# Patient Record
Sex: FEMALE | Race: WHITE | NOT HISPANIC OR LATINO | Employment: UNEMPLOYED | URBAN - NONMETROPOLITAN AREA
[De-identification: names, ages, dates, MRNs, and addresses within clinical notes are randomized per-mention and may not be internally consistent; named-entity substitution may affect disease eponyms.]

---

## 2018-01-21 ENCOUNTER — HOSPITAL ENCOUNTER (EMERGENCY)
Facility: HOSPITAL | Age: 73
Discharge: HOME | End: 2018-01-21
Attending: EMERGENCY MEDICINE | Admitting: EMERGENCY MEDICINE
Payer: MEDICARE

## 2018-01-21 VITALS
OXYGEN SATURATION: 99 % | BODY MASS INDEX: 28.25 KG/M2 | TEMPERATURE: 100.3 F | HEART RATE: 98 BPM | DIASTOLIC BLOOD PRESSURE: 69 MMHG | WEIGHT: 180 LBS | HEIGHT: 67 IN | RESPIRATION RATE: 18 BRPM | SYSTOLIC BLOOD PRESSURE: 160 MMHG
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
## 2018-01-21 DIAGNOSIS — S61.452A CAT BITE OF LEFT HAND, INITIAL ENCOUNTER: ICD-10-CM

## 2018-01-21 DIAGNOSIS — L03.114 CELLULITIS OF HAND, LEFT: Primary | ICD-10-CM

## 2018-01-21 PROCEDURE — 99283 EMERGENCY DEPT VISIT LOW MDM: CPT

## 2018-01-21 PROCEDURE — 90471 IMMUNIZATION ADMIN: CPT

## 2018-01-21 PROCEDURE — 90715 TDAP VACCINE 7 YRS/> IM: CPT | Performed by: EMERGENCY MEDICINE

## 2018-01-21 RX ORDER — AMOXICILLIN AND CLAVULANATE POTASSIUM 875; 125 MG/1; MG/1
1 TABLET, FILM COATED ORAL EVERY 12 HOURS
Qty: 14 TABLET | Refills: 0 | Status: SHIPPED | OUTPATIENT
Start: 2018-01-21 | End: 2018-02-26 | Stop reason: HOSPADM

## 2018-01-21 RX ORDER — LEVOTHYROXINE SODIUM 0.2 MG/1
200 TABLET ORAL DAILY
COMMUNITY

## 2018-01-21 RX ORDER — AZATHIOPRINE 50 MG/1
50 TABLET ORAL 3 TIMES DAILY
COMMUNITY

## 2018-01-21 RX ORDER — ERGOCALCIFEROL 1.25 MG/1
50000 CAPSULE ORAL WEEKLY
COMMUNITY
End: 2021-11-26

## 2018-01-21 RX ORDER — ALENDRONATE SODIUM 70 MG/1
70 TABLET ORAL
COMMUNITY
End: 2021-11-27

## 2018-01-21 RX ORDER — OXYCODONE HYDROCHLORIDE AND ACETAMINOPHEN 5; 325 MG/1; MG/1
1 TABLET ORAL ONCE
Status: COMPLETED | OUTPATIENT
Start: 2018-01-21 | End: 2018-01-21

## 2018-01-21 RX ORDER — AMOXICILLIN AND CLAVULANATE POTASSIUM 875; 125 MG/1; MG/1
1 TABLET, FILM COATED ORAL ONCE
Status: COMPLETED | OUTPATIENT
Start: 2018-01-21 | End: 2018-01-21

## 2018-01-21 RX ORDER — IBUPROFEN 800 MG/1
TABLET ORAL EVERY 8 HOURS PRN
COMMUNITY
End: 2018-02-26 | Stop reason: HOSPADM

## 2018-01-21 RX ORDER — ATORVASTATIN CALCIUM 40 MG/1
40 TABLET, FILM COATED ORAL DAILY
COMMUNITY
End: 2018-03-25 | Stop reason: HOSPADM

## 2018-01-21 RX ORDER — GLIPIZIDE 10 MG/1
5 TABLET ORAL
COMMUNITY
End: 2021-11-26

## 2018-01-21 RX ORDER — NAPROXEN 500 MG/1
500 TABLET ORAL 2 TIMES DAILY WITH MEALS
Qty: 14 TABLET | Refills: 0 | Status: SHIPPED | OUTPATIENT
Start: 2018-01-21 | End: 2018-02-26 | Stop reason: HOSPADM

## 2018-01-21 RX ORDER — MELATONIN
1000 DAILY
COMMUNITY

## 2018-01-21 RX ADMIN — AMOXICILLIN AND CLAVULANATE POTASSIUM 1 TABLET: 875; 125 TABLET, FILM COATED ORAL at 15:34

## 2018-01-21 RX ADMIN — TETANUS TOXOID, REDUCED DIPHTHERIA TOXOID AND ACELLULAR PERTUSSIS VACCINE, ADSORBED 0.5 ML: 5; 2.5; 8; 8; 2.5 SUSPENSION INTRAMUSCULAR at 15:53

## 2018-01-21 RX ADMIN — OXYCODONE HYDROCHLORIDE AND ACETAMINOPHEN 1 TABLET: 5; 325 TABLET ORAL at 15:34

## 2018-01-21 NOTE — DISCHARGE INSTRUCTIONS
Animal Bite   WHAT YOU NEED TO KNOW:   Animal bite injuries range from shallow cuts to deep, life-threatening wounds  An animal can cut or puncture the skin when it bites  Your skin may be torn from your body  Your skin may swell or bruise even if the bite does not break the skin  Animal bites occur more often on the hands, arms, legs, and face  Bites from dogs and cats are the most common injuries  DISCHARGE INSTRUCTIONS:   Return to the emergency department if:   · You have a fever  · Your wound is red, swollen, and draining pus  · You see red streaks on the skin around the wound  · You can no longer move the bitten area  · Your heartbeat and breathing are much faster than usual     · You feel dizzy and confused  Contact your healthcare provider if:   · Your pain does not get better, even after you take pain medicine  · You have nightmares or flashbacks about the animal bite  · You have questions or concerns about your condition or care  Medicines: You may need any of the following:  · Antibiotics  prevent or treat a bacterial infection  · Prescription pain medicine  may be given  Ask how to take this medicine safely  · A tetanus vaccine  may be needed to prevent tetanus  Tetanus is a life-threatening bacterial infection that affects the nerves and muscles  The bacteria can be spread through animal bites  · A rabies vaccine  may be needed to prevent rabies  Rabies is a life-threatening viral infection  The virus can be spread through animal bites  · Take your medicine as directed  Contact your healthcare provider if you think your medicine is not helping or if you have side effects  Tell him of her if you are allergic to any medicine  Keep a list of the medicines, vitamins, and herbs you take  Include the amounts, and when and why you take them  Bring the list or the pill bottles to follow-up visits  Carry your medicine list with you in case of an emergency    Follow up with your healthcare provider in 1 to 2 days: You may need to return to have your stitches removed  Write down your questions so you remember to ask them during your visits  Self-care:   · Apply antibiotic ointment as directed  This helps prevent infection in minor skin wounds  It is available without a doctor's order  · Keep the wound clean and covered  Wash the wound every day with soap and water or germ-killing cleanser  Ask your healthcare provider about the kinds of bandages to use  · Apply ice on your wound  Ice helps decrease swelling and pain  Ice may also help prevent tissue damage  Use an ice pack, or put crushed ice in a plastic bag  Cover it with a towel and place it on your wound for 15 to 20 minutes every hour or as directed  · Elevate the wound area  Raise your wound above the level of your heart as often as you can  This will help decrease swelling and pain  Prop your wound on pillows or blankets to keep it elevated comfortably  Prevent another animal bite:   · Learn to recognize the signs of a scared or angry pet  Avoid quick, sudden movements  · Do not step between animals that are fighting  · Do not leave a pet alone with a young child  · Do not disturb an animal while it eats, sleeps, or cares for its young  · Do not approach an animal you do not know, especially one that is tied up or caged  · Stay away from animals that seem sick or act strangely  · Do not feed or capture wild animals  © 2017 [facility] Information is for End User's use only and may not be sold, redistributed or otherwise used for commercial purposes  All illustrations and images included in CareNotes® are the copyrighted property of [employer]  or Cameron Hurtado  The above information is an  only  It is not intended as medical advice for individual conditions or treatments   Talk to your doctor, nurse or pharmacist before following any medical regimen to see if it is safe and effective for you  Cellulitis, Ambulatory Care   GENERAL INFORMATION:   Cellulitis  is a skin infection caused by bacteria  Common symptoms include the following:   · Fever    · A red, warm, swollen area on your skin    · Pain when the area is touched    · Bumps or blisters (abscess) that may drain pus    · Bumpy, raised skin that feels like an orange peel  Seek immediate care for the following symptoms:   · An increase in pain, redness, warmth, and size    · Red streaks coming from the infected area    · A thin, gray-brown discharge coming from your infected skin area    · A crackling under your skin when you touch it    · Purple dots or bumps on your skin, or bleeding under your skin    · New swelling and pain in your legs    · Sudden trouble breathing or chest pain  Treatment for cellulitis  may include medicines to treat the bacterial infection or decrease pain  The infection may need to be cleaned out  Damaged, dead, or infected tissue may need to be cut away to help your wound heal   Manage your symptoms:   · Elevate your wound above the level of your heart  as often as you can  This will help decrease swelling and pain  Prop your wound on pillows or blankets to keep it elevated comfortably  · Clean your wound as directed  You may need to wash the wound with soap and water  Look for signs of infection  · Wear pressure stockings as directed  The stockings are tight and put pressure on your legs  This improves blood flow and decreases swelling  Prevent cellulitis:   · Wash your hands often  Use soap and water  Wash your hands after you use the bathroom, change a child's diapers, or sneeze  Wash your hands before you prepare or eat food  Use lotion to prevent dry, cracked skin  · Do not share personal items, such as towels, clothing, and razors  · Clean exercise equipment  with germ-killing  before and after you use it    Follow up with your healthcare provider as directed:  Write down your questions so you remember to ask them during your visits  CARE AGREEMENT:   You have the right to help plan your care  Learn about your health condition and how it may be treated  Discuss treatment options with your caregivers to decide what care you want to receive  You always have the right to refuse treatment  The above information is an  only  It is not intended as medical advice for individual conditions or treatments  Talk to your doctor, nurse or pharmacist before following any medical regimen to see if it is safe and effective for you  © 2014 [address] is for End User's use only and may not be sold, redistributed or otherwise used for commercial purposes  All illustrations and images included in CareNotes® are the copyrighted property of [employer]  or [facility]

## 2018-01-21 NOTE — ED PROVIDER NOTES
History  Chief Complaint   Patient presents with    Animal Bite     bit on left hand approx  1 week ago  the past few days hand has become sore and red     HPI     69-year-old female was bit on her left hand by her cat who does not have rabies about 1 week ago  It started getting red and painful a couple days ago  No fevers chills or sweats  No pain in the wrist   No numbness or tingling  Is right-handed  Takes azathioprine for multiple sclerosis  No chest pain shortness of breath  No forearm pain  No recent antibiotics  Medical decision making:    Impression:  Cat bite cellulitis to left hand without crepitus or signs of abscess  No lymphangitis at this time  Warrants Augmentin and pain control  Prior to Admission Medications   Prescriptions Last Dose Informant Patient Reported? Taking? alendronate (FOSAMAX) 70 mg tablet   Yes Yes   Sig: Take 70 mg by mouth every 7 days   atorvastatin (LIPITOR) 40 mg tablet   Yes Yes   Sig: Take 40 mg by mouth daily   azaTHIOprine (IMURAN) 50 mg tablet   Yes Yes   Sig: Take 50 mg by mouth 3 (three) times a day   cholecalciferol (VITAMIN D3) 1,000 units tablet   Yes Yes   Sig: Take 1,000 Units by mouth daily   ergocalciferol (VITAMIN D2) 50,000 units   Yes Yes   Sig: Take 50,000 Units by mouth once a week   glipiZIDE (GLUCOTROL) 10 mg tablet   Yes Yes   Sig: Take 10 mg by mouth 2 (two) times a day before meals   ibuprofen (MOTRIN) 800 mg tablet   Yes Yes   Sig: Take by mouth every 8 (eight) hours as needed for mild pain   levothyroxine 200 mcg tablet   Yes Yes   Sig: Take 200 mcg by mouth daily   metFORMIN (GLUCOPHAGE) 1000 MG tablet   Yes Yes   Sig: Take 1,000 mg by mouth 2 (two) times a day with meals      Facility-Administered Medications: None       Past Medical History:   Diagnosis Date    Diabetes mellitus ([facility] [address] )     Disease of thyroid gland     Hyperlipidemia     Multiple sclerosis ([facility] [address] )        History reviewed  No pertinent surgical history      History reviewed  No pertinent family history  I have reviewed and agree with the history as documented  Social History   Substance Use Topics    Smoking status: Former Smoker     Types: Cigarettes     Quit date: 2007    Smokeless tobacco: Never Used    Alcohol use Yes      Comment: rarely        Review of Systems   Constitutional: Negative for chills, fatigue and fever  HENT: Negative for congestion, ear pain, rhinorrhea, sinus pressure, sore throat, trouble swallowing and voice change  Eyes: Negative for pain and visual disturbance  Respiratory: Negative for cough, choking, shortness of breath and stridor  Cardiovascular: Negative for chest pain, palpitations and leg swelling  Gastrointestinal: Negative for abdominal pain, blood in stool, constipation, diarrhea, nausea and vomiting  Endocrine: Negative  Genitourinary: Negative for dysuria, flank pain, hematuria, pelvic pain and urgency  Musculoskeletal: Negative  Skin: Positive for wound  Allergic/Immunologic: Negative  Neurological: Negative for dizziness, speech difficulty, weakness, light-headedness and numbness  Hematological: Negative  Psychiatric/Behavioral: Negative  Physical Exam  ED Triage Vitals [01/21/18 1505]   Temperature Pulse Respirations Blood Pressure SpO2   100 3 °F (37 9 °C) 98 18 160/69 99 %      Temp Source Heart Rate Source Patient Position - Orthostatic VS BP Location FiO2 (%)   Tympanic Monitor Sitting Right arm --      Pain Score       9           Orthostatic Vital Signs  Vitals:    01/21/18 1505   BP: 160/69   Pulse: 98   Patient Position - Orthostatic VS: Sitting       Physical Exam   Constitutional: She is oriented to person, place, and time  She appears well-developed and well-nourished  No distress  HENT:   Head: Normocephalic and atraumatic     Nose: Nose normal    Mouth/Throat: Oropharynx is clear and moist    Eyes: Conjunctivae and EOM are normal  Pupils are equal, round, and reactive to light    Neck: Normal range of motion  Neck supple  Cardiovascular: Normal rate, regular rhythm, normal heart sounds and intact distal pulses  Exam reveals no gallop and no friction rub  No murmur heard  Pulmonary/Chest: Effort normal and breath sounds normal  No stridor  No respiratory distress  She has no wheezes  She has no rales  She exhibits no tenderness  Abdominal: Soft  Bowel sounds are normal  She exhibits no distension  There is no tenderness  There is no rebound and no guarding  Musculoskeletal: Normal range of motion  She exhibits no deformity  Neurological: She is alert and oriented to person, place, and time  She exhibits normal muscle tone  Skin: Skin is warm and dry  No rash noted  No erythema  Left dorsal hand cellulitis with punctate puncture site without discharge or open wound  No lymphangitis  No fluctuance  Psychiatric: She has a normal mood and affect  Vitals reviewed        ED Medications  Medications   amoxicillin-clavulanate (AUGMENTIN) 875-125 mg per tablet 1 tablet (1 tablet Oral Given 1/21/18 1534)   oxyCODONE-acetaminophen (PERCOCET) 5-325 mg per tablet 1 tablet (1 tablet Oral Given 1/21/18 1534)   tetanus-diphtheria-acellular pertussis (BOOSTRIX) IM injection 0 5 mL (0 5 mL Intramuscular Given 1/21/18 1583)       Diagnostic Studies  Results Reviewed     None                 No orders to display              Procedures  Procedures       Phone Contacts  ED Phone Contact    ED Course  ED Course                                MDM  CritCare Time    Disposition  Final diagnoses:   Cellulitis of hand, left   Cat bite of left hand, initial encounter     Time reflects when diagnosis was documented in both MDM as applicable and the Disposition within this note     Time User Action Codes Description Comment    1/21/2018  4:31 PM Teja, Case Add [[id number]] Cellulitis of hand, left     1/21/2018  4:32 PM Teja Case Add Gabriel Wren Cat bite of left hand, initial encounter       ED Disposition     ED Disposition Condition Comment    Discharge  Gloria Felix discharge to home/self care  Condition at discharge: Good        Follow-up Information     Follow up With Specialties Details Why Contact Info    Rachel Martin DO Family Medicine Schedule an appointment as soon as possible for a visit in 3 days for re-evaluation [address] [place] [address] [address]  [phone]          Discharge Medication List as of 1/21/2018  4:34 PM      START taking these medications    Details   amoxicillin-clavulanate (AUGMENTIN) 875-125 mg per tablet Take 1 tablet by mouth every 12 (twelve) hours for 7 days, Starting Sun 1/21/2018, Until Sun 1/28/2018, Print      naproxen (NAPROSYN) 500 mg tablet Take 1 tablet by mouth 2 (two) times a day with meals for 7 days, Starting Sun 1/21/2018, Until Sun 1/28/2018, Print         CONTINUE these medications which have NOT CHANGED    Details   alendronate (FOSAMAX) 70 mg tablet Take 70 mg by mouth every 7 days, Historical Med      atorvastatin (LIPITOR) 40 mg tablet Take 40 mg by mouth daily, Historical Med      azaTHIOprine (IMURAN) 50 mg tablet Take 50 mg by mouth 3 (three) times a day, Historical Med      cholecalciferol (VITAMIN D3) 1,000 units tablet Take 1,000 Units by mouth daily, Historical Med      ergocalciferol (VITAMIN D2) 50,000 units Take 50,000 Units by mouth once a week, Historical Med      glipiZIDE (GLUCOTROL) 10 mg tablet Take 10 mg by mouth 2 (two) times a day before meals, Historical Med      ibuprofen (MOTRIN) 800 mg tablet Take by mouth every 8 (eight) hours as needed for mild pain, Historical Med      levothyroxine 200 mcg tablet Take 200 mcg by mouth daily, Historical Med      metFORMIN (GLUCOPHAGE) 1000 MG tablet Take 1,000 mg by mouth 2 (two) times a day with meals, Historical Med           No discharge procedures on file      ED Provider  Electronically Signed by           Hi Meléndez DO  01/21/18 6832

## 2018-01-28 ENCOUNTER — HOSPITAL ENCOUNTER (INPATIENT)
Facility: HOSPITAL | Age: 73
LOS: 1 days | DRG: 812 | End: 2018-01-28
Attending: EMERGENCY MEDICINE | Admitting: HOSPITALIST
Payer: MEDICARE

## 2018-01-28 ENCOUNTER — APPOINTMENT (EMERGENCY)
Dept: RADIOLOGY | Facility: HOSPITAL | Age: 73
DRG: 812 | End: 2018-01-28
Payer: MEDICARE

## 2018-01-28 ENCOUNTER — HOSPITAL ENCOUNTER (INPATIENT)
Facility: HOSPITAL | Age: 73
LOS: 29 days | Discharge: NURSING, REHAB OR LONG-TERM CARE | DRG: 853 | End: 2018-02-26
Attending: INTERNAL MEDICINE | Admitting: INTERNAL MEDICINE
Payer: MEDICARE

## 2018-01-28 VITALS
SYSTOLIC BLOOD PRESSURE: 148 MMHG | HEART RATE: 92 BPM | TEMPERATURE: 98.8 F | RESPIRATION RATE: 18 BRPM | WEIGHT: 174.2 LBS | OXYGEN SATURATION: 97 % | DIASTOLIC BLOOD PRESSURE: 65 MMHG | BODY MASS INDEX: 27.28 KG/M2

## 2018-01-28 DIAGNOSIS — D62 ACUTE BLOOD LOSS ANEMIA: ICD-10-CM

## 2018-01-28 DIAGNOSIS — R77.8 ELEVATED TROPONIN: ICD-10-CM

## 2018-01-28 DIAGNOSIS — L98.429 SACRAL ULCER (HCC): ICD-10-CM

## 2018-01-28 DIAGNOSIS — K25.9 GASTRIC ULCER: ICD-10-CM

## 2018-01-28 DIAGNOSIS — D61.818 PANCYTOPENIA (HCC): ICD-10-CM

## 2018-01-28 DIAGNOSIS — D62 ACUTE POSTHEMORRHAGIC ANEMIA: ICD-10-CM

## 2018-01-28 DIAGNOSIS — G93.41 METABOLIC ENCEPHALOPATHY: ICD-10-CM

## 2018-01-28 DIAGNOSIS — N17.9 AKI (ACUTE KIDNEY INJURY) (HCC): ICD-10-CM

## 2018-01-28 DIAGNOSIS — R53.1 WEAKNESS: ICD-10-CM

## 2018-01-28 DIAGNOSIS — K25.2 ACUTE GASTRIC ULCER WITH HEMORRHAGE AND PERFORATION (HCC): ICD-10-CM

## 2018-01-28 DIAGNOSIS — G35 MULTIPLE SCLEROSIS (HCC): ICD-10-CM

## 2018-01-28 DIAGNOSIS — K65.9 PERITONITIS (HCC): ICD-10-CM

## 2018-01-28 DIAGNOSIS — D64.9 ANEMIA: Primary | ICD-10-CM

## 2018-01-28 DIAGNOSIS — E11.65 TYPE 2 DIABETES MELLITUS WITH HYPERGLYCEMIA, UNSPECIFIED LONG TERM INSULIN USE STATUS: ICD-10-CM

## 2018-01-28 DIAGNOSIS — R19.5 HEME POSITIVE STOOL: ICD-10-CM

## 2018-01-28 DIAGNOSIS — I63.531 ACUTE RIGHT PCA STROKE (HCC): ICD-10-CM

## 2018-01-28 DIAGNOSIS — K66.8 PNEUMOPERITONEUM: ICD-10-CM

## 2018-01-28 DIAGNOSIS — R73.9 HYPERGLYCEMIA: ICD-10-CM

## 2018-01-28 DIAGNOSIS — I63.531 ACUTE ISCHEMIC RIGHT POSTERIOR CEREBRAL ARTERY (PCA) STROKE (HCC): ICD-10-CM

## 2018-01-28 DIAGNOSIS — G35 HISTORY OF MULTIPLE SCLEROSIS (HCC): ICD-10-CM

## 2018-01-28 PROBLEM — E11.9 TYPE 2 DIABETES MELLITUS (HCC): Status: ACTIVE | Noted: 2018-01-28

## 2018-01-28 LAB
ABO GROUP BLD: NORMAL
ACETONE SERPL-MCNC: NEGATIVE MG/DL
ALBUMIN SERPL BCP-MCNC: 2.3 G/DL (ref 3.5–5)
ALP SERPL-CCNC: 76 U/L (ref 46–116)
ALT SERPL W P-5'-P-CCNC: 23 U/L (ref 12–78)
ANION GAP SERPL CALCULATED.3IONS-SCNC: 16 MMOL/L (ref 4–13)
AST SERPL W P-5'-P-CCNC: 30 U/L (ref 5–45)
ATRIAL RATE: 82 BPM
BACTERIA UR QL AUTO: ABNORMAL /HPF
BASE EX.OXY STD BLDV CALC-SCNC: 81.2 % (ref 60–80)
BASE EXCESS BLDV CALC-SCNC: -6.7 MMOL/L
BASOPHILS # BLD MANUAL: 0 THOUSAND/UL (ref 0–0.1)
BASOPHILS NFR MAR MANUAL: 0 % (ref 0–1)
BILIRUB SERPL-MCNC: 1.3 MG/DL (ref 0.2–1)
BILIRUB UR QL STRIP: NEGATIVE
BLD GP AB SCN SERPL QL: NEGATIVE
BUN SERPL-MCNC: 43 MG/DL (ref 5–25)
CALCIUM SERPL-MCNC: 8.6 MG/DL (ref 8.3–10.1)
CHLORIDE SERPL-SCNC: 99 MMOL/L (ref 100–108)
CLARITY UR: CLEAR
CO2 SERPL-SCNC: 18 MMOL/L (ref 21–32)
COLOR UR: YELLOW
CREAT SERPL-MCNC: 1.31 MG/DL (ref 0.6–1.3)
DEPRECATED D DIMER PPP: 2742 NG/ML (FEU) (ref 0–424)
EOSINOPHIL # BLD MANUAL: 0 THOUSAND/UL (ref 0–0.4)
EOSINOPHIL NFR BLD MANUAL: 0 % (ref 0–6)
ERYTHROCYTE [DISTWIDTH] IN BLOOD BY AUTOMATED COUNT: 16.2 % (ref 11.6–15.1)
GFR SERPL CREATININE-BSD FRML MDRD: 41 ML/MIN/1.73SQ M
GLUCOSE SERPL-MCNC: 586 MG/DL (ref 65–140)
GLUCOSE UR STRIP-MCNC: ABNORMAL MG/DL
HCO3 BLDV-SCNC: 18.1 MMOL/L (ref 24–30)
HCT VFR BLD AUTO: 10 % (ref 34.8–46.1)
HGB BLD-MCNC: 3.2 G/DL (ref 11.5–15.4)
HGB UR QL STRIP.AUTO: ABNORMAL
INR PPP: 1.48 (ref 0.86–1.16)
KETONES UR STRIP-MCNC: NEGATIVE MG/DL
LEUKOCYTE ESTERASE UR QL STRIP: ABNORMAL
LYMPHOCYTES # BLD AUTO: 0.95 THOUSAND/UL (ref 0.6–4.47)
LYMPHOCYTES # BLD AUTO: 17 % (ref 14–44)
MCH RBC QN AUTO: 36.4 PG (ref 26.8–34.3)
MCHC RBC AUTO-ENTMCNC: 32 G/DL (ref 31.4–37.4)
MCV RBC AUTO: 114 FL (ref 82–98)
MONOCYTES # BLD AUTO: 0.39 THOUSAND/UL (ref 0–1.22)
MONOCYTES NFR BLD: 7 % (ref 4–12)
MYELOCYTES NFR BLD MANUAL: 1 % (ref 0–1)
NEUTROPHILS # BLD MANUAL: 4.17 THOUSAND/UL (ref 1.85–7.62)
NEUTS BAND NFR BLD MANUAL: 1 % (ref 0–8)
NEUTS SEG NFR BLD AUTO: 74 % (ref 43–75)
NITRITE UR QL STRIP: NEGATIVE
NON-SQ EPI CELLS URNS QL MICRO: ABNORMAL /HPF
NT-PROBNP SERPL-MCNC: 4178 PG/ML
O2 CT BLDV-SCNC: 4.2 ML/DL
OTHER STN SPEC: ABNORMAL
P AXIS: -6 DEGREES
PCO2 BLDV: 32.1 MM HG (ref 42–50)
PH BLDV: 7.37 [PH] (ref 7.3–7.4)
PH UR STRIP.AUTO: 5.5 [PH] (ref 4.5–8)
PLATELET # BLD AUTO: 59 THOUSANDS/UL (ref 149–390)
PMV BLD AUTO: 10.6 FL (ref 8.9–12.7)
PO2 BLDV: 54.1 MM HG (ref 35–45)
POTASSIUM SERPL-SCNC: 5.6 MMOL/L (ref 3.5–5.3)
PR INTERVAL: 144 MS
PROT SERPL-MCNC: 5.4 G/DL (ref 6.4–8.2)
PROT UR STRIP-MCNC: NEGATIVE MG/DL
PROTHROMBIN TIME: 17.9 SECONDS (ref 12.1–14.4)
QRS AXIS: -31 DEGREES
QRSD INTERVAL: 100 MS
QT INTERVAL: 364 MS
QTC INTERVAL: 425 MS
RBC # BLD AUTO: 0.88 MILLION/UL (ref 3.81–5.12)
RBC #/AREA URNS AUTO: ABNORMAL /HPF
RH BLD: POSITIVE
SODIUM SERPL-SCNC: 133 MMOL/L (ref 136–145)
SP GR UR STRIP.AUTO: 1.01 (ref 1–1.03)
SPECIMEN EXPIRATION DATE: NORMAL
T WAVE AXIS: 14 DEGREES
TOTAL CELLS COUNTED SPEC: 100
TROPONIN I SERPL-MCNC: 0.15 NG/ML
TSH SERPL DL<=0.05 MIU/L-ACNC: 0.28 UIU/ML (ref 0.36–3.74)
UROBILINOGEN UR QL STRIP.AUTO: 0.2 E.U./DL
VENTRICULAR RATE: 82 BPM
WBC # BLD AUTO: 5.56 THOUSAND/UL (ref 4.31–10.16)
WBC #/AREA URNS AUTO: ABNORMAL /HPF

## 2018-01-28 PROCEDURE — 84443 ASSAY THYROID STIM HORMONE: CPT | Performed by: EMERGENCY MEDICINE

## 2018-01-28 PROCEDURE — 85610 PROTHROMBIN TIME: CPT | Performed by: EMERGENCY MEDICINE

## 2018-01-28 PROCEDURE — 85027 COMPLETE CBC AUTOMATED: CPT | Performed by: EMERGENCY MEDICINE

## 2018-01-28 PROCEDURE — 86850 RBC ANTIBODY SCREEN: CPT | Performed by: EMERGENCY MEDICINE

## 2018-01-28 PROCEDURE — 86900 BLOOD TYPING SEROLOGIC ABO: CPT | Performed by: EMERGENCY MEDICINE

## 2018-01-28 PROCEDURE — 82805 BLOOD GASES W/O2 SATURATION: CPT | Performed by: EMERGENCY MEDICINE

## 2018-01-28 PROCEDURE — 81001 URINALYSIS AUTO W/SCOPE: CPT | Performed by: EMERGENCY MEDICINE

## 2018-01-28 PROCEDURE — 71046 X-RAY EXAM CHEST 2 VIEWS: CPT

## 2018-01-28 PROCEDURE — 85379 FIBRIN DEGRADATION QUANT: CPT | Performed by: EMERGENCY MEDICINE

## 2018-01-28 PROCEDURE — 86920 COMPATIBILITY TEST SPIN: CPT

## 2018-01-28 PROCEDURE — 36430 TRANSFUSION BLD/BLD COMPNT: CPT

## 2018-01-28 PROCEDURE — 36415 COLL VENOUS BLD VENIPUNCTURE: CPT | Performed by: EMERGENCY MEDICINE

## 2018-01-28 PROCEDURE — 93010 ELECTROCARDIOGRAM REPORT: CPT | Performed by: INTERNAL MEDICINE

## 2018-01-28 PROCEDURE — P9021 RED BLOOD CELLS UNIT: HCPCS

## 2018-01-28 PROCEDURE — 86901 BLOOD TYPING SEROLOGIC RH(D): CPT | Performed by: EMERGENCY MEDICINE

## 2018-01-28 PROCEDURE — 82009 KETONE BODYS QUAL: CPT | Performed by: EMERGENCY MEDICINE

## 2018-01-28 PROCEDURE — 84439 ASSAY OF FREE THYROXINE: CPT | Performed by: EMERGENCY MEDICINE

## 2018-01-28 PROCEDURE — 93005 ELECTROCARDIOGRAM TRACING: CPT | Performed by: EMERGENCY MEDICINE

## 2018-01-28 PROCEDURE — 82272 OCCULT BLD FECES 1-3 TESTS: CPT

## 2018-01-28 PROCEDURE — 99291 CRITICAL CARE FIRST HOUR: CPT

## 2018-01-28 PROCEDURE — 80053 COMPREHEN METABOLIC PANEL: CPT | Performed by: EMERGENCY MEDICINE

## 2018-01-28 PROCEDURE — 83880 ASSAY OF NATRIURETIC PEPTIDE: CPT | Performed by: EMERGENCY MEDICINE

## 2018-01-28 PROCEDURE — 85007 BL SMEAR W/DIFF WBC COUNT: CPT | Performed by: EMERGENCY MEDICINE

## 2018-01-28 PROCEDURE — 84484 ASSAY OF TROPONIN QUANT: CPT | Performed by: EMERGENCY MEDICINE

## 2018-01-28 RX ORDER — LEVOTHYROXINE SODIUM 0.1 MG/1
200 TABLET ORAL
Status: DISCONTINUED | OUTPATIENT
Start: 2018-01-29 | End: 2018-02-03

## 2018-01-28 RX ORDER — AZATHIOPRINE 50 MG/1
50 TABLET ORAL DAILY
Status: DISCONTINUED | OUTPATIENT
Start: 2018-01-29 | End: 2018-01-30

## 2018-01-28 RX ORDER — MELATONIN
1000 DAILY
Status: DISCONTINUED | OUTPATIENT
Start: 2018-01-29 | End: 2018-02-03

## 2018-01-28 RX ORDER — CHLORHEXIDINE GLUCONATE 0.12 MG/ML
15 RINSE ORAL EVERY 12 HOURS SCHEDULED
Status: DISCONTINUED | OUTPATIENT
Start: 2018-01-29 | End: 2018-01-29

## 2018-01-28 RX ORDER — METHYLPREDNISOLONE 4 MG/1
4 TABLET ORAL SEE ADMIN INSTRUCTIONS
COMMUNITY
End: 2018-02-26 | Stop reason: HOSPADM

## 2018-01-28 RX ORDER — GLIPIZIDE 5 MG/1
5 TABLET ORAL
Status: DISCONTINUED | OUTPATIENT
Start: 2018-01-29 | End: 2018-01-31

## 2018-01-28 RX ORDER — ERGOCALCIFEROL 1.25 MG/1
50000 CAPSULE ORAL WEEKLY
Status: DISCONTINUED | OUTPATIENT
Start: 2018-01-31 | End: 2018-02-03

## 2018-01-28 RX ORDER — ACETAMINOPHEN AND CODEINE PHOSPHATE 300; 30 MG/1; MG/1
1 TABLET ORAL EVERY 6 HOURS PRN
Status: DISCONTINUED | OUTPATIENT
Start: 2018-01-28 | End: 2018-01-29

## 2018-01-28 RX ORDER — ATORVASTATIN CALCIUM 40 MG/1
40 TABLET, FILM COATED ORAL
Status: DISCONTINUED | OUTPATIENT
Start: 2018-01-29 | End: 2018-02-01

## 2018-01-28 RX ORDER — METHYLPREDNISOLONE 4 MG/1
4 TABLET ORAL SEE ADMIN INSTRUCTIONS
Status: DISCONTINUED | OUTPATIENT
Start: 2018-01-28 | End: 2018-01-30

## 2018-01-28 RX ORDER — ACETAMINOPHEN AND CODEINE PHOSPHATE 300; 30 MG/1; MG/1
1 TABLET ORAL EVERY 6 HOURS PRN
COMMUNITY
End: 2018-02-26 | Stop reason: HOSPADM

## 2018-01-28 NOTE — ED NOTES
Pt palced on O2 via nasal cannula at 2LPM  Dr Morgan Arango made aware     Jillian Berman RN  01/28/18 5570

## 2018-01-28 NOTE — ED PROVIDER NOTES
History  Chief Complaint   Patient presents with    Weakness - Generalized     Asimn was bit by a cat last Sunday, was put on augmentin and naproxen  Since then, patient has been getting more weak  68-year-old female patient presents emergency department for evaluation fatigue  The patient states that she had been bitten by a cat last week, was treated for probable pasteurella infection and cellulitis doing better but then has become increasingly more weak  The patient does have a history of relapsing and remitting multiple sclerosis  Current, the patient is lying in bed, pale, alert and oriented but ill-appearing  The patient has no chest pains, shortness of breath, nausea, vomiting  Patient does have adventitious breath sounds bilaterally most consistent with crackles suggestive of congestive heart failure  Patient has positive hepatic jugular reflex  The patient does have bilateral 4+ pitting edema which he states is not normal for her  She has no history of renal dysfunction receive a history of congestive heart failure  The patient will be evaluated with a differential diagnosis to include but not be limited to congestive heart failure, non ST segment elevation myocardial infarction, renal failure, relapsing remitting MS  History provided by:  Patient   used: No    Fatigue   Severity:  Moderate  Onset quality:  Gradual  Timing:  Constant  Progression:  Worsening  Chronicity:  New  Context: recent infection    Context: not alcohol use, not change in medication, not decreased sleep, not drug use and not pinched nerve    Relieved by:  Nothing  Worsened by:  Nothing  Ineffective treatments:  None tried  Associated symptoms: no abdominal pain, no ataxia, no chest pain, no fever, no frequency, no seizures, no sensory-motor deficit and no stroke symptoms        Prior to Admission Medications   Prescriptions Last Dose Informant Patient Reported? Taking? acetaminophen-codeine (TYLENOL #3) 300-30 mg per tablet 1/28/2018 at 1300  Yes Yes   Sig: Take 1 tablet by mouth every 6 (six) hours as needed for moderate pain   alendronate (FOSAMAX) 70 mg tablet 1/27/2018 at 0500  Yes Yes   Sig: Take 70 mg by mouth every 7 days   amoxicillin-clavulanate (AUGMENTIN) 875-125 mg per tablet 1/28/2018 at 0500  No Yes   Sig: Take 1 tablet by mouth every 12 (twelve) hours for 7 days   atorvastatin (LIPITOR) 40 mg tablet 1/28/2018 at 0500  Yes Yes   Sig: Take 40 mg by mouth daily   azaTHIOprine (IMURAN) 50 mg tablet 1/28/2018 at 1300  Yes Yes   Sig: Take 50 mg by mouth 3 (three) times a day   cholecalciferol (VITAMIN D3) 1,000 units tablet 1/28/2018 at 0500  Yes Yes   Sig: Take 1,000 Units by mouth daily   ergocalciferol (VITAMIN D2) 50,000 units 1/24/2018 at 0500  Yes Yes   Sig: Take 50,000 Units by mouth once a week wednesday    glipiZIDE (GLUCOTROL) 10 mg tablet 1/28/2018 at 0500  Yes Yes   Sig: Take 5 mg by mouth 2 (two) times a day before meals     ibuprofen (MOTRIN) 800 mg tablet 1/28/2018 at 1300  Yes Yes   Sig: Take by mouth every 8 (eight) hours as needed for mild pain   levothyroxine 200 mcg tablet 1/27/2018 at 0500  Yes Yes   Sig: Take 200 mcg by mouth daily Skip Sundays    metFORMIN (GLUCOPHAGE) 1000 MG tablet 1/28/2018 at 0500  Yes Yes   Sig: Take 1,000 mg by mouth 2 (two) times a day with meals   methylprednisolone (MEDROL) 4 mg tablet 1/27/2018 at Unknown time  Yes Yes   Sig: Take 4 mg by mouth see administration instructions   naproxen (NAPROSYN) 500 mg tablet 1/28/2018 at 0500  No Yes   Sig: Take 1 tablet by mouth 2 (two) times a day with meals for 7 days      Facility-Administered Medications: None       Past Medical History:   Diagnosis Date    [facility] mellitus ([facility]     Disease of thyroid gland     Hyperlipidemia     Multiple sclerosis ([facility] )        Past Surgical History:   Procedure Laterality Date    MN LAP,DIAGNOSTIC ABDOMEN N/A 1/29/2018    Procedure: LAPAROSCOPY DIAGNOSTIC, with repair of perforated gastric ulcer;  Surgeon: Renee Rush MD;  Location: [facility];  Service: General       History reviewed  No pertinent family history  I have reviewed and agree with the history as documented  Social History   Substance Use Topics    Smoking status: Former Smoker     Types: Cigarettes     Quit date: 2007    Smokeless tobacco: Never Used    Alcohol use Yes      Comment: rarely        Review of Systems   Constitutional: Positive for fatigue  Negative for fever  Cardiovascular: Negative for chest pain  Gastrointestinal: Negative for abdominal pain  Genitourinary: Negative for frequency  Neurological: Negative for seizures  All other systems reviewed and are negative  Physical Exam  ED Triage Vitals   Temperature Pulse Respirations Blood Pressure SpO2   01/28/18 1655 01/28/18 1655 01/28/18 1655 01/28/18 1700 01/28/18 1655   98 9 °F (37 2 °C) 87 18 131/55 100 %      Temp Source Heart Rate Source Patient Position - Orthostatic VS BP Location FiO2 (%)   01/28/18 1655 01/28/18 1655 01/28/18 1655 01/28/18 1655 --   Temporal Monitor Sitting Left arm       Pain Score       01/28/18 1655       No Pain           Orthostatic Vital Signs  Vitals:    01/28/18 2046 01/28/18 2116 01/28/18 2130 01/28/18 2156   BP: 125/55 142/60 156/60 148/65   Pulse: 92 88 90 92   Patient Position - Orthostatic VS:    Lying       Physical Exam   Constitutional: She is oriented to person, place, and time  She appears well-developed and well-nourished  HENT:   Head: Normocephalic and atraumatic  Right Ear: External ear normal    Left Ear: External ear normal    Eyes: Conjunctivae and EOM are normal    Neck: No JVD present  No tracheal deviation present  No thyromegaly present  Cardiovascular: Normal rate  Pulmonary/Chest: Effort normal  No stridor  She has wheezes  She has rales  Abdominal: Soft  She exhibits no distension and no mass  There is no tenderness   There is no guarding  No hernia  Musculoskeletal: Normal range of motion  She exhibits edema  She exhibits no tenderness or deformity  Lymphadenopathy:     She has no cervical adenopathy  Neurological: She is alert and oriented to person, place, and time  Skin: Skin is warm  No rash noted  No erythema  No pallor  Psychiatric: She has a normal mood and affect  Her behavior is normal    Nursing note and vitals reviewed  ED Medications  Medications - No data to display    Diagnostic Studies  Results Reviewed     Procedure Component Value Units Date/Time    T4, free [[id number]]  (Normal) Collected:  01/28/18 1745    Lab Status:  Final result Specimen:  Blood from Arm, Right Updated:  01/29/18 1039     Free T4 1 24 ng/dL     Urine Microscopic [[id number]]  (Abnormal) Collected:  01/28/18 1857    Lab Status:  Final result Specimen:  Urine from Urine, Clean Catch Updated:  01/1945     RBC, UA 1-2 (A) /hpf      WBC, UA 4-10 (A) /hpf      Epithelial Cells Moderate (A) /hpf      Bacteria, UA Occasional /hpf      OTHER OBSERVATIONS Yeast Cells Present    CBC and differential [[id number]]  (Abnormal) Collected:  01/28/18 1816    Lab Status:  Final result Specimen:  Blood from Arm, Right Updated:  01/28/18 1911     WBC 5 56 Thousand/uL      RBC 0 88 (L) Million/uL      Hemoglobin 3 2 (LL) g/dL      Hematocrit 10 0 (L) %       (H) fL      MCH 36 4 (H) pg      MCHC 32 0 g/dL      RDW 16 2 (H) %      MPV 10 6 fL      Platelets 59 (L) Thousands/uL     UA w Reflex to Microscopic w Reflex to Culture [[id number]]  (Abnormal) Collected:  01/28/18 1857    Lab Status:  Final result Specimen:  Urine from Urine, Clean Catch Updated:  01/28/18 1910     Color, UA Yellow     Clarity, UA Clear     Specific Gravity, UA 1 010     pH, UA 5 5     Leukocytes, UA Elevated glucose may cause decreased leukocyte values   See urine microscopic for [facility] result/ (A)     Nitrite, UA Negative     Protein, UA Negative mg/dl      Glucose, UA >=1000 (1%) (A) mg/dl      Ketones, UA Negative mg/dl      Urobilinogen, UA 0 2 E U /dl      Bilirubin, UA Negative     Blood, UA Trace-Intact    Acetone [[id number]]  (Normal) Collected:  01/28/18 1837    Lab Status:  Final result Specimen:  Blood from Arm, Left Updated:  01/28/18 1902     Acetone, Bld Negative    Blood gas, venous [[id number]]  (Abnormal) Collected:  01/28/18 1837    Lab Status:  Final result Specimen:  Blood from Arm, Left Updated:  01/28/18 1852     pH, Gómez 7 370     pCO2, Gómez 32 1 (L) mm Hg      pO2, Gómez 54 1 (H) mm Hg      HCO3, Gómez 18 1 (L) mmol/L      Base Excess, Gómez -6 7 mmol/L      O2 Content, Gómez 4 2 ml/dL      O2 HGB, VENOUS 81 2 (H) %     Troponin I [[id number]]  (Abnormal) Collected:  01/28/18 1745    Lab Status:  Final result Specimen:  Blood from Arm, Right Updated:  01/28/18 1830     Troponin I 0 15 (HH) ng/mL     Narrative:         [employer] Chemistry analyzer 99% cutoff is > 0 04 ng/mL in network labs    o cTnI 99% cutoff is useful only when applied to patients in the clinical setting of myocardial ischemia  o cTnI 99% cutoff should be interpreted in the context of clinical history, ECG findings and possibly cardiac imaging to establish correct diagnosis  o cTnI 99% cutoff may be suggestive but clearly not indicative of a coronary event without the clinical setting of myocardial ischemia      Comprehensive metabolic panel [[id number]]  (Abnormal) Collected:  01/28/18 1745    Lab Status:  Final result Specimen:  Blood from Arm, Right Updated:  01/28/18 1825     Sodium 133 (L) mmol/L      Potassium 5 6 (H) mmol/L      Chloride 99 (L) mmol/L      CO2 18 (L) mmol/L      Anion Gap 16 (H) mmol/L      BUN 43 (H) mg/dL      Creatinine 1 31 (H) mg/dL      Glucose 586 (HH) mg/dL      Calcium 8 6 mg/dL      AST 30 U/L      ALT 23 U/L      Alkaline Phosphatase 76 U/L      Total Protein 5 4 (L) g/dL      Albumin 2 3 (L) g/dL      Total Bilirubin 1 30 (H) mg/dL      eGFR 41 ml/min/1 73sq m     Narrative: National Kidney Disease Education Program recommendations are as follows:  GFR calculation is accurate only with a steady state creatinine  Chronic Kidney disease less than 60 ml/min/1 73 sq  meters  Kidney failure less than 15 ml/min/1 73 sq  meters  TSH, 3rd generation with T4 reflex [[id number]]  (Abnormal) Collected:  01/28/18 1745    Lab Status:  Final result Specimen:  Blood from Arm, Right Updated:  01/28/18 1822     TSH 3RD GENERATON 0 283 (L) uIU/mL     Narrative:         Patients undergoing fluorescein dye angiography may retain small amounts of fluorescein in the body for 48-72 hours post procedure  Samples containing fluorescein can produce falsely depressed TSH values  If the patient had this procedure,a specimen should be resubmitted post fluorescein clearance  The recommended reference ranges for TSH during pregnancy are as follows:  First trimester 0 1 to 2 5 uIU/mL  Second trimester  0 2 to 3 0 uIU/mL  Third trimester 0 3 to 3 0 uIU/m      BNP [[id number]]  (Abnormal) Collected:  01/28/18 1745    Lab Status:  Final result Specimen:  Blood from Arm, Right Updated:  01/28/18 1822     NT-proBNP 4,178 (H) pg/mL     D-dimer, quantitative [[id number]]  (Abnormal) Collected:  01/28/18 1745    Lab Status:  Final result Specimen:  Blood from Arm, Right Updated:  01/28/18 1818     D-Dimer, Quant 2,742 (H) ng/ml (FEU)     Protime-INR [[id number]]  (Abnormal) Collected:  01/28/18 1745    Lab Status:  Final result Specimen:  Blood from Arm, Right Updated:  01/28/18 1818     Protime 17 9 (H) seconds      INR 1 48 (H)                 XR chest 2 views   Final Result by KATE King MD (01/29 2936)      No active pulmonary disease                      Workstation performed: [id number]                    Procedures  Procedures       Phone Contacts  ED Phone Contact    ED Course  ED Course as of Jan 30 1658   Lisa Estrada Jan 28, 2018   1923 Hemoglobin: (!!) 3 2                               MDM  Number of Diagnoses or Management Options  Anemia: new and requires workup  Elevated troponin: new and requires workup  Heme positive stool: new and requires workup  Hyperglycemia: new and requires workup  Weakness: new and requires workup     Amount and/or Complexity of Data Reviewed  Clinical lab tests: ordered and reviewed  Tests in the radiology section of CPT®: ordered and reviewed  Decide to obtain previous medical records or to obtain history from someone other than the patient: yes  Review and summarize past medical records: yes  Discuss the patient with other providers: yes  Independent visualization of images, tracings, or specimens: yes    Patient Progress  Patient progress: improved    The patient presented with a condition in which there was a high probability of imminent or life-threatening deterioration, and critical care services (excluding separately billable procedures) totalled 30-74 minutes  Disposition  Final diagnoses:   Anemia   Elevated troponin   Weakness   Hyperglycemia   Heme positive stool     Time reflects when diagnosis was documented in both MDM as applicable and the Disposition within this note     Time User Action Codes Description Comment    1/28/2018  7:26 PM Rozann Quiet Add [D64 9] Anemia     1/28/2018  7:27 PM Rozann Quiet Add [R74 8] Elevated troponin     1/28/2018  7:27 PM Rozann Quiet Add [R53 1] Weakness     1/28/2018  7:27 PM Rozann Quiet Add [R73 9] Hyperglycemia     1/28/2018  7:27 PM Rozann Quiet Add [R19 5] Heme positive stool       ED Disposition     ED Disposition Condition Comment    Admit  Case was discussed with Dr Merline Richters and the patient's admission status was agreed to be Admission Status: inpatient status to the service of Dr Merline Richters MD [facility]   Accepting Physician  Sue Holm MD, Dr      RN Documentation    Aline Sutherland Most Recent Value   Accepting Facility Name, [address] [place] [address]   Bed Assignment  ICU room 13   Report Given to  Franci Caraballo RN      Follow-up Information    None       Discharge Medication List as of 1/28/2018 10:33 PM      CONTINUE these medications which have NOT CHANGED    Details   acetaminophen-codeine (TYLENOL #3) 300-30 mg per tablet Take 1 tablet by mouth every 6 (six) hours as needed for moderate pain, Historical Med      alendronate (FOSAMAX) 70 mg tablet Take 70 mg by mouth every 7 days, Historical Med      amoxicillin-clavulanate (AUGMENTIN) 875-125 mg per tablet Take 1 tablet by mouth every 12 (twelve) hours for 7 days, Starting Sun 1/21/2018, Until Sun 1/28/2018, Print      atorvastatin (LIPITOR) 40 mg tablet Take 40 mg by mouth daily, Historical Med      azaTHIOprine (IMURAN) 50 mg tablet Take 50 mg by mouth 3 (three) times a day, Historical Med      cholecalciferol (VITAMIN D3) 1,000 units tablet Take 1,000 Units by mouth daily, Historical Med      ergocalciferol (VITAMIN D2) 50,000 units Take 50,000 Units by mouth once a week wednesday , Historical Med      glipiZIDE (GLUCOTROL) 10 mg tablet Take 5 mg by mouth 2 (two) times a day before meals  , Historical Med      ibuprofen (MOTRIN) 800 mg tablet Take by mouth every 8 (eight) hours as needed for mild pain, Historical Med      levothyroxine 200 mcg tablet Take 200 mcg by mouth daily Skip Sundays , Historical Med      metFORMIN (GLUCOPHAGE) 1000 MG tablet Take 1,000 mg by mouth 2 (two) times a day with meals, Historical Med      methylprednisolone (MEDROL) 4 mg tablet Take 4 mg by mouth see administration instructions, Historical Med      naproxen (NAPROSYN) 500 mg tablet Take 1 tablet by mouth 2 (two) times a day with meals for 7 days, Starting Sun 1/21/2018, Until Sun 1/28/2018, Print           No discharge procedures on file      ED Provider  Electronically Signed by           Carolina Clarke DO  01/30/18 0296

## 2018-01-29 ENCOUNTER — ANESTHESIA EVENT (INPATIENT)
Dept: PERIOP | Facility: HOSPITAL | Age: 73
DRG: 853 | End: 2018-01-29
Payer: MEDICARE

## 2018-01-29 ENCOUNTER — APPOINTMENT (INPATIENT)
Dept: CT IMAGING | Facility: HOSPITAL | Age: 73
DRG: 853 | End: 2018-01-29
Payer: MEDICARE

## 2018-01-29 ENCOUNTER — ANESTHESIA (INPATIENT)
Dept: PERIOP | Facility: HOSPITAL | Age: 73
DRG: 853 | End: 2018-01-29
Payer: MEDICARE

## 2018-01-29 PROBLEM — K25.9 GASTRIC ULCER: Status: ACTIVE | Noted: 2018-01-28

## 2018-01-29 PROBLEM — E88.09 HYPOALBUMINEMIA DUE TO PROTEIN-CALORIE MALNUTRITION (HCC): Status: ACTIVE | Noted: 2018-01-29

## 2018-01-29 PROBLEM — K66.8 PNEUMOPERITONEUM: Status: ACTIVE | Noted: 2018-01-28

## 2018-01-29 PROBLEM — E46 HYPOALBUMINEMIA DUE TO PROTEIN-CALORIE MALNUTRITION (HCC): Status: ACTIVE | Noted: 2018-01-29

## 2018-01-29 LAB
ABO GROUP BLD BPU: NORMAL
ABO GROUP BLD BPU: NORMAL
ABO GROUP BLD: NORMAL
ANION GAP SERPL CALCULATED.3IONS-SCNC: 7 MMOL/L (ref 4–13)
ANION GAP SERPL CALCULATED.3IONS-SCNC: 8 MMOL/L (ref 4–13)
ANISOCYTOSIS BLD QL SMEAR: PRESENT
BASE EXCESS BLDA CALC-SCNC: -2 MMOL/L (ref -2–3)
BASE EXCESS BLDA CALC-SCNC: -4 MMOL/L (ref -2–3)
BASOPHILS # BLD MANUAL: 0 THOUSAND/UL (ref 0–0.1)
BASOPHILS NFR MAR MANUAL: 0 % (ref 0–1)
BLD GP AB SCN SERPL QL: NEGATIVE
BPU ID: NORMAL
BPU ID: NORMAL
BUN SERPL-MCNC: 44 MG/DL (ref 5–25)
BUN SERPL-MCNC: 46 MG/DL (ref 5–25)
CA-I BLD-SCNC: 1.12 MMOL/L (ref 1.12–1.32)
CA-I BLD-SCNC: 1.15 MMOL/L (ref 1.12–1.32)
CALCIUM SERPL-MCNC: 8.2 MG/DL (ref 8.3–10.1)
CALCIUM SERPL-MCNC: 8.4 MG/DL (ref 8.3–10.1)
CHLORIDE SERPL-SCNC: 103 MMOL/L (ref 100–108)
CHLORIDE SERPL-SCNC: 106 MMOL/L (ref 100–108)
CO2 SERPL-SCNC: 23 MMOL/L (ref 21–32)
CO2 SERPL-SCNC: 25 MMOL/L (ref 21–32)
CREAT SERPL-MCNC: 1.13 MG/DL (ref 0.6–1.3)
CREAT SERPL-MCNC: 1.23 MG/DL (ref 0.6–1.3)
EOSINOPHIL # BLD MANUAL: 0 THOUSAND/UL (ref 0–0.4)
EOSINOPHIL NFR BLD MANUAL: 0 % (ref 0–6)
ERYTHROCYTE [DISTWIDTH] IN BLOOD BY AUTOMATED COUNT: 17.8 % (ref 11.6–15.1)
ERYTHROCYTE [DISTWIDTH] IN BLOOD BY AUTOMATED COUNT: 19.2 % (ref 11.6–15.1)
ERYTHROCYTE [DISTWIDTH] IN BLOOD BY AUTOMATED COUNT: 22.6 % (ref 11.6–15.1)
GFR SERPL CREATININE-BSD FRML MDRD: 44 ML/MIN/1.73SQ M
GFR SERPL CREATININE-BSD FRML MDRD: 49 ML/MIN/1.73SQ M
GLUCOSE SERPL-MCNC: 328 MG/DL (ref 65–140)
GLUCOSE SERPL-MCNC: 335 MG/DL (ref 65–140)
GLUCOSE SERPL-MCNC: 393 MG/DL (ref 65–140)
GLUCOSE SERPL-MCNC: 403 MG/DL (ref 65–140)
GLUCOSE SERPL-MCNC: 497 MG/DL (ref 65–140)
GLUCOSE SERPL-MCNC: 512 MG/DL (ref 65–140)
HCO3 BLDA-SCNC: 22.6 MMOL/L (ref 22–28)
HCO3 BLDA-SCNC: 23.5 MMOL/L (ref 22–28)
HCT VFR BLD AUTO: 14 % (ref 34.8–46.1)
HCT VFR BLD AUTO: 22.8 % (ref 34.8–46.1)
HCT VFR BLD AUTO: 24.7 % (ref 34.8–46.1)
HCT VFR BLD AUTO: 26.9 % (ref 34.8–46.1)
HCT VFR BLD CALC: 16 % (ref 34.8–46.1)
HCT VFR BLD CALC: 19 % (ref 34.8–46.1)
HGB BLD-MCNC: 12.4 G/DL (ref 11.5–15.4)
HGB BLD-MCNC: 4.8 G/DL (ref 11.5–15.4)
HGB BLD-MCNC: 7.8 G/DL (ref 11.5–15.4)
HGB BLD-MCNC: 8.4 G/DL (ref 11.5–15.4)
HGB BLD-MCNC: 9.1 G/DL (ref 11.5–15.4)
HGB BLDA-MCNC: 5.4 G/DL (ref 11.5–15.4)
HGB BLDA-MCNC: 6.5 G/DL (ref 11.5–15.4)
INR PPP: 1.61 (ref 0.86–1.16)
LYMPHOCYTES # BLD AUTO: 0.83 THOUSAND/UL (ref 0.6–4.47)
LYMPHOCYTES # BLD AUTO: 34 % (ref 14–44)
MAGNESIUM SERPL-MCNC: 1.7 MG/DL (ref 1.6–2.6)
MCH RBC QN AUTO: 30.4 PG (ref 26.8–34.3)
MCH RBC QN AUTO: 31 PG (ref 26.8–34.3)
MCH RBC QN AUTO: 33.1 PG (ref 26.8–34.3)
MCHC RBC AUTO-ENTMCNC: 33.8 G/DL (ref 31.4–37.4)
MCHC RBC AUTO-ENTMCNC: 34.2 G/DL (ref 31.4–37.4)
MCHC RBC AUTO-ENTMCNC: 34.3 G/DL (ref 31.4–37.4)
MCV RBC AUTO: 89 FL (ref 82–98)
MCV RBC AUTO: 92 FL (ref 82–98)
MCV RBC AUTO: 97 FL (ref 82–98)
MONOCYTES # BLD AUTO: 0.12 THOUSAND/UL (ref 0–1.22)
MONOCYTES NFR BLD: 5 % (ref 4–12)
NEUTROPHILS # BLD MANUAL: 1.48 THOUSAND/UL (ref 1.85–7.62)
NEUTS BAND NFR BLD MANUAL: 24 % (ref 0–8)
NEUTS SEG NFR BLD AUTO: 37 % (ref 43–75)
NRBC BLD AUTO-RTO: 1 /100 WBC (ref 0–2)
PCO2 BLD: 24 MMOL/L (ref 21–32)
PCO2 BLD: 25 MMOL/L (ref 21–32)
PCO2 BLD: 44.8 MM HG (ref 36–44)
PCO2 BLD: 50.3 MM HG (ref 36–44)
PH BLD: 7.26 [PH] (ref 7.35–7.45)
PH BLD: 7.33 [PH] (ref 7.35–7.45)
PLATELET # BLD AUTO: 102 THOUSANDS/UL (ref 149–390)
PLATELET # BLD AUTO: 37 THOUSANDS/UL (ref 149–390)
PLATELET # BLD AUTO: 45 THOUSANDS/UL (ref 149–390)
PLATELET BLD QL SMEAR: ABNORMAL
PMV BLD AUTO: 10.5 FL (ref 8.9–12.7)
PMV BLD AUTO: 10.7 FL (ref 8.9–12.7)
PMV BLD AUTO: 11.8 FL (ref 8.9–12.7)
PO2 BLD: 461 MM HG (ref 75–129)
PO2 BLD: 543 MM HG (ref 75–129)
POTASSIUM BLD-SCNC: 4.4 MMOL/L (ref 3.5–5.3)
POTASSIUM BLD-SCNC: 4.7 MMOL/L (ref 3.5–5.3)
POTASSIUM SERPL-SCNC: 4.7 MMOL/L (ref 3.5–5.3)
POTASSIUM SERPL-SCNC: 4.9 MMOL/L (ref 3.5–5.3)
PROTHROMBIN TIME: 19.3 SECONDS (ref 12.1–14.4)
RBC # BLD AUTO: 1.45 MILLION/UL (ref 3.81–5.12)
RBC # BLD AUTO: 2.57 MILLION/UL (ref 3.81–5.12)
RBC # BLD AUTO: 2.94 MILLION/UL (ref 3.81–5.12)
RH BLD: POSITIVE
SAO2 % BLD FROM PO2: 100 % (ref 95–98)
SAO2 % BLD FROM PO2: 100 % (ref 95–98)
SODIUM BLD-SCNC: 140 MMOL/L (ref 136–145)
SODIUM BLD-SCNC: 141 MMOL/L (ref 136–145)
SODIUM SERPL-SCNC: 134 MMOL/L (ref 136–145)
SODIUM SERPL-SCNC: 138 MMOL/L (ref 136–145)
SPECIMEN EXPIRATION DATE: NORMAL
SPECIMEN SOURCE: ABNORMAL
SPECIMEN SOURCE: ABNORMAL
T4 FREE SERPL-MCNC: 1.24 NG/DL (ref 0.76–1.46)
TOTAL CELLS COUNTED SPEC: 100
UNIT DISPENSE STATUS: NORMAL
UNIT DISPENSE STATUS: NORMAL
UNIT PRODUCT CODE: NORMAL
UNIT PRODUCT CODE: NORMAL
UNIT RH: NORMAL
UNIT RH: NORMAL
WBC # BLD AUTO: 2.26 THOUSAND/UL (ref 4.31–10.16)
WBC # BLD AUTO: 2.43 THOUSAND/UL (ref 4.31–10.16)
WBC # BLD AUTO: 2.79 THOUSAND/UL (ref 4.31–10.16)

## 2018-01-29 PROCEDURE — 43659 UNLISTED LAPS PX STOMACH: CPT | Performed by: PHYSICIAN ASSISTANT

## 2018-01-29 PROCEDURE — P9021 RED BLOOD CELLS UNIT: HCPCS

## 2018-01-29 PROCEDURE — 4A133B1 MONITORING OF ARTERIAL PRESSURE, PERIPHERAL, PERCUTANEOUS APPROACH: ICD-10-PCS | Performed by: INTERNAL MEDICINE

## 2018-01-29 PROCEDURE — 99222 1ST HOSP IP/OBS MODERATE 55: CPT | Performed by: INTERNAL MEDICINE

## 2018-01-29 PROCEDURE — 82947 ASSAY GLUCOSE BLOOD QUANT: CPT

## 2018-01-29 PROCEDURE — 82948 REAGENT STRIP/BLOOD GLUCOSE: CPT

## 2018-01-29 PROCEDURE — 88341 IMHCHEM/IMCYTCHM EA ADD ANTB: CPT | Performed by: SURGERY

## 2018-01-29 PROCEDURE — 85027 COMPLETE CBC AUTOMATED: CPT | Performed by: NURSE PRACTITIONER

## 2018-01-29 PROCEDURE — P9037 PLATE PHERES LEUKOREDU IRRAD: HCPCS

## 2018-01-29 PROCEDURE — 85018 HEMOGLOBIN: CPT | Performed by: NURSE PRACTITIONER

## 2018-01-29 PROCEDURE — 85014 HEMATOCRIT: CPT | Performed by: NURSE PRACTITIONER

## 2018-01-29 PROCEDURE — 99222 1ST HOSP IP/OBS MODERATE 55: CPT | Performed by: PHYSICIAN ASSISTANT

## 2018-01-29 PROCEDURE — 85014 HEMATOCRIT: CPT

## 2018-01-29 PROCEDURE — 80048 BASIC METABOLIC PNL TOTAL CA: CPT | Performed by: NURSE PRACTITIONER

## 2018-01-29 PROCEDURE — 86850 RBC ANTIBODY SCREEN: CPT | Performed by: NURSE PRACTITIONER

## 2018-01-29 PROCEDURE — 84132 ASSAY OF SERUM POTASSIUM: CPT

## 2018-01-29 PROCEDURE — 88342 IMHCHEM/IMCYTCHM 1ST ANTB: CPT | Performed by: SURGERY

## 2018-01-29 PROCEDURE — 83735 ASSAY OF MAGNESIUM: CPT | Performed by: NURSE PRACTITIONER

## 2018-01-29 PROCEDURE — 94002 VENT MGMT INPAT INIT DAY: CPT

## 2018-01-29 PROCEDURE — 86901 BLOOD TYPING SEROLOGIC RH(D): CPT | Performed by: NURSE PRACTITIONER

## 2018-01-29 PROCEDURE — 03HY32Z INSERTION OF MONITORING DEVICE INTO UPPER ARTERY, PERCUTANEOUS APPROACH: ICD-10-PCS | Performed by: INTERNAL MEDICINE

## 2018-01-29 PROCEDURE — 0DB74ZX EXCISION OF STOMACH, PYLORUS, PERCUTANEOUS ENDOSCOPIC APPROACH, DIAGNOSTIC: ICD-10-PCS | Performed by: SURGERY

## 2018-01-29 PROCEDURE — 86920 COMPATIBILITY TEST SPIN: CPT

## 2018-01-29 PROCEDURE — 88305 TISSUE EXAM BY PATHOLOGIST: CPT | Performed by: SURGERY

## 2018-01-29 PROCEDURE — 0DU747Z SUPPLEMENT STOMACH, PYLORUS WITH AUTOLOGOUS TISSUE SUBSTITUTE, PERCUTANEOUS ENDOSCOPIC APPROACH: ICD-10-PCS | Performed by: SURGERY

## 2018-01-29 PROCEDURE — 85027 COMPLETE CBC AUTOMATED: CPT | Performed by: ANESTHESIOLOGY

## 2018-01-29 PROCEDURE — 85007 BL SMEAR W/DIFF WBC COUNT: CPT | Performed by: NURSE PRACTITIONER

## 2018-01-29 PROCEDURE — 88313 SPECIAL STAINS GROUP 2: CPT | Performed by: SURGERY

## 2018-01-29 PROCEDURE — 82330 ASSAY OF CALCIUM: CPT

## 2018-01-29 PROCEDURE — 85610 PROTHROMBIN TIME: CPT | Performed by: NURSE PRACTITIONER

## 2018-01-29 PROCEDURE — 82803 BLOOD GASES ANY COMBINATION: CPT

## 2018-01-29 PROCEDURE — 74177 CT ABD & PELVIS W/CONTRAST: CPT

## 2018-01-29 PROCEDURE — 30233N1 TRANSFUSION OF NONAUTOLOGOUS RED BLOOD CELLS INTO PERIPHERAL VEIN, PERCUTANEOUS APPROACH: ICD-10-PCS | Performed by: INTERNAL MEDICINE

## 2018-01-29 PROCEDURE — 86900 BLOOD TYPING SEROLOGIC ABO: CPT | Performed by: NURSE PRACTITIONER

## 2018-01-29 PROCEDURE — 84295 ASSAY OF SERUM SODIUM: CPT

## 2018-01-29 PROCEDURE — 4A133J1 MONITORING OF ARTERIAL PULSE, PERIPHERAL, PERCUTANEOUS APPROACH: ICD-10-PCS | Performed by: INTERNAL MEDICINE

## 2018-01-29 PROCEDURE — 43659 UNLISTED LAPS PX STOMACH: CPT | Performed by: SURGERY

## 2018-01-29 PROCEDURE — P9040 RBC LEUKOREDUCED IRRADIATED: HCPCS

## 2018-01-29 PROCEDURE — HPPLACEHOLDER PB H&P PLACEHOLDER: Performed by: PHYSICIAN ASSISTANT

## 2018-01-29 RX ORDER — SODIUM CHLORIDE 9 MG/ML
2000 INJECTION, SOLUTION INTRAVENOUS CONTINUOUS
Status: DISCONTINUED | OUTPATIENT
Start: 2018-01-29 | End: 2018-01-29

## 2018-01-29 RX ORDER — FLUCONAZOLE 2 MG/ML
400 INJECTION, SOLUTION INTRAVENOUS EVERY 24 HOURS
Status: DISCONTINUED | OUTPATIENT
Start: 2018-01-29 | End: 2018-02-05

## 2018-01-29 RX ORDER — SUCCINYLCHOLINE CHLORIDE 20 MG/ML
INJECTION INTRAMUSCULAR; INTRAVENOUS AS NEEDED
Status: DISCONTINUED | OUTPATIENT
Start: 2018-01-29 | End: 2018-01-29 | Stop reason: SURG

## 2018-01-29 RX ORDER — PROPOFOL 10 MG/ML
INJECTION, EMULSION INTRAVENOUS
Status: COMPLETED
Start: 2018-01-29 | End: 2018-01-31

## 2018-01-29 RX ORDER — HYDRALAZINE HYDROCHLORIDE 20 MG/ML
10 INJECTION INTRAMUSCULAR; INTRAVENOUS EVERY 4 HOURS PRN
Status: DISCONTINUED | OUTPATIENT
Start: 2018-01-29 | End: 2018-02-09

## 2018-01-29 RX ORDER — ROCURONIUM BROMIDE 10 MG/ML
INJECTION, SOLUTION INTRAVENOUS AS NEEDED
Status: DISCONTINUED | OUTPATIENT
Start: 2018-01-29 | End: 2018-01-29 | Stop reason: SURG

## 2018-01-29 RX ORDER — MAGNESIUM HYDROXIDE 1200 MG/15ML
LIQUID ORAL AS NEEDED
Status: DISCONTINUED | OUTPATIENT
Start: 2018-01-29 | End: 2018-01-29 | Stop reason: HOSPADM

## 2018-01-29 RX ORDER — ONDANSETRON 2 MG/ML
4 INJECTION INTRAMUSCULAR; INTRAVENOUS ONCE AS NEEDED
Status: DISCONTINUED | OUTPATIENT
Start: 2018-01-29 | End: 2018-01-29 | Stop reason: HOSPADM

## 2018-01-29 RX ORDER — LIDOCAINE HYDROCHLORIDE AND EPINEPHRINE 10; 10 MG/ML; UG/ML
INJECTION, SOLUTION INFILTRATION; PERINEURAL AS NEEDED
Status: DISCONTINUED | OUTPATIENT
Start: 2018-01-29 | End: 2018-01-29 | Stop reason: HOSPADM

## 2018-01-29 RX ORDER — MORPHINE SULFATE 2 MG/ML
1 INJECTION, SOLUTION INTRAMUSCULAR; INTRAVENOUS EVERY 4 HOURS PRN
Status: DISCONTINUED | OUTPATIENT
Start: 2018-01-29 | End: 2018-02-02

## 2018-01-29 RX ORDER — CHLORHEXIDINE GLUCONATE 0.12 MG/ML
15 RINSE ORAL EVERY 12 HOURS SCHEDULED
Status: DISCONTINUED | OUTPATIENT
Start: 2018-01-29 | End: 2018-01-31

## 2018-01-29 RX ORDER — SODIUM CHLORIDE 9 MG/ML
999 INJECTION, SOLUTION INTRAVENOUS ONCE
Status: COMPLETED | OUTPATIENT
Start: 2018-01-29 | End: 2018-01-29

## 2018-01-29 RX ORDER — MORPHINE SULFATE 2 MG/ML
1 INJECTION, SOLUTION INTRAMUSCULAR; INTRAVENOUS ONCE
Status: COMPLETED | OUTPATIENT
Start: 2018-01-29 | End: 2018-01-29

## 2018-01-29 RX ORDER — SODIUM CHLORIDE 9 MG/ML
INJECTION, SOLUTION INTRAVENOUS CONTINUOUS PRN
Status: DISCONTINUED | OUTPATIENT
Start: 2018-01-29 | End: 2018-01-29 | Stop reason: SURG

## 2018-01-29 RX ORDER — SODIUM CHLORIDE 9 MG/ML
250 INJECTION, SOLUTION INTRAVENOUS CONTINUOUS
Status: DISCONTINUED | OUTPATIENT
Start: 2018-01-29 | End: 2018-01-29

## 2018-01-29 RX ORDER — FENTANYL CITRATE 50 UG/ML
INJECTION, SOLUTION INTRAMUSCULAR; INTRAVENOUS AS NEEDED
Status: DISCONTINUED | OUTPATIENT
Start: 2018-01-29 | End: 2018-01-29 | Stop reason: SURG

## 2018-01-29 RX ORDER — DEXTROSE AND SODIUM CHLORIDE 5; .9 G/100ML; G/100ML
250 INJECTION, SOLUTION INTRAVENOUS CONTINUOUS
Status: DISCONTINUED | OUTPATIENT
Start: 2018-01-29 | End: 2018-01-29

## 2018-01-29 RX ORDER — ETOMIDATE 2 MG/ML
INJECTION INTRAVENOUS AS NEEDED
Status: DISCONTINUED | OUTPATIENT
Start: 2018-01-29 | End: 2018-01-29 | Stop reason: SURG

## 2018-01-29 RX ORDER — MEPERIDINE HYDROCHLORIDE 50 MG/ML
12.5 INJECTION INTRAMUSCULAR; INTRAVENOUS; SUBCUTANEOUS ONCE AS NEEDED
Status: DISCONTINUED | OUTPATIENT
Start: 2018-01-29 | End: 2018-01-29 | Stop reason: HOSPADM

## 2018-01-29 RX ORDER — ONDANSETRON 2 MG/ML
INJECTION INTRAMUSCULAR; INTRAVENOUS
Status: COMPLETED
Start: 2018-01-29 | End: 2018-01-29

## 2018-01-29 RX ORDER — ONDANSETRON 2 MG/ML
4 INJECTION INTRAMUSCULAR; INTRAVENOUS EVERY 4 HOURS PRN
Status: DISCONTINUED | OUTPATIENT
Start: 2018-01-29 | End: 2018-02-26 | Stop reason: HOSPADM

## 2018-01-29 RX ORDER — SODIUM CHLORIDE 9 MG/ML
500 INJECTION, SOLUTION INTRAVENOUS CONTINUOUS
Status: DISCONTINUED | OUTPATIENT
Start: 2018-01-29 | End: 2018-01-29

## 2018-01-29 RX ORDER — PROPOFOL 10 MG/ML
5-50 INJECTION, EMULSION INTRAVENOUS
Status: DISCONTINUED | OUTPATIENT
Start: 2018-01-29 | End: 2018-01-31

## 2018-01-29 RX ORDER — FENTANYL CITRATE/PF 50 MCG/ML
50 SYRINGE (ML) INJECTION
Status: DISCONTINUED | OUTPATIENT
Start: 2018-01-29 | End: 2018-01-29 | Stop reason: HOSPADM

## 2018-01-29 RX ADMIN — IODIXANOL 100 ML: 320 INJECTION, SOLUTION INTRAVASCULAR at 12:50

## 2018-01-29 RX ADMIN — GLIPIZIDE 5 MG: 5 TABLET ORAL at 09:04

## 2018-01-29 RX ADMIN — INSULIN LISPRO 5 UNITS: 100 INJECTION, SOLUTION INTRAVENOUS; SUBCUTANEOUS at 05:15

## 2018-01-29 RX ADMIN — FLUCONAZOLE 400 MG: 2 INJECTION INTRAVENOUS at 21:08

## 2018-01-29 RX ADMIN — ROCURONIUM BROMIDE 20 MG: 10 INJECTION INTRAVENOUS at 16:15

## 2018-01-29 RX ADMIN — SUCCINYLCHOLINE CHLORIDE 100 MG: 20 INJECTION, SOLUTION INTRAMUSCULAR; INTRAVENOUS at 16:03

## 2018-01-29 RX ADMIN — INSULIN LISPRO 5 UNITS: 100 INJECTION, SOLUTION INTRAVENOUS; SUBCUTANEOUS at 13:28

## 2018-01-29 RX ADMIN — ETOMIDATE 8 MG: 2 INJECTION, SOLUTION INTRAVENOUS at 16:03

## 2018-01-29 RX ADMIN — INSULIN LISPRO 5 UNITS: 100 INJECTION, SOLUTION INTRAVENOUS; SUBCUTANEOUS at 01:08

## 2018-01-29 RX ADMIN — ONDANSETRON 4 MG: 2 INJECTION INTRAMUSCULAR; INTRAVENOUS at 10:33

## 2018-01-29 RX ADMIN — HYDRALAZINE HYDROCHLORIDE 10 MG: 20 INJECTION INTRAMUSCULAR; INTRAVENOUS at 23:54

## 2018-01-29 RX ADMIN — INSULIN LISPRO 4 UNITS: 100 INJECTION, SOLUTION INTRAVENOUS; SUBCUTANEOUS at 23:07

## 2018-01-29 RX ADMIN — SODIUM CHLORIDE 999 ML/HR: 0.9 INJECTION, SOLUTION INTRAVENOUS at 11:59

## 2018-01-29 RX ADMIN — CHOLECALCIFEROL TAB 25 MCG (1000 UNIT) 1000 UNITS: 25 TAB at 09:04

## 2018-01-29 RX ADMIN — CHLORHEXIDINE GLUCONATE 15 ML: 1.2 RINSE ORAL at 01:08

## 2018-01-29 RX ADMIN — PIPERACILLIN SODIUM AND TAZOBACTAM SODIUM 3.38 G: 36; 4.5 INJECTION, POWDER, FOR SOLUTION INTRAVENOUS at 15:25

## 2018-01-29 RX ADMIN — PROPOFOL 50 MCG: 10 INJECTION, EMULSION INTRAVENOUS at 18:09

## 2018-01-29 RX ADMIN — PIPERACILLIN SODIUM AND TAZOBACTAM SODIUM 3.38 G: 36; 4.5 INJECTION, POWDER, FOR SOLUTION INTRAVENOUS at 21:07

## 2018-01-29 RX ADMIN — PROPOFOL 50 MCG/KG/MIN: 10 INJECTION, EMULSION INTRAVENOUS at 19:20

## 2018-01-29 RX ADMIN — PROPOFOL 45 MCG/KG/MIN: 10 INJECTION, EMULSION INTRAVENOUS at 21:15

## 2018-01-29 RX ADMIN — HYDROMORPHONE HYDROCHLORIDE 0.5 MG: 1 INJECTION, SOLUTION INTRAMUSCULAR; INTRAVENOUS; SUBCUTANEOUS at 14:26

## 2018-01-29 RX ADMIN — HYDROMORPHONE HYDROCHLORIDE 0.5 MG: 1 INJECTION, SOLUTION INTRAMUSCULAR; INTRAVENOUS; SUBCUTANEOUS at 19:19

## 2018-01-29 RX ADMIN — MORPHINE SULFATE 1 MG: 2 INJECTION, SOLUTION INTRAMUSCULAR; INTRAVENOUS at 10:19

## 2018-01-29 RX ADMIN — SODIUM CHLORIDE: 0.9 INJECTION, SOLUTION INTRAVENOUS at 17:09

## 2018-01-29 RX ADMIN — LEVOTHYROXINE SODIUM 200 MCG: 100 TABLET ORAL at 05:08

## 2018-01-29 RX ADMIN — FENTANYL CITRATE 100 MCG: 50 INJECTION INTRAMUSCULAR; INTRAVENOUS at 16:03

## 2018-01-29 RX ADMIN — MORPHINE SULFATE 1 MG: 2 INJECTION, SOLUTION INTRAMUSCULAR; INTRAVENOUS at 23:55

## 2018-01-29 RX ADMIN — SODIUM CHLORIDE: 0.9 INJECTION, SOLUTION INTRAVENOUS at 16:02

## 2018-01-29 RX ADMIN — CHLORHEXIDINE GLUCONATE 15 ML: 1.2 RINSE ORAL at 21:07

## 2018-01-29 NOTE — PROCEDURES
Arterial Line Insertion  Date/Time: 1/29/2018 3:39 PM  Performed by: Zenia Villa, Shelley Lomas by: Bryant Irby     Patient location:  Bedside  Consent:     Consent obtained:  Verbal    Consent given by:  Patient    Risks discussed:  Bleeding, infection, ischemia, pain and repeat procedure  Universal protocol:     Procedure explained and questions answered to patient or proxy's satisfaction: yes      Relevant documents present and verified: yes      Test results available and properly labeled: yes      Imaging studies available: no      Required blood products, implants, devices, and special equipment available: no      Site/side marked: yes      Immediately prior to procedure a time out was called: yes      Patient identity confirmed:  Verbally with patient  Indications:     Indications: hemodynamic monitoring    Pre-procedure details:     Skin preparation:  Chlorhexidine  Procedure details:     Location / Tip of Catheter:  Radial    Laterality:  Right    Needle gauge:  18 G    Placement technique:  Percutaneous    Number of attempts:  1    Successful placement: yes      Transducer: waveform confirmed    Post-procedure details:     Post-procedure:  Secured with tape, sterile dressing applied and sutured    Patient tolerance of procedure:   Tolerated well, no immediate complications

## 2018-01-29 NOTE — H&P
History & Physical Exam - Critical Care   Iveth Méndez 67 y o  female MRN: [id number]  Unit/Bed#: ICU 11 Encounter: [id number]      Assessment/Plan:  1  Chronic blood loss anemia  · Given the patient's significantly decreased hemoglobin we will admit her to the step-down unit for evaluation and monitoring  This will require greater than a 2 midnight stay therefore the patient will be placed in inpatient status  · The patient did receive 1 unit of packed red blood cells at [address] [address]   We will repeat the patient's hemoglobin and transfuse as needed to maintain a hemoglobin level greater than 7   · She will need a GI evaluation to determine if she has an active GI bleeding source  2  Diabetes mellitus with hyperglycemia  · Will continue the patient on sliding scale and Lantus insulin with a goal to maintain her blood glucose between 140 and 180  3  BEVERLY on unknown chronic kidney disease  · We will monitor the patient's renal indices and urine output closely  4  Multiple sclerosis- relapsing and relenting  · We will continue the patient on her outpatient Imuran  5  Hypothyroid  · Will continue synthroid  6  Hypoalbuminemia secondary to protein calorie malnutrition  7  Hyperkalemia-this is likely related to her metabolic derangements associated with her hyperglycemia      Critical Care Time:   Documented critical care time excludes any procedures documented elsewhere  It also excludes any family updates    _____________________________________________________________________      HPI:    Iveth Méndez is a 67 y o  female who [place] [facility] [place] [facility] [place] for fatigue and found to have hemoglobin of 3 2  The patient states she has had chronic upper abdominal pain for about 1 month  She denies nausea or vomiting  She denies diarrhea or constipation  She denies dizziness, lightheadedness    Review of Systems:    Full 14 point review of systems was performed   Aside from what was mentioned in the HPI, it is otherwise negative  Historical Information   Past Medical History:   Diagnosis Date    [facility] mellitus ([facility] )     Disease of thyroid gland     Hyperlipidemia     Multiple sclerosis ([facility] )      History reviewed  No pertinent surgical history  Social History   History   Alcohol Use    Yes     Comment: rarely     History   Drug Use No     History   Smoking Status    Former Smoker    Types: Cigarettes    Quit date: 2007   Smokeless Tobacco    Never Used       Family History:   History reviewed  No pertinent family history  Medications:  Pertinent medications were reviewed    atorvastatin 40 mg Oral Daily With Dinner   azaTHIOprine 50 mg Oral Daily   chlorhexidine 15 mL Swish [employer] Spit Q12H [address]   cholecalciferol 1,000 Units Oral Daily   [START ON 1/31/2018] ergocalciferol 50,000 Units Oral Weekly   glipiZIDE 5 mg Oral BID AC   insulin lispro 1-5 Units Subcutaneous Q6H [address]   levothyroxine 200 mcg Oral Early Morning   methylprednisolone 4 mg Oral See Admin Instructions         No Known Allergies      Vitals:   /56   Pulse 82   Temp 98 7 °F (37 1 °C)   Resp 12   Ht 5' 7" (1 702 m)   Wt 73 3 kg (161 lb 9 6 oz)   SpO2 99%   BMI 25 31 kg/m²   Body mass index is 25 31 kg/m²    SpO2: 99 %,    ,          Intake/Output Summary (Last 24 hours) at 01/29/18 0804  Last data filed at 01/29/18 0700   Gross per 24 hour   Intake                0 ml   Output              875 ml   Net             -875 ml     Invasive Devices     Peripheral Intravenous Line            Peripheral IV 01/28/18 Left Antecubital less than 1 day    Peripheral IV 01/28/18 Right Antecubital less than 1 day                Physical Exam:  Gen:  Awake and alert  HEENT:  Pupils are equal round reactive to light  Neck:  Supple negative for lymphadenopathy  Chest:  Diminished in the bases but otherwise clear  Cor:  Regular rate and rhythm  Abd:  Soft with epigastric area tenderness  Ext:  There is no significant edema clubbing or cyanosis  Neuro:  Awake and alert, weak in her extremities  Skin:  Warm and dry  There is evidence of poor hygiene      Diagnostic Data:  Lab: I have personally reviewed pertinent lab results  ,   CBC:    Results from last 7 days  Lab Units 01/29/18  0016   WBC Thousand/uL 2 79*   HEMOGLOBIN g/dL 4 8*   HEMATOCRIT % 14 0*   PLATELETS Thousands/uL 45*      CMP:   Lab Results   Component Value Date     (L) 01/29/2018    K 4 9 01/29/2018     01/29/2018    CO2 23 01/29/2018    ANIONGAP 8 01/29/2018    BUN 44 (H) 01/29/2018    CREATININE 1 23 01/29/2018    GLUCOSE 512 (HH) 01/29/2018    CALCIUM 8 2 (L) 01/29/2018    AST 30 01/28/2018    ALT 23 01/28/2018    ALKPHOS 76 01/28/2018    PROT 5 4 (L) 01/28/2018    BILITOT 1 30 (H) 01/28/2018    EGFR 44 01/29/2018   ,   PT/INR:   Lab Results   Component Value Date    INR 1 61 (H) 01/29/2018   ,   Magnesium: No results found for: MAG,  Phosphorous: No results found for: PHOS    ABG: No results found for: PHART, AAL1AQF, PO2ART, OCD4QKS, J8MZCGXJ, BEART, SOURCE,     Microbiology:      Imaging: I have personally reviewed the pertinent imaging studies on the PACS system      Cardiac/EKG/telemetry/Echo:     Results from last 7 days  Lab Units 01/28/18  1745   TROPONIN I ng/mL 0 15*   NT-PRO BNP pg/mL 4,178*           VTE Prophylaxis: RX contraindicated due to: Acute blood loss anemia    Code Status: Level 3 - DNAR and DNI    PARRISH Wolf    Portions of the record may have been created with voice recognition software  Occasional wrong word or "sound a like" substitutions may have occurred due to the inherent limitations of voice recognition software  Read the chart carefully and recognize, using context, where substitutions have occurred

## 2018-01-29 NOTE — CONSULTS
Consultation - General Surgery   Home Taveras 67 y o  female MRN: [id number]  Unit/Bed#: ICU 11 Encounter: [id number]    Assessment/Plan     Assessment/ Plan  68 yo wf  1  Pneumoperitoneum - to OR for exploratory laparoscopy repair of suspected perforated viscus  2  Acute abdomen secondary to #1 - pain magement  3  Multiple Sclerosis - on po prednisone, stress coverage  4  DM type 2 - hold po meds, monitor blood glucose    History of Present Illness   HPI:  Home Taveras is a 67 y o  female who presents with three week history of nausea and abdominal discomfort without vomiting  She states it started very suddenly and she has been unable to eat since but was otherwise in her normal state of health  On 1/24 she had a cat bite and was seen in [facility] and treated with augmentin and Naproxen for pain  She reports her pain started the following day to the point that she passed out in her PCP's office  She continued to have worsening pain and associated weakness until yesterday when she could no longer get up and walk  She was brought into the ER by her son at that point  She denies associated vomiting, fevers, bloody or dark bowel movements, chills, cp, sob  She denies previous episodes of this type of pain in the past     Inpatient consult to Acute Care Surgery  Consult performed by: Austin Shaver  Consult ordered by: Zita Gardiner          Review of Systems   Constitutional: Positive for activity change (decreased), appetite change (anorexia) and fatigue  Negative for chills, diaphoresis and fever  HENT: Negative  Negative for congestion  Eyes: Negative  Respiratory: Negative for cough, chest tightness, shortness of breath and wheezing  Cardiovascular: Negative for chest pain and palpitations  Gastrointestinal: Positive for abdominal distention, abdominal pain (epigastric and diffuse) and nausea  Negative for blood in stool, constipation, diarrhea, rectal pain and vomiting  Endocrine: Negative  Genitourinary: Negative  Musculoskeletal:        History of MS     Skin: Negative  Neurological: Negative for dizziness, seizures, numbness and headaches  Psychiatric/Behavioral: Negative  Historical Information   Past Medical History:   Diagnosis Date    Diabetes mellitus ([facility] )     Disease of thyroid gland     Hyperlipidemia     Multiple sclerosis ([facility] )      History reviewed  No pertinent surgical history  Social History   History   Alcohol Use    Yes     Comment: rarely     History   Drug Use No     History   Smoking Status    Former Smoker    Types: Cigarettes    Quit date: 2007   Smokeless Tobacco    Never Used     Family History: non-contributory    Meds/Allergies   PTA meds:   Prior to Admission Medications   Prescriptions Last Dose Informant Patient Reported?  Taking?   acetaminophen-codeine (TYLENOL #3) 300-30 mg per tablet   Yes No   Sig: Take 1 tablet by mouth every 6 (six) hours as needed for moderate pain   alendronate (FOSAMAX) 70 mg tablet   Yes No   Sig: Take 70 mg by mouth every 7 days   amoxicillin-clavulanate (AUGMENTIN) 875-125 mg per tablet   No No   Sig: Take 1 tablet by mouth every 12 (twelve) hours for 7 days   atorvastatin (LIPITOR) 40 mg tablet   Yes No   Sig: Take 40 mg by mouth daily   azaTHIOprine (IMURAN) 50 mg tablet   Yes No   Sig: Take 50 mg by mouth 3 (three) times a day   cholecalciferol (VITAMIN D3) 1,000 units tablet   Yes No   Sig: Take 1,000 Units by mouth daily   ergocalciferol (VITAMIN D2) 50,000 units   Yes No   Sig: Take 50,000 Units by mouth once a week wednesday    glipiZIDE (GLUCOTROL) 10 mg tablet   Yes No   Sig: Take 5 mg by mouth 2 (two) times a day before meals     ibuprofen (MOTRIN) 800 mg tablet   Yes No   Sig: Take by mouth every 8 (eight) hours as needed for mild pain   levothyroxine 200 mcg tablet   Yes No   Sig: Take 200 mcg by mouth daily Skip Sundays    metFORMIN (GLUCOPHAGE) 1000 MG tablet   Yes No   Sig: Take 1,000 mg by mouth 2 (two) times a day with meals   methylprednisolone (MEDROL) 4 mg tablet   Yes No   Sig: Take 4 mg by mouth see administration instructions   naproxen (NAPROSYN) 500 mg tablet   No No   Sig: Take 1 tablet by mouth 2 (two) times a day with meals for 7 days      Facility-Administered Medications: None     No Known Allergies    Objective   First Vitals:   Blood Pressure: (!) 176/60 (01/29/18 0000)  Pulse: 104 (01/29/18 0000)  Temperature: 99 6 °F (37 6 °C) (01/28/18 2340)  Temp Source: Temporal (01/28/18 2340)  Respirations: 19 (01/29/18 0000)  Height: 5' 7" (170 2 cm) (01/28/18 2339)  Weight - Scale: 73 3 kg (161 lb 9 6 oz) (01/28/18 2339)  SpO2: 100 % (01/29/18 0000)    Current Vitals:   Blood Pressure: (!) 208/80 (01/29/18 1204)  Pulse: 92 (01/29/18 1300)  Temperature: 98 °F (36 7 °C) (01/29/18 1100)  Temp Source: Temporal (01/29/18 0855)  Respirations: 16 (01/29/18 1300)  Height: 5' 7" (170 2 cm) (01/28/18 2339)  Weight - Scale: 73 3 kg (161 lb 9 6 oz) (01/28/18 2339)  SpO2: 98 % (01/29/18 1300)      Intake/Output Summary (Last 24 hours) at 01/29/18 1435  Last data filed at 01/29/18 1423   Gross per 24 hour   Intake              350 ml   Output             1325 ml   Net             -975 ml       Invasive Devices     Peripheral Intravenous Line            Peripheral IV 01/28/18 Left Antecubital less than 1 day    Peripheral IV 01/29/18 Right Antecubital less than 1 day                Physical Exam   Constitutional: She is oriented to person, place, and time  She appears well-developed and well-nourished  No distress  HENT:   Head: Normocephalic and atraumatic  Eyes: EOM are normal  Pupils are equal, round, and reactive to light  Cardiovascular: Regular rhythm, S1 normal, S2 normal and intact distal pulses  Tachycardia present  Exam reveals no gallop, no S3, no S4, no distant heart sounds and no friction rub  No murmur heard  Pulmonary/Chest: No respiratory distress  She has no wheezes  She has no rhonchi  She has no rales  Abdominal: Soft  She exhibits distension  She exhibits no ascites and no mass  Bowel sounds are absent  There is generalized tenderness  There is rebound and guarding  There is no rigidity  Neurological: She is alert and oriented to person, place, and time  Skin: Skin is warm and dry  Lab Results:   CBC:   Lab Results   Component Value Date    WBC 2 43 (L) 01/29/2018    HGB 8 4 (L) 01/29/2018    HCT 24 7 (L) 01/29/2018    MCV 92 01/29/2018    PLT 37 (LL) 01/29/2018    MCH 31 0 01/29/2018    MCHC 33 8 01/29/2018    RDW 19 2 (H) 01/29/2018    MPV 11 8 01/29/2018    NRBC 1 01/29/2018   , CMP:   Lab Results   Component Value Date     01/29/2018    K 4 7 01/29/2018     01/29/2018    CO2 25 01/29/2018    ANIONGAP 7 01/29/2018    BUN 46 (H) 01/29/2018    CREATININE 1 13 01/29/2018    GLUCOSE 403 (H) 01/29/2018    CALCIUM 8 4 01/29/2018    AST 30 01/28/2018    ALT 23 01/28/2018    ALKPHOS 76 01/28/2018    PROT 5 4 (L) 01/28/2018    BILITOT 1 30 (H) 01/28/2018    EGFR 49 01/29/2018     Imaging: I have personally reviewed pertinent films in PACS  EKG, Pathology, and Other Studies: I have personally reviewed pertinent reports  Counseling / Coordination of Care  Total floor / unit time spent today 24 minutes  Greater than 50% of total time was spent with the patient and / or family counseling and / or coordination of care  A description of the counseling / coordination of care: explaining medical condition of perforated bowel, potential surgical options, answering patient and family questions related to surgery

## 2018-01-29 NOTE — CASE MANAGEMENT
Initial Clinical Review    Admission: Date/Time/Statement: 1/28/18 @ 2334     Orders Placed This Encounter   Procedures    Inpatient Admission     Standing Status:   Standing     Number of Occurrences:   1     Order Specific Question:   Admitting Physician     Answer:   Alejandro Stewart     Order Specific Question:   Level of Care     Answer:   Level 1 Stepdown [13]     Order Specific Question:   Estimated length of stay     Answer:   More than 2 Midnights     Order Specific Question:   Certification     Answer:   I certify that inpatient services are medically necessary for this patient for a duration of greater than two midnights  See H&P and MD Progress Notes for additional information about the patient's course of treatment  ED: Date/Time/Mode of Arrival:   ED Arrival Information     Patient not seen in ED                       Chief Complaint: No chief complaint on file  History of Illness:    Nay Gonzales is a 67 y o  female who presents as a transfer from [facility] after presenting there for fatigue and found to have hemoglobin of 3 2  The patient states she has had chronic upper abdominal pain for about 1 month  She denies nausea or vomiting  She denies diarrhea or constipation   She denies dizziness, lightheadedness    ED Vital Signs:   ED Triage Vitals   Temperature Pulse Respirations Blood Pressure SpO2   01/28/18 2340 01/29/18 0000 01/29/18 0000 01/29/18 0000 01/29/18 0000   99 6 °F (37 6 °C) 104 19 (!) 176/60 100 %      Temp Source Heart Rate Source Patient Position - Orthostatic VS BP Location FiO2 (%)   01/28/18 2340 -- -- -- --   Temporal          Pain Score       01/28/18 2339       No Pain        Wt Readings from Last 1 Encounters:   01/28/18 73 3 kg (161 lb 9 6 oz)       Vital Signs (abnormal):    above    Abnormal Labs/Diagnostic Test Results:    CXR:  NAD  BUN  46  H/H   4 8/14             3 2/10       ( adm)  WBC   2 79  paltelets   45    ED Treatment:   Medication Administration - No Administrations Displayed (No Start Event Found)     None          Past Medical/Surgical History: Active Ambulatory Problems     Diagnosis Date Noted    No Active Ambulatory Problems     Resolved Ambulatory Problems     Diagnosis Date Noted    No Resolved Ambulatory Problems     Past Medical History:   Diagnosis Date    Diabetes mellitus ([address] )     Disease of thyroid gland     Hyperlipidemia     Multiple sclerosis (HCC)        Admitting Diagnosis: Anemia [D64 9]    Age/Sex: 67 y o  female    1  Assessment/Plan:    Chronic blood loss anemia  · Given the patient's significantly decreased hemoglobin we will admit her to the step-down unit for evaluation and monitoring  This will require greater than a 2 midnight stay therefore the patient will be placed in inpatient status  · The patient did receive 1 unit of packed red blood cells at [address]   We will repeat the patient's hemoglobin and transfuse as needed to maintain a hemoglobin level greater than 7   · She will need a GI evaluation to determine if she has an active GI bleeding source  2  Diabetes mellitus with hyperglycemia  · Will continue the patient on sliding scale and Lantus insulin with a goal to maintain her blood glucose between 140 and 180  3  BEVERLY on unknown chronic kidney disease  · We will monitor the patient's renal indices and urine output closely  4  Multiple sclerosis- relapsing and relenting  · We will continue the patient on her outpatient Imuran  5  Hypothyroid  ? Will continue synthroid  6  Hypoalbuminemia secondary to protein calorie malnutrition  7    Hyperkalemia-this is likely related to her metabolic derangements associated with her hyperglycemia       Admission Orders:   RIGOBERTO  1/28  @    2345  Scheduled Meds:   atorvastatin 40 mg Oral Daily With Dinner   azaTHIOprine 50 mg Oral Daily   chlorhexidine 15 mL Swish & Spit Q12H [address]   cholecalciferol 1,000 Units Oral Daily   [START ON 1/31/2018] ergocalciferol 50,000 Units Oral Weekly   glipiZIDE 5 mg Oral BID AC   insulin lispro 1-5 Units Subcutaneous Q6H JT   levothyroxine 200 mcg Oral Early Morning   methylprednisolone 4 mg Oral See Admin Instructions   sodium chloride 999 mL/hr Intravenous Once     Continuous Infusions:    PRN Meds:     2  U PRBC  Cons  GI

## 2018-01-29 NOTE — OP NOTE
OPERATIVE REPORT  PATIENT NAME: Roula Gtz    :  1945  MRN: [id number]  Pt Location: [facility] OR ROOM 03    SURGERY DATE: 2018    Surgeon(s) and Role:     * Sis Serra MD - Primary     * Onesimo Kahn PA-C - Assisting    Preop Diagnosis:  Gastric ulcer [K25 9]  Pneumoperitoneum [K66 8]  Anemia [D64 9]    Post-Op Diagnosis Codes:     * Gastric ulcer [K25 9]     * Pneumoperitoneum [K66 8]     * Anemia [D64 9]     * Gastric ulcer with hemorrhage and perforation (HCC) [K25 6]    Procedure(s) (LRB):  LAPAROSCOPY DIAGNOSTIC, with repair of perforated gastric ulcer (N/A)    Specimen(s):  ID Type Source Tests Collected by Time Destination   1 : Portion of gastric ulcer Tissue Ulcer TISSUE EXAM Sis Serra MD 2018 1707        Estimated Blood Loss:   Minimal    Drains:       Anesthesia Type:   General    Operative Indications:  Gastric ulcer [K25 9]  Pneumoperitoneum [K66 8]  Anemia [D64 9]      Operative Findings:  Gastric perforation of pre-pyloric ulcer  Significant contamination of abdominal cavity with gastric contents    Complications:   None    Procedure and Technique:  The patient was seen again in the Holding Room  The risks, benefits, complications, treatment options, and expected outcomes were discussed with the patient and/or family  The possibilities of reaction to medication, pulmonary aspiration, perforation of viscus, bleeding, recurrent infection, the need for additional procedures, failure to diagnose a condition, and creating a complication requiring transfusion or operation were discussed  There was concurrence with the proposed plan and informed consent was obtained  The site of surgery was properly noted/marked  The patient was taken to Operating Room, identified as Roula Gtz and the procedure verified  A Time Out was held after the prep and draping,  and the above information confirmed      The patient was placed in the supine position and general anesthesia was induced, along with placement of orogastric tube, Venodyne boots, and a Deng catheter  The abdomen was prepped and draped in a sterile fashion  An incision was made to accommodate a 5mm trocar which was placed using optiview technique and the abdomen was insufflated  Additional ports were placed under direct vision in the routine positions  A careful evaluation of the entire abdomen was carried out  There was evidence of perforation with purulent fluid in all four quadrants of the abdomen and perforated gastric ulcer  Gastric ulcer was noted to be in the pre-pyloric portion of the stomach and a biopsy of the ulcer was taken and sent off to pathology  Irrigation was also performed and irrigate suctioned from the abdomen with multiple liters of fluid used until the fluid returned clear  At this point an omental patch was created using harmonic scalpel to divide the omentum until an appropriate size omental patch was created to reach the ulcer  At this point the silvia patch was created by taking 3 separate 2-0 silk sutures of the stomach surrounding the perforation and tying down the silvia patch  The silvia patch was then covered with additional omentum and two COREY drains were placed into the abdomen over the area of perforation and secured to the skin using nylon sutues    All ports were removed and irrigated  All skin incisions were closed using MonocryI suture in a subcuticular fashion  The instrument, sponge, and needle counts were correct at the conclusion of the case          I was present for the entire procedure and A qualified resident physician was not available    Patient Disposition:  PACU     SIGNATURE: Inna Barragan MD  DATE: January 29, 2018  TIME: 5:28 PM

## 2018-01-29 NOTE — CONSULTS
Consultation - [facility]  Dahiana Mason 67 y o  female MRN: [id number]  Unit/Bed#: ICU 11 Encounter: [id number]        Consults    ASSESSMENT/ PLAN:    29-year-old female with past medical history of diabetes, hyperlipidemia, multiple sclerosis who was admitted for generalized weakness 1 week after cat bite, being evaluated for anemia  1  Anemia:  Patients Hgb on admission was 3 2, she denies any hx of melena or hematochezia at home  She received 2 units of PRBCs and her Hgb today is 4 8  Denies SOB, dizziness/lightheadedness  On rectal exam, there is light brown stool  She is complaining of epigastric and left sided abdominal pain  This could be secondary to slow GI bleed  Recommend CT abdomen  If negative, we will plan to perform EGD/colonoscopy  -NPO for now  -recommend CT abdomen/pelvis  -monitor H&H  -continue to transfuse as necessary  -further recommendations regarding EGD/colonosocpy will be made after CT  -discussed with ICU team     2  Abdominal pain  -f/u CT scan   -pain management     Reason for Consult / Principal Problem: anemia    HPI: Dahiana Mason is a 68 yo female with pmh DM, HLD, MS who was transferred from [address] [place] [address] for generalized weakness/fatigue as well as redness on left hand where she had cat bite 1 week prior  Gi was consulted for anemia  On admission, patient was found to have a Hgb of 3 2  She denies any melena, hematochezia or prior hx of Gi bleeding or anemia in the past  She does admit to generalized abdominal pain and is tender on exam  She denies any nausea, vomiting, diarrhea, constipation  She was given 2 units of PRBCs and her Hgb today is 4 8  She also denies dizziness, lightheadedness, SOB  She states she has never had an EGD or colonoscopy in the past  She denies tobacco or alcohol use  Denies family hx of colon cancer  REVIEW OF SYSTEMS:     CONSTITUTIONAL: Denies any fever, chills, or rigors   Admits to weakness   HEENT: No earache or tinnitus  Denies hearing loss or visual disturbances  CARDIOVASCULAR: No chest pain or palpitations  RESPIRATORY: Denies any cough, hemoptysis, shortness of breath or dyspnea on exertion  GASTROINTESTINAL: As noted in the History of Present Illness  GENITOURINARY: No problems with urination  Denies any hematuria or dysuria  NEUROLOGIC: No dizziness or vertigo, denies headaches  MUSCULOSKELETAL: Denies any muscle or joint pain  SKIN: Denies skin rashes or itching  ENDOCRINE: Denies excessive thirst  Denies intolerance to heat or cold  PSYCHOSOCIAL: Denies depression or anxiety  Denies any recent memory loss  Historical Information   Past Medical History:   Diagnosis Date    [facility] mellitus ([facility] )     Disease of thyroid gland     Hyperlipidemia     Multiple sclerosis ([facility] )      History reviewed  No pertinent surgical history  Social History   History   Alcohol Use    Yes     Comment: rarely     History   Drug Use No     History   Smoking Status    Former Smoker    Types: Cigarettes    Quit date:    Smokeless Tobacco    Never Used     History reviewed  No pertinent family history      Meds/Allergies     Prescriptions Prior to Admission   Medication    acetaminophen-codeine (TYLENOL #3) 300-30 mg per tablet    alendronate (FOSAMAX) 70 mg tablet    [] amoxicillin-clavulanate (AUGMENTIN) 875-125 mg per tablet    atorvastatin (LIPITOR) 40 mg tablet    azaTHIOprine (IMURAN) 50 mg tablet    cholecalciferol (VITAMIN D3) 1,000 units tablet    ergocalciferol (VITAMIN D2) 50,000 units    glipiZIDE (GLUCOTROL) 10 mg tablet    ibuprofen (MOTRIN) 800 mg tablet    levothyroxine 200 mcg tablet    metFORMIN (GLUCOPHAGE) 1000 MG tablet    methylprednisolone (MEDROL) 4 mg tablet    naproxen (NAPROSYN) 500 mg tablet     Current Facility-Administered Medications   Medication Dose Route Frequency    atorvastatin (LIPITOR) tablet 40 mg  40 mg Oral Daily With Dinner    azaTHIOprine (IMURAN) tablet 50 mg  50 mg Oral Daily    chlorhexidine (PERIDEX) 0 12 % oral rinse 15 mL  15 mL Swish & Spit Q12H [facility]    cholecalciferol (VITAMIN D3) tablet 1,000 Units  1,000 Units Oral Daily    [START ON 1/31/2018] ergocalciferol (VITAMIN D2) capsule 50,000 Units  50,000 Units Oral Weekly    glipiZIDE (GLUCOTROL) tablet 5 mg  5 mg Oral BID AC    insulin lispro (HumaLOG) 100 units/mL subcutaneous injection 1-5 Units  1-5 Units Subcutaneous Q6H [facility]    levothyroxine tablet 200 mcg  200 mcg Oral Early Morning    methylprednisolone (MEDROL) tablet 4 mg  4 mg Oral See Admin Instructions       No Known Allergies        Objective     Blood pressure (!) 189/66, pulse 84, temperature 97 8 °F (36 6 °C), temperature source Temporal, resp  rate 14, height 5' 7" (1 702 m), weight 73 3 kg (161 lb 9 6 oz), SpO2 98 %  Intake/Output Summary (Last 24 hours) at 01/29/18 1011  Last data filed at 01/29/18 0855   Gross per 24 hour   Intake              350 ml   Output              875 ml   Net             -525 ml         PHYSICAL EXAM:      General Appearance:   A&Ox1   HEENT:   Normocephalic, atraumatic      Neck:  Supple, symmetrical, trachea midline   Lungs:   Clear to auscultation bilaterally; no rales, rhonchi or wheezing    Heart[de-identified]   S1 and S2 normal; regular rate and rhythm; no murmur, rub, or gallop     Abdomen:   Soft, moderate diffuse tenderness, worse in epigastric region, non-distended; normal bowel sounds; no masses, no organomegaly    Genitalia:   Deferred    Rectal:   Deferred    Extremities:  No cyanosis, clubbing or edema    Pulses:  2+ and symmetric all extremities    Skin:  Skin color, texture, turgor normal, no rashes or lesions          Lab Results:   Admission on 01/28/2018   Component Date Value    Sodium 01/29/2018 134*    Potassium 01/29/2018 4 9     Chloride 01/29/2018 103     CO2 01/29/2018 23     Anion Gap 01/29/2018 8     BUN 01/29/2018 44*    Creatinine 01/29/2018 1 23     Glucose 01/29/2018 512*    Calcium 01/29/2018 8 2*    eGFR 01/29/2018 44     WBC 01/29/2018 2 79*    RBC 01/29/2018 1 45*    Hemoglobin 01/29/2018 4 8*    Hematocrit 01/29/2018 14 0*    MCV 01/29/2018 97     MCH 01/29/2018 33 1     MCHC 01/29/2018 34 3     RDW 01/29/2018 22 6*    Platelets 60/84/4006 45*    MPV 01/29/2018 10 7     Magnesium 01/29/2018 1 7     Protime 01/29/2018 19 3*    INR 01/29/2018 1 61*    Unit Product Code 01/29/2018 [id number]     Unit Number 01/29/2018 [id number]     Unit ABO 01/29/2018 O     Unit RH 01/29/2018 POS     Crossmatch 01/29/2018 Compatible     Unit Dispense Status 01/29/2018 Presumed Trans     Unit Product Code 01/29/2018 [id number]     Unit Number 01/29/2018 [id number]     Unit ABO 01/29/2018 O     Unit RH 01/29/2018 POS     Crossmatch 01/29/2018 Compatible     Unit Dispense Status 01/29/2018 Issued     ABO Grouping 01/29/2018 O     Rh Factor 01/29/2018 Positive     Antibody Screen 01/29/2018 Negative     Specimen Expiration Date 01/29/2018 [id number]     POC Glucose 01/29/2018 497*       Imaging Studies: I have personally reviewed pertinent imaging studies  Xr Chest 2 Views    Result Date: 1/29/2018  Impression: No active pulmonary disease  This patient was seen and examined by Dr Cady Serra  All concepcion medical decisions were made by Dr Cady Serra  Thank you for allowing us to participate in the care of this patient  We will follow up closely with you

## 2018-01-29 NOTE — ED NOTES
[facility] called and stated they will be here in 15 minutes for patient  Orders to HOLD second unit per Dr Ericka Krishna  Lab called and notified       Jass Gomes RN  01/28/18 2155

## 2018-01-29 NOTE — EMTALA/ACUTE CARE TRANSFER
[address] [facility] EMERGENCY DEPARTMENT  [address] [facility] [address]  Dept: [phone]      [facility] TRANSFER CONSENT    NAME Crystal Monique                                         1945                              MRN [id number]    I have been informed of my rights regarding examination, treatment, and transfer   by Dr Alfonso Bishop DO    Benefits:      Risks:        Consent for Transfer:  I acknowledge that my medical condition has been evaluated and explained to me by the emergency department physician or other qualified medical person and/or my attending physician, who has recommended that I be transferred to the service of    at    The above potential benefits of such transfer, the potential risks associated with such transfer, and the probable risks of not being transferred have been explained to me, and I fully understand them  The doctor has explained that, in my case, the benefits of transfer outweigh the risks  I agree to be transferred  I authorize the performance of emergency medical procedures and treatments upon me in both transit and upon arrival at the receiving facility  Additionally, I authorize the release of any and all medical records to the receiving facility and request they be transported with me, if possible  I understand that the safest mode of transportation during a medical emergency is an ambulance and that the Hospital advocates the use of this mode of transport  Risks of traveling to the receiving facility by car, including absence of medical control, life sustaining equipment, such as oxygen, and medical personnel has been explained to me and I fully understand them  (NATHAN CORRECT BOX BELOW)  [  ]  I consent to the stated transfer and to be transported by ambulance/helicopter  [  ]  I consent to the stated transfer, but refuse transportation by ambulance and accept full responsibility for my transportation by car    I understand the risks of non-ambulance transfers and I exonerate the Hospital and its staff from any deterioration in my condition that results from this refusal     X___________________________________________    DATE  18  TIME________  Signature of patient or legally responsible individual signing on patient behalf           RELATIONSHIP TO PATIENT_________________________          Provider Certification    NAME Freddy BLANC 1945                              MRN [id number]    A medical screening exam was performed on the above named patient  Based on the examination:    Condition Necessitating Transfer The primary encounter diagnosis was Anemia  Diagnoses of Elevated troponin, Weakness, Hyperglycemia, and Heme positive stool were also pertinent to this visit  Patient Condition:      Reason for Transfer:      Transfer Requirements: Facility     · Space available and qualified personnel available for treatment as acknowledged by    · Agreed to accept transfer and to provide appropriate medical treatment as acknowledged by          · Appropriate medical records of the examination and treatment of the patient are provided at the time of transfer   [address] [place] [address] [address] _______  · Transfer will be performed by qualified personnel from    and appropriate transfer equipment as required, including the use of necessary and appropriate life support measures      Provider Certification: I have examined the patient and explained the following risks and benefits of being transferred/refusing transfer to the patient/family:         Based on these reasonable risks and benefits to the patient and/or the unborn child(lokesh), and based upon the information available at the time of the patients examination, I certify that the medical benefits reasonably to be expected from the provision of appropriate medical treatments at another medical facility outweigh the increasing risks, if any, to the individuals medical condition, and in the case of labor to the unborn child, from effecting the transfer      X____________________________________________ DATE 01/28/18        TIME_______      ORIGINAL - SEND TO MEDICAL RECORDS   COPY - SEND WITH PATIENT DURING TRANSFER

## 2018-01-29 NOTE — ANESTHESIA PREPROCEDURE EVALUATION
Review of Systems/Medical History  Patient summary reviewed  Chart reviewed      Cardiovascular  EKG reviewed, Hyperlipidemia,    Pulmonary       GI/Hepatic      Comment: pneumoperituoneum    perf gastric ulcer       Comment: BEVERLY     Endo/Other  Diabetes type 2 , History of thyroid disease , hypothyroidism,      GYN       Hematology  Anemia ,     Musculoskeletal       Neurology      Comment: MS Psychology           Physical Exam    Airway    Mallampati score: I  TM Distance: <3 FB       Dental       Cardiovascular  Rhythm: regular, Rate: normal, Cardiovascular exam normal    Pulmonary  Pulmonary exam normal     Other Findings        Anesthesia Plan  ASA Score- 4 Emergent    Anesthesia Type- general with ASA Monitors  Additional Monitors:   Airway Plan:         Plan Factors- Patient instructed to abstain from smoking on day of procedure  Patient did not smoke on day of surgery  Induction- intravenous  Postoperative Plan- Plan for postoperative opioid use  Planned trial extubation    Informed Consent- Anesthetic plan and risks discussed with patient

## 2018-01-30 LAB
ABO GROUP BLD BPU: NORMAL
ACANTHOCYTES BLD QL SMEAR: PRESENT
ANION GAP SERPL CALCULATED.3IONS-SCNC: 9 MMOL/L (ref 4–13)
ANISOCYTOSIS BLD QL SMEAR: PRESENT
BASOPHILS # BLD MANUAL: 0 THOUSAND/UL (ref 0–0.1)
BASOPHILS NFR MAR MANUAL: 0 % (ref 0–1)
BPU ID: NORMAL
BUN SERPL-MCNC: 42 MG/DL (ref 5–25)
BURR CELLS BLD QL SMEAR: PRESENT
CALCIUM SERPL-MCNC: 7.3 MG/DL (ref 8.3–10.1)
CHLORIDE SERPL-SCNC: 113 MMOL/L (ref 100–108)
CO2 SERPL-SCNC: 22 MMOL/L (ref 21–32)
CREAT SERPL-MCNC: 0.8 MG/DL (ref 0.6–1.3)
CROSSMATCH: NORMAL
EOSINOPHIL # BLD MANUAL: 0.03 THOUSAND/UL (ref 0–0.4)
EOSINOPHIL NFR BLD MANUAL: 1 % (ref 0–6)
ERYTHROCYTE [DISTWIDTH] IN BLOOD BY AUTOMATED COUNT: 17.1 % (ref 11.6–15.1)
GFR SERPL CREATININE-BSD FRML MDRD: 74 ML/MIN/1.73SQ M
GLUCOSE SERPL-MCNC: 154 MG/DL (ref 65–140)
GLUCOSE SERPL-MCNC: 175 MG/DL (ref 65–140)
GLUCOSE SERPL-MCNC: 202 MG/DL (ref 65–140)
GLUCOSE SERPL-MCNC: 212 MG/DL (ref 65–140)
GLUCOSE SERPL-MCNC: 333 MG/DL (ref 65–140)
HCT VFR BLD AUTO: 34.9 % (ref 34.8–46.1)
HGB BLD-MCNC: 11.9 G/DL (ref 11.5–15.4)
HGB BLD-MCNC: 12 G/DL (ref 11.5–15.4)
LYMPHOCYTES # BLD AUTO: 0.85 THOUSAND/UL (ref 0.6–4.47)
LYMPHOCYTES # BLD AUTO: 29 % (ref 14–44)
MCH RBC QN AUTO: 30.1 PG (ref 26.8–34.3)
MCHC RBC AUTO-ENTMCNC: 34.1 G/DL (ref 31.4–37.4)
MCV RBC AUTO: 88 FL (ref 82–98)
MONOCYTES # BLD AUTO: 0 THOUSAND/UL (ref 0–1.22)
MONOCYTES NFR BLD: 0 % (ref 4–12)
NEUTROPHILS # BLD MANUAL: 2.06 THOUSAND/UL (ref 1.85–7.62)
NEUTS BAND NFR BLD MANUAL: 32 % (ref 0–8)
NEUTS SEG NFR BLD AUTO: 38 % (ref 43–75)
NRBC BLD AUTO-RTO: 0 /100 WBCS
PLATELET # BLD AUTO: 50 THOUSANDS/UL (ref 149–390)
PLATELET BLD QL SMEAR: ABNORMAL
PMV BLD AUTO: 10.5 FL (ref 8.9–12.7)
POLYCHROMASIA BLD QL SMEAR: PRESENT
POTASSIUM SERPL-SCNC: 4.1 MMOL/L (ref 3.5–5.3)
RBC # BLD AUTO: 3.95 MILLION/UL (ref 3.81–5.12)
SODIUM SERPL-SCNC: 144 MMOL/L (ref 136–145)
TOTAL CELLS COUNTED SPEC: 100
UNIT DISPENSE STATUS: NORMAL
UNIT PRODUCT CODE: NORMAL
UNIT RH: NORMAL
WBC # BLD AUTO: 2.94 THOUSAND/UL (ref 4.31–10.16)

## 2018-01-30 PROCEDURE — 82948 REAGENT STRIP/BLOOD GLUCOSE: CPT

## 2018-01-30 PROCEDURE — 94003 VENT MGMT INPAT SUBQ DAY: CPT

## 2018-01-30 PROCEDURE — 85007 BL SMEAR W/DIFF WBC COUNT: CPT | Performed by: SURGERY

## 2018-01-30 PROCEDURE — C9113 INJ PANTOPRAZOLE SODIUM, VIA: HCPCS | Performed by: SURGERY

## 2018-01-30 PROCEDURE — 85027 COMPLETE CBC AUTOMATED: CPT | Performed by: SURGERY

## 2018-01-30 PROCEDURE — 99233 SBSQ HOSP IP/OBS HIGH 50: CPT | Performed by: INTERNAL MEDICINE

## 2018-01-30 PROCEDURE — 80048 BASIC METABOLIC PNL TOTAL CA: CPT | Performed by: SURGERY

## 2018-01-30 PROCEDURE — 85018 HEMOGLOBIN: CPT | Performed by: NURSE PRACTITIONER

## 2018-01-30 RX ORDER — LABETALOL HYDROCHLORIDE 5 MG/ML
INJECTION, SOLUTION INTRAVENOUS
Status: COMPLETED
Start: 2018-01-30 | End: 2018-01-30

## 2018-01-30 RX ORDER — PANTOPRAZOLE SODIUM 40 MG/1
40 INJECTION, POWDER, FOR SOLUTION INTRAVENOUS EVERY 12 HOURS SCHEDULED
Status: DISCONTINUED | OUTPATIENT
Start: 2018-01-30 | End: 2018-02-15

## 2018-01-30 RX ORDER — LABETALOL HYDROCHLORIDE 5 MG/ML
10 INJECTION, SOLUTION INTRAVENOUS EVERY 4 HOURS PRN
Status: DISCONTINUED | OUTPATIENT
Start: 2018-01-30 | End: 2018-02-05

## 2018-01-30 RX ORDER — FUROSEMIDE 10 MG/ML
40 INJECTION INTRAMUSCULAR; INTRAVENOUS ONCE
Status: COMPLETED | OUTPATIENT
Start: 2018-01-30 | End: 2018-01-30

## 2018-01-30 RX ADMIN — PROPOFOL 30 MCG/KG/MIN: 10 INJECTION, EMULSION INTRAVENOUS at 06:31

## 2018-01-30 RX ADMIN — HYDROMORPHONE HYDROCHLORIDE 0.5 MG: 1 INJECTION, SOLUTION INTRAMUSCULAR; INTRAVENOUS; SUBCUTANEOUS at 12:28

## 2018-01-30 RX ADMIN — LABETALOL 20 MG/4 ML (5 MG/ML) INTRAVENOUS SYRINGE 10 MG: at 16:35

## 2018-01-30 RX ADMIN — HYDROMORPHONE HYDROCHLORIDE 0.5 MG: 1 INJECTION, SOLUTION INTRAMUSCULAR; INTRAVENOUS; SUBCUTANEOUS at 07:56

## 2018-01-30 RX ADMIN — HYDRALAZINE HYDROCHLORIDE 10 MG: 20 INJECTION INTRAMUSCULAR; INTRAVENOUS at 03:22

## 2018-01-30 RX ADMIN — CHLORHEXIDINE GLUCONATE 15 ML: 1.2 RINSE ORAL at 21:07

## 2018-01-30 RX ADMIN — HYDROMORPHONE HYDROCHLORIDE 0.5 MG: 1 INJECTION, SOLUTION INTRAMUSCULAR; INTRAVENOUS; SUBCUTANEOUS at 21:07

## 2018-01-30 RX ADMIN — FLUCONAZOLE 400 MG: 2 INJECTION INTRAVENOUS at 17:58

## 2018-01-30 RX ADMIN — HYDROMORPHONE HYDROCHLORIDE 0.5 MG: 1 INJECTION, SOLUTION INTRAMUSCULAR; INTRAVENOUS; SUBCUTANEOUS at 17:54

## 2018-01-30 RX ADMIN — INSULIN LISPRO 1 UNITS: 100 INJECTION, SOLUTION INTRAVENOUS; SUBCUTANEOUS at 05:17

## 2018-01-30 RX ADMIN — INSULIN LISPRO 2 UNITS: 100 INJECTION, SOLUTION INTRAVENOUS; SUBCUTANEOUS at 23:45

## 2018-01-30 RX ADMIN — CHLORHEXIDINE GLUCONATE 15 ML: 1.2 RINSE ORAL at 08:01

## 2018-01-30 RX ADMIN — LEVOTHYROXINE SODIUM 200 MCG: 100 TABLET ORAL at 05:16

## 2018-01-30 RX ADMIN — HYDRALAZINE HYDROCHLORIDE 10 MG: 20 INJECTION INTRAMUSCULAR; INTRAVENOUS at 06:39

## 2018-01-30 RX ADMIN — LABETALOL HYDROCHLORIDE 10 MG: 5 INJECTION, SOLUTION INTRAVENOUS at 16:35

## 2018-01-30 RX ADMIN — PIPERACILLIN SODIUM AND TAZOBACTAM SODIUM 3.38 G: 36; 4.5 INJECTION, POWDER, FOR SOLUTION INTRAVENOUS at 21:09

## 2018-01-30 RX ADMIN — HYDRALAZINE HYDROCHLORIDE 10 MG: 20 INJECTION INTRAMUSCULAR; INTRAVENOUS at 15:07

## 2018-01-30 RX ADMIN — PROPOFOL 35 MCG/KG/MIN: 10 INJECTION, EMULSION INTRAVENOUS at 01:58

## 2018-01-30 RX ADMIN — PANTOPRAZOLE SODIUM 40 MG: 40 INJECTION, POWDER, FOR SOLUTION INTRAVENOUS at 21:07

## 2018-01-30 RX ADMIN — HYDROMORPHONE HYDROCHLORIDE 0.5 MG: 1 INJECTION, SOLUTION INTRAMUSCULAR; INTRAVENOUS; SUBCUTANEOUS at 03:21

## 2018-01-30 RX ADMIN — PIPERACILLIN SODIUM AND TAZOBACTAM SODIUM 3.38 G: 36; 4.5 INJECTION, POWDER, FOR SOLUTION INTRAVENOUS at 08:38

## 2018-01-30 RX ADMIN — FUROSEMIDE 40 MG: 10 INJECTION, SOLUTION INTRAMUSCULAR; INTRAVENOUS at 08:35

## 2018-01-30 RX ADMIN — INSULIN LISPRO 1 UNITS: 100 INJECTION, SOLUTION INTRAVENOUS; SUBCUTANEOUS at 17:54

## 2018-01-30 RX ADMIN — PIPERACILLIN SODIUM AND TAZOBACTAM SODIUM 3.38 G: 36; 4.5 INJECTION, POWDER, FOR SOLUTION INTRAVENOUS at 03:18

## 2018-01-30 RX ADMIN — INSULIN LISPRO 1 UNITS: 100 INJECTION, SOLUTION INTRAVENOUS; SUBCUTANEOUS at 12:35

## 2018-01-30 RX ADMIN — PIPERACILLIN SODIUM AND TAZOBACTAM SODIUM 3.38 G: 36; 4.5 INJECTION, POWDER, FOR SOLUTION INTRAVENOUS at 15:06

## 2018-01-30 NOTE — POST OP PROGRESS NOTES
Progress Note - General Surgery   Nay Gonzales 67 y o  female MRN: [id number]  Unit/Bed#: ICU 11 Encounter: [id number]    Assessment:  Perforated gastric ulcer with pneumoperitoneum  Postop day 1  Status post laparoscopic Lea Hoguet patch repair of gastric ulcer, abdominal washout, placement of drains  Severe Anemia, chronic blood loss- related to hemorrhage from ulcer- status post multiple transfusions  Multiple sclerosis with chronic steroid use  Diabetes mellitus 2  Acute hypoxic respiratory failure likely related to anemia and surgical stress  Thrombocytopenia      Plan:  Continue supportive care  Wean and extubate per Critical Care service  Monitor hemoglobin and platelet count- transfuse p r n  Continue IV fluids, IV antibiotics, Diflucan   Continue NG tube, do not remove or reposition  Continue Protonix drip  Continue stress dose steroids  Continue blood glucose monitoring and sliding scale insulin coverage  Continue medical management per Critical Care service      Subjective/Objective   Chief Complaint:  Patient mildly sedated, on ventilator    Subjective:  Patient arousable  Unable to answer questions  Stable overnight  Objective:     Blood pressure (!) 208/80, pulse 88, temperature 98 6 °F (37 °C), temperature source Temporal, resp  rate (!) 7, height 5' 7" (1 702 m), weight 73 3 kg (161 lb 9 6 oz), SpO2 100 %  ,Body mass index is 25 31 kg/m²        Intake/Output Summary (Last 24 hours) at 01/30/18 1038  Last data filed at 01/30/18 0701   Gross per 24 hour   Intake          4252 48 ml   Output             2575 ml   Net          1677 48 ml       Invasive Devices     Central Venous Catheter Line            CVC Central Lines 01/29/18 Triple Right Femoral less than 1 day          Peripheral Intravenous Line            Peripheral IV 01/28/18 Left Antecubital 1 day          Arterial Line            Arterial Line 01/29/18 Radial less than 1 day          Drain            Closed/Suction Drain Left Abdomen Bulb 15 Fr  less than 1 day    Closed/Suction Drain Right;Other (Comment) Abdomen Bulb 15 Fr  less than 1 day    Urethral Catheter Latex; Double-lumen 16 Fr  less than 1 day                Physical Exam:  Gen:  Sedated but arousable  HEENT:   sclera anicteric, mucous membranes dry  Neck:  Supple negative for lymphadenopathy  Chest:  Coarse bilaterally anteriorly  Cor:  Regular rate and rhythm  Abd:  Soft with some incisional tenderness, nondistended, few bowel sounds  Ext:  There is edema in her bilateral lower extremities 2+  Skin:  Warm and dry, incision sites are clean dry and intact  NG tube with small amount of dark emesis  Abdominal drains with blood-tinged serosanguineous output, no bile    Lab, Imaging and other studies:  I have personally reviewed pertinent lab results    , CBC:   Lab Results   Component Value Date    WBC 2 94 (L) 01/30/2018    HGB 11 9 01/30/2018    HCT 34 9 01/30/2018    MCV 88 01/30/2018    PLT 50 (L) 01/30/2018    MCH 30 1 01/30/2018    MCHC 34 1 01/30/2018    RDW 17 1 (H) 01/30/2018    MPV 10 5 01/30/2018    NRBC 0 01/30/2018   , CMP:   Lab Results   Component Value Date     01/30/2018    K 4 1 01/30/2018     (H) 01/30/2018    CO2 22 01/30/2018    ANIONGAP 9 01/30/2018    BUN 42 (H) 01/30/2018    CREATININE 0 80 01/30/2018    GLUCOSE 202 (H) 01/30/2018    GLUCOSE 328 (H) 01/29/2018    CALCIUM 7 3 (L) 01/30/2018    EGFR 74 01/30/2018     VTE Pharmacologic Prophylaxis: Reason for no pharmacologic prophylaxis None due to GI bleeding and anemia  VTE Mechanical Prophylaxis: sequential compression device     Ramirez Concepcion PA-C

## 2018-01-30 NOTE — ANESTHESIA POSTPROCEDURE EVALUATION
Post-Op Assessment Note      CV Status:  Stable    Hydration Status:  Stable  Airway: intubated    Post Op Vitals Reviewed: Yes          Staff: Anesthesiologist       Comments: pt transfered to icu in stable condition report to icu staff          BP     Temp      Pulse     Resp      SpO2

## 2018-01-30 NOTE — MALNUTRITION/BMI
This medical record reflects one or more clinical indicators suggestive of malnutrition and/or morbid obesity  Please indicate the one diagnosis below which you feel best reflects the clinical picture  Malnutrition Findings:   Malnutrition type: Acute illness (Acute severe protein calorie malnutrition related to GI distress as evidenced by <50% of energy intake for >5 days an, slight temporal muscle wasting, and muscle wasting in the clavicle region  )  Degree of Malnutrition: Other severe protein calorie malnutrition    BMI Findings: Body mass index is 25 31 kg/m²  See Nutrition note dated 1/20/18 for additional details  Completed nutrition assessment is viewable in the nutrition documentation

## 2018-01-30 NOTE — SOCIAL WORK
CM met with the patient and her , Antoine Meagan, to do a general SW assessment  The patient lives in a two story home with her spouse  Bed and bathrooms on the second level  She is independent with adls and ambulation  She has a WC, RW, and a cane at home if needed  No hx of VNA or STR services in the recent past  Her  drives her to all appointments  She has no formal POA/AD paperwork completed  Her health care agent would be her , Antoine Rhodes  Her PCP is Dr Duke Bedoya  She uses the [facility] in [facility] for rx needs  Family will provide transport home at discharge  Cm provided contact information on whiteboard provided in the patients room  CM following for any discharge needs

## 2018-01-31 LAB
ABO GROUP BLD BPU: NORMAL
ABO GROUP BLD BPU: NORMAL
BPU ID: NORMAL
BPU ID: NORMAL
CROSSMATCH: NORMAL
CROSSMATCH: NORMAL
ERYTHROCYTE [DISTWIDTH] IN BLOOD BY AUTOMATED COUNT: 17.4 % (ref 11.6–15.1)
GLUCOSE SERPL-MCNC: 196 MG/DL (ref 65–140)
GLUCOSE SERPL-MCNC: 276 MG/DL (ref 65–140)
GLUCOSE SERPL-MCNC: 307 MG/DL (ref 65–140)
HCT VFR BLD AUTO: 27.8 % (ref 34.8–46.1)
HGB BLD-MCNC: 11.1 G/DL (ref 11.5–15.4)
HGB BLD-MCNC: 9.1 G/DL (ref 11.5–15.4)
MCH RBC QN AUTO: 29.6 PG (ref 26.8–34.3)
MCHC RBC AUTO-ENTMCNC: 32.7 G/DL (ref 31.4–37.4)
MCV RBC AUTO: 91 FL (ref 82–98)
PLATELET # BLD AUTO: 26 THOUSANDS/UL (ref 149–390)
PMV BLD AUTO: 10.5 FL (ref 8.9–12.7)
RBC # BLD AUTO: 3.07 MILLION/UL (ref 3.81–5.12)
UNIT DISPENSE STATUS: NORMAL
UNIT DISPENSE STATUS: NORMAL
UNIT PRODUCT CODE: NORMAL
UNIT PRODUCT CODE: NORMAL
UNIT RH: NORMAL
UNIT RH: NORMAL
WBC # BLD AUTO: 3.36 THOUSAND/UL (ref 4.31–10.16)

## 2018-01-31 PROCEDURE — 85027 COMPLETE CBC AUTOMATED: CPT | Performed by: NURSE PRACTITIONER

## 2018-01-31 PROCEDURE — 82948 REAGENT STRIP/BLOOD GLUCOSE: CPT

## 2018-01-31 PROCEDURE — G8987 SELF CARE CURRENT STATUS: HCPCS

## 2018-01-31 PROCEDURE — 87040 BLOOD CULTURE FOR BACTERIA: CPT | Performed by: SURGERY

## 2018-01-31 PROCEDURE — 97167 OT EVAL HIGH COMPLEX 60 MIN: CPT

## 2018-01-31 PROCEDURE — G8988 SELF CARE GOAL STATUS: HCPCS

## 2018-01-31 PROCEDURE — C9113 INJ PANTOPRAZOLE SODIUM, VIA: HCPCS | Performed by: SURGERY

## 2018-01-31 PROCEDURE — 85018 HEMOGLOBIN: CPT | Performed by: NURSE PRACTITIONER

## 2018-01-31 PROCEDURE — 99223 1ST HOSP IP/OBS HIGH 75: CPT | Performed by: INTERNAL MEDICINE

## 2018-01-31 RX ORDER — HEPARIN SODIUM 5000 [USP'U]/ML
5000 INJECTION, SOLUTION INTRAVENOUS; SUBCUTANEOUS EVERY 8 HOURS SCHEDULED
Status: DISCONTINUED | OUTPATIENT
Start: 2018-01-31 | End: 2018-02-01

## 2018-01-31 RX ADMIN — LABETALOL 20 MG/4 ML (5 MG/ML) INTRAVENOUS SYRINGE 10 MG: at 23:55

## 2018-01-31 RX ADMIN — CHLORHEXIDINE GLUCONATE 15 ML: 1.2 RINSE ORAL at 08:44

## 2018-01-31 RX ADMIN — LABETALOL 20 MG/4 ML (5 MG/ML) INTRAVENOUS SYRINGE 10 MG: at 02:05

## 2018-01-31 RX ADMIN — INSULIN LISPRO 1 UNITS: 100 INJECTION, SOLUTION INTRAVENOUS; SUBCUTANEOUS at 05:51

## 2018-01-31 RX ADMIN — HEPARIN SODIUM 5000 UNITS: 5000 INJECTION, SOLUTION INTRAVENOUS; SUBCUTANEOUS at 09:39

## 2018-01-31 RX ADMIN — PANTOPRAZOLE SODIUM 40 MG: 40 INJECTION, POWDER, FOR SOLUTION INTRAVENOUS at 22:24

## 2018-01-31 RX ADMIN — Medication 4.5 G: at 23:35

## 2018-01-31 RX ADMIN — INSULIN LISPRO 3 UNITS: 100 INJECTION, SOLUTION INTRAVENOUS; SUBCUTANEOUS at 12:30

## 2018-01-31 RX ADMIN — PANTOPRAZOLE SODIUM 40 MG: 40 INJECTION, POWDER, FOR SOLUTION INTRAVENOUS at 08:44

## 2018-01-31 RX ADMIN — HYDRALAZINE HYDROCHLORIDE 10 MG: 20 INJECTION INTRAMUSCULAR; INTRAVENOUS at 02:46

## 2018-01-31 RX ADMIN — FLUCONAZOLE 400 MG: 2 INJECTION INTRAVENOUS at 17:57

## 2018-01-31 RX ADMIN — LABETALOL 20 MG/4 ML (5 MG/ML) INTRAVENOUS SYRINGE 10 MG: at 10:16

## 2018-01-31 RX ADMIN — INSULIN LISPRO 3 UNITS: 100 INJECTION, SOLUTION INTRAVENOUS; SUBCUTANEOUS at 18:06

## 2018-01-31 RX ADMIN — PIPERACILLIN SODIUM AND TAZOBACTAM SODIUM 3.38 G: 36; 4.5 INJECTION, POWDER, FOR SOLUTION INTRAVENOUS at 09:11

## 2018-01-31 RX ADMIN — PIPERACILLIN SODIUM AND TAZOBACTAM SODIUM 3.38 G: 36; 4.5 INJECTION, POWDER, FOR SOLUTION INTRAVENOUS at 02:55

## 2018-01-31 RX ADMIN — HYDROMORPHONE HYDROCHLORIDE 0.5 MG: 1 INJECTION, SOLUTION INTRAMUSCULAR; INTRAVENOUS; SUBCUTANEOUS at 05:28

## 2018-01-31 RX ADMIN — Medication 4.5 G: at 15:23

## 2018-01-31 RX ADMIN — HEPARIN SODIUM 5000 UNITS: 5000 INJECTION, SOLUTION INTRAVENOUS; SUBCUTANEOUS at 22:24

## 2018-01-31 RX ADMIN — HYDROMORPHONE HYDROCHLORIDE 0.5 MG: 1 INJECTION, SOLUTION INTRAMUSCULAR; INTRAVENOUS; SUBCUTANEOUS at 00:43

## 2018-01-31 NOTE — POST OP PROGRESS NOTES
Progress Note - General Surgery   Nay Gonzales 67 y o  female MRN: [id number]  Unit/Bed#: ICU 11 Encounter: [id number]    Assessment:  Perforated gastric ulcer with pneumoperitoneum  Postop day 2 Status post laparoscopic Lea Hoguet patch repair of gastric ulcer, abdominal washout, placement of drains  Severe Anemia, chronic blood loss- related to hemorrhage from ulcer- status post multiple transfusions- hgb  stable  Pancytopenia- ? Medication effect   Acute severe protein calorie malnutrition related to GI distress   Multiple sclerosis with chronic steroid use and Imuran use  Diabetes mellitus 2  Acute hypoxic respiratory failure likely related to anemia and surgical stress- patient extubated yesterday  Thrombocytopenia  Cat bite left hand with some swelling       Plan:  Continue supportive care  Continue NGT and NPO  Plan for UGI study tmrw to evaluate Mickey Bores  If no leakage of contrast will remove NGT and start clear liquids  Continue Abx and Diflucan per ID  Continue Protonix   D/c barba  DVT prophylaxix on hold due to recent GI bleeding  Continue BGM and BS control  Continue to monitor hgb, platelets- transfuse prn  Monitor left hand wound  Continue medical management per CC service        Subjective/Objective   Chief Complaint: pain    Subjective: Patient c/o abdominal pain  Worse with movement  OOB to chair  Extubated yesterday  Denies SOB  Still has barba  Minimal NGT output  Drains without bile  Objective:     Blood pressure 167/70, pulse 86, temperature 97 7 °F (36 5 °C), temperature source Temporal, resp  rate (!) 28, height 5' 7" (1 702 m), weight 77 2 kg (170 lb 3 1 oz), SpO2 96 %  ,Body mass index is 26 66 kg/m²        Intake/Output Summary (Last 24 hours) at 01/31/18 1342  Last data filed at 01/31/18 1301   Gross per 24 hour   Intake              550 ml   Output             2920 ml   Net            -2370 ml       Invasive Devices     Peripheral Intravenous Line            Peripheral IV 01/30/18 Left Antecubital less than 1 day    Peripheral IV 01/31/18 Right Arm less than 1 day          Drain            NG/OG/Enteral Tube Nasogastric Right nares 2 days    Closed/Suction Drain Left Abdomen Bulb 15 Fr  1 day    Closed/Suction Drain Right;Other (Comment) Abdomen Bulb 15 Fr  1 day    Urethral Catheter Latex; Double-lumen 16 Fr  1 day                Physical Exam:  Gen:  Alert and oriented, some discomfort, NAD  HEENT:   sclera anicteric, mucous membranes dry  Neck:  Supple negative for lymphadenopathy  Chest:  clear, decreased at bases, shallow BS  Cor:  Regular rate and rhythm  Abd:  Soft with some incisional tenderness, nondistended, few bowel sounds  Ext:  There is mild edema in her bilateral lower extremities  Left hand with scabbed wound and mild swelling, no increased warmth or cellulitis  Skin:  Warm and dry, incision sites are clean dry and intact  NG tube with small amount of output  Abdominal drains with serosanguineous output, no bile    Lab, Imaging and other studies:  I have personally reviewed pertinent lab results    , CBC:   Lab Results   Component Value Date    WBC 3 36 (L) 01/31/2018    HGB 9 1 (L) 01/31/2018    HCT 27 8 (L) 01/31/2018    MCV 91 01/31/2018    PLT 26 (LL) 01/31/2018    MCH 29 6 01/31/2018    MCHC 32 7 01/31/2018    RDW 17 4 (H) 01/31/2018    MPV 10 5 01/31/2018   , CMP: No results found for: NA, K, CL, CO2, ANIONGAP, BUN, CREATININE, GLUCOSE, CALCIUM, AST, ALT, ALKPHOS, PROT, ALBUMIN, BILITOT, EGFR  VTE Pharmacologic Prophylaxis: Heparin  VTE Mechanical Prophylaxis: sequential compression device     Gisel Concepcion PA-C

## 2018-01-31 NOTE — CONSULTS
Consultation - Infectious Disease   Freddy Perla 67 y o  female MRN: [id number]  Unit/Bed#: ICU 11 Encounter: [id number]      IMPRESSION & RECOMMENDATIONS:   1  Peritonitis-secondary to perforation of a gastric ulceration with contamination of the peritoneum with gastric contacts  Patient is now status post laparoscopic repair with washout with drains left in place  Consider patient for the possibility of more resistant organisms and yeast secondary infection as the patient had been Augmentin at the time of the perforation  Fortunately the patient has now undergone adequate source control   -continue Zosyn but will increase the dose to 4 5 g IV q 6 hours  -continue fluconazole at current dose  -plan at least 5 days of intravenous antibiotics  -close surgical follow-up  -serial abdominal exams  -monitor CBC with diff and creatinine    2  Pancytopenia-possibly a medication effect secondary to the Imuran  Possibly another etiology  Patient has not neutropenic  She has been transfused and resuscitated for the severe anemia   -monitor CBC with diff  -no additional Imuran for now  -would consult Hematology Oncology if the pancytopenia persists    3  Chronic blood loss anemia-secondary to gastric ulceration  The H&H has now stabilized after resuscitation  -monitor CBC with diff  -continue Protonix  -GI follow-up    4  Diabetes mellitus-type 2 with hyperglycemia    5  Multiple sclerosis-apparently on Imuran for a while  Seems to be neurologically stable  -will need close neurology follow-up  -would hold on Imuran for now    6  Cat bite-left hand  No overt cellulitis at this time    Which should be well covered by the antibiotics as above      Discussed in detail with Dr Amada Mckeon:  Reason for Consult:  Peritonitis  HPI: Freddy Perla is a 67y o  year old female with a history of multiple sclerosis on Imuran and diabetes mellitus admitted to [facility] in [facility] after being transferred from [facility] for symptomatic anemia, who I am asked to assist with management of peritonitis  The patient has a history of multiple sclerosis and has been on Imuran for quite some time  She apparently has been suffering from progressive fatigue over the last month  She still stain the cat bite about a week ago by her own cat and went to the ER for further evaluation  She was started on Augmentin and Motrin  Since starting the antibiotics in the Motrin the patient began developing some abdominal pain  Because of the abdominal pain in the progressive fatigue patient went to the [facility] ER for further evaluation  In the emergency department the patient was found to have severe anemia  She was transferred to [facility] in [facility] for further management  On evaluation in [facility] she supportive care and transfusion  However she was noted to have the abdominal pain and tenderness and therefore she underwent CT of the abdomen and pelvis that revealed evidence of a perforation  Patient was taken to the operating room 2 days ago and underwent laparoscopic repair of a gastric ulceration as well as washout of significant intra-abdominal contamination  Patient was started on Zosyn and fluconazole  During the patient's brief hospital stay she has remained afebrile and hemodynamically stable  She was initially on the ventilator in the postoperative  However since she is now extubated and not requiring any oxygen support  She has some mild abdominal pain, but denies any nausea vomiting or diarrhea  She has not yet having bowel movements  She denies any cough or shortness of breath, denies any sore throat or rhinorrhea or nasal congestion, denies any dysuria or hematuria  REVIEW OF SYSTEMS:  A complete 12 point system-based review of systems is otherwise negative      PAST MEDICAL HISTORY:  Past Medical History:   Diagnosis Date    Diabetes mellitus ([facility] [address] )     Disease of thyroid gland     Hyperlipidemia     Multiple sclerosis (HCC)      Past Surgical History:   Procedure Laterality Date    MO LAP,DIAGNOSTIC ABDOMEN N/A 2018    Procedure: LAPAROSCOPY DIAGNOSTIC, with repair of perforated gastric ulcer;  Surgeon: Vesta Kawasaki, MD;  Location: [facility] OR;  Service: General       FAMILY HISTORY:  Non-contributory    SOCIAL HISTORY:  Social History   History   Alcohol Use    Yes     Comment: rarely     History   Drug Use No     History   Smoking Status    Former Smoker    Types: Cigarettes    Quit date:    Smokeless Tobacco    Never Used       ALLERGIES:  No Known Allergies    MEDICATIONS:  All current active medications have been reviewed  Antibiotics:  Zosyn 3, fluconazole 3, postop day 2  PHYSICAL EXAM:  HR:  [72-90] 76  Resp:  [8-24] 11  BP: (158-193)/(57-81) 163/67  SpO2:  [96 %-100 %] 97 %  Temp (24hrs), Av 2 °F (36 8 °C), Min:97 7 °F (36 5 °C), Max:98 9 °F (37 2 °C)  Current: Temperature: 97 7 °F (36 5 °C)    Intake/Output Summary (Last 24 hours) at 18 1132  Last data filed at 18 1001   Gross per 24 hour   Intake              550 ml   Output             4400 ml   Net            -3850 ml       General Appearance:  Appearing chronically ill, nontoxic, and in no distress   Head:  Normocephalic, without obvious abnormality, atraumatic   Eyes:  Conjunctiva pale and sclera anicteric, both eyes   Nose: Nares normal, mucosa normal, no drainage   Throat: Oropharynx moist without lesions   Neck: Supple, symmetrical, no adenopathy, no tenderness/mass/nodules   Back:   Symmetric, no curvature, ROM normal, no CVA tenderness   Lungs:   Decreased breath sounds bilaterally, respirations unlabored   Chest Wall:  No tenderness or deformity   Heart:  RRR; no murmur, rub or gallop   Abdomen:   Soft, non-tender, non-distended, positive bowel sounds  Bilateral drains in place  Extremities: No cyanosis, clubbing  Left hand with some edema    Also with scabbing consistent with a previous cat bite  Skin: No other rashes or lesions  No draining wounds noted  Lymph nodes: Cervical, supraclavicular nodes normal   Neurologic: Alert and oriented times 3, poor memory, able to move all 4 extremities       LABS, IMAGING, & OTHER STUDIES:  Lab Results:  I have personally reviewed pertinent labs  Results from last 7 days  Lab Units 01/31/18  1031 01/31/18  0511 01/30/18  1541 01/30/18  0447  01/29/18  1656   WBC Thousand/uL 3 36*  --   --  2 94*  --  2 26*   HEMOGLOBIN g/dL 9 1* 11 1* 12 0 11 9  < > 7 8*   PLATELETS Thousands/uL 26*  --   --  50*  --  102*   < > = values in this interval not displayed  Results from last 7 days  Lab Units 01/30/18  0447  01/29/18  0951 01/29/18  0016 01/28/18  1745   SODIUM mmol/L 144  --  138 134* 133*   POTASSIUM mmol/L 4 1  --  4 7 4 9 5 6*   CHLORIDE mmol/L 113*  --  106 103 99*   CO2 mmol/L 22  --  25 23 18*   ANION GAP mmol/L 9  --  7 8 16*   BUN mg/dL 42*  --  46* 44* 43*   CREATININE mg/dL 0 80  --  1 13 1 23 1 31*   EGFR ml/min/1 73sq m 74  --  49 44 41   GLUCOSE RANDOM mg/dL 202*  --  403* 512* 586*   GLUCOSE, ISTAT   --   < >  --   --   --    CALCIUM mg/dL 7 3*  --  8 4 8 2* 8 6   AST U/L  --   --   --   --  30   ALT U/L  --   --   --   --  23   ALK PHOS U/L  --   --   --   --  76   TOTAL PROTEIN g/dL  --   --   --   --  5 4*   BILIRUBIN TOTAL mg/dL  --   --   --   --  1 30*   < > = values in this interval not displayed  Blood cultures x2 sets pending    Imaging Studies:   I have personally reviewed pertinent imaging study reports and images in PACS  CT abdomen and pelvis-Moderate pneumoperitoneum suggesting bowel perforation  Although the origin is uncertain, possibility of perforated gastric ulcer may be considered  Small amount of abdominal and pelvic ascites  No evidence for organized abscess  Mild small bowel dilatation may reflect reactive ileus  Cholelithiasis

## 2018-01-31 NOTE — PLAN OF CARE
Problem: OCCUPATIONAL THERAPY ADULT  Goal: Performs self-care activities at highest level of function for planned discharge setting  See evaluation for individualized goals  Treatment Interventions: ADL retraining, Functional transfer training, Endurance training, UE strengthening/ROM, Patient/family training, Compensatory technique education, Activityengagement, Energy conservation, Cognitive reorientation, Equipment evaluation/education          See flowsheet documentation for full assessment, interventions and recommendations  Limitation: Decreased ADL status, Decreased UE strength, Decreased endurance, Decreased cognition, Decreased Safe judgement during ADL, Decreased self-care trans, Decreased high-level ADLs  Prognosis: Fair, Good  Assessment: Pt is a 67 y o  female seen for OT evaluation s/[facility] w/ Pneumoperitoneum, exploratory lap repair of gastric ulcer, intubated after procedure, w/ NG tube, COREY drains  Pt extubated on 1/30  Comorbidities affecting pt's functional performance at time of assessment include: anemia, severe malnutrition, Multiple sclerosis, DM II  Personal factors affecting pt at time of IE include: impaired cognition  Prior to admission, pt was living w/ spouse w/ first floor setup and reports independent w/ UB ADLs, assist LB ADLs, independent SPT to w/c w/ VCs for positioning and safety, independent w/c mobility  Upon evaluation: Pt requires MOD assist x 2 sit<>Stand w/ VCs for positioning, MOD assist x2 SPT w/ RW to w/c, MAX assist LB ADLs, MOD assist UB ADLs, MAX assist toileting 2* the following deficits impacting occupational performance: decreased strength and endurance, impaired balance, poor activity tolerance, increased pain in abdomen, impaired cognition (increased processing time, impaired STM), anxiety   Pt to benefit from continued skilled OT tx while in the hospital to address deficits as defined above and maximize level of functional independence w ADL's and functional mobility  Occupational Performance areas to address include: grooming, bathing/shower, toilet hygiene, dressing, functional mobility and clothing management, energy conservation education  From OT standpoint, recommendation at time of d/c would be short term rehab        OT Discharge Recommendation: Short Term Rehab         Comments: Jin Medina MS, OTR/L

## 2018-01-31 NOTE — OCCUPATIONAL THERAPY NOTE
[address]  Evaluation     Patient Name: Amalia Cedillo  HBRADHAE'Z Date: 1/31/2018  Problem List  Patient Active Problem List   Diagnosis    Anemia    [facility]    Hyperglycemia due to type 2 diabetes mellitus ([facility] )    BEVERLY (acute kidney injury) ([facility]    Hypoalbuminemia due to protein-calorie malnutrition ([facility] )    Gastric ulcer    Pneumoperitoneum     Past Medical History  Past Medical History:   Diagnosis Date    [facility]     Disease of thyroid gland     Hyperlipidemia     [facility] sclerosis [facility]      Past Surgical History  Past Surgical History:   Procedure Laterality Date    PA LAP,DIAGNOSTIC ABDOMEN N/A 1/29/2018    Procedure: LAPAROSCOPY DIAGNOSTIC, with repair of perforated gastric ulcer;  Surgeon: Andre Torrez MD;  Location: [facility];  Service: General           01/31/18 4204   Note Type   Note type Eval/Treat   Restrictions/Precautions   Weight Bearing Precautions Per Order No   Other Precautions Multiple lines;Telemetry; Fall Risk;Pain  (NG tube, COREY drains)   Pain Assessment   Pain Assessment 0-10   Pain Score 4   Pain Type Surgical pain   Pain Location Abdomen   Pain Orientation Left   Hospital Pain Intervention(s) Ambulation/increased activity;Repositioned   Response to Interventions tolerated   Home Living   Type of [address] [place] [address] Two level; Able to live on main level with bedroom/bathroom;1/2 bath on main level  (reports 1st floor setup, )   Bathroom Shower/Tub (sponge bathes)   Bathroom Toilet Standard   Bathroom Equipment Commode   Bathroom Accessibility Accessible  (1/2 bath 1st floor)   [facility]; Wheelchair-manual   Additional Comments pt report spouse home w/ pt at all times   Prior Function   Level of [place] Independent with ADLs and functional mobility  (assist LB ADLs)   Lives With Spouse   Receives Help From Family   ADL Assistance Independent  (assist LB ADLs)   IADLs Independent   Vocational Retired Comments pt reports mainly uses w/c for mobility   Lifestyle   Autonomy per pt independent w/ ADLS, independent functional transfers SPT w/ RW, w/c for mobility   Reciprocal Relationships spouse   Service to Others retired    Intrinsic Gratification watching tv   ADL   Where Kyle[address] 4  Minimal Assistance  (NG tube, NPO)   Grooming Assistance 4  Minimal Assistance   UB Bathing Assistance 3  Moderate Assistance   LB [address] 2  Maximal Assistance   [address] 3  Moderate Assistance   LB Dressing Assistance 2  Maximal [address]  2  Maximal Assistance   Bed Mobility   Supine to Sit Unable to assess   Additional Comments pt seated in bedside recliner upon arrival   Transfers   Sit to Stand 3  Moderate assistance   Additional items Assist x 2; Increased time required;Verbal cues;Armrests   Stand to Sit 3  Moderate assistance   Additional items Assist x 2; Increased time required;Verbal cues;Armrests   Stand pivot 3  Moderate assistance   Additional items Assist x 2; Increased time required;Verbal cues;Armrests   Additional Comments VCs for safety and positioning, encouragement t/o   Functional Mobility   Functional Mobility 3  Moderate assistance   Additional Comments assist x 2 w/ VCs for safety, anxiety w/ transfer   Additional items Rolling walker   Balance   Static Sitting Fair   Dynamic Sitting Fair -   Static Standing Poor +   Dynamic Standing Poor +   Ambulatory Poor   Activity Tolerance   Activity Tolerance Patient limited by pain; Patient limited by fatigue   Nurse Made Aware appropriate to see per RN, Real Rough present during transfers   RUE Assessment   RUE Assessment WFL  (3+/5)   LUE Assessment   LUE Assessment WFL  (3+/5)   Hand Function   Gross Motor Coordination Functional   Fine Motor Coordination Functional   Sensation   Light Touch No apparent deficits   Sharp/Dull No apparent deficits   Proprioception Proprioception No apparent deficits   Vision-Basic Assessment   Current Vision Wears glasses only for reading   Vision - Complex Assessment   Ocular Range of Motion [facility]   Acuity Able to read clock/calendar on wall without difficulty   Cognition   Overall Cognitive Status Impaired   Arousal/Participation Cooperative;Responsive   Attention Attends with cues to redirect   Orientation Level Oriented to person;Oriented to place; Disoriented to time;Disoriented to situation   Memory Decreased short term memory;Decreased recall of recent events;Decreased recall of precautions   Following Commands Follows one step commands with increased time or repetition   Comments pt appears w/ STM, decreased insight, increased processing, anxiety   Assessment   Limitation Decreased ADL status; Decreased UE strength;Decreased endurance;Decreased cognition;Decreased Safe judgement during ADL;Decreased self-care trans;Decreased high-level ADLs   Prognosis Fair;Good   Assessment Pt is a 67 y o  female seen for OT evaluation s/p [facility] w/ Pneumoperitoneum, exploratory lap repair of gastric ulcer, intubated after procedure, w/ NG tube, COREY drains  Pt extubated on 1/30  Comorbidities affecting pt's functional performance at time of assessment include: anemia, severe malnutrition, Multiple sclerosis, DM II  Personal factors affecting pt at time of IE include: impaired cognition  Prior to admission, pt was living w/ spouse w/ first floor setup and reports independent w/ UB ADLs, assist LB ADLs, independent SPT to w/c w/ VCs for positioning and safety, independent w/c mobility   Upon evaluation: Pt requires MOD assist x 2 sit<>Stand w/ VCs for positioning, MOD assist x2 SPT w/ RW to w/c, MAX assist LB ADLs, MOD assist UB ADLs, MAX assist toileting 2* the following deficits impacting occupational performance: decreased strength and endurance, impaired balance, poor activity tolerance, increased pain in abdomen, impaired cognition (increased processing time, impaired STM), anxiety  Pt to benefit from continued skilled OT tx while in the hospital to address deficits as defined above and maximize level of functional independence w ADL's and functional mobility  Occupational Performance areas to address include: grooming, bathing/shower, toilet hygiene, dressing, functional mobility and clothing management, energy conservation education  From OT standpoint, recommendation at time of d/c would be short term rehab  Goals   Patient Goals none expressed   LTG Time Frame 7-10   Long Term Goal please see below goals   Plan   Treatment Interventions ADL retraining;Functional transfer training; Endurance training;UE strengthening/ROM; Patient/family training; Compensatory technique education; Activityengagement; Energy conservation;Cognitive reorientation;Equipment evaluation/education   Goal Expiration Date 02/10/18   OT Frequency 3-5x/wk   Recommendation   OT Discharge Recommendation Short Term Rehab   Barthel Index   Feeding 5   Bathing 0   Grooming Score 0   Dressing Score 5   Bladder Score 5   Bowels Score 10   Toilet Use Score 5   Transfers (Bed/Chair) Score 5   Mobility (Level Surface) Score 0   Stairs Score 0   Barthel Index Score 35   Modified Nilo Scale   Modified Cottle Scale 4     Occupational Therapy Goals to be met in 7-10 days:  1) Pt will improve activity tolerance to G for min 30 min txment sessions  2) Pt will complete ADLs/self care w/ setup and LB ADLS w/ MIN assist  3) Pt will complete toileting w/ supervision w/ G hygiene/thoroughness using DME PRN  4) Pt will improve functional transfers on/off all surfaces using DME PRN w/ G balance/safety including toileting w/ supervision  5) Pt will engage in ongoing cognitive assessment w/ G participation to A w/ safe d/c planning/recommendations  6) Pt will demonstrate G carryover of pt/caregiver education and training as appropriate w/ mod I  w/ G tolerance  7) Pt will engage in depression screen/leisure interest checklist w/ G participation to monitor s/s depression and ID 3 positive coping strategies to A w/ emotional regulation and management  8) Pt will demonstrate 100% carryover of E C  techniques w/ mod I t/o fx'l I/ADL/leisure tasks w/o cues s/p skilled education  9) Pt will tolerate bed mobility and EOB seated tasks w/ min A for 30 mins to engage in fx'l I/ADL/leisure tasks w/ min A w/ min cues  10) Pt will demonstrate 100% carryover of LHAE for LB ADLs/self care and leisure s/p skilled education w/ mod I and G participation    Documentation completed by: Kennedy Boyd MS, OTR/L

## 2018-01-31 NOTE — NURSING NOTE
Received patient from ICU  She is resting comfortably in bed  Suction on low continuous through NG tube  She denies any pain  Bed low and locked  Call bell within reach  Will continue to monitor

## 2018-01-31 NOTE — PROGRESS NOTES
Progress Note - Critical Care   Gloria Felix 67 y o  female MRN: [id number]  Unit/Bed#: ICU 11 Encounter: [id number]    Assessment/Plan:  1  Chronic blood loss anemia secondary to a gastric ulcer  · Her hemoglobin is stable  · Will continue her on b i d  Protonix  2  Acute hypoxic respiratory failure likely multifactorial related to #1 and her recent surgical procedure-now extubated  · Will encourage out of bed , cough, deep breathing and incentive spirometer  · Will wean oxygen with a goal to maintain her saturations greater than 90%  3  Perforated gastric ulcer status post Bobie Yaa patch, postop day 2  · Appreciate surgery's assistance with the care this patient  · Diet advancement per surgical team's recommendations  · If it is anticipated that she will have a prolonged NPO status, we should consider the initiation of TPN given her hypo albuminemia on admission  4  Diabetes mellitus type 2  · We will continue her on sliding scale insulin for now with a goal to maintain her blood glucose between 140 and 180  5  Multiple sclerosis  6  Hypothyroid-on Synthroid  7  Hypoalbuminemia secondary to protein calorie malnutrition      Critical Care Time:   Documented critical care time excludes any procedures documented elsewhere  It also excludes any family updates    _____________________________________________________________________    HPI/24hr events:   No events overnight  The patient denies shortness of breath  She denies significant abdominal pain      Medications:    Current Facility-Administered Medications:  atorvastatin 40 mg Oral Daily With PARRISH Hilton    chlorhexidine 15 mL Swish & Spit Q12H [facility] [address]    cholecalciferol 1,000 Units Oral Daily PARRISH Cook    ergocalciferol 50,000 Units Oral Weekly PARRISH Cook    fluconazole 400 mg Intravenous Q24H Dee Dee Heath MD Last Rate: Stopped (01/30/18 1958)   glipiZIDE 5 mg Oral BID AC PARRISH Cook    hydrALAZINE 10 mg Intravenous Q4H PRN Parul Garces, PARRISH    HYDROmorphone 0 5 mg Intravenous Q3H PRN Hilda Loyd PA-C    insulin lispro 1-5 Units Subcutaneous Q6H [address], CRNP    labetalol 10 mg Intravenous Q4H PRN Antonetta Gimenez Spatzer, PARRISH    levothyroxine 200 mcg Oral Early Morning Terrie Hector, CRSIDRA    morphine injection 1 mg Intravenous Q4H PRN Parul Garces, PARRISH    ondansetron 4 mg Intravenous Q4H PRN Parul Garces, PARRISH    pantoprazole 40 mg Intravenous Q12H [facility] Ti Jacobsen MD    piperacillin-tazobactam 3 375 g Intravenous Q6H PARRISH Garcia Last Rate: 3 375 g (01/31/18 0255)   propofol 5-50 mcg/kg/min Intravenous Titrated PARRISH Garcia Last Rate: Stopped (01/30/18 0730)         propofol 5-50 mcg/kg/min Last Rate: Stopped (01/30/18 0730)         Physical exam:  Vitals: Body mass index is 26 66 kg/m²  Blood pressure 170/64, pulse 82, temperature 97 7 °F (36 5 °C), temperature source Temporal, resp  rate (!) 9, height 5' 7" (1 702 m), weight 77 2 kg (170 lb 3 1 oz), SpO2 96 %  ,  Temp  Min: 97 7 °F (36 5 °C)  Max: 99 6 °F (37 6 °C)  IBW: 61 6 kg    SpO2: 96 %  SpO2 Activity: At Rest  O2 Device: None (Room air)      Intake/Output Summary (Last 24 hours) at 01/31/18 7207  Last data filed at 01/31/18 0600   Gross per 24 hour   Intake              200 ml   Output             4055 ml   Net            -3855 ml       Invasive/non-invasive ventilation settings:   Respiratory    Lab Data (Last 4 hours)    None         O2/Vent Data (Last 4 hours)    None              Invasive Devices     Peripheral Intravenous Line            Peripheral IV 01/30/18 Left Antecubital less than 1 day    Peripheral IV 01/31/18 Right Arm less than 1 day          Drain            NG/OG/Enteral Tube Nasogastric Right nares 2 days    Closed/Suction Drain Left Abdomen Bulb 15 Fr  1 day    Closed/Suction Drain Right;Other (Comment) Abdomen Bulb 15 Fr  1 day    Urethral Catheter Latex; Double-lumen 16 Fr  1 day Physical Exam:  Gen:  Awake and alert  HEENT:  Pupils are equal round and reactive to light  Neck:  Supple negative for lymphadenopathy  Chest:  Diminished but otherwise clear  Cor:  Regular rate and rhythm  Abd:  Mildly distended but soft  The drainage tubes has minimal drainage in the output container  Ext:  There is mild lower extremity edema  Neuro:  Awake and alert, able to move her extremities  Skin:  Warm and dry      Diagnostic Data:  Lab: I have personally reviewed pertinent lab results  CBC:     Results from last 7 days  Lab Units 01/31/18  0511 01/30/18  1541 01/30/18  0447  01/29/18  1656  01/29/18  1435 01/29/18  0951   WBC Thousand/uL  --   --  2 94*  --  2 26*  --   --  2 43*   HEMOGLOBIN g/dL 11 1* 12 0 11 9  < > 7 8*  --  8 4* 9 1*   I STAT HEMOGLOBIN   --   --   --   --   --   < >  --   --    HEMATOCRIT %  --   --  34 9  --  22 8*  --  24 7* 26 9*   PLATELETS Thousands/uL  --   --  50*  --  102*  --   --  37*   < > = values in this interval not displayed      CMP:     Results from last 7 days  Lab Units 01/30/18  0447 01/29/18  1651 01/29/18  1638 01/29/18  0951 01/29/18  0016 01/28/18  1745   SODIUM mmol/L 144  --   --  138 134* 133*   POTASSIUM mmol/L 4 1  --   --  4 7 4 9 5 6*   CHLORIDE mmol/L 113*  --   --  106 103 99*   CO2 mmol/L 22  --   --  25 23 18*   BUN mg/dL 42*  --   --  46* 44* 43*   CREATININE mg/dL 0 80  --   --  1 13 1 23 1 31*   CALCIUM mg/dL 7 3*  --   --  8 4 8 2* 8 6   TOTAL PROTEIN g/dL  --   --   --   --   --  5 4*   BILIRUBIN TOTAL mg/dL  --   --   --   --   --  1 30*   ALK PHOS U/L  --   --   --   --   --  76   ALT U/L  --   --   --   --   --  23   AST U/L  --   --   --   --   --  30   GLUCOSE RANDOM mg/dL 202*  --   --  403* 512* 586*   GLUCOSE, ISTAT mg/dl  --  328* 335*  --   --   --      PT/INR:   No results found for: PT, INR,   Magnesium:   Results from last 7 days  Lab Units 01/29/18  0016   MAGNESIUM mg/dL 1 7     Phosphorous:       Microbiology: Imaging:      Cardiac lab/EKG/telemetry/ECHO:       VTE Prophylaxis:  SCDs    Code Status: Level 3 - DNAR and DNI    Adrian Evan, CRNP    Portions of the record may have been created with voice recognition software  Occasional wrong word or "sound a like" substitutions may have occurred due to the inherent limitations of voice recognition software  Read the chart carefully and recognize, using context, where substitutions have occurred

## 2018-02-01 ENCOUNTER — APPOINTMENT (INPATIENT)
Dept: RADIOLOGY | Facility: HOSPITAL | Age: 73
DRG: 853 | End: 2018-02-01
Payer: MEDICARE

## 2018-02-01 PROBLEM — D61.818 PANCYTOPENIA (HCC): Status: ACTIVE | Noted: 2018-01-28

## 2018-02-01 LAB
ANION GAP SERPL CALCULATED.3IONS-SCNC: 10 MMOL/L (ref 4–13)
BUN SERPL-MCNC: 54 MG/DL (ref 5–25)
CALCIUM SERPL-MCNC: 7.6 MG/DL (ref 8.3–10.1)
CHLORIDE SERPL-SCNC: 123 MMOL/L (ref 100–108)
CO2 SERPL-SCNC: 26 MMOL/L (ref 21–32)
CREAT SERPL-MCNC: 0.76 MG/DL (ref 0.6–1.3)
ERYTHROCYTE [DISTWIDTH] IN BLOOD BY AUTOMATED COUNT: 17.2 % (ref 11.6–15.1)
ERYTHROCYTE [DISTWIDTH] IN BLOOD BY AUTOMATED COUNT: 17.3 % (ref 11.6–15.1)
FDP BLD QL AGGL: >10 <20
FIBRINOGEN PPP-MCNC: 326 MG/DL (ref 227–495)
GFR SERPL CREATININE-BSD FRML MDRD: 79 ML/MIN/1.73SQ M
GLUCOSE SERPL-MCNC: 290 MG/DL (ref 65–140)
GLUCOSE SERPL-MCNC: 299 MG/DL (ref 65–140)
GLUCOSE SERPL-MCNC: 311 MG/DL (ref 65–140)
GLUCOSE SERPL-MCNC: 329 MG/DL (ref 65–140)
GLUCOSE SERPL-MCNC: 330 MG/DL (ref 65–140)
HCT VFR BLD AUTO: 23 % (ref 34.8–46.1)
HGB BLD-MCNC: 7.2 G/DL (ref 11.5–15.4)
HGB BLD-MCNC: 7.2 G/DL (ref 11.5–15.4)
HGB BLD-MCNC: 7.4 G/DL (ref 11.5–15.4)
MCH RBC QN AUTO: 29.1 PG (ref 26.8–34.3)
MCH RBC QN AUTO: 29.8 PG (ref 26.8–34.3)
MCHC RBC AUTO-ENTMCNC: 31.3 G/DL (ref 31.4–37.4)
MCHC RBC AUTO-ENTMCNC: 31.3 G/DL (ref 31.4–37.4)
MCV RBC AUTO: 93 FL (ref 82–98)
MCV RBC AUTO: 95 FL (ref 82–98)
PLATELET # BLD AUTO: 20 THOUSANDS/UL (ref 149–390)
PLATELET # BLD AUTO: 20 THOUSANDS/UL (ref 149–390)
PMV BLD AUTO: 10.1 FL (ref 8.9–12.7)
PMV BLD AUTO: 10.9 FL (ref 8.9–12.7)
POTASSIUM SERPL-SCNC: 4.2 MMOL/L (ref 3.5–5.3)
RBC # BLD AUTO: 2.42 MILLION/UL (ref 3.81–5.12)
RBC # BLD AUTO: 2.47 MILLION/UL (ref 3.81–5.12)
RETICS # AUTO: 6500 10*3/UL (ref 14097–95744)
RETICS # CALC: 0.27 % (ref 0.37–1.87)
SODIUM SERPL-SCNC: 159 MMOL/L (ref 136–145)
SODIUM SERPL-SCNC: 160 MMOL/L (ref 136–145)
WBC # BLD AUTO: 3.47 THOUSAND/UL (ref 4.31–10.16)
WBC # BLD AUTO: 3.48 THOUSAND/UL (ref 4.31–10.16)

## 2018-02-01 PROCEDURE — P9040 RBC LEUKOREDUCED IRRADIATED: HCPCS

## 2018-02-01 PROCEDURE — P9021 RED BLOOD CELLS UNIT: HCPCS

## 2018-02-01 PROCEDURE — 86920 COMPATIBILITY TEST SPIN: CPT

## 2018-02-01 PROCEDURE — 85018 HEMOGLOBIN: CPT | Performed by: INTERNAL MEDICINE

## 2018-02-01 PROCEDURE — 80048 BASIC METABOLIC PNL TOTAL CA: CPT | Performed by: NURSE PRACTITIONER

## 2018-02-01 PROCEDURE — 30233R1 TRANSFUSION OF NONAUTOLOGOUS PLATELETS INTO PERIPHERAL VEIN, PERCUTANEOUS APPROACH: ICD-10-PCS | Performed by: INTERNAL MEDICINE

## 2018-02-01 PROCEDURE — 30233N1 TRANSFUSION OF NONAUTOLOGOUS RED BLOOD CELLS INTO PERIPHERAL VEIN, PERCUTANEOUS APPROACH: ICD-10-PCS | Performed by: INTERNAL MEDICINE

## 2018-02-01 PROCEDURE — C9113 INJ PANTOPRAZOLE SODIUM, VIA: HCPCS | Performed by: SURGERY

## 2018-02-01 PROCEDURE — 82728 ASSAY OF FERRITIN: CPT | Performed by: PHYSICIAN ASSISTANT

## 2018-02-01 PROCEDURE — 82948 REAGENT STRIP/BLOOD GLUCOSE: CPT

## 2018-02-01 PROCEDURE — 83550 IRON BINDING TEST: CPT | Performed by: PHYSICIAN ASSISTANT

## 2018-02-01 PROCEDURE — 82607 VITAMIN B-12: CPT | Performed by: PHYSICIAN ASSISTANT

## 2018-02-01 PROCEDURE — 99222 1ST HOSP IP/OBS MODERATE 55: CPT | Performed by: INTERNAL MEDICINE

## 2018-02-01 PROCEDURE — 84295 ASSAY OF SERUM SODIUM: CPT | Performed by: INTERNAL MEDICINE

## 2018-02-01 PROCEDURE — 99223 1ST HOSP IP/OBS HIGH 75: CPT | Performed by: INTERNAL MEDICINE

## 2018-02-01 PROCEDURE — 85384 FIBRINOGEN ACTIVITY: CPT | Performed by: INTERNAL MEDICINE

## 2018-02-01 PROCEDURE — 74022 RADEX COMPL AQT ABD SERIES: CPT

## 2018-02-01 PROCEDURE — 85027 COMPLETE CBC AUTOMATED: CPT | Performed by: INTERNAL MEDICINE

## 2018-02-01 PROCEDURE — 99232 SBSQ HOSP IP/OBS MODERATE 35: CPT | Performed by: INTERNAL MEDICINE

## 2018-02-01 PROCEDURE — 85362 FIBRIN DEGRADATION PRODUCTS: CPT | Performed by: INTERNAL MEDICINE

## 2018-02-01 PROCEDURE — 85027 COMPLETE CBC AUTOMATED: CPT | Performed by: NURSE PRACTITIONER

## 2018-02-01 PROCEDURE — 85014 HEMATOCRIT: CPT | Performed by: INTERNAL MEDICINE

## 2018-02-01 PROCEDURE — P9037 PLATE PHERES LEUKOREDU IRRAD: HCPCS

## 2018-02-01 PROCEDURE — 83540 ASSAY OF IRON: CPT | Performed by: PHYSICIAN ASSISTANT

## 2018-02-01 PROCEDURE — 85045 AUTOMATED RETICULOCYTE COUNT: CPT | Performed by: PHYSICIAN ASSISTANT

## 2018-02-01 RX ORDER — SODIUM CHLORIDE 450 MG/100ML
75 INJECTION, SOLUTION INTRAVENOUS CONTINUOUS
Status: DISCONTINUED | OUTPATIENT
Start: 2018-02-01 | End: 2018-02-01

## 2018-02-01 RX ORDER — INSULIN GLARGINE 100 [IU]/ML
10 INJECTION, SOLUTION SUBCUTANEOUS EVERY MORNING
Status: DISCONTINUED | OUTPATIENT
Start: 2018-02-01 | End: 2018-02-02

## 2018-02-01 RX ORDER — DEXTROSE MONOHYDRATE 50 MG/ML
60 INJECTION, SOLUTION INTRAVENOUS CONTINUOUS
Status: DISCONTINUED | OUTPATIENT
Start: 2018-02-01 | End: 2018-02-05

## 2018-02-01 RX ADMIN — Medication 4.5 G: at 04:01

## 2018-02-01 RX ADMIN — INSULIN LISPRO 4 UNITS: 100 INJECTION, SOLUTION INTRAVENOUS; SUBCUTANEOUS at 23:49

## 2018-02-01 RX ADMIN — FLUCONAZOLE 400 MG: 2 INJECTION INTRAVENOUS at 20:25

## 2018-02-01 RX ADMIN — HEPARIN SODIUM 5000 UNITS: 5000 INJECTION, SOLUTION INTRAVENOUS; SUBCUTANEOUS at 05:42

## 2018-02-01 RX ADMIN — HYDRALAZINE HYDROCHLORIDE 10 MG: 20 INJECTION INTRAMUSCULAR; INTRAVENOUS at 03:57

## 2018-02-01 RX ADMIN — SODIUM CHLORIDE 75 ML/HR: 0.45 INJECTION, SOLUTION INTRAVENOUS at 13:08

## 2018-02-01 RX ADMIN — Medication 4.5 G: at 17:15

## 2018-02-01 RX ADMIN — PANTOPRAZOLE SODIUM 40 MG: 40 INJECTION, POWDER, FOR SOLUTION INTRAVENOUS at 08:41

## 2018-02-01 RX ADMIN — DEXTROSE MONOHYDRATE 50 ML/HR: 50 INJECTION, SOLUTION INTRAVENOUS at 19:56

## 2018-02-01 RX ADMIN — MORPHINE SULFATE 1 MG: 2 INJECTION, SOLUTION INTRAMUSCULAR; INTRAVENOUS at 19:55

## 2018-02-01 RX ADMIN — INSULIN GLARGINE 10 UNITS: 100 INJECTION, SOLUTION SUBCUTANEOUS at 13:13

## 2018-02-01 RX ADMIN — MORPHINE SULFATE 1 MG: 2 INJECTION, SOLUTION INTRAMUSCULAR; INTRAVENOUS at 15:06

## 2018-02-01 RX ADMIN — IOHEXOL 100 ML: 350 INJECTION, SOLUTION INTRAVENOUS at 18:01

## 2018-02-01 RX ADMIN — INSULIN LISPRO 3 UNITS: 100 INJECTION, SOLUTION INTRAVENOUS; SUBCUTANEOUS at 05:41

## 2018-02-01 RX ADMIN — INSULIN LISPRO 4 UNITS: 100 INJECTION, SOLUTION INTRAVENOUS; SUBCUTANEOUS at 20:45

## 2018-02-01 RX ADMIN — INSULIN LISPRO 3 UNITS: 100 INJECTION, SOLUTION INTRAVENOUS; SUBCUTANEOUS at 01:04

## 2018-02-01 RX ADMIN — PANTOPRAZOLE SODIUM 40 MG: 40 INJECTION, POWDER, FOR SOLUTION INTRAVENOUS at 20:04

## 2018-02-01 RX ADMIN — LEVOTHYROXINE SODIUM 200 MCG: 100 TABLET ORAL at 08:39

## 2018-02-01 RX ADMIN — INSULIN LISPRO 3 UNITS: 100 INJECTION, SOLUTION INTRAVENOUS; SUBCUTANEOUS at 13:13

## 2018-02-01 NOTE — POST OP PROGRESS NOTES
Progress Note - General Surgery   Noé Stanley 67 y o  female MRN: [id number]  Unit/Bed#: E4 -01 Encounter: [id number]    Assessment:  Perforated gastric ulcer with pneumoperitoneum  Postop day  Status post laparoscopic Harrie Apo patch repair of gastric ulcer, abdominal washout, placement of drains  Severe Anemia,   Pancytopenia- ? Medication effect   Acute severe protein calorie malnutrition related to GI distress   Multiple sclerosis with chronic steroid use and Imuran use  Diabetes mellitus 2  Acute hypoxic respiratory failure likely related to anemia and surgical stress- patient extubated yesterday  Thrombocytopenia  Cat bite left hand with some swelling       Plan:  Continue supportive care  Anemia - no obvious signs of acute blood loss however if continues to decrease would recommend CT abdomen pelvis  Both COREY drains are serosanguineous without evidence of blood  Upper GI for today was canceled due to patient's inability to get out of the wheelchair for the test  Will therefore place contrast done NG tube and check obstruction series later  Continue antibiotics  PT OT      Subjective/Objective   Chief Complaint: pain    Subjective: Patient c/o abdominal pain  Worse with movement  OOB to chair  Extubated yesterday  Denies SOB  Still has barba  Minimal NGT output  Drains without bile  Objective:     Blood pressure 154/80, pulse 103, temperature (!) 97 1 °F (36 2 °C), temperature source Tympanic, resp  rate 18, height 5' 7" (1 702 m), weight 77 2 kg (170 lb 3 1 oz), SpO2 97 %  ,Body mass index is 26 66 kg/m²        Intake/Output Summary (Last 24 hours) at 02/01/18 1428  Last data filed at 02/01/18 0548   Gross per 24 hour   Intake              100 ml   Output              635 ml   Net             -535 ml       Invasive Devices     Peripheral Intravenous Line            Peripheral IV 01/30/18 Left Antecubital 1 day    Peripheral IV 01/31/18 Right Arm 1 day          Drain            NG/OG/Enteral Tube Nasogastric Right nares 3 days    Closed/Suction Drain Left Abdomen Bulb 15 Fr  2 days    Closed/Suction Drain Right;Other (Comment) Abdomen Bulb 15 Fr  2 days                Physical Exam:  Gen:  Alert and oriented, some discomfort, NAD  HEENT:   sclera anicteric, mucous membranes dry  Neck:  Supple negative for lymphadenopathy  Chest:  clear, decreased at bases, shallow BS  Cor:  Regular rate and rhythm  Abd:  Soft with some incisional tenderness, nondistended, few bowel sounds  Ext:  There is mild edema in her bilateral lower extremities  Left hand with scabbed wound and mild swelling, no increased warmth or cellulitis  Skin:  Warm and dry, incision sites are clean dry and intact  NG tube with small amount of output  Abdominal drains with serosanguineous output, no bile    Lab, Imaging and other studies:  I have personally reviewed pertinent lab results    , CBC:   Lab Results   Component Value Date    WBC 3 48 (L) 02/01/2018    HGB 7 2 (L) 02/01/2018    HCT 23 0 (L) 02/01/2018    MCV 95 02/01/2018    PLT 20 (LL) 02/01/2018    MCH 29 8 02/01/2018    MCHC 31 3 (L) 02/01/2018    RDW 17 2 (H) 02/01/2018    MPV 10 9 02/01/2018   , CMP:   Lab Results   Component Value Date     (H) 02/01/2018    K 4 2 02/01/2018     (H) 02/01/2018    CO2 26 02/01/2018    ANIONGAP 10 02/01/2018    BUN 54 (H) 02/01/2018    CREATININE 0 76 02/01/2018    GLUCOSE 311 (H) 02/01/2018    CALCIUM 7 6 (L) 02/01/2018    EGFR 79 02/01/2018     VTE Pharmacologic Prophylaxis: Heparin  VTE Mechanical Prophylaxis: sequential compression device     Inna Barragan MD

## 2018-02-01 NOTE — CONSULTS
Addendum: called to see pt due to blood from ngt of 250ml and pain that is diffuse  Pt is lethargic  She is still having dark blood from ngt  She had drop in h/h earlier today from 9 to 7 2  Will transfuse 1 more unit of platelets  Transfuse 1 units of prbc  Check h/h prior  Called surgery and left vm  Also hypernatremia worsened  Will change to d5w from 1/2 ns  Inpatient Medical Consultation - [facility]    Patient Information: River Moore 67 y o  female MRN: [id number]  Unit/Bed#: E4 -01 Encounter: [id number]  PCP: Favio Gage DO  Date of Admission:  1/28/2018  Date of Consultation: 02/01/18  Requesting Physician: Tete Carlson MD    Reason For Consultation:   Management of anemia, hypernatremia, diabetes    Assessment/Plan:  Principal problem  1  Peritonitis due to perforated gastric ulcer- s/p lap repair with washout  Per surgery  They plan for UGI study  They discussed removal of NGT depending on study  Antibiotics per ID  She is currently on zosyn and fluconazole     2  Acute/chronic anemia due to gastric ulcer- pt hemoglobin had been stable but dropped from 9 1 to 7 2  Will repeat labs stat  Will transfuse if true value  Hold heparin for now  Will need to evaluate for bleeding site if this is a true value vrs is it from imuran  3  Hypernatremia- likely due to ngt and free water deficit  Will start 1/2 normal saline  May need to change to d5w if no improvement  Will check sodium this afternoon  Active  1  Pancytopenia- pt may need platelet transfusion if her hemoglobin is truly 7 2  Will need to find bleeding site  May need heme/onc eval as it could be due to imuran or another cause  2  Type 2 diabetes with hyperglycemia- blood glucose in the 200-300  Pt is not on insulin at home  Will check a1c  Will start lantus here  Will start low dose due to npo and lantus is new  Will continue insulin sliding scale    3  Cat bite left hand- continue antibiotics   ID following    4  Uncontrolled htn- could be related to pain  Pt on prn medications  Pt was able to receive some po meds  Will continue prn IV medications for now  Will assess after ngt removed to see if need to start po antihypertensives  5  hypothyroid on synthroid    6  Hyperlipidemia- d/c statin  Can restart once stable  7  MS- holding imuran due to acute illness  History of Present Illness:    River Moore is a 67 y o  female who is originally admitted to the  service on 1/28/2018 due to acute/chronic anemia  We are consulted for further medical management  Pt had acute hypoxic respiratory failure in which she was intubated in the ICU  She was extubated and has been sating well on room air  She was found to have peritonitis due to perforated gastric ulcer which was the cause of her anemia  She is s/p laparoscopic repair with washout  She is on antibiotics for this  She did not have any issues over night  No brbpr  No blood in ngt  No problems this am  She tells me her abd pain is controlled but she is slightly confused this am      Review of Systems:    Review of Systems   Constitutional: Negative  HENT: Negative  Eyes: Negative  Respiratory: Negative  Cardiovascular: Negative  Gastrointestinal: Negative for abdominal pain, blood in stool and nausea  Endocrine: Negative  Genitourinary: Negative  Musculoskeletal: Negative  Skin: Negative  Allergic/Immunologic: Negative  Neurological: Negative  Hematological: Negative  Psychiatric/Behavioral: Negative          Past Medical and Surgical History:     Past Medical History:   Diagnosis Date    Diabetes mellitus ([facility] [address] )     Disease of thyroid gland     Hyperlipidemia     Multiple sclerosis ([facility] [address] )        Past Surgical History:   Procedure Laterality Date    IN LAP,DIAGNOSTIC ABDOMEN N/A 1/29/2018    Procedure: LAPAROSCOPY DIAGNOSTIC, with repair of perforated gastric ulcer;  Surgeon: Tete Carlson MD;  Location: [facility];  Service: General       Meds/Allergies:    all medications and allergies reviewed    Allergies: No Known Allergies    Social History:     Marital Status: /Civil Union    Substance Use History:   History   Alcohol Use    Yes     Comment: rarely     History   Smoking Status    Former Smoker    Types: Cigarettes    Quit date: 2007   Smokeless Tobacco    Never Used     History   Drug Use No       Family History:    History reviewed  No pertinent family history  Physical Exam:     Vitals:   Blood Pressure: 154/80 (02/01/18 0743)  Pulse: 103 (02/01/18 0743)  Temperature: (!) 97 1 °F (36 2 °C) (02/01/18 0743)  Temp Source: Tympanic (02/01/18 0743)  Respirations: 18 (02/01/18 0743)  Height: 5' 7" (170 2 cm) (01/28/18 2339)  Weight - Scale: 77 2 kg (170 lb 3 1 oz) (01/31/18 0604)  SpO2: 97 % (02/01/18 0743)    Physical Exam   Constitutional: No distress  HENT:   Head: Normocephalic and atraumatic  Eyes: EOM are normal  Pupils are equal, round, and reactive to light  Neck: Normal range of motion  Neck supple  Cardiovascular: Normal rate, regular rhythm and normal heart sounds  Exam reveals no gallop and no friction rub  No murmur heard  Pulmonary/Chest: Effort normal and breath sounds normal  No respiratory distress  She has no wheezes  She has no rales  Abdominal: Soft  Bowel sounds are normal  She exhibits no distension  There is no tenderness  There is no rebound  Musculoskeletal: Normal range of motion  Neurological: She is alert  Oriented x3 with prompting  Slight confusion   Skin: Skin is warm and dry  She is not diaphoretic  Additional Data:     Lab Results: I have personally reviewed pertinent reports      Hemoglobin dropped from 9 1 to 7 2    Results from last 7 days  Lab Units 02/01/18  0834  01/30/18  0447   WBC Thousand/uL 3 47*  < > 2 94*   HEMOGLOBIN g/dL 7 2*  < > 11 9   HEMATOCRIT % 23 0*  < > 34 9   PLATELETS Thousands/uL 20*  < > 50*   LYMPHO PCT %  --   --  29 MONO PCT MAN %  --   --  0*   EOSINO PCT MANUAL %  --   --  1   < > = values in this interval not displayed  Results from last 7 days  Lab Units 02/01/18  0834  01/28/18  1745   SODIUM mmol/L 159*  < > 133*   POTASSIUM mmol/L 4 2  < > 5 6*   CHLORIDE mmol/L 123*  < > 99*   CO2 mmol/L 26  < > 18*   BUN mg/dL 54*  < > 43*   CREATININE mg/dL 0 76  < > 1 31*   CALCIUM mg/dL 7 6*  < > 8 6   TOTAL PROTEIN g/dL  --   --  5 4*   BILIRUBIN TOTAL mg/dL  --   --  1 30*   ALK PHOS U/L  --   --  76   ALT U/L  --   --  23   AST U/L  --   --  30   GLUCOSE RANDOM mg/dL 311*  < > 586*   GLUCOSE, ISTAT   --   < >  --    < > = values in this interval not displayed  Results from last 7 days  Lab Units 01/29/18  0016   INR  1 61*       Imaging: I have personally reviewed pertinent reports  Xr Chest 2 Views    Result Date: 1/29/2018  Narrative: CHEST INDICATION: fatigue  History taken directly from the electronic ordering system  COMPARISON: None VIEWS:  AP semierect and lateral IMAGES:  3 FINDINGS: The patient is rotated to the left  The cardiomediastinal silhouette is unremarkable  The lungs are clear  No pleural effusions  Mild degenerative changes, thoracic spine  Impression: No active pulmonary disease    Workstation performed: [id number]     Ct Abdomen Pelvis W Contrast    Result Date: 1/29/2018  Narrative: CT ABDOMEN AND PELVIS WITH IV CONTRAST INDICATION:  Abdominal pain and vomiting  COMPARISON: None  TECHNIQUE:  CT examination of the abdomen and pelvis was performed  Reformatted images were created in axial, sagittal, and coronal planes  Radiation dose length product (DLP) for this visit:  464 mGy-cm   This examination, like all CT scans performed in the [facility], was performed utilizing techniques to minimize radiation dose exposure, including the use of iterative reconstruction and automated exposure control   IV Contrast:  100 mL of iodixanol (VISIPAQUE)         Enteric Contrast:  Enteric contrast was not administered  FINDINGS: ABDOMEN LOWER CHEST:  No significant abnormalities identified in the lower chest  Borderline cardiomegaly  LIVER/BILIARY TREE:  Decreased attenuation compared to the spleen suggesting fatty infiltration  GALLBLADDER:  Small calcified gallstones in the fundus  No pericholecystic inflammatory change  SPLEEN:  There are patchy subcapsular areas of decreased attenuation suspicious for infarcts  PANCREAS:  Unremarkable  ADRENAL GLANDS:  Unremarkable  KIDNEYS/URETERS:  2 adjacent 2-3 mm nonobstructing left lower pole calculi  Small bilateral renal cysts  No hydronephrosis  STOMACH AND BOWEL:  Although the etiology of the pneumoperitoneum is uncertain, there is thickening of the gastric antrum with associated luminal irregularity and focal wall thinning for which a perforated gastric ulcer may be considered  The second and third portions of the duodenum appear mildly dilated with a moderate sized diverticulum noted  Mildly dilated proximal jejunal loops, possibly related to ileus  No transition to suggest mechanical obstruction  No focal inflammatory changes  APPENDIX:  No findings to suggest appendicitis  ABDOMINOPELVIC CAVITY:  There is a moderate amount of pneumoperitoneum noted  There is a small amount of abdominal and pelvic ascites  No evidence of rim-enhancing abscess  VESSELS:  Atherosclerotic changes are present  No evidence of aneurysm  PELVIS REPRODUCTIVE ORGANS:  Unremarkable for patient's age  Clips in the adnexa bilaterally  URINARY BLADDER:  Unremarkable  ABDOMINAL WALL/INGUINAL REGIONS:  Unremarkable  OSSEOUS STRUCTURES:  No acute fracture or destructive osseous lesion  Mild lumbar levoscoliosis with multilevel degenerative disc disease  Impression: Moderate pneumoperitoneum suggesting bowel perforation  Although the origin is uncertain, possibility of perforated gastric ulcer may be considered  Small amount of abdominal and pelvic ascites   No evidence for organized abscess  Mild small bowel dilatation may reflect reactive ileus  Cholelithiasis    I personally discussed this study with Alayna Saleh on 1/29/2018 1:35 PM  Workstation performed: [id number]

## 2018-02-01 NOTE — PROGRESS NOTES
Progress Note - Infectious Disease   Lucía García 67 y o  female MRN: [id number]  Unit/Bed#: E4 -01 Encounter: [id number]      Impression/Plan:  1  Peritonitis-secondary to perforation of a gastric ulceration with contamination of the peritoneum with gastric contacts  Patient is now status post laparoscopic repair with washout with drains left in place  Consider patient for the possibility of more resistant organisms and yeast secondary infection as the patient had been Augmentin at the time of the perforation  Fortunately the patient has now undergone adequate source control   -continue Zosyn and fluconazole at least through 2/3/2018 to give 5 days postop  -close surgical follow-up  -serial abdominal exams  -monitor CBC with diff and creatinine     2  Pancytopenia-possibly a medication effect secondary to the Imuran  Possibly another etiology  Patient has not neutropenic  She has been transfused and resuscitated for the severe anemia  The cell counts have improved a bit   -monitor CBC with diff  -no additional Imuran for now  -would consult Hematology Oncology if the pancytopenia persists     3  Chronic blood loss anemia-secondary to gastric ulceration  The H&H had now stabilized after resuscitation, but now is drifted down     -monitor CBC with diff  -continue Protonix  -GI follow-up     4  Diabetes mellitus-type 2 with hyperglycemia     5  Multiple sclerosis-apparently on Imuran for a while  Seems to be neurologically stable  -will need close neurology follow-up  -would hold on Imuran for now     6   Cat bite-left hand  No overt cellulitis at this time  Which should be well covered by the antibiotics as above      Antibiotics:  Zosyn 4  Fluconazole 4  Postop day 3  Subjective:  Patient has no fever, chills, sweats; no nausea, vomiting, diarrhea; no cough, shortness of breath; no increased pain  No new symptoms  Her mouth is feeling very dry    The patient has now moved out of the intensive care unit    Objective:  Vitals:  HR:  [] 103  Resp:  [11-28] 18  BP: (145-193)/(60-83) 154/80  SpO2:  [96 %-98 %] 97 %  Temp (24hrs), Av 8 °F (36 6 °C), Min:97 1 °F (36 2 °C), Max:99 7 °F (37 6 °C)  Current: Temperature: (!) 97 1 °F (36 2 °C)    Physical Exam:   General Appearance:  Alert, interactive, seems a bit confused, nontoxic, no acute distress  Throat: Oropharynx dry without lesions  NG tube in place   Lungs:   Decreased breath sounds bilaterally; no wheezes, rhonchi or rales; respirations unlabored   Heart:  Tachycardic; no murmur, rub or gallop   Abdomen:   Soft, non-tender, non-distended, decreased bowel sounds  Extremities: No clubbing, cyanosis or edema   Skin: No new rashes or lesions  No draining wounds noted  Left hand with slightly decreased edema       Labs, Imaging, & Other studies:   All pertinent labs and imaging studies were personally reviewed    Results from last 7 days  Lab Units 18  1031 18  0511 18  1541 18  0447  18  1656   WBC Thousand/uL 3 36*  --   --  2 94*  --  2 26*   HEMOGLOBIN g/dL 9 1* 11 1* 12 0 11 9  < > 7 8*   PLATELETS Thousands/uL 26*  --   --  50*  --  102*   < > = values in this interval not displayed      Results from last 7 days  Lab Units 18  0447  18  0951 18  0016 18  1745   SODIUM mmol/L 144  --  138 134* 133*   POTASSIUM mmol/L 4 1  --  4 7 4 9 5 6*   CHLORIDE mmol/L 113*  --  106 103 99*   CO2 mmol/L 22  --  25 23 18*   ANION GAP mmol/L 9  --  7 8 16*   BUN mg/dL 42*  --  46* 44* 43*   CREATININE mg/dL 0 80  --  1 13 1 23 1 31*   EGFR ml/min/1 73sq m 74  --  49 44 41   GLUCOSE RANDOM mg/dL 202*  --  403* 512* 586*   GLUCOSE, ISTAT   --   < >  --   --   --    CALCIUM mg/dL 7 3*  --  8 4 8 2* 8 6   AST U/L  --   --   --   --  30   ALT U/L  --   --   --   --  23   ALK PHOS U/L  --   --   --   --  76   TOTAL PROTEIN g/dL  --   --   --   --  5 4*   BILIRUBIN TOTAL mg/dL  --   --   --   -- 1 30*   < > = values in this interval not displayed

## 2018-02-01 NOTE — CONSULTS
Oncology Consult Note  April Diza 67 y o  female MRN: [id number]  Unit/Bed#: E4 -01 Encounter: [id number]            Assessment and Plan:   1  Anemia  Hemoglobin 3 9 at presentation 1/28/2018  She has received transfusions and her hemoglobin has improved to 11 1 1/30/2018 but then decreased again to 9 1 January 31st and 7 2 2/1/2018  2    Perforated gastric ulcer  Status post laparoscopic repair 1/29/2018  Repeat upper GI was anticipated however due to her mobility limitations, NG tube placed with obstruction series planned  3   Thrombocytopenia with platelet count in his 50s on presentation and has decreased into the 20s  4   Neutropenia with bandemia 2nd to peritonitis  Abx per ID     5   Uncontrolled diabetes mellitus  6   Acute kidney injury improved  7   Malnutrition  8  Acute hypoxic respiratory failure  Extubated 1/31/2018  9  MS  Imuran and azothioprine on hold  Anemia likely 2nd from GI bleed  Hemoglobin improved from 3 2 g/dL 1/28/2018 to 11 1 1/30/2018 s/p multiple transfusions of packed red blood cells and surgical repair of perforated gastric ulcer but then decreased again to 9 1 January 31st and 7 2 2/1/2018  Concern for ongoing bleeding  Thrombocytopenia  Platelet counts in the 50s did increased to low 100s and have been decreased to 20s  Likely secondary to infection  Leukopenia with bandemia suspected secondary to infection  Medications for MS may have also been contributing to pancytopenia  There is no available values for comparison  Patient seen with her son Marta Bartholomew at bedside  Continue to monitor, transfuse packed red blood cells and platelets p r n     Will evaluate for other potential sources anemia/thrombocytopenia        Reason for Consultation:  Pancytopenia      History of Presenting Illness:  April Diaz presented [address] ED 1/28/2018 regarding fatigue and 1 month history of abdominal pain  Hemoglobin was found to be 3 2 g/dL    She was transfused with packed red blood cells and [place] [facility]  [place] scan of her abdomen pelvis 1/29/2018: Moderate pneumoperitoneum suggesting bowel perforation  1/29/2018:  She underwent laparoscopy with repair of perforated gastric ulcer by Dr Rosas Aponte  At time of presentation 1/28/2018 her hemoglobin was 3 2, MCV of 114, RDW 16 2, platelet count 18684, white blood cell count 5 56   74% segs, 1% bands, 17% lymphocytes  Glucose elevated to 586, normal transaminases, total bilirubin 1 3, albumin 2 3, total protein 5 4, potassium 5 6, sodium 133, creatinine 1 31  Potassium has since normalized, glucose remains elevated been under better control  Renal function has improved  Her creatinine was 0 76 today, 2/1/2018  Sodium was reported at 159 and repeat evaluation was ordered  1/29/2018 hemoglobin 9 1, platelet count 11488, white blood cell count 2 43   1/30/2018 hemoglobin 11 1, platelet count 50, white blood cell count 2 94; 38% segs, 32% bands, 29% lymphocytes  No other comparison CBC values available in [place], [place] or [facility] everywhere  She only received 1 dose of 5000 units subcu heparin 2/1/2018 at 5:42 a m  Left hand cat bite mid January 2018  Augmentin prescribed per ED 1/21/2018            Review of Systems - Unreliable    Past Medical History:   Diagnosis Date    [facility] [address] [facility]     Disease of thyroid gland     Hyperlipidemia     Multiple sclerosis ([facility] [address] [facility]        Past Surgical History:   Procedure Laterality Date    VA LAP,DIAGNOSTIC ABDOMEN N/A 1/29/2018    Procedure: LAPAROSCOPY DIAGNOSTIC, with repair of perforated gastric ulcer;  Surgeon: Andre Salinas MD;  Location: [facility];  Service: General       Social History     Social History    Marital status: /Civil Union     Spouse name: N/A    Number of children: N/A    Years of education: N/A     Social History Main Topics    Smoking status: Former Smoker     Types: Cigarettes     Quit date: 2007    Smokeless tobacco: Never Used    Alcohol use Yes      Comment: rarely    Drug use: No    Sexual activity: Not Asked     Other Topics Concern    None     Social History Narrative    None       History reviewed  No pertinent family history      No Known Allergies      Current Facility-Administered Medications:     cholecalciferol (VITAMIN D3) tablet 1,000 Units, 1,000 Units, Oral, Daily, PARRISH Whitaker, 1,000 Units at 01/29/18 2566    ergocalciferol (VITAMIN D2) capsule 50,000 Units, 50,000 Units, Oral, Weekly, PARRISH Whitaker    fluconazole (DIFLUCAN) IVPB (premix) 400 mg, 400 mg, Intravenous, Q24H, Inna Barragan MD, Last Rate: 100 mL/hr at 01/31/18 1757, 400 mg at 01/31/18 1757    hydrALAZINE (APRESOLINE) injection 10 mg, 10 mg, Intravenous, Q4H PRN, PARRISH Mcbride, 10 mg at 02/01/18 0357    insulin glargine (LANTUS) subcutaneous injection 10 Units, 10 Units, Subcutaneous, QAM, Magdalene Delcid, DO, 10 Units at 02/01/18 1313    insulin lispro (HumaLOG) 100 units/mL subcutaneous injection 1-5 Units, 1-5 Units, Subcutaneous, Q6H [facility], 3 Units at 02/01/18 1313 **AND** Fingerstick Glucose (POCT), , , Q6H, PARRISH Whitaker    labetalol (NORMODYNE) injection 10 mg, 10 mg, Intravenous, Q4H PRN, Alcus Samples Spatzer, CRNP, 10 mg at 01/31/18 6677    levothyroxine tablet 200 mcg, 200 mcg, Oral, Early Morning, PARRISH Whitaker, 200 mcg at 02/01/18 5684    morphine injection 1 mg, 1 mg, Intravenous, Q4H PRN, PARRISH Mcbride, 1 mg at 02/01/18 1506    ondansetron (ZOFRAN) injection 4 mg, 4 mg, Intravenous, Q4H PRN, PARRISH Mcbride    pantoprazole (PROTONIX) injection 40 mg, 40 mg, Intravenous, Q12H [facility], Inna Barragan MD, 40 mg at 02/01/18 0841    piperacillin-tazobactam (ZOSYN) 4 5 g in sodium chloride 0 9 % 100 mL IVPB, 4 5 g, Intravenous, Q6H, Rashard Marie MD, Last Rate: 200 mL/hr at 02/01/18 0401, 4 5 g at 02/01/18 0401    sodium chloride infusion 0 45 %, 75 mL/hr, Intravenous, Continuous, Magdalene Delcid, DO, Last Rate: 75 mL/hr at 18 1308, 75 mL/hr at 18 1308    Prescriptions Prior to Admission   Medication    acetaminophen-codeine (TYLENOL #3) 300-30 mg per tablet    alendronate (FOSAMAX) 70 mg tablet    [] amoxicillin-clavulanate (AUGMENTIN) 875-125 mg per tablet    atorvastatin (LIPITOR) 40 mg tablet    azaTHIOprine (IMURAN) 50 mg tablet    cholecalciferol (VITAMIN D3) 1,000 units tablet    ergocalciferol (VITAMIN D2) 50,000 units    glipiZIDE (GLUCOTROL) 10 mg tablet    ibuprofen (MOTRIN) 800 mg tablet    levothyroxine 200 mcg tablet    metFORMIN (GLUCOPHAGE) 1000 MG tablet    methylprednisolone (MEDROL) 4 mg tablet    naproxen (NAPROSYN) 500 mg tablet         PHYSICAL EXAMINATION     /68   Pulse 98   Temp 97 9 °F (36 6 °C) (Tympanic)   Resp 16   Ht 5' 7" (1 702 m)   Wt 77 2 kg (170 lb 3 1 oz)   SpO2 97%   BMI 26 66 kg/m²         Physical Exam:      Constitutional:  Ill appearing    HEENT: Normocephalic and atraumatic  NG tube in place  Dry mouth    Cardiovascular: Regular rate and rhythm without rubs, murmurs or gallops  Extremities:  + LE edema  2+ dorsalis pedis pulses  Pulmonary/Chest: CTA without wheezing, rales or rhonchi  Abdomen:  NABS, Soft, non-tender without rebound or guarding  Liver and spleen non-palpable  Musculoskeletal: Normal range of motion  +arthritic changes  Neurological: Grossly normal strength without sensory deficit  Skin: Skin is warm, dry and intact  No diaphoresis  No rashes or bruises          RESULTS          Xr Chest 2 Views    Result Date: 2018  Narrative: CHEST INDICATION: fatigue  History taken directly from the electronic ordering system  COMPARISON: None VIEWS:  AP semierect and lateral IMAGES:  3 FINDINGS: The patient is rotated to the left  The cardiomediastinal silhouette is unremarkable  The lungs are clear  No pleural effusions   Mild degenerative changes, thoracic spine  Impression: No active pulmonary disease    Workstation performed: [id number]     Ct Abdomen Pelvis W Contrast    Result Date: 1/29/2018  Narrative: CT ABDOMEN AND PELVIS WITH IV CONTRAST INDICATION:  Abdominal pain and vomiting  COMPARISON: None  TECHNIQUE:  CT examination of the abdomen and pelvis was performed  Reformatted images were created in axial, sagittal, and coronal planes  Radiation dose length product (DLP) for this visit:  464 mGy-cm   This examination, like all CT scans performed in the [facility], was performed utilizing techniques to minimize radiation dose exposure, including the use of iterative reconstruction and automated exposure control  IV Contrast:  100 mL of iodixanol (VISIPAQUE)         Enteric Contrast:  Enteric contrast was not administered  FINDINGS: ABDOMEN LOWER CHEST:  No significant abnormalities identified in the lower chest  Borderline cardiomegaly  LIVER/BILIARY TREE:  Decreased attenuation compared to the spleen suggesting fatty infiltration  GALLBLADDER:  Small calcified gallstones in the fundus  No pericholecystic inflammatory change  SPLEEN:  There are patchy subcapsular areas of decreased attenuation suspicious for infarcts  PANCREAS:  Unremarkable  ADRENAL GLANDS:  Unremarkable  KIDNEYS/URETERS:  2 adjacent 2-3 mm nonobstructing left lower pole calculi  Small bilateral renal cysts  No hydronephrosis  STOMACH AND BOWEL:  Although the etiology of the pneumoperitoneum is uncertain, there is thickening of the gastric antrum with associated luminal irregularity and focal wall thinning for which a perforated gastric ulcer may be considered  The second and third portions of the duodenum appear mildly dilated with a moderate sized diverticulum noted  Mildly dilated proximal jejunal loops, possibly related to ileus  No transition to suggest mechanical obstruction  No focal inflammatory changes   APPENDIX:  No findings to suggest appendicitis  ABDOMINOPELVIC CAVITY:  There is a moderate amount of pneumoperitoneum noted  There is a small amount of abdominal and pelvic ascites  No evidence of rim-enhancing abscess  VESSELS:  Atherosclerotic changes are present  No evidence of aneurysm  PELVIS REPRODUCTIVE ORGANS:  Unremarkable for patient's age  Clips in the adnexa bilaterally  URINARY BLADDER:  Unremarkable  ABDOMINAL WALL/INGUINAL REGIONS:  Unremarkable  OSSEOUS STRUCTURES:  No acute fracture or destructive osseous lesion  Mild lumbar levoscoliosis with multilevel degenerative disc disease  Impression: Moderate pneumoperitoneum suggesting bowel perforation  Although the origin is uncertain, possibility of perforated gastric ulcer may be considered  Small amount of abdominal and pelvic ascites  No evidence for organized abscess  Mild small bowel dilatation may reflect reactive ileus  Cholelithiasis    I personally discussed this study with Eleazar Zapata on 1/29/2018 1:35 PM  Workstation performed: [id number]

## 2018-02-01 NOTE — CONSULTS
Oncology Consult Note  Amalia Cedillo 67 y o  female MRN: [id number]  Unit/Bed#: E4 -01 Encounter: [id number]      Presenting Complaint:  Consult for pancytopenia    History of Presenting Illness:  40-year-old  female with multiple sclerosis for many years, on Imuran 300 mg daily (50 mg tablet 2 tablets t i d ), diabetes mellitus type 2, there was weight loss, epigastric pain for at least 2 months prior to the presentation by her son Gustavo Hoskins, the patient had a CAT bite on the dorsal aspect of the left hand she was treated with Augmentin, prednisone, naproxen with subsequent abdominal pain in the epigastrium area, nausea, fatigue in the emergency room  She was found to have hemoglobin of 4 8, WBC 2 79, platelets 90501, 69% neutrophils, 32% bands, 29% lymphocytes, 1% eosinophiles  The patient was found to have acute gastric perforation she had packed RBC transfusion, status post laparoscopic repair of gastric ulceration as well as washout for possible peritonitis, she was in the ICU, however she still pancytopenic with WBC 3 4, hemoglobin 7 2, MCV 95, platelets 37734, currently on Zosyn, Diflucan insulin, morphine  I do not have any previous labs  She does not smoke or drink  She has abdominal pain, dry mouth, no fever, chills at this time    Review of Systems - As stated in the HPI otherwise the fourteen point review of systems was negative      Past Medical History:   Diagnosis Date    Diabetes mellitus ([facility] [address] [facility]     Disease of thyroid gland     Hyperlipidemia     Multiple sclerosis ([facility] )        Social History     Social History    Marital status: /Civil Union     Spouse name: N/A    Number of children: N/A    Years of education: N/A     Social History Main Topics    Smoking status: Former Smoker     Types: Cigarettes     Quit date: 2007    Smokeless tobacco: Never Used    Alcohol use Yes      Comment: rarely    Drug use: No    Sexual activity: Not Asked     Other Topics Concern    None Social History Narrative    None       History reviewed  No pertinent family history      No Known Allergies      Current Facility-Administered Medications:     cholecalciferol (VITAMIN D3) tablet 1,000 Units, 1,000 Units, Oral, Daily, Thurlow Cockayne, CRNP, 1,000 Units at 01/29/18 6290    ergocalciferol (VITAMIN D2) capsule 50,000 Units, 50,000 Units, Oral, Weekly, Thurlow Cockayne, CRNP    fluconazole (DIFLUCAN) IVPB (premix) 400 mg, 400 mg, Intravenous, Q24H, Vania Sierra MD, Last Rate: 100 mL/hr at 01/31/18 1757, 400 mg at 01/31/18 1757    hydrALAZINE (APRESOLINE) injection 10 mg, 10 mg, Intravenous, Q4H PRN, Jacquiline Mew, CRNP, 10 mg at 02/01/18 0357    insulin glargine (LANTUS) subcutaneous injection 10 Units, 10 Units, Subcutaneous, QAM, Magdalene Delcid, DO, 10 Units at 02/01/18 1313    insulin lispro (HumaLOG) 100 units/mL subcutaneous injection 1-5 Units, 1-5 Units, Subcutaneous, Q6H [address], 3 Units at 02/01/18 1313 **AND** Fingerstick Glucose (POCT), , , Q6H, Stu Mayberryne, CRNP    labetalol (NORMODYNE) injection 10 mg, 10 mg, Intravenous, Q4H PRN, Maceo Peto Spatzer, CRNP, 10 mg at 01/31/18 2355    levothyroxine tablet 200 mcg, 200 mcg, Oral, Early Morning, Stu Mayberryne, CRNP, 200 mcg at 02/01/18 2861    morphine injection 1 mg, 1 mg, Intravenous, Q4H PRN, Jacquiline Mew, CRNP, 1 mg at 02/01/18 1506    ondansetron (ZOFRAN) injection 4 mg, 4 mg, Intravenous, Q4H PRN, Jacquiline Mew, CRNP    pantoprazole (PROTONIX) injection 40 mg, 40 mg, Intravenous, Q12H [address], Vania Sierra MD, 40 mg at 02/01/18 0841    piperacillin-tazobactam (ZOSYN) 4 5 g in sodium chloride 0 9 % 100 mL IVPB, 4 5 g, Intravenous, Q6H, Luis Miguel Melgar MD, Last Rate: 200 mL/hr at 02/01/18 1715, 4 5 g at 02/01/18 1715    sodium chloride infusion 0 45 %, 75 mL/hr, Intravenous, Continuous, Magdalene Delcid DO, Last Rate: 75 mL/hr at 02/01/18 1308, 75 mL/hr at 02/01/18 1308      /68   Pulse 98   Temp 97 9 °F (36 6 °C) (Tympanic)   Resp 16   Ht 5' 7" (1 702 m)   Wt 77 2 kg (170 lb 3 1 oz)   SpO2 97%   BMI 26 66 kg/m²       General Appearance:    Alert, oriented chronically ill, she has NG tube in place, she looks pale       Eyes:    PERRL   Ears:    Normal external ear canals, both ears   Nose:   Nares normal, septum midline   Throat:   Mucosa moist  Pharynx without injection  Neck:   Supple       Lungs:     Clear to auscultation bilaterally   Chest Wall:    No tenderness or deformity    Heart:    Regular rate and rhythm       Abdomen:     Soft, tender throughout, no bowel sounds, no organomegaly           Extremities:   Extremities no cyanosis or edema       Skin:   no rash or icterus  Lymph nodes:   Cervical, supraclavicular, and axillary nodes normal   Neurologic:   CNII-XII intact, normal strength, sensation and reflexes     Throughout               Recent Results (from the past 48 hour(s))   Fingerstick Glucose (POCT)    Collection Time: 01/30/18 11:32 PM   Result Value Ref Range    POC Glucose 212 (H) 65 - 140 mg/dl   Hemoglobin    Collection Time: 01/31/18  5:11 AM   Result Value Ref Range    Hemoglobin 11 1 (L) 11 5 - 15 4 g/dL   Fingerstick Glucose (POCT)    Collection Time: 01/31/18  5:39 AM   Result Value Ref Range    POC Glucose 196 (H) 65 - 140 mg/dl   Blood culture    Collection Time: 01/31/18 10:22 AM   Result Value Ref Range    Blood Culture No Growth at 24 hrs  Blood culture    Collection Time: 01/31/18 10:22 AM   Result Value Ref Range    Blood Culture No Growth at 24 hrs      CBC    Collection Time: 01/31/18 10:31 AM   Result Value Ref Range    WBC 3 36 (L) 4 31 - 10 16 Thousand/uL    RBC 3 07 (L) 3 81 - 5 12 Million/uL    Hemoglobin 9 1 (L) 11 5 - 15 4 g/dL    Hematocrit 27 8 (L) 34 8 - 46 1 %    MCV 91 82 - 98 fL    MCH 29 6 26 8 - 34 3 pg    MCHC 32 7 31 4 - 37 4 g/dL    RDW 17 4 (H) 11 6 - 15 1 %    Platelets 26 (LL) 597 - 390 Thousands/uL    MPV 10 5 8 9 - 12 7 fL   Fingerstick Glucose (POCT) Collection Time: 01/31/18 12:03 PM   Result Value Ref Range    POC Glucose 276 (H) 65 - 140 mg/dl   Prepare RBC:Has consent been obtained? Yes; Where is the Surgery Scheduled?  [place], 2 Units    Collection Time: 01/31/18 12:12 PM   Result Value Ref Range    Unit Product Code S8556P64     Unit Number [id number]     Unit ABO O     Unit [facility] POS     Crossmatch Compatible     Unit Dispense Status Return to [facility]     Unit Product Code R9369Z87     Unit Number [id number]     Unit ABO O     Unit RH POS     Crossmatch Compatible     Unit Dispense Status Presumed Trans    Fingerstick Glucose (POCT)    Collection Time: 01/31/18  5:55 PM   Result Value Ref Range    POC Glucose 307 (H) 65 - 140 mg/dl   Fingerstick Glucose (POCT)    Collection Time: 02/01/18 12:36 AM   Result Value Ref Range    POC Glucose 299 (H) 65 - 140 mg/dl   Fingerstick Glucose (POCT)    Collection Time: 02/01/18  5:40 AM   Result Value Ref Range    POC Glucose 290 (H) 65 - 140 mg/dl   CBC    Collection Time: 02/01/18  8:34 AM   Result Value Ref Range    WBC 3 47 (L) 4 31 - 10 16 Thousand/uL    RBC 2 47 (L) 3 81 - 5 12 Million/uL    Hemoglobin 7 2 (L) 11 5 - 15 4 g/dL    Hematocrit 23 0 (L) 34 8 - 46 1 %    MCV 93 82 - 98 fL    MCH 29 1 26 8 - 34 3 pg    MCHC 31 3 (L) 31 4 - 37 4 g/dL    RDW 17 3 (H) 11 6 - 15 1 %    Platelets 20 (LL) 666 - 390 Thousands/uL    MPV 10 1 8 9 - 12 7 fL   Basic metabolic panel    Collection Time: 02/01/18  8:34 AM   Result Value Ref Range    Sodium 159 (H) 136 - 145 mmol/L    Potassium 4 2 3 5 - 5 3 mmol/L    Chloride 123 (H) 100 - 108 mmol/L    CO2 26 21 - 32 mmol/L    Anion Gap 10 4 - 13 mmol/L    BUN 54 (H) 5 - 25 mg/dL    Creatinine 0 76 0 60 - 1 30 mg/dL    Glucose 311 (H) 65 - 140 mg/dL    Calcium 7 6 (L) 8 3 - 10 1 mg/dL    eGFR 79 ml/min/1 73sq m   CBC    Collection Time: 02/01/18 10:21 AM   Result Value Ref Range    WBC 3 48 (L) 4 31 - 10 16 Thousand/uL    RBC 2 42 (L) 3 81 - 5 12 Million/uL Hemoglobin 7 2 (L) 11 5 - 15 4 g/dL    Hematocrit 23 0 (L) 34 8 - 46 1 %    MCV 95 82 - 98 fL    MCH 29 8 26 8 - 34 3 pg    MCHC 31 3 (L) 31 4 - 37 4 g/dL    RDW 17 2 (H) 11 6 - 15 1 %    Platelets 20 (LL) 505 - 390 Thousands/uL    MPV 10 9 8 9 - 12 7 fL   Fingerstick Glucose (POCT)    Collection Time: 02/01/18  1:16 PM   Result Value Ref Range    POC Glucose 329 (H) 65 - 140 mg/dl   Prepare RBC:Special Requirements: CMV Negative, Irradiated, Leukoreduced; Has consent been obtained? Yes, 1 Units    Collection Time: 02/01/18  2:18 PM   Result Value Ref Range    Unit Product Code T1330A03     Unit Number [id number]     Unit ABO O     Unit RH POS     Crossmatch Compatible     Unit Dispense Status Issued    Prepare platelet pheresis:Transfusion Indications: Prophylactic in stable, non-bleeding outpatient with platelet count < or = 20,000; Special Requirements: CMV/Leukoreduced, Irradiated; Has consent been obtained? Yes, 1 Units    Collection Time: 02/01/18  5:38 PM   Result Value Ref Range    Unit Product Code B3318H74     Unit Number [id number]     Unit ABO A     Unit [facility] POS     Unit Dispense Status Issued          Xr Chest 2 Views    Result Date: 1/29/2018  Narrative: CHEST INDICATION: fatigue  History taken directly from the electronic ordering system  COMPARISON: None VIEWS:  AP semierect and lateral IMAGES:  3 FINDINGS: The patient is rotated to the left  The cardiomediastinal silhouette is unremarkable  The lungs are clear  No pleural effusions  Mild degenerative changes, thoracic spine  Impression: No active pulmonary disease    Workstation performed: [id number]     Ct Abdomen Pelvis W Contrast    Result Date: 1/29/2018  Narrative: CT ABDOMEN AND PELVIS WITH IV CONTRAST INDICATION:  Abdominal pain and vomiting  COMPARISON: None  TECHNIQUE:  CT examination of the abdomen and pelvis was performed  Reformatted images were created in axial, sagittal, and coronal planes    Radiation dose length product (DLP) for this visit:  464 mGy-cm   This examination, like all CT scans performed in the [facility], was performed utilizing techniques to minimize radiation dose exposure, including the use of iterative reconstruction and automated exposure control  IV Contrast:  100 mL of iodixanol (VISIPAQUE)         Enteric Contrast:  Enteric contrast was not administered  FINDINGS: ABDOMEN LOWER CHEST:  No significant abnormalities identified in the lower chest  Borderline cardiomegaly  LIVER/BILIARY TREE:  Decreased attenuation compared to the spleen suggesting fatty infiltration  GALLBLADDER:  Small calcified gallstones in the fundus  No pericholecystic inflammatory change  SPLEEN:  There are patchy subcapsular areas of decreased attenuation suspicious for infarcts  PANCREAS:  Unremarkable  ADRENAL GLANDS:  Unremarkable  KIDNEYS/URETERS:  2 adjacent 2-3 mm nonobstructing left lower pole calculi  Small bilateral renal cysts  No hydronephrosis  STOMACH AND BOWEL:  Although the etiology of the pneumoperitoneum is uncertain, there is thickening of the gastric antrum with associated luminal irregularity and focal wall thinning for which a perforated gastric ulcer may be considered  The second and third portions of the duodenum appear mildly dilated with a moderate sized diverticulum noted  Mildly dilated proximal jejunal loops, possibly related to ileus  No transition to suggest mechanical obstruction  No focal inflammatory changes  APPENDIX:  No findings to suggest appendicitis  ABDOMINOPELVIC CAVITY:  There is a moderate amount of pneumoperitoneum noted  There is a small amount of abdominal and pelvic ascites  No evidence of rim-enhancing abscess  VESSELS:  Atherosclerotic changes are present  No evidence of aneurysm  PELVIS REPRODUCTIVE ORGANS:  Unremarkable for patient's age  Clips in the adnexa bilaterally  URINARY BLADDER:  Unremarkable  ABDOMINAL WALL/INGUINAL REGIONS:  Unremarkable  OSSEOUS STRUCTURES:  No acute fracture or destructive osseous lesion  Mild lumbar levoscoliosis with multilevel degenerative disc disease  Impression: Moderate pneumoperitoneum suggesting bowel perforation  Although the origin is uncertain, possibility of perforated gastric ulcer may be considered  Small amount of abdominal and pelvic ascites  No evidence for organized abscess  Mild small bowel dilatation may reflect reactive ileus  Cholelithiasis  I personally discussed this study with Gloria Zarate on 1/29/2018 1:35 PM  Workstation performed: [id number]     Assessment and plan:  1  Pancytopenia secondary to chronic Imuran (300 mg p o  daily) for multiple sclerosis, this could cause chronic pancytopenia the patient was admitted with perforated gastric ulcers, peritonitis status post laparoscopic washout, she received packed RBC, platelets, now with more pancytopenia she had been on Zosyn, Diflucan  2  Rule out DIC, D-dimer of 2700, INR 1 6, I will order fibrinogen, FDP, the treatment of the icy ease treating the underlying cause such as peritonitis  3  If ANC below 1000 I suggest filgrastim 300 mcg subcu x1 dose  4  If platelets below 55,029 a suggest platelet transfusion with 1 unit of platelet  5    Continue clinical observation

## 2018-02-01 NOTE — CASE MANAGEMENT
Continued Stay Review    Date:  2/1/2018    Vital Signs: /80 (BP Location: Left arm)   Pulse 103   Temp (!) 97 1 °F (36 2 °C) (Tympanic)   Resp 18   Ht 5' 7" (1 702 m)   Wt 77 2 kg (170 lb 3 1 oz)   SpO2 97%   BMI 26 66 kg/m²     Consult Hematology  Transfuse 1 unit PRBC's  Transfuse 1 unit pheresed platelets  PT Eval    Medications:   Scheduled Meds:   Current Facility-Administered Medications:  cholecalciferol 1,000 Units Oral Daily PARRISH Morin    ergocalciferol 50,000 Units Oral Weekly PARRISH Morin    fluconazole 400 mg Intravenous Q24H Lauri Wilson MD Last Rate: 400 mg (01/31/18 0837)   hydrALAZINE 10 mg Intravenous Q4H PRN Hao Alto, CRNP    insulin glargine 10 Units Subcutaneous QAM Magdalene Delcid DO    insulin lispro 1-5 Units Subcutaneous Q6H [address] PARRISH Morin    labetalol 10 mg Intravenous Q4H PRN Pearl Pleas Spatzer, CRNP    levothyroxine 200 mcg Oral Early Morning PARRISH Morin    morphine injection 1 mg Intravenous Q4H PRN Hao Alto, CRNP    ondansetron 4 mg Intravenous Q4H PRN Hao Alto, CRNP    pantoprazole 40 mg Intravenous Q12H [address] Lauri Wilson MD    piperacillin-tazobactam 4 5 g Intravenous Q6H Bal Cisneros MD Last Rate: 4 5 g (02/01/18 0401)   sodium chloride 75 mL/hr Intravenous Continuous Magdalene DO Farhana Last Rate: 75 mL/hr (02/01/18 1308)     Continuous Infusions:   sodium chloride 75 mL/hr Last Rate: 75 mL/hr (02/01/18 1308)     PRN Meds: hydrALAZINE IV X 1    labetalol    morphine injection    ondansetron    Abnormal Labs/Diagnostic Results:  H&H 7 2 / 23,   Plats 20,   Na 159,   Cl 123,   Bun 54,   Glu 311,   Ca 7 6    UGI:  Pending    Age/Sex: 67 y o  female     Assessment/Plan:   Per Medicine:  Principal problem  1  Peritonitis due to perforated gastric ulcer- s/p lap repair with washout  Per surgery  They plan for UGI study  They discussed removal of NGT depending on study  Antibiotics per ID   She is currently on zosyn and fluconazole      2  Acute/chronic anemia due to gastric ulcer- pt hemoglobin had been stable but dropped from 9 1 to 7 2  Will repeat labs stat  Will transfuse if true value  Hold heparin for now  Will need to evaluate for bleeding site if this is a true value vrs is it from imuran       3  Hypernatremia- likely due to ngt and free water deficit  Will start 1/2 normal saline  May need to change to d5w if no improvement  Will check sodium this afternoon       Active  1  Pancytopenia- pt may need platelet transfusion if her hemoglobin is truly 7 2  Will need to find bleeding site  May need heme/onc eval as it could be due to imuran or another cause       2  Type 2 diabetes with hyperglycemia- blood glucose in the 200-300  Pt is not on insulin at home  Will check a1c  Will start lantus here  Will start low dose due to npo and lantus is new  Will continue insulin sliding scale     3  Cat bite left hand- continue antibiotics  ID following     4  Uncontrolled htn- could be related to pain  Pt on prn medications  Pt was able to receive some po meds  Will continue prn IV medications for now  Will assess after ngt removed to see if need to start po antihypertensives       5  hypothyroid on synthroid     6  Hyperlipidemia- d/c statin  Can restart once stable       7  MS- holding imuran due to acute illness       PER ID : continue Zosyn and fluconazole at least through 2/3/2018 to give 5 days postop                  would consult Hematology Oncology if the pancytopenia persists    Discharge Plan:  MSW following

## 2018-02-02 ENCOUNTER — APPOINTMENT (INPATIENT)
Dept: CT IMAGING | Facility: HOSPITAL | Age: 73
DRG: 853 | End: 2018-02-02
Payer: MEDICARE

## 2018-02-02 LAB
ABO GROUP BLD BPU: NORMAL
ABO GROUP BLD: NORMAL
ANION GAP SERPL CALCULATED.3IONS-SCNC: 3 MMOL/L (ref 4–13)
BLD GP AB SCN SERPL QL: NEGATIVE
BPU ID: NORMAL
BUN SERPL-MCNC: 59 MG/DL (ref 5–25)
CALCIUM SERPL-MCNC: 7.7 MG/DL (ref 8.3–10.1)
CHLORIDE SERPL-SCNC: 126 MMOL/L (ref 100–108)
CO2 SERPL-SCNC: 29 MMOL/L (ref 21–32)
CREAT SERPL-MCNC: 0.85 MG/DL (ref 0.6–1.3)
CROSSMATCH: NORMAL
ERYTHROCYTE [DISTWIDTH] IN BLOOD BY AUTOMATED COUNT: 17.2 % (ref 11.6–15.1)
ERYTHROCYTE [DISTWIDTH] IN BLOOD BY AUTOMATED COUNT: 18.1 % (ref 11.6–15.1)
EST. AVERAGE GLUCOSE BLD GHB EST-MCNC: 97 MG/DL
FERRITIN SERPL-MCNC: 2778 NG/ML (ref 8–388)
GFR SERPL CREATININE-BSD FRML MDRD: 69 ML/MIN/1.73SQ M
GLUCOSE SERPL-MCNC: 250 MG/DL (ref 65–140)
GLUCOSE SERPL-MCNC: 250 MG/DL (ref 65–140)
GLUCOSE SERPL-MCNC: 271 MG/DL (ref 65–140)
GLUCOSE SERPL-MCNC: 282 MG/DL (ref 65–140)
GLUCOSE SERPL-MCNC: 296 MG/DL (ref 65–140)
GLUCOSE SERPL-MCNC: 344 MG/DL (ref 65–140)
HBA1C MFR BLD: 5 % (ref 4.2–6.3)
HCT VFR BLD AUTO: 23.1 % (ref 34.8–46.1)
HCT VFR BLD AUTO: 25.5 % (ref 34.8–46.1)
HGB BLD-MCNC: 7.5 G/DL (ref 11.5–15.4)
HGB BLD-MCNC: 8.3 G/DL (ref 11.5–15.4)
INR PPP: 1.13 (ref 0.86–1.16)
IRON SATN MFR SERPL: 80 %
IRON SERPL-MCNC: 129 UG/DL (ref 50–170)
MCH RBC QN AUTO: 29 PG (ref 26.8–34.3)
MCH RBC QN AUTO: 29.1 PG (ref 26.8–34.3)
MCHC RBC AUTO-ENTMCNC: 32.5 G/DL (ref 31.4–37.4)
MCHC RBC AUTO-ENTMCNC: 32.5 G/DL (ref 31.4–37.4)
MCV RBC AUTO: 89 FL (ref 82–98)
MCV RBC AUTO: 90 FL (ref 82–98)
PLATELET # BLD AUTO: 44 THOUSANDS/UL (ref 149–390)
PLATELET # BLD AUTO: 75 THOUSANDS/UL (ref 149–390)
PMV BLD AUTO: 10.2 FL (ref 8.9–12.7)
PMV BLD AUTO: 9.6 FL (ref 8.9–12.7)
POTASSIUM SERPL-SCNC: 3.8 MMOL/L (ref 3.5–5.3)
PROTHROMBIN TIME: 14.5 SECONDS (ref 12.1–14.4)
RBC # BLD AUTO: 2.58 MILLION/UL (ref 3.81–5.12)
RBC # BLD AUTO: 2.86 MILLION/UL (ref 3.81–5.12)
RH BLD: POSITIVE
SODIUM SERPL-SCNC: 158 MMOL/L (ref 136–145)
SODIUM SERPL-SCNC: 164 MMOL/L (ref 136–145)
SPECIMEN EXPIRATION DATE: NORMAL
TIBC SERPL-MCNC: 161 UG/DL (ref 250–450)
UNIT DISPENSE STATUS: NORMAL
UNIT PRODUCT CODE: NORMAL
UNIT RH: NORMAL
VIT B12 SERPL-MCNC: 5105 PG/ML (ref 100–900)
WBC # BLD AUTO: 2.25 THOUSAND/UL (ref 4.31–10.16)
WBC # BLD AUTO: 2.69 THOUSAND/UL (ref 4.31–10.16)

## 2018-02-02 PROCEDURE — 99233 SBSQ HOSP IP/OBS HIGH 50: CPT | Performed by: INTERNAL MEDICINE

## 2018-02-02 PROCEDURE — 99232 SBSQ HOSP IP/OBS MODERATE 35: CPT | Performed by: INTERNAL MEDICINE

## 2018-02-02 PROCEDURE — 74177 CT ABD & PELVIS W/CONTRAST: CPT

## 2018-02-02 PROCEDURE — 86850 RBC ANTIBODY SCREEN: CPT | Performed by: INTERNAL MEDICINE

## 2018-02-02 PROCEDURE — 86923 COMPATIBILITY TEST ELECTRIC: CPT

## 2018-02-02 PROCEDURE — 86901 BLOOD TYPING SEROLOGIC RH(D): CPT | Performed by: INTERNAL MEDICINE

## 2018-02-02 PROCEDURE — 86900 BLOOD TYPING SEROLOGIC ABO: CPT | Performed by: INTERNAL MEDICINE

## 2018-02-02 PROCEDURE — 83036 HEMOGLOBIN GLYCOSYLATED A1C: CPT | Performed by: INTERNAL MEDICINE

## 2018-02-02 PROCEDURE — P9040 RBC LEUKOREDUCED IRRADIATED: HCPCS

## 2018-02-02 PROCEDURE — 84295 ASSAY OF SERUM SODIUM: CPT | Performed by: INTERNAL MEDICINE

## 2018-02-02 PROCEDURE — 85610 PROTHROMBIN TIME: CPT | Performed by: INTERNAL MEDICINE

## 2018-02-02 PROCEDURE — C9113 INJ PANTOPRAZOLE SODIUM, VIA: HCPCS | Performed by: SURGERY

## 2018-02-02 PROCEDURE — 85027 COMPLETE CBC AUTOMATED: CPT | Performed by: INTERNAL MEDICINE

## 2018-02-02 PROCEDURE — 82948 REAGENT STRIP/BLOOD GLUCOSE: CPT

## 2018-02-02 PROCEDURE — 80048 BASIC METABOLIC PNL TOTAL CA: CPT | Performed by: INTERNAL MEDICINE

## 2018-02-02 RX ORDER — MORPHINE SULFATE 2 MG/ML
2 INJECTION, SOLUTION INTRAMUSCULAR; INTRAVENOUS EVERY 4 HOURS PRN
Status: DISCONTINUED | OUTPATIENT
Start: 2018-02-02 | End: 2018-02-02

## 2018-02-02 RX ORDER — MORPHINE SULFATE 2 MG/ML
1 INJECTION, SOLUTION INTRAMUSCULAR; INTRAVENOUS ONCE
Status: COMPLETED | OUTPATIENT
Start: 2018-02-02 | End: 2018-02-02

## 2018-02-02 RX ORDER — INSULIN GLARGINE 100 [IU]/ML
15 INJECTION, SOLUTION SUBCUTANEOUS EVERY MORNING
Status: DISCONTINUED | OUTPATIENT
Start: 2018-02-03 | End: 2018-02-05

## 2018-02-02 RX ORDER — MORPHINE SULFATE 2 MG/ML
2 INJECTION, SOLUTION INTRAMUSCULAR; INTRAVENOUS EVERY 2 HOUR PRN
Status: DISCONTINUED | OUTPATIENT
Start: 2018-02-02 | End: 2018-02-15

## 2018-02-02 RX ADMIN — Medication 4.5 G: at 21:38

## 2018-02-02 RX ADMIN — MORPHINE SULFATE 1 MG: 2 INJECTION, SOLUTION INTRAMUSCULAR; INTRAVENOUS at 10:49

## 2018-02-02 RX ADMIN — Medication 4.5 G: at 16:14

## 2018-02-02 RX ADMIN — Medication 4.5 G: at 00:11

## 2018-02-02 RX ADMIN — PANTOPRAZOLE SODIUM 40 MG: 40 INJECTION, POWDER, FOR SOLUTION INTRAVENOUS at 08:21

## 2018-02-02 RX ADMIN — DEXTROSE MONOHYDRATE 75 ML/HR: 50 INJECTION, SOLUTION INTRAVENOUS at 21:59

## 2018-02-02 RX ADMIN — IOHEXOL 50 ML: 240 INJECTION, SOLUTION INTRATHECAL; INTRAVASCULAR; INTRAVENOUS; ORAL at 11:30

## 2018-02-02 RX ADMIN — IOHEXOL 100 ML: 350 INJECTION, SOLUTION INTRAVENOUS at 14:20

## 2018-02-02 RX ADMIN — MORPHINE SULFATE 1 MG: 2 INJECTION, SOLUTION INTRAMUSCULAR; INTRAVENOUS at 08:21

## 2018-02-02 RX ADMIN — ONDANSETRON 4 MG: 2 INJECTION INTRAMUSCULAR; INTRAVENOUS at 11:34

## 2018-02-02 RX ADMIN — Medication 4.5 G: at 09:53

## 2018-02-02 RX ADMIN — Medication 4.5 G: at 05:17

## 2018-02-02 RX ADMIN — MORPHINE SULFATE 2 MG: 2 INJECTION, SOLUTION INTRAMUSCULAR; INTRAVENOUS at 21:40

## 2018-02-02 RX ADMIN — INSULIN LISPRO 3 UNITS: 100 INJECTION, SOLUTION INTRAVENOUS; SUBCUTANEOUS at 05:20

## 2018-02-02 RX ADMIN — INSULIN GLARGINE 10 UNITS: 100 INJECTION, SOLUTION SUBCUTANEOUS at 08:21

## 2018-02-02 RX ADMIN — INSULIN LISPRO 2 UNITS: 100 INJECTION, SOLUTION INTRAVENOUS; SUBCUTANEOUS at 18:38

## 2018-02-02 RX ADMIN — PANTOPRAZOLE SODIUM 40 MG: 40 INJECTION, POWDER, FOR SOLUTION INTRAVENOUS at 21:39

## 2018-02-02 RX ADMIN — INSULIN LISPRO 2 UNITS: 100 INJECTION, SOLUTION INTRAVENOUS; SUBCUTANEOUS at 13:23

## 2018-02-02 RX ADMIN — MORPHINE SULFATE 2 MG: 2 INJECTION, SOLUTION INTRAMUSCULAR; INTRAVENOUS at 16:18

## 2018-02-02 RX ADMIN — FLUCONAZOLE 400 MG: 2 INJECTION INTRAVENOUS at 18:40

## 2018-02-02 NOTE — PLAN OF CARE
Nutrition/Hydration-ADULT     Nutrient/Hydration intake appropriate for improving, restoring or maintaining nutritional needs Not Progressing          DISCHARGE PLANNING     Discharge to home or other facility with appropriate resources Progressing        DISCHARGE PLANNING - CARE MANAGEMENT     Discharge to post-acute care or home with appropriate resources Progressing        INFECTION - ADULT     Absence or prevention of progression during hospitalization Progressing     Absence of fever/infection during neutropenic period Progressing        Knowledge Deficit     Patient/family/caregiver demonstrates understanding of disease process, treatment plan, medications, and discharge instructions Progressing        PAIN - ADULT     Verbalizes/displays adequate comfort level or baseline comfort level Progressing        Potential for Falls     Patient will remain free of falls Progressing        Prexisting or High Potential for Compromised Skin Integrity     Skin integrity is maintained or improved Progressing        SAFETY ADULT     Maintain or return to baseline ADL function Progressing     Maintain or return mobility status to optimal level Progressing

## 2018-02-02 NOTE — PROGRESS NOTES
[facility] Progress Note  Patient: Roula Gtz 67 y o  female   MRN: [id number]  PCP: Cuate Wright DO  Unit/Bed#: E4 -01 Encounter: [id number]  Date Of Visit: 02/02/18      Assessment/plan  Principal problem  1  Peritonitis due to perforated gastric ulcer- s/p lap repair with washout with silvia patch  Per surgery  Antibiotics per ID  She is currently on zosyn and fluconazole until 2/5  Tests did not show leak  She did have upper gi bleed  Awaiting surgery follow up       2  Acute/chronic anemia due to gastric ulcer and recent gi bleeding-s/p 2 units of prbc yesterday  H/h improved this am  Check h/h now        3  Hypernatremia- likely due to ngt and free water deficit  Improving with d5w  Check na now  4  Acute metabolic encephalopathy due to hypernatremia- resolving slowly     Active  1  Pancytopenia due to imuran- appreciate hematology recommendations  Platelets transfused yesterday due to bleeding  Check platelets now  If still bleeding and platelets less than 50 would transfuse platelets again       2  Type 2 diabetes with hyperglycemia- blood glucose in the 200-300  Pt is not on insulin at home  a1c is 5 0 but could be falsely lowered due to anemia  Increase lantus to 15 units  Continue insulin sliding scale       3  Cat bite left hand- continue antibiotics until 2/5  ID following     4  Uncontrolled htn- could be related to pain  Pt on prn medications  Pt was able to receive some po meds  Will continue prn IV medications for now  Will assess after ngt removed to see if need to start po antihypertensives       5  hypothyroid on synthroid     6  Hyperlipidemia- d/c statin  Can restart once stable       7  MS- holding imuran due to acute illness  Subjective:   Pt seen and examined  Pt is having increased abd pain  She is more awake today  She had further blood in her ng tube  It is dark blood   No f/c no cp no worsening sob    Objective:     Vitals: Blood pressure 160/67, pulse 94, temperature 97 8 °F (36 6 °C), temperature source Tympanic, resp  rate 18, height 5' 7" (1 702 m), weight 77 2 kg (170 lb 3 1 oz), SpO2 99 %  ,Body mass index is 26 66 kg/m²  Lab, Imaging and other studies:    Results from last 7 days  Lab Units 02/02/18  0316   WBC Thousand/uL 2 69*   HEMOGLOBIN g/dL 8 3*   HEMATOCRIT % 25 5*   PLATELETS Thousands/uL 75*   INR  1 13       Results from last 7 days  Lab Units 02/02/18  0316  01/28/18  1745   SODIUM mmol/L 158*  < > 133*   POTASSIUM mmol/L 3 8  < > 5 6*   CHLORIDE mmol/L 126*  < > 99*   CO2 mmol/L 29  < > 18*   BUN mg/dL 59*  < > 43*   CREATININE mg/dL 0 85  < > 1 31*   CALCIUM mg/dL 7 7*  < > 8 6   TOTAL PROTEIN g/dL  --   --  5 4*   BILIRUBIN TOTAL mg/dL  --   --  1 30*   ALK PHOS U/L  --   --  76   ALT U/L  --   --  23   AST U/L  --   --  30   GLUCOSE RANDOM mg/dL 282*  < > 586*   GLUCOSE, ISTAT   --   < >  --    < > = values in this interval not displayed      Results from last 7 days  Lab Units 01/28/18  1745   TROPONIN I ng/mL 0 15*     Lab Results   Component Value Date    BLOODCX No Growth at 48 hrs  01/31/2018    BLOODCX No Growth at 48 hrs  01/31/2018     Scheduled Meds:   Current Facility-Administered Medications:  cholecalciferol 1,000 Units Oral Daily PARRISH Whitaker    dextrose 50 mL/hr Intravenous Continuous Magdalene Delcid DO Last Rate: 50 mL/hr (02/02/18 6820)   ergocalciferol 50,000 Units Oral Weekly PARRISH Whitaker    fluconazole 400 mg Intravenous Q24H Inna Barragan MD Last Rate: Stopped (02/01/18 7674)   hydrALAZINE 10 mg Intravenous Q4H PRN PARRISH Mcbride    [START ON 2/3/2018] insulin glargine 15 Units Subcutaneous QAM Magdalene Delcid DO    insulin lispro 1-5 Units Subcutaneous Q6H [facility] PARRISH Whitaker    labetalol 10 mg Intravenous Q4H PRN Alcus Samples Spatzer, CRNP    levothyroxine 200 mcg Oral Early Morning PARRISH Whitaker    morphine injection 2 mg Intravenous Q4H PRN Magdalene Delcid DO    ondansetron 4 mg Intravenous Q4H PRN Ace PARRISH Zamorano    pantoprazole 40 mg Intravenous Q12H [address] Gamal Steiner MD    piperacillin-tazobactam 4 5 g Intravenous Q6H aEn Mcclain MD Last Rate: 4 5 g (02/02/18 1614)     Continuous Infusions:   dextrose 50 mL/hr Last Rate: 50 mL/hr (02/02/18 8247)     PRN Meds: hydrALAZINE    labetalol    morphine injection    ondansetron      Physical exam:  Physical Exam  General appearance: alert and oriented, in no acute distress  Head: Normocephalic, without obvious abnormality, atraumatic  Eyes: conjunctivae/corneas clear  PERRL, EOM's intact  Fundi benign    Neck: no adenopathy, no carotid bruit, no JVD, supple, symmetrical, trachea midline and thyroid not enlarged, symmetric, no tenderness/mass/nodules  Lungs: slight decrease breath sounds at bases bilateral  Heart: regular rate and rhythm, S1, S2 normal, no murmur, click, rub or gallop  Abdomen: soft slight distended +ttp that is diffuse +bs  Extremities: extremities normal, warm and well-perfused; no cyanosis, clubbing, or edema  Pulses: 2+ and symmetric  Skin: Skin color, texture, turgor normal  No rashes or lesions  Neurologic: Mental status: awake alert oriented x3 but lethargic at times      VTE Pharmacologic Prophylaxis: Reason for no pharmacologic prophylaxis bleeding  VTE Mechanical Prophylaxis: sequential compression device    Counseling / Coordination of Care  Total floor / unit time spent today 20 minutes     Current Length of Stay: 5 day(s)    Current Patient Status: Inpatient       Code Status: Level 3 - DNAR and DNI

## 2018-02-02 NOTE — PROGRESS NOTES
Patient assessed by Surgical P TREVOR  On call at the request of the Hospitalist regarding a clinical concerns about the patient  It was reported that the patient underwent an upper GI series but was unable to complete the test   The report describes no gross extravasation of contrast     The patient was noted to have a 2 mg/dL drop in her hemoglobin and was as a thrombocytopenia for which blood products have been ordered  I requested a full set of repeat labs an hour after the transfusions  The nurses report a drainage of 250cc of bloody nasogastric tube aspirate  Which has subsequently tapered off  There was a report of a worsening tender abdomen  The [de-identified] assistant does report a tender abdomen to percussion without signs of peritonitis  Our plan is to continue to support the patient with blood products and repeat the labs then proceed accordingly

## 2018-02-02 NOTE — WOUND OSTOMY CARE
Progress Note - Wound   Home Taveras 67 y o  female MRN: [id number]  Unit/Bed#: E4 -01 Encounter: [id number]      Assessment:   Patient seen for skin assessment  Lying in bed, reports abdominal pain  Per PCA staff, patient has been incontinent of bowel and bladder with oozing of liquid stool  Patient has 2 COREY drains to abdomen and NG tube (site WNL)  Bilateral heels are erythematous and blanchable  Patient has a scab to her left hand (healing infected catch scratch per family--ID following)  There is an area to the patient's left medial wrist that is weeping (old a-line site?)  Bilateral upper extremities are bruised  Bilateral feet and ankles have tan scaling  Findings:  1  Hospital acquired deep tissue injury (suspect stage 3 or 4) to midline coccyx  2  MASD with epidermal erosion to b/l medial buttocks (intertrigo)  Plan:   1  Turn and reposition patient every 2 hours  2  Elevate heels off of bed on pillows to offload  3  Moisturize skin daily with nourishing lotion  4  Apply Hydraguard lotion to b/l heels BID & PRN  5  Sofcare cushion when patient getting OOB to chair  6  Cleanse b/l buttocks and sacrum with soap and water, dry  Apply calazime paste TID & PRN  7  Apply Accumax alternating pump to bed mattress  8  Wound care team to follow weekly  Objective:    Vitals: Blood pressure 160/67, pulse 94, temperature 97 8 °F (36 6 °C), temperature source Tympanic, resp  rate 18, height 5' 7" (1 702 m), weight 77 2 kg (170 lb 3 1 oz), SpO2 99 %  ,Body mass index is 26 66 kg/m²  Pressure Ulcer 02/02/18 Coccyx Mid Deep tissue injury--non-blanchable deep purple, skin intact  (Active)   Staging Deep Tissue Injury 2/2/2018  6:00 PM   Wound Description Clean; Intact; Light purple;Non-blanchable erythema 2/2/2018  6:00 PM   Mira-wound Assessment Erythema;Fragile 2/2/2018  6:00 PM   Wound Length (cm) 1 1 cm 2/2/2018  6:00 PM   Wound Width (cm) 0 4 cm 2/2/2018  6:00 PM   Wound Depth (cm) 0 2/2/2018  6:00 PM   Calculated Wound Area (cm^2) 0 44 cm^2 2/2/2018  6:00 PM   Calculated Wound Volume (cm^3) 0 cm^3 2/2/2018  6:00 PM   Drainage Amount None 2/2/2018  6:00 PM   Dressing Protective barrier 2/2/2018  6:00 PM   Patient Tolerance Tolerated well 2/2/2018  6:00 PM       Wound 01/31/18 Moisture associated skin damage Buttocks Right;Left gluteal cleft with partial thickness erosion (Active)   Wound Description Clean;Pink 2/2/2018  6:00 PM   Mira-wound Assessment Erythema;Fragile 2/2/2018  6:00 PM   Wound Length (cm) 1 2 cm 2/2/2018  6:00 PM   Wound Width (cm) 1 cm 2/2/2018  6:00 PM   Wound Depth (cm) 0 1 2/2/2018  6:00 PM   Calculated Wound Volume (cm^3) 0 12 cm^3 2/2/2018  6:00 PM   Drainage Amount Scant 2/2/2018  6:00 PM   Drainage Description Serosanguineous 2/2/2018  6:00 PM   Non-staged Wound Description Partial thickness 2/2/2018  6:00 PM   Treatments Cleansed;Site care 2/2/2018  6:00 PM   Dressing Protective barrier 2/2/2018  6:00 PM   Patient Tolerance Tolerated well 2/2/2018  6:00 PM         Jerrod JIMENESN, RN, David Reese ()

## 2018-02-02 NOTE — POST OP PROGRESS NOTES
Progress Note - General Surgery   Donte Jaimes 67 y o  female MRN: [id number]  Unit/Bed#: E4 -01 Encounter: [id number]    Assessment:  Perforated gastric ulcer with pneumoperitoneum and peritonitis  Postop day  4 Status post laparoscopic Allayne Freud patch repair of gastric ulcer, abdominal washout, placement of drains  Severe Anemia- drop in hemoglobin today with some bloody NG tube output overnight  Pancytopenia-  possible medication effect, patient has been on Imuran for history of multiple sclerosis  Acute severe protein calorie malnutrition related to GI distress   Multiple sclerosis with chronic steroid use and Imuran use  Diabetes mellitus 2  Acute hypoxic respiratory failure- resolved  Cat bite left hand with some swelling    Plan:  Continue supportive care, medical management  Continue NGT and NPO, monitor output  Patient with some bloody NG tube output overnight and drop in hemoglobin today  Will check CT of the abdomen today  Continue to monitor H&H and platelet count- transfuse prn  Continue Abx and Diflucan per ID  Continue Protonix   DVT prophylaxix on hold due to recent GI bleeding  PT/OT    Subjective/Objective   Chief Complaint:  Abdominal pain     Subjective:  Patient still complains of abdominal pain  NG tube with bloody output last evening  Drop in hemoglobin today  Complains of feeling fatigued and weak  Blood pressure has been stable in patient is not tachycardic  Not out of bed today  Drains without bile  Objective:     Blood pressure 158/70, pulse 89, temperature 97 9 °F (36 6 °C), temperature source Tympanic, resp  rate 18, height 5' 7" (1 702 m), weight 77 2 kg (170 lb 3 1 oz), SpO2 98 %  ,Body mass index is 26 66 kg/m²        Intake/Output Summary (Last 24 hours) at 02/02/18 1258  Last data filed at 02/02/18 1216   Gross per 24 hour   Intake          2909 92 ml   Output             1465 ml   Net          1444 92 ml       Invasive Devices     Peripheral Intravenous Line Peripheral IV 01/31/18 Right Arm 2 days    Peripheral IV 02/01/18 Left Forearm less than 1 day          Drain            NG/OG/Enteral Tube Nasogastric Right nares 4 days    Closed/Suction Drain Left Abdomen Bulb 15 Fr  3 days    Closed/Suction Drain Right;Other (Comment) Abdomen Bulb 15 Fr  3 days                Physical Exam:  Gen:  Alert and oriented, fatigued, NAD  HEENT:   sclera anicteric, mucous membranes dry  Neck:  Supple negative for lymphadenopathy  Chest:  clear, decreased at bases, shallow BS  Cor:  Regular rate and rhythm  Abd:  Soft with tenderness across upper abdomen, distended, active bowel sounds  Ext:  There is mild edema in her bilateral lower extremities  Left hand with scabbed wound and mild swelling, no increased warmth or cellulitis  Skin:  Warm and dry, incision sites are clean dry and intact  NG tube currently clamped, dark bilious output in canister  Abdominal drains with serosanguineous output, no bile       Lab, Imaging and other studies:  I have personally reviewed pertinent lab results    , CBC:   Lab Results   Component Value Date    WBC 2 69 (L) 02/02/2018    HGB 8 3 (L) 02/02/2018    HCT 25 5 (L) 02/02/2018    MCV 89 02/02/2018    PLT 75 (L) 02/02/2018    MCH 29 0 02/02/2018    MCHC 32 5 02/02/2018    RDW 17 2 (H) 02/02/2018    MPV 9 6 02/02/2018   , CMP:   Lab Results   Component Value Date     (H) 02/02/2018    K 3 8 02/02/2018     (H) 02/02/2018    CO2 29 02/02/2018    ANIONGAP 3 (L) 02/02/2018    BUN 59 (H) 02/02/2018    CREATININE 0 85 02/02/2018    GLUCOSE 282 (H) 02/02/2018    CALCIUM 7 7 (L) 02/02/2018    EGFR 69 02/02/2018     VTE Pharmacologic Prophylaxis: Sequential compression device (Venodyne)   VTE Mechanical Prophylaxis: sequential compression device     Ramirez Concepcion PA-C

## 2018-02-02 NOTE — NURSING NOTE
Notified Dr Santos Notice of my assessment of the pt and my concern with her increased lethargy, abdominal tenderness and distention and bloody output from the NG  I told her that I had called Dr Yuni Sibley and relayed his instructions  She came to the bedside and assessed the patient as well  She called Dr Yuni Sibley to discuss her concerns  Dr Santos Notice told me that Dr Yuni Sibley would be sending a PA to evaluate the pt and she would order 1 more unit of PRBC and 1 more unit of platelets

## 2018-02-02 NOTE — PROGRESS NOTES
Progress Note - Infectious Disease   Lianne Bailey 67 y o  female MRN: [id number]  Unit/Bed#: E4 -01 Encounter: [id number]      Impression/Plan:  1   Peritonitis-secondary to perforation of a gastric ulceration with contamination of the peritoneum with gastric contacts  Charleston Fulling is now status post laparoscopic repair with washout with drains left in place   Consider patient for the possibility of more resistant organisms and yeast secondary infection as the patient had been Augmentin at the time of the perforation   Fortunately the patient has now undergone adequate source control   -discontinue Zosyn in case it is affecting the platelets  -cefepime 2 g IV q 12 hours, Flagyl 500 mg IV q 8 hours  -continue fluconazole  -likely discontinue all antibiotics 2/5/2018 as that lb 1 week of therapy from the time of adequate source control  -close surgical follow-up  -serial abdominal exams  -monitor CBC with diff and creatinine     2   Pancytopenia-likely secondary to the Imuran   Possibly another etiology   Patient has not neutropenic  Charlene Smith has been transfused and resuscitated for the severe anemia  The cell counts have improved a bit  Patient has received platelet transfusion yesterday   -monitor CBC with diff  -no additional Imuran for now  -hematology oncology follow-up  -transfusion is needed  -G-CSF if the neutrophil count is less than 1000     3   Chronic blood loss anemia-secondary to gastric ulceration   The H&H had now stabilized after resuscitation, but now is drifted down     -monitor CBC with diff  -continue Protonix  -GI follow-up     4   Diabetes mellitus-type 2 with hyperglycemia     5   Multiple sclerosis-apparently on Imuran for a while   Seems to be neurologically stable  -will need close neurology follow-up  -would hold on Imuran for now     6   Cat bite-left hand   No overt cellulitis at this time   Which should be well covered by the antibiotics as above    The cat is being watched at home without any behavioral abnormalities  -discontinue antibiotics after today's dosing as above    7  Hypernatremia-patient with a large free water deficit  Sodium has decreased modestly since yesterday with D5W being started  -frequent monitoring of BMP  -consider Nephrology evaluation if persist    8  GI bleed-in the setting of her recently repaired perforated peptic ulcer  Patient has been transfused  No overt bleeding is appreciated now on the NG tube  -will discontinue the Zosyn as above in case it is causing functional platelet disorder    Discussed in detail with Dr Katherine Wise  Will see the patient again 2018  Please call if questions  Antibiotics:  Zosyn 5  Fluconazole 5  Postop day 4    Subjective:  Patient has no fever, chills, sweats; no nausea, vomiting, diarrhea; no cough, shortness of breath; no pain  No new symptoms  She developed GI bleeding last night and required platelet transfusions  Bleeding seemed to stop  Objective:  Vitals:  HR:  [] 87  Resp:  [16-18] 18  BP: (145-175)/(63-79) 165/79  SpO2:  [96 %-98 %] 98 %  Temp (24hrs), Av 2 °F (36 8 °C), Min:97 4 °F (36 3 °C), Max:98 8 °F (37 1 °C)  Current: Temperature: 97 9 °F (36 6 °C)    Physical Exam:   General Appearance:  Somnolent, confused, interactive, nontoxic, no acute distress  Throat: Oropharynx moist without lesions  NG tube in place   Lungs:   Clear to auscultation bilaterally; no wheezes, rhonchi or rales; respirations unlabored   Heart:  RRR; no murmur, rub or gallop   Abdomen:   Soft, mildly tender, mildly distended, decreased bowel sounds  COREY drains in place   Extremities: No clubbing, cyanosis or edema   Skin: No new rashes or lesions  No draining wounds noted         Labs, Imaging, & Other studies:   All pertinent labs and imaging studies were personally reviewed    Results from last 7 days  Lab Units 18  0316 18  2052 18  1021 18  0834   WBC Thousand/uL 2 69*  --  3 48* 3 47* HEMOGLOBIN g/dL 8 3* 7 4* 7 2* 7 2*   PLATELETS Thousands/uL 75*  --  20* 20*       Results from last 7 days  Lab Units 02/02/18  0316 02/01/18  1755 02/01/18  0834  01/28/18  1745   SODIUM mmol/L 158* 160* 159*  < > 133*   POTASSIUM mmol/L 3 8  --  4 2  < > 5 6*   CHLORIDE mmol/L 126*  --  123*  < > 99*   CO2 mmol/L 29  --  26  < > 18*   ANION GAP mmol/L 3*  --  10  < > 16*   BUN mg/dL 59*  --  54*  < > 43*   CREATININE mg/dL 0 85  --  0 76  < > 1 31*   EGFR ml/min/1 73sq m 69  --  79  < > 41   GLUCOSE RANDOM mg/dL 282*  --  311*  < > 586*   GLUCOSE, ISTAT   --   --   --   < >  --    CALCIUM mg/dL 7 7*  --  7 6*  < > 8 6   AST U/L  --   --   --   --  30   ALT U/L  --   --   --   --  23   ALK PHOS U/L  --   --   --   --  76   TOTAL PROTEIN g/dL  --   --   --   --  5 4*   BILIRUBIN TOTAL mg/dL  --   --   --   --  1 30*   < > = values in this interval not displayed  Results from last 7 days  Lab Units 01/31/18  1022   BLOOD CULTURE  No Growth at 24 hrs  No Growth at 24 hrs

## 2018-02-02 NOTE — NURSING NOTE
Spoke with Dr Perez Hua from the surgical team regarding patient's output from COREY drains and leakage at site  He stated the drainage was within expected limits  Will continue to monitor  I also notified him that the results of the obstruction series was available for his review  He stated he would like to possibly repeat an obstruction series in the morning  I asked him if it was ok to put the patient back on NG suction as she had been off for the study  He confirmed the restart of suction

## 2018-02-02 NOTE — NURSING NOTE
Called Dr Jose L Romeo regarding dark red output from Ng  Considering pt's recent low hemoglobin and pancytopenia, I was concerned about possible GI bleed    I informed him of my assessments that the pt's abdomen seemed more firm and tender than previously and she also seemed more fatigued  Dr Jose L Romeo asked for a CBC, BMP, and PT/INR to be drawn after finishing the currently running unit of platelets and for the nurse to call him with the results

## 2018-02-03 LAB
ABO GROUP BLD BPU: NORMAL
ABO GROUP BLD BPU: NORMAL
ANION GAP SERPL CALCULATED.3IONS-SCNC: 7 MMOL/L (ref 4–13)
BPU ID: NORMAL
BPU ID: NORMAL
BUN SERPL-MCNC: 50 MG/DL (ref 5–25)
CALCIUM SERPL-MCNC: 7 MG/DL (ref 8.3–10.1)
CHLORIDE SERPL-SCNC: 129 MMOL/L (ref 100–108)
CO2 SERPL-SCNC: 26 MMOL/L (ref 21–32)
CREAT SERPL-MCNC: 0.85 MG/DL (ref 0.6–1.3)
ERYTHROCYTE [DISTWIDTH] IN BLOOD BY AUTOMATED COUNT: 16.8 % (ref 11.6–15.1)
GFR SERPL CREATININE-BSD FRML MDRD: 69 ML/MIN/1.73SQ M
GLUCOSE SERPL-MCNC: 193 MG/DL (ref 65–140)
GLUCOSE SERPL-MCNC: 328 MG/DL (ref 65–140)
GLUCOSE SERPL-MCNC: 330 MG/DL (ref 65–140)
GLUCOSE SERPL-MCNC: 342 MG/DL (ref 65–140)
GLUCOSE SERPL-MCNC: 426 MG/DL (ref 65–140)
HCT VFR BLD AUTO: 21.2 % (ref 34.8–46.1)
HGB BLD-MCNC: 6.9 G/DL (ref 11.5–15.4)
MCH RBC QN AUTO: 29.5 PG (ref 26.8–34.3)
MCHC RBC AUTO-ENTMCNC: 32.5 G/DL (ref 31.4–37.4)
MCV RBC AUTO: 91 FL (ref 82–98)
PLATELET # BLD AUTO: 61 THOUSANDS/UL (ref 149–390)
PMV BLD AUTO: 9.6 FL (ref 8.9–12.7)
POTASSIUM SERPL-SCNC: 4.3 MMOL/L (ref 3.5–5.3)
RBC # BLD AUTO: 2.34 MILLION/UL (ref 3.81–5.12)
SODIUM SERPL-SCNC: 162 MMOL/L (ref 136–145)
SODIUM SERPL-SCNC: 162 MMOL/L (ref 136–145)
UNIT DISPENSE STATUS: NORMAL
UNIT DISPENSE STATUS: NORMAL
UNIT PRODUCT CODE: NORMAL
UNIT PRODUCT CODE: NORMAL
UNIT RH: NORMAL
UNIT RH: NORMAL
WBC # BLD AUTO: 2.17 THOUSAND/UL (ref 4.31–10.16)

## 2018-02-03 PROCEDURE — 82948 REAGENT STRIP/BLOOD GLUCOSE: CPT

## 2018-02-03 PROCEDURE — 99232 SBSQ HOSP IP/OBS MODERATE 35: CPT | Performed by: INTERNAL MEDICINE

## 2018-02-03 PROCEDURE — 84295 ASSAY OF SERUM SODIUM: CPT | Performed by: INTERNAL MEDICINE

## 2018-02-03 PROCEDURE — P9037 PLATE PHERES LEUKOREDU IRRAD: HCPCS

## 2018-02-03 PROCEDURE — 80048 BASIC METABOLIC PNL TOTAL CA: CPT | Performed by: INTERNAL MEDICINE

## 2018-02-03 PROCEDURE — 85027 COMPLETE CBC AUTOMATED: CPT | Performed by: INTERNAL MEDICINE

## 2018-02-03 PROCEDURE — P9021 RED BLOOD CELLS UNIT: HCPCS

## 2018-02-03 PROCEDURE — C9113 INJ PANTOPRAZOLE SODIUM, VIA: HCPCS | Performed by: SURGERY

## 2018-02-03 RX ORDER — SUCRALFATE ORAL 1 G/10ML
1000 SUSPENSION ORAL
Status: DISCONTINUED | OUTPATIENT
Start: 2018-02-03 | End: 2018-02-17

## 2018-02-03 RX ADMIN — MORPHINE SULFATE 2 MG: 2 INJECTION, SOLUTION INTRAMUSCULAR; INTRAVENOUS at 04:57

## 2018-02-03 RX ADMIN — MORPHINE SULFATE 2 MG: 2 INJECTION, SOLUTION INTRAMUSCULAR; INTRAVENOUS at 09:42

## 2018-02-03 RX ADMIN — SUCRALFATE 1000 MG: 1 SUSPENSION ORAL at 15:47

## 2018-02-03 RX ADMIN — Medication 4.5 G: at 09:31

## 2018-02-03 RX ADMIN — FLUCONAZOLE 400 MG: 2 INJECTION INTRAVENOUS at 17:43

## 2018-02-03 RX ADMIN — CHOLECALCIFEROL TAB 25 MCG (1000 UNIT) 1000 UNITS: 25 TAB at 09:31

## 2018-02-03 RX ADMIN — Medication 4.5 G: at 15:47

## 2018-02-03 RX ADMIN — DEXTROSE MONOHYDRATE 100 ML/HR: 50 INJECTION, SOLUTION INTRAVENOUS at 21:32

## 2018-02-03 RX ADMIN — MORPHINE SULFATE 2 MG: 2 INJECTION, SOLUTION INTRAMUSCULAR; INTRAVENOUS at 18:04

## 2018-02-03 RX ADMIN — PANTOPRAZOLE SODIUM 40 MG: 40 INJECTION, POWDER, FOR SOLUTION INTRAVENOUS at 21:00

## 2018-02-03 RX ADMIN — INSULIN GLARGINE 15 UNITS: 100 INJECTION, SOLUTION SUBCUTANEOUS at 09:31

## 2018-02-03 RX ADMIN — Medication 4.5 G: at 04:11

## 2018-02-03 RX ADMIN — MORPHINE SULFATE 2 MG: 2 INJECTION, SOLUTION INTRAMUSCULAR; INTRAVENOUS at 15:47

## 2018-02-03 RX ADMIN — PANTOPRAZOLE SODIUM 40 MG: 40 INJECTION, POWDER, FOR SOLUTION INTRAVENOUS at 09:31

## 2018-02-03 RX ADMIN — Medication 4.5 G: at 21:00

## 2018-02-03 RX ADMIN — INSULIN LISPRO 3 UNITS: 100 INJECTION, SOLUTION INTRAVENOUS; SUBCUTANEOUS at 00:27

## 2018-02-03 RX ADMIN — INSULIN LISPRO 4 UNITS: 100 INJECTION, SOLUTION INTRAVENOUS; SUBCUTANEOUS at 07:03

## 2018-02-03 RX ADMIN — INSULIN LISPRO 5 UNITS: 100 INJECTION, SOLUTION INTRAVENOUS; SUBCUTANEOUS at 17:38

## 2018-02-03 NOTE — PHYSICAL THERAPY NOTE
Physical Therapy Cancellation Note      PT orders received and chart reviewed  PT Attempted to see Pt today for initial evaluation, however nsg states that pt is not appropriate to be seen at this time due to Hgb 6 9       Abram Rojas, PT

## 2018-02-03 NOTE — PROGRESS NOTES
[facility] Progress Note  Patient: Del Howard 67 y o  female   MRN: [id number]  PCP: Pierre Rausch,   Unit/Bed#: E4 -01 Encounter: [id number]  Date Of Visit: 02/03/18      Assessment/plan  Principal problem  1  Peritonitis due to perforated gastric ulcer- s/p lap repair with washout with silvia patch  Per surgery  Antibiotics per ID  She is currently on zosyn and fluconazole until 2/5  Tests did not show leak       2  Acute/chronic anemia due to gastric ulcer and recently it is multifactorial due to stress gastritis and suppression from imuran- pt written for 2 units this am  Continue to monitor and transfuse as needed  carafate started       3  Hypernatremia- likely due to ngt and free water deficit  Improving with d5w  Check na at 1600       4  Acute metabolic encephalopathy due to hypernatremia- resolving slowly     Active  1  Pancytopenia due to imuran- appreciate hematology recommendations  Platelets transfused yesterday due to bleeding  Check platelets now  If still bleeding and platelets less than 50 would transfuse platelets again       2  Type 2 diabetes with hyperglycemia- blood glucose in the 200-300  Pt is not on insulin at home  a1c is 5 0 but could be falsely lowered due to anemia  Increase lantus to 15 units and adjust as needed  Continue insulin sliding scale       3  Cat bite left hand- continue antibiotics until 2/5  ID following     4  Uncontrolled htn- could be related to pain  Pt on prn medications  Pt was able to receive some po meds  Will continue prn IV medications for now  Will assess after ngt removed to see if need to start po antihypertensives       5  Hypothyroid- restart po synthroid tomorrow if tolerating po     6  Hyperlipidemia- d/c statin  Can restart once stable       7  MS- holding imuran due to acute illness         Subjective:   Pt seen and examined  Spoke with both family and dr Lavena Rubinstein  Pt is awake alert  No further blood in ng tube today   She denies increase abd pain  No f/c no cp no sob no n/v  She is eager to have ngt removed  Objective:     Vitals: Blood pressure 150/67, pulse 96, temperature 98 1 °F (36 7 °C), temperature source Tympanic, resp  rate 20, height 5' 7" (1 702 m), weight 77 2 kg (170 lb 3 1 oz), SpO2 97 %  ,Body mass index is 26 66 kg/m²  Lab, Imaging and other studies:    Results from last 7 days  Lab Units 02/03/18  0647  02/02/18  0316   WBC Thousand/uL 2 17*  < > 2 69*   HEMOGLOBIN g/dL 6 9*  < > 8 3*   HEMATOCRIT % 21 2*  < > 25 5*   PLATELETS Thousands/uL 61*  < > 75*   INR   --   --  1 13   < > = values in this interval not displayed  Results from last 7 days  Lab Units 02/03/18  0647  01/28/18  1745   SODIUM mmol/L 162*  < > 133*   POTASSIUM mmol/L 4 3  < > 5 6*   CHLORIDE mmol/L 129*  < > 99*   CO2 mmol/L 26  < > 18*   BUN mg/dL 50*  < > 43*   CREATININE mg/dL 0 85  < > 1 31*   CALCIUM mg/dL 7 0*  < > 8 6   TOTAL PROTEIN g/dL  --   --  5 4*   BILIRUBIN TOTAL mg/dL  --   --  1 30*   ALK PHOS U/L  --   --  76   ALT U/L  --   --  23   AST U/L  --   --  30   GLUCOSE RANDOM mg/dL 330*  < > 586*   GLUCOSE, ISTAT   --   < >  --    < > = values in this interval not displayed      Results from last 7 days  Lab Units 01/28/18  1745   TROPONIN I ng/mL 0 15*     Lab Results   Component Value Date    BLOODCX No Growth at 48 hrs  01/31/2018    BLOODCX No Growth at 48 hrs  01/31/2018     Scheduled Meds:   Current Facility-Administered Medications:  dextrose 75 mL/hr Intravenous Continuous Magdalene Delcid DO Last Rate: 75 mL/hr (02/02/18 2159)   fluconazole 400 mg Intravenous Q24H Andre Salinas MD Last Rate: 400 mg (02/02/18 1840)   hydrALAZINE 10 mg Intravenous Q4H PRN PARRISH Martel    insulin glargine 15 Units Subcutaneous QAM Magdalene Dlecid,     insulin lispro 1-5 Units Subcutaneous Q6H [address] PARRISH Harris    labetalol 10 mg Intravenous Q4H PRN Ilona Seitz Spatzer, CRNP    morphine injection 2 mg Intravenous Q2H PRN Yulissa Kohler MD    ondansetron 4 mg Intravenous Q4H PRN PARRISH Alonso    pantoprazole 40 mg Intravenous Q12H [address] Ki Go MD    piperacillin-tazobactam 4 5 g Intravenous Q6H Tamar Gutierrez MD Last Rate: 4 5 g (02/03/18 4423)   sucralfate 1,000 mg Oral BID AC Yulissa Kohler MD      Continuous Infusions:   dextrose 75 mL/hr Last Rate: 75 mL/hr (02/02/18 4179)     PRN Meds: hydrALAZINE    labetalol    morphine injection    ondansetron      Physical exam:  Physical Exam  General appearance: alert  Head: Normocephalic, without obvious abnormality, atraumatic  Eyes: conjunctivae/corneas clear  PERRL, EOM's intact  Fundi benign    Neck: no adenopathy, no carotid bruit, no JVD, supple, symmetrical, trachea midline and thyroid not enlarged, symmetric, no tenderness/mass/nodules  Lungs: clear to auscultation bilaterally  Heart: regular rate and rhythm, S1, S2 normal, no murmur, click, rub or gallop  Abdomen: soft +ttp that is diffuse nd +bs  Extremities: extremities normal, warm and well-perfused; no cyanosis, clubbing, or edema  Pulses: 2+ and symmetric  Skin: Skin color, texture, turgor normal  No rashes or lesions  Neurologic: Mental status: awake alert oriented x2 person, place

## 2018-02-03 NOTE — PROGRESS NOTES
Subjective: The patient reports persistent right-sided abdominal pain unchanged over the past 48 hours  She states that she is hungry  Objective:  Vital signs:  Temperature 98 1° pulse 96 respirations 20 blood pressure 150/67 O2 saturations 97% on room air  Constitutional patient is elderly female who appears older than her stated age who is awake alert and oriented  She is able to respond appropriately to questions and commands  Abdomen is soft rotund and tender to percussion and palpation  No true peritoneal signs  She has 2 Mitchell-Linder drains in place 1 on the right with a serous drainage 1 on the left with a serous drainage which it does not hold it is sealed due to the suctioning of air from the subcutaneous emphysema noted on CT scan from February 2nd  Postop day number 4 status post laparoscopic Ze Fine patch with postoperative anemia of chronic disease  I believe that the continued drop in hemoglobin is multifactorial related to a gastropexy with a stress gastritis complicated by thrombocytopenia and a myelosuppression from her chronic high-dose azathioprine use  I do not believe that there is an indication for additional investigations or interventions such as upper endoscopy which potentially could disrupt the Ze Fine patch  I had a lengthy conversation with the patient and her family at the bedside and attempted to answer all questions to their satisfaction  Our plan is to remove the nasogastric tube today and advance her to a clear liquid diet  I will add sucralfate to her medication regimen

## 2018-02-03 NOTE — PROGRESS NOTES
Pt NGT was removed without difficulty, pt tolerated well  dysphagia done, no coughing noted  Pt tolerated clears

## 2018-02-04 LAB
ABO GROUP BLD BPU: NORMAL
ABO GROUP BLD BPU: NORMAL
ANION GAP SERPL CALCULATED.3IONS-SCNC: 4 MMOL/L (ref 4–13)
ANISOCYTOSIS BLD QL SMEAR: PRESENT
BASOPHILS # BLD MANUAL: 0 THOUSAND/UL (ref 0–0.1)
BASOPHILS NFR MAR MANUAL: 0 % (ref 0–1)
BPU ID: NORMAL
BPU ID: NORMAL
BUN SERPL-MCNC: 57 MG/DL (ref 5–25)
CALCIUM SERPL-MCNC: 7.2 MG/DL (ref 8.3–10.1)
CHLORIDE SERPL-SCNC: 127 MMOL/L (ref 100–108)
CO2 SERPL-SCNC: 29 MMOL/L (ref 21–32)
CREAT SERPL-MCNC: 0.94 MG/DL (ref 0.6–1.3)
EOSINOPHIL # BLD MANUAL: 0.08 THOUSAND/UL (ref 0–0.4)
EOSINOPHIL NFR BLD MANUAL: 3 % (ref 0–6)
ERYTHROCYTE [DISTWIDTH] IN BLOOD BY AUTOMATED COUNT: 15.5 % (ref 11.6–15.1)
GFR SERPL CREATININE-BSD FRML MDRD: 61 ML/MIN/1.73SQ M
GLUCOSE SERPL-MCNC: 250 MG/DL (ref 65–140)
GLUCOSE SERPL-MCNC: 276 MG/DL (ref 65–140)
GLUCOSE SERPL-MCNC: 296 MG/DL (ref 65–140)
GLUCOSE SERPL-MCNC: 332 MG/DL (ref 65–140)
GLUCOSE SERPL-MCNC: 434 MG/DL (ref 65–140)
GLUCOSE SERPL-MCNC: 474 MG/DL (ref 65–140)
HCT VFR BLD AUTO: 24.6 % (ref 34.8–46.1)
HGB BLD-MCNC: 8.2 G/DL (ref 11.5–15.4)
LYMPHOCYTES # BLD AUTO: 1.52 THOUSAND/UL (ref 0.6–4.47)
LYMPHOCYTES # BLD AUTO: 55 % (ref 14–44)
MAGNESIUM SERPL-MCNC: 2.3 MG/DL (ref 1.6–2.6)
MCH RBC QN AUTO: 29.9 PG (ref 26.8–34.3)
MCHC RBC AUTO-ENTMCNC: 33.3 G/DL (ref 31.4–37.4)
MCV RBC AUTO: 90 FL (ref 82–98)
MONOCYTES # BLD AUTO: 0.08 THOUSAND/UL (ref 0–1.22)
MONOCYTES NFR BLD: 3 % (ref 4–12)
NEUTROPHILS # BLD MANUAL: 1.05 THOUSAND/UL (ref 1.85–7.62)
NEUTS BAND NFR BLD MANUAL: 7 % (ref 0–8)
NEUTS SEG NFR BLD AUTO: 31 % (ref 43–75)
NRBC BLD AUTO-RTO: 0 /100 WBCS
OVALOCYTES BLD QL SMEAR: PRESENT
PLATELET # BLD AUTO: 21 THOUSANDS/UL (ref 149–390)
PLATELET BLD QL SMEAR: ABNORMAL
PMV BLD AUTO: 9.9 FL (ref 8.9–12.7)
POTASSIUM SERPL-SCNC: 4.2 MMOL/L (ref 3.5–5.3)
RBC # BLD AUTO: 2.74 MILLION/UL (ref 3.81–5.12)
SODIUM SERPL-SCNC: 155 MMOL/L (ref 136–145)
SODIUM SERPL-SCNC: 160 MMOL/L (ref 136–145)
TOTAL CELLS COUNTED SPEC: 100
UNIT DISPENSE STATUS: NORMAL
UNIT DISPENSE STATUS: NORMAL
UNIT PRODUCT CODE: NORMAL
UNIT PRODUCT CODE: NORMAL
UNIT RH: NORMAL
UNIT RH: NORMAL
VARIANT LYMPHS # BLD AUTO: 1 %
WBC # BLD AUTO: 2.76 THOUSAND/UL (ref 4.31–10.16)

## 2018-02-04 PROCEDURE — 83735 ASSAY OF MAGNESIUM: CPT | Performed by: SURGERY

## 2018-02-04 PROCEDURE — P9037 PLATE PHERES LEUKOREDU IRRAD: HCPCS

## 2018-02-04 PROCEDURE — 84295 ASSAY OF SERUM SODIUM: CPT | Performed by: INTERNAL MEDICINE

## 2018-02-04 PROCEDURE — 85027 COMPLETE CBC AUTOMATED: CPT | Performed by: SURGERY

## 2018-02-04 PROCEDURE — 86644 CMV ANTIBODY: CPT

## 2018-02-04 PROCEDURE — 85007 BL SMEAR W/DIFF WBC COUNT: CPT | Performed by: SURGERY

## 2018-02-04 PROCEDURE — G8978 MOBILITY CURRENT STATUS: HCPCS

## 2018-02-04 PROCEDURE — C9113 INJ PANTOPRAZOLE SODIUM, VIA: HCPCS | Performed by: SURGERY

## 2018-02-04 PROCEDURE — G8979 MOBILITY GOAL STATUS: HCPCS

## 2018-02-04 PROCEDURE — 97163 PT EVAL HIGH COMPLEX 45 MIN: CPT

## 2018-02-04 PROCEDURE — 82948 REAGENT STRIP/BLOOD GLUCOSE: CPT

## 2018-02-04 PROCEDURE — 99232 SBSQ HOSP IP/OBS MODERATE 35: CPT | Performed by: INTERNAL MEDICINE

## 2018-02-04 PROCEDURE — 80048 BASIC METABOLIC PNL TOTAL CA: CPT | Performed by: SURGERY

## 2018-02-04 RX ORDER — LEVOTHYROXINE SODIUM 0.1 MG/1
200 TABLET ORAL
Status: DISCONTINUED | OUTPATIENT
Start: 2018-02-04 | End: 2018-02-26 | Stop reason: HOSPADM

## 2018-02-04 RX ADMIN — MORPHINE SULFATE 2 MG: 2 INJECTION, SOLUTION INTRAMUSCULAR; INTRAVENOUS at 08:41

## 2018-02-04 RX ADMIN — SUCRALFATE 1000 MG: 1 SUSPENSION ORAL at 06:52

## 2018-02-04 RX ADMIN — MORPHINE SULFATE 2 MG: 2 INJECTION, SOLUTION INTRAMUSCULAR; INTRAVENOUS at 17:05

## 2018-02-04 RX ADMIN — FLUCONAZOLE 400 MG: 2 INJECTION INTRAVENOUS at 18:28

## 2018-02-04 RX ADMIN — Medication 4.5 G: at 15:47

## 2018-02-04 RX ADMIN — MORPHINE SULFATE 2 MG: 2 INJECTION, SOLUTION INTRAMUSCULAR; INTRAVENOUS at 01:05

## 2018-02-04 RX ADMIN — INSULIN GLARGINE 15 UNITS: 100 INJECTION, SOLUTION SUBCUTANEOUS at 08:41

## 2018-02-04 RX ADMIN — Medication 4.5 G: at 08:47

## 2018-02-04 RX ADMIN — SUCRALFATE 1000 MG: 1 SUSPENSION ORAL at 15:47

## 2018-02-04 RX ADMIN — INSULIN LISPRO 4 UNITS: 100 INJECTION, SOLUTION INTRAVENOUS; SUBCUTANEOUS at 00:59

## 2018-02-04 RX ADMIN — PANTOPRAZOLE SODIUM 40 MG: 40 INJECTION, POWDER, FOR SOLUTION INTRAVENOUS at 08:40

## 2018-02-04 RX ADMIN — INSULIN LISPRO 3 UNITS: 100 INJECTION, SOLUTION INTRAVENOUS; SUBCUTANEOUS at 06:04

## 2018-02-04 RX ADMIN — Medication 4.5 G: at 04:04

## 2018-02-04 RX ADMIN — INSULIN LISPRO 5 UNITS: 100 INJECTION, SOLUTION INTRAVENOUS; SUBCUTANEOUS at 12:56

## 2018-02-04 RX ADMIN — MORPHINE SULFATE 2 MG: 2 INJECTION, SOLUTION INTRAMUSCULAR; INTRAVENOUS at 11:10

## 2018-02-04 RX ADMIN — DEXTROSE MONOHYDRATE 100 ML/HR: 50 INJECTION, SOLUTION INTRAVENOUS at 06:35

## 2018-02-04 RX ADMIN — MORPHINE SULFATE 2 MG: 2 INJECTION, SOLUTION INTRAMUSCULAR; INTRAVENOUS at 06:17

## 2018-02-04 RX ADMIN — LEVOTHYROXINE SODIUM 200 MCG: 100 TABLET ORAL at 12:58

## 2018-02-04 RX ADMIN — INSULIN LISPRO 10 UNITS: 100 INJECTION, SOLUTION INTRAVENOUS; SUBCUTANEOUS at 17:06

## 2018-02-04 RX ADMIN — Medication 4.5 G: at 21:49

## 2018-02-04 RX ADMIN — MORPHINE SULFATE 2 MG: 2 INJECTION, SOLUTION INTRAMUSCULAR; INTRAVENOUS at 19:25

## 2018-02-04 RX ADMIN — PANTOPRAZOLE SODIUM 40 MG: 40 INJECTION, POWDER, FOR SOLUTION INTRAVENOUS at 21:49

## 2018-02-04 NOTE — PROGRESS NOTES
[facility] Progress Note  Patient: Mónica [place] 67 y o  female   MRN: [id number]  PCP: Linsey Carlson DO  Unit/Bed#: E4 -01 Encounter: [id number]  Date Of Visit: 02/04/18      Assessment/plan  Principal problem  1  Peritonitis due to perforated gastric ulcer- s/p lap repair with washout with silvia patch   Per surgery   Antibiotics per ID  She is currently on zosyn and fluconazole until 2/5  Tests did not show leak  Ng tube removed  Diet advanced        2  Acute/chronic anemia due to gastric ulcer and recently it is multifactorial due to stress gastritis and suppression from imuran- h/h still improved after 2 more units of prbc  Continue to monitor h/h  Transfuse as needed       3  Hypernatremia- likely due to ngt and free water deficit  Improved with d5w  Sodium is now 155  It decreased from 160  Will decrease d5w to 60 ml/hr  Check sodium in am       4  Acute metabolic encephalopathy due to hypernatremia- resolving      Active  1  Pancytopenia due to imuran- appreciate hematology recommendations  Platelets back to 31,397  Would transfuse another unit of platelets  Check platelets in am       2  Type 2 diabetes with hyperglycemia- blood glucose in 400  Pt is not on insulin at home  a1c is 5 0 but could be falsely lowered due to anemia  Decrease d5w to 60 units  Pt is currently on 15 units of lantus  Will need to adjust depending on blood glucose tomorrow       3  Cat bite left hand- continue antibiotics until 2/5  ID following     4  Uncontrolled htn- could be related to pain  Pt on prn medications  sbp is 130-150  Continue to monitor with the prn medications  5  Hypothyroid- restarted po synthroid       6  Hyperlipidemia- d/c statin  Can restart once stable or at discharge      7  MS- holding imuran due to acute illness  Subjective:   Pt seen and examined  Pt is tolerating diet  No melena  No brbpr  No vomiting blood  No difficulty swallowing       Objective:     Vitals: Blood pressure 157/63, pulse (!) 107, temperature 98 3 °F (36 8 °C), resp  rate 18, height 5' 7" (1 702 m), weight 77 2 kg (170 lb 3 1 oz), SpO2 98 %  ,Body mass index is 26 66 kg/m²  Lab, Imaging and other studies:    Results from last 7 days  Lab Units 02/04/18  0449  02/02/18  0316   WBC Thousand/uL 2 76*  < > 2 69*   HEMOGLOBIN g/dL 8 2*  < > 8 3*   HEMATOCRIT % 24 6*  < > 25 5*   PLATELETS Thousands/uL 21*  < > 75*   INR   --   --  1 13   < > = values in this interval not displayed  Results from last 7 days  Lab Units 02/04/18  1432 02/04/18  0449  01/28/18  1745   SODIUM mmol/L 155* 160*  < > 133*   POTASSIUM mmol/L  --  4 2  < > 5 6*   CHLORIDE mmol/L  --  127*  < > 99*   CO2 mmol/L  --  29  < > 18*   BUN mg/dL  --  57*  < > 43*   CREATININE mg/dL  --  0 94  < > 1 31*   CALCIUM mg/dL  --  7 2*  < > 8 6   TOTAL PROTEIN g/dL  --   --   --  5 4*   BILIRUBIN TOTAL mg/dL  --   --   --  1 30*   ALK PHOS U/L  --   --   --  76   ALT U/L  --   --   --  23   AST U/L  --   --   --  30   GLUCOSE RANDOM mg/dL  --  276*  < > 586*   GLUCOSE, ISTAT   --   --   < >  --    < > = values in this interval not displayed  Results from last 7 days  Lab Units 01/28/18  1745   TROPONIN I ng/mL 0 15*     Lab Results   Component Value Date    BLOODCX No Growth After 4 Days  01/31/2018    BLOODCX No Growth After 4 Days   01/31/2018     Scheduled Meds:   Current Facility-Administered Medications:  dextrose 60 mL/hr Intravenous Continuous Magdalene Delcid DO Last Rate: 100 mL/hr (02/04/18 1275)   fluconazole 400 mg Intravenous Q24H Yun Denise MD Last Rate: 400 mg (02/03/18 1743)   hydrALAZINE 10 mg Intravenous Q4H PRN PARRISH Wallace    insulin glargine 15 Units Subcutaneous QAM Magdalene Delcid, DO    insulin lispro 1-5 Units Subcutaneous Q6H [facility] PARRISH Diehl    labetalol 10 mg Intravenous Q4H PRN Marleta Miller Spatzer, CRNP    levothyroxine 200 mcg Oral Early Morning Magdalene Delcid, DO    morphine injection 2 mg Intravenous Q2H PRN Tr Etienne MD    ondansetron 4 mg Intravenous Q4H PRN Saintclair Parson, CRNP    pantoprazole 40 mg Intravenous Q12H [facility] Tatyana Aguirre MD    piperacillin-tazobactam 4 5 g Intravenous Q6H Jluis Turpin MD Last Rate: 4 5 g (02/04/18 0847)   sucralfate 1,000 mg Oral BID AC Tr Etienne MD      Continuous Infusions:   dextrose 60 mL/hr Last Rate: 100 mL/hr (02/04/18 0635)     PRN Meds: hydrALAZINE    labetalol    morphine injection    ondansetron      Physical exam:  Physical Exam  General appearance: awake alert oriented x2 person, place  Head: Normocephalic, without obvious abnormality, atraumatic  Eyes: conjunctivae/corneas clear  PERRL, EOM's intact  Fundi benign    Neck: no adenopathy, no carotid bruit, no JVD, supple, symmetrical, trachea midline and thyroid not enlarged, symmetric, no tenderness/mass/nodules  Lungs: clear to auscultation bilaterally  Heart: regular rate and rhythm, S1, S2 normal, no murmur, click, rub or gallop  Abdomen: soft nd +ttp that is decreased +bs  Extremities: extremities normal, warm and well-perfused; no cyanosis, clubbing, or edema  Pulses: 2+ and symmetric  Skin: Skin color, texture, turgor normal  No rashes or lesions  Neurologic: Mental status: awake alert oriented x2 person, place

## 2018-02-04 NOTE — PROGRESS NOTES
Subjective: The patient reports feeling better  She denies complaints of pain in her abdomen currently  Objective:  Vital signs:  Temperature 98 3° pulse 107 respirations 18 blood pressure 157/63 oxygen saturations 98% on room air  Constitutional:  The patient is a debilitated elderly female who is cognitively sharp and a reliable historian  Abdomen soft and tender to palpation in 4 quadrants  No true peritoneal signs are appreciated  Postop day 5  Status post laparoscopic Oral Notch patch with postoperative anemia  The patient appears clinically stable from a surgical perspective  We will advance her to a full liquid diet today and continue with the supportive care under the direction of the medical service

## 2018-02-04 NOTE — PLAN OF CARE
Problem: PHYSICAL THERAPY ADULT  Goal: Performs mobility at highest level of function for planned discharge setting  See evaluation for individualized goals  Treatment/Interventions: LE strengthening/ROM, Therapeutic exercise, Endurance training, Patient/family training, Bed mobility, Continued evaluation, Spoke to nursing          See flowsheet documentation for full assessment, interventions and recommendations  Prognosis: Fair  Problem List: Decreased strength, Decreased endurance, Decreased mobility, Decreased cognition, Decreased skin integrity, Pain  Assessment: pt is a 73y/o f who presents to [facility] c peritonitis  s/p diagnostic lap of abdomen 1/29/18  initially presented to [facility] c Hgb 3 2  PMH significant for pancytopenia, gastric ulcer, BEVERLY, DM 2, + MS  at baseline, pt reports being mod (I) c functional mobility c RW + uses w/c prn  resides c spouse in 2 story home c 1st floor set up  currently requires max (A) for bed mobility 2* significant deficits in strength, pain, activity tolerance, + skin integrity  edema noted B/L UE/LEs  initiated rolling R/L in supine c use of bed rails  attempted supine>sit, however unable to complete 2* pain  pt repositioned in supine c bed alarm activated + all needs in reach  pt also lethargic during session requiring frequent cues to remain alert  would benefit from skilled PT to maximize functional mobility + improve quality of life  PT will further assess OOB mobility as appropriate  upon d/c, recommend STR  PT eval of high complexity 2* unstable med status c pt requiring ongoing medical management 2* gastric ulcer  pt c multiple co-morbidities including MS impacting PT  presents c significant decline in mobility from baseline requiring max (A) + inability to complete mobility assessment 2* increased pain  Recommendation: Short-term skilled PT     PT - OK to Discharge: No    See flowsheet documentation for full assessment

## 2018-02-04 NOTE — PHYSICAL THERAPY NOTE
PT Evaluation (26min)  (7:35-8:01)    Past Medical History:   Diagnosis Date    Diabetes mellitus ([facility] [address] )     Disease of thyroid gland     Hyperlipidemia     Multiple sclerosis ([facility] [address] )         02/04/18 0801   Note Type   Note type Eval only   Pain Assessment   Pain Assessment 0-10   Pain Score 8   Pain Type Acute pain   Pain Location Abdomen   Pain Orientation Bilateral   Home Living   Type of [address] [place] [address] Two level; Able to live on main level with bedroom/bathroom   Bathroom Equipment Commode   Home Equipment Walker;Cane;Wheelchair-manual   Prior Function   Level of [place] Independent with ADLs and functional mobility  (ambulates c RW; uses w/c prn)   Lives With Spouse   Receives Help From Family   ADL Assistance Independent   IADLs Independent   Falls in the last 6 months 0   Vocational Retired   Restrictions/Precautions   Other Precautions Cognitive; Bed Alarm;Multiple lines; Fall Risk;Pain   General   Additional Pertinent History pt presents to [facility] c peritonitis  s/p diagnostic lap of abdomen 1/29/18  PT consulted for mobility + d/c planning  Family/Caregiver Present No   Cognition   Orientation Level Oriented to person;Oriented to place;Oriented to situation   RUE Assessment   RUE Assessment WFL   LUE Assessment   LUE Assessment WFL   RLE Assessment   RLE Assessment WFL  (edematous; 3-/5)   LLE Assessment   LLE Assessment WFL  (edematous; 3-/5)   Coordination   Sensation WFL   Bed Mobility   Rolling R 2  Maximal assistance   Additional items Assist x 1;Bedrails;Verbal cues; Increased time required   Rolling L 2  Maximal assistance   Additional items Assist x 1;Bedrails;Verbal cues; Increased time required   Supine to Sit Unable to assess  (per pt request 2* pain)   Transfers   Sit to Stand Unable to assess   Ambulation/Elevation   Gait pattern Not tested   Endurance Deficit   Endurance Deficit Yes   Endurance Deficit Description fatigue; pain   Activity Tolerance   Activity Tolerance Patient limited by fatigue;Patient limited by pain   Nurse Made Aware Svetlana Easlye   Assessment   Prognosis Fair   Problem List Decreased strength;Decreased endurance;Decreased mobility; Decreased cognition;Decreased skin integrity;Pain   Assessment pt is a 73y/o f who presents to [facility] c peritonitis  s/p diagnostic lap of abdomen 1/29/18  initially presented to [facility] c Hgb 3 2  PMH significant for pancytopenia, gastric ulcer, BEVERLY, DM 2, + MS  at baseline, pt reports being mod (I) c functional mobility c RW + uses w/c prn  resides c spouse in 2 story home c 1st floor set up  currently requires max (A) for bed mobility 2* significant deficits in strength, pain, activity tolerance, + skin integrity  edema noted B/L UE/LEs  initiated rolling R/L in supine c use of bed rails  attempted supine>sit, however unable to complete 2* pain  pt repositioned in supine c bed alarm activated + all needs in reach  pt also lethargic during session requiring frequent cues to remain alert  would benefit from skilled PT to maximize functional mobility + improve quality of life  PT will further assess OOB mobility as appropriate  upon d/c, recommend STR  PT eval of high complexity 2* unstable med status c pt requiring ongoing medical management 2* gastric ulcer  pt c multiple co-morbidities including MS impacting PT  presents c significant decline in mobility from baseline requiring max (A) + inability to complete mobility assessment 2* increased pain  Goals   Patient Goals none stated   STG Expiration Date 02/09/18   Short Term Goal #1 1  increase strength 1/2 grade to improve overall functional mobility, 2  perform bed mobility min (A)x1 to sit up + eat a meal, 3  improve Barthel Index 10pts to improve quality of life   Plan   Treatment/Interventions LE strengthening/ROM; Therapeutic exercise; Endurance training;Patient/family training;Bed mobility;Continued evaluation;Spoke to nursing   PT Frequency 5x/wk   Recommendation   Recommendation Short-term skilled PT   PT - OK to Discharge No   Barthel Index   Feeding 5   Bathing 0   Grooming Score 0   Dressing Score 5   Bladder Score 5   Bowels Score 10   Toilet Use Score 5   Transfers (Bed/Chair) Score 0   Mobility (Level Surface) Score 0   Stairs Score 0   Barthel Index Score 30     Zoey Mccrary PT

## 2018-02-05 ENCOUNTER — APPOINTMENT (INPATIENT)
Dept: RADIOLOGY | Facility: HOSPITAL | Age: 73
DRG: 853 | End: 2018-02-05
Payer: MEDICARE

## 2018-02-05 ENCOUNTER — APPOINTMENT (INPATIENT)
Dept: RADIOLOGY | Facility: HOSPITAL | Age: 73
DRG: 853 | End: 2018-02-05
Attending: INTERNAL MEDICINE
Payer: MEDICARE

## 2018-02-05 PROBLEM — K25.2 ACUTE PERFORATED GASTRIC ULCER WITH HEMORRHAGE (HCC): Status: ACTIVE | Noted: 2018-02-05

## 2018-02-05 PROBLEM — D62 ACUTE BLOOD LOSS ANEMIA: Status: ACTIVE | Noted: 2018-02-05

## 2018-02-05 PROBLEM — D65 DIC (DISSEMINATED INTRAVASCULAR COAGULATION) (HCC): Status: ACTIVE | Noted: 2018-02-05

## 2018-02-05 PROBLEM — A41.9 SEPSIS (HCC): Status: ACTIVE | Noted: 2018-02-05

## 2018-02-05 PROBLEM — G35 MULTIPLE SCLEROSIS (HCC): Status: ACTIVE | Noted: 2018-02-05

## 2018-02-05 PROBLEM — K65.9 PERITONITIS (HCC): Status: ACTIVE | Noted: 2018-02-05

## 2018-02-05 PROBLEM — E78.5 HYPERLIPIDEMIA: Status: ACTIVE | Noted: 2018-02-05

## 2018-02-05 PROBLEM — K66.8 PNEUMOPERITONEUM: Status: RESOLVED | Noted: 2018-01-28 | Resolved: 2018-02-05

## 2018-02-05 PROBLEM — G93.41 METABOLIC ENCEPHALOPATHY: Status: ACTIVE | Noted: 2018-02-05

## 2018-02-05 PROBLEM — E03.9 HYPOTHYROIDISM: Status: ACTIVE | Noted: 2018-02-05

## 2018-02-05 PROBLEM — E11.65 HYPERGLYCEMIA DUE TO TYPE 2 DIABETES MELLITUS (HCC): Chronic | Status: ACTIVE | Noted: 2018-01-28

## 2018-02-05 PROBLEM — E87.0 HYPERNATREMIA: Status: ACTIVE | Noted: 2018-02-05

## 2018-02-05 PROBLEM — E11.9 TYPE 2 DIABETES MELLITUS (HCC): Chronic | Status: ACTIVE | Noted: 2018-01-28

## 2018-02-05 LAB
ABO GROUP BLD BPU: NORMAL
ALBUMIN SERPL BCP-MCNC: 1.5 G/DL (ref 3.5–5)
ALP SERPL-CCNC: 42 U/L (ref 46–116)
ALT SERPL W P-5'-P-CCNC: 20 U/L (ref 12–78)
ANION GAP SERPL CALCULATED.3IONS-SCNC: 6 MMOL/L (ref 4–13)
ANISOCYTOSIS BLD QL SMEAR: PRESENT
APTT PPP: 29 SECONDS (ref 23–35)
AST SERPL W P-5'-P-CCNC: 22 U/L (ref 5–45)
BACTERIA BLD CULT: NORMAL
BACTERIA BLD CULT: NORMAL
BASOPHILS # BLD MANUAL: 0 THOUSAND/UL (ref 0–0.1)
BASOPHILS NFR MAR MANUAL: 0 % (ref 0–1)
BILIRUB DIRECT SERPL-MCNC: 0.21 MG/DL (ref 0–0.2)
BILIRUB SERPL-MCNC: 0.35 MG/DL (ref 0.2–1)
BPU ID: NORMAL
BUN SERPL-MCNC: 51 MG/DL (ref 5–25)
CALCIUM SERPL-MCNC: 7.5 MG/DL (ref 8.3–10.1)
CHLORIDE SERPL-SCNC: 125 MMOL/L (ref 100–108)
CO2 SERPL-SCNC: 27 MMOL/L (ref 21–32)
CREAT SERPL-MCNC: 0.99 MG/DL (ref 0.6–1.3)
DEPRECATED D DIMER PPP: 3888 NG/ML (FEU) (ref 0–424)
EOSINOPHIL # BLD MANUAL: 0.06 THOUSAND/UL (ref 0–0.4)
EOSINOPHIL NFR BLD MANUAL: 2 % (ref 0–6)
ERYTHROCYTE [DISTWIDTH] IN BLOOD BY AUTOMATED COUNT: 15.8 % (ref 11.6–15.1)
FDP BLD QL AGGL: >20 <40
FIBRINOGEN PPP-MCNC: 340 MG/DL (ref 227–495)
GFR SERPL CREATININE-BSD FRML MDRD: 57 ML/MIN/1.73SQ M
GLUCOSE SERPL-MCNC: 179 MG/DL (ref 65–140)
GLUCOSE SERPL-MCNC: 210 MG/DL (ref 65–140)
GLUCOSE SERPL-MCNC: 249 MG/DL (ref 65–140)
GLUCOSE SERPL-MCNC: 387 MG/DL (ref 65–140)
GLUCOSE SERPL-MCNC: 411 MG/DL (ref 65–140)
HCT VFR BLD AUTO: 18.8 % (ref 34.8–46.1)
HGB BLD-MCNC: 6.2 G/DL (ref 11.5–15.4)
INR PPP: 1.1 (ref 0.86–1.16)
LDH SERPL-CCNC: 248 U/L (ref 81–234)
LYMPHOCYTES # BLD AUTO: 1.67 THOUSAND/UL (ref 0.6–4.47)
LYMPHOCYTES # BLD AUTO: 55 % (ref 14–44)
MCH RBC QN AUTO: 30.5 PG (ref 26.8–34.3)
MCHC RBC AUTO-ENTMCNC: 33 G/DL (ref 31.4–37.4)
MCV RBC AUTO: 93 FL (ref 82–98)
MONOCYTES # BLD AUTO: 0.09 THOUSAND/UL (ref 0–1.22)
MONOCYTES NFR BLD: 3 % (ref 4–12)
NEUTROPHILS # BLD MANUAL: 1.22 THOUSAND/UL (ref 1.85–7.62)
NEUTS BAND NFR BLD MANUAL: 1 % (ref 0–8)
NEUTS SEG NFR BLD AUTO: 39 % (ref 43–75)
NRBC BLD AUTO-RTO: 0 /100 WBCS
PLATELET # BLD AUTO: 19 THOUSANDS/UL (ref 149–390)
PLATELET BLD QL SMEAR: ABNORMAL
PMV BLD AUTO: 11.3 FL (ref 8.9–12.7)
POTASSIUM SERPL-SCNC: 4 MMOL/L (ref 3.5–5.3)
PROT SERPL-MCNC: 4.2 G/DL (ref 6.4–8.2)
PROTHROMBIN TIME: 14.2 SECONDS (ref 12.1–14.4)
RBC # BLD AUTO: 2.03 MILLION/UL (ref 3.81–5.12)
RBC MORPH BLD: PRESENT
SODIUM SERPL-SCNC: 158 MMOL/L (ref 136–145)
THROMBIN TIME: 16.8 SECONDS (ref 14.7–18.4)
TOTAL CELLS COUNTED SPEC: 100
UNIT DISPENSE STATUS: NORMAL
UNIT PRODUCT CODE: NORMAL
UNIT RH: NORMAL
WBC # BLD AUTO: 3.04 THOUSAND/UL (ref 4.31–10.16)

## 2018-02-05 PROCEDURE — 85670 THROMBIN TIME PLASMA: CPT | Performed by: INTERNAL MEDICINE

## 2018-02-05 PROCEDURE — 83010 ASSAY OF HAPTOGLOBIN QUANT: CPT | Performed by: INTERNAL MEDICINE

## 2018-02-05 PROCEDURE — 85007 BL SMEAR W/DIFF WBC COUNT: CPT | Performed by: INTERNAL MEDICINE

## 2018-02-05 PROCEDURE — P9040 RBC LEUKOREDUCED IRRADIATED: HCPCS

## 2018-02-05 PROCEDURE — 76000 FLUOROSCOPY <1 HR PHYS/QHP: CPT | Performed by: RADIOLOGY

## 2018-02-05 PROCEDURE — 83615 LACTATE (LD) (LDH) ENZYME: CPT | Performed by: INTERNAL MEDICINE

## 2018-02-05 PROCEDURE — 85362 FIBRIN DEGRADATION PRODUCTS: CPT | Performed by: INTERNAL MEDICINE

## 2018-02-05 PROCEDURE — 71045 X-RAY EXAM CHEST 1 VIEW: CPT

## 2018-02-05 PROCEDURE — 76000 FLUOROSCOPY <1 HR PHYS/QHP: CPT

## 2018-02-05 PROCEDURE — 36597 REPOSITION VENOUS CATHETER: CPT | Performed by: RADIOLOGY

## 2018-02-05 PROCEDURE — 85384 FIBRINOGEN ACTIVITY: CPT | Performed by: INTERNAL MEDICINE

## 2018-02-05 PROCEDURE — 85379 FIBRIN DEGRADATION QUANT: CPT | Performed by: INTERNAL MEDICINE

## 2018-02-05 PROCEDURE — 36569 INSJ PICC 5 YR+ W/O IMAGING: CPT

## 2018-02-05 PROCEDURE — C9113 INJ PANTOPRAZOLE SODIUM, VIA: HCPCS | Performed by: SURGERY

## 2018-02-05 PROCEDURE — 80076 HEPATIC FUNCTION PANEL: CPT | Performed by: INTERNAL MEDICINE

## 2018-02-05 PROCEDURE — 99233 SBSQ HOSP IP/OBS HIGH 50: CPT | Performed by: INTERNAL MEDICINE

## 2018-02-05 PROCEDURE — 82948 REAGENT STRIP/BLOOD GLUCOSE: CPT

## 2018-02-05 PROCEDURE — 99232 SBSQ HOSP IP/OBS MODERATE 35: CPT | Performed by: INTERNAL MEDICINE

## 2018-02-05 PROCEDURE — 85027 COMPLETE CBC AUTOMATED: CPT | Performed by: INTERNAL MEDICINE

## 2018-02-05 PROCEDURE — 85730 THROMBOPLASTIN TIME PARTIAL: CPT | Performed by: INTERNAL MEDICINE

## 2018-02-05 PROCEDURE — 85610 PROTHROMBIN TIME: CPT | Performed by: SURGERY

## 2018-02-05 PROCEDURE — P9037 PLATE PHERES LEUKOREDU IRRAD: HCPCS

## 2018-02-05 PROCEDURE — 36597 REPOSITION VENOUS CATHETER: CPT

## 2018-02-05 PROCEDURE — C1751 CATH, INF, PER/CENT/MIDLINE: HCPCS

## 2018-02-05 PROCEDURE — 80048 BASIC METABOLIC PNL TOTAL CA: CPT | Performed by: INTERNAL MEDICINE

## 2018-02-05 PROCEDURE — 86644 CMV ANTIBODY: CPT

## 2018-02-05 PROCEDURE — 02HV33Z INSERTION OF INFUSION DEVICE INTO SUPERIOR VENA CAVA, PERCUTANEOUS APPROACH: ICD-10-PCS | Performed by: INTERNAL MEDICINE

## 2018-02-05 RX ORDER — INSULIN GLARGINE 100 [IU]/ML
15 INJECTION, SOLUTION SUBCUTANEOUS EVERY 12 HOURS SCHEDULED
Status: DISCONTINUED | OUTPATIENT
Start: 2018-02-05 | End: 2018-02-06

## 2018-02-05 RX ORDER — DEXTROSE MONOHYDRATE 50 MG/ML
100 INJECTION, SOLUTION INTRAVENOUS CONTINUOUS
Status: DISCONTINUED | OUTPATIENT
Start: 2018-02-05 | End: 2018-02-08

## 2018-02-05 RX ORDER — CALCIUM CARBONATE 200(500)MG
500 TABLET,CHEWABLE ORAL 3 TIMES DAILY PRN
Status: DISCONTINUED | OUTPATIENT
Start: 2018-02-05 | End: 2018-02-26 | Stop reason: HOSPADM

## 2018-02-05 RX ADMIN — MORPHINE SULFATE 2 MG: 2 INJECTION, SOLUTION INTRAMUSCULAR; INTRAVENOUS at 10:00

## 2018-02-05 RX ADMIN — SUCRALFATE 1000 MG: 1 SUSPENSION ORAL at 17:46

## 2018-02-05 RX ADMIN — INSULIN GLARGINE 15 UNITS: 100 INJECTION, SOLUTION SUBCUTANEOUS at 21:15

## 2018-02-05 RX ADMIN — INSULIN LISPRO 5 UNITS: 100 INJECTION, SOLUTION INTRAVENOUS; SUBCUTANEOUS at 17:42

## 2018-02-05 RX ADMIN — Medication 4.5 G: at 03:08

## 2018-02-05 RX ADMIN — SUCRALFATE 1000 MG: 1 SUSPENSION ORAL at 06:59

## 2018-02-05 RX ADMIN — PANTOPRAZOLE SODIUM 40 MG: 40 INJECTION, POWDER, FOR SOLUTION INTRAVENOUS at 09:54

## 2018-02-05 RX ADMIN — DEXTROSE MONOHYDRATE 60 ML/HR: 50 INJECTION, SOLUTION INTRAVENOUS at 00:13

## 2018-02-05 RX ADMIN — MORPHINE SULFATE 2 MG: 2 INJECTION, SOLUTION INTRAMUSCULAR; INTRAVENOUS at 03:08

## 2018-02-05 RX ADMIN — INSULIN GLARGINE 15 UNITS: 100 INJECTION, SOLUTION SUBCUTANEOUS at 09:54

## 2018-02-05 RX ADMIN — LEVOTHYROXINE SODIUM 200 MCG: 100 TABLET ORAL at 06:59

## 2018-02-05 RX ADMIN — PANTOPRAZOLE SODIUM 40 MG: 40 INJECTION, POWDER, FOR SOLUTION INTRAVENOUS at 21:06

## 2018-02-05 NOTE — PROGRESS NOTES
Progress Note - Beni Bartholomew 67 y o  female MRN: [id number]    Unit/Bed#: E4 -01 Encounter: [id number]      Assessment/Plan:  1-sepsis:  Present on admission:  Patient presented with sepsis  This was felt to be secondary to peritonitis secondary to a perforated gastric ulcer: The patient underwent surgical repair  She completed 7 days of antibiotics  Her sepsis has resolved  2-perforated gastric ulcer: With associated peritonitis  Patient presented with a perforated gastric ulcer  Her  notes that she had GI upset for quite some time prior to admission  She underwent laproscopic repair with silvia patch      -continue proton pump inhibitor   -NG tube removed    -tolerating p o  Diet    -no evidence of leak     3-peritonitis:  Secondary to perforated gastric ulcer  Status post laparoscopic repair and washout    -status post 1 week of postoperative antibiotics with zosyn and fluconazole    -patient is followed by the Infectious Disease team   Antibiotics discontinued today  Continue to monitor  4-acute blood loss anemia:  Secondary to upper GI bleed secondary to perforated gastric ulcer:   -initial Hb 3's  She was transfused multiple units PRBC  -hemoglobin decreased from 8 2-6 9 today    -discussed case with Dr Vaishali Vazquez  Patient has scant melanotic stool  Decrease in hemoglobin likely secondary to persistent oozing due to her thrombocytopenia/DIC  -will transfuse platelets and packed red blood cells at this time    -spoke with Dr Peri Araiza, hematologist on call- suspects persistent DIC  Recheck DIC panel  He will re-eval pt today  5-diabetes type 2:  With hyperglycemia due to IV dextrose in her IV fluids, treating her hypernatremia   -blood sugar this morning was 411   -will increase Lantus to 15 units q 12 hours  Continue sliding scale insulin    -will add pre meal standing Humalog    -continue to titrate  6-hypernatremia:  Patient has hyperchloremic hyponatremia  Initially had significant free water deficit as she was NPO, had an NG tube perioperatively  Continues to have minimal p o  hydration due to her generalized weakness    -Her IV fluids have been titrated throughout the weekend  Had improvement in her sodium yesterday so her rate of her D5 IV fluids was decreased, however unfortunately today her sodium has worsened      -Will change her rate back to 100 cc/hour  Will recheck a sodium level this afternoon      -if patient does not significantly improve will consult Nephrology to coordinate her care  7-pancytopenia:  Patient presented with pancytopenia, with normal coags  Her profound anemia is likely secondary to her GI bleed from her perforated gastric ulcer  Patient however has persistent leukopenia without neutropenia, and thrombocytopenia    -she was evaluated by the hematology service who felt her pancytopenia is likely secondary to Imuran      -pt was also noted to have DIC  -recheck DIC panel to eval benefit from cryo etc    -Continue supportive transfusions    -initial anemia with a hemoglobin in the threes  Patient was transfused packed red blood cells with initial improvement  Hemoglobin decreased from 8 yesterday to 6 9 again today    -monitor ANC:  Currently not neutropenic  If ANC< 1000 will give filgrastim 300mcg sq    8-hypothyroidism:  Continue Synthroid    9-hyperlipidemia:  Statin on hold    10-multiple sclerosis:  Imuran on hold    11-left hand cat bite:  No evidence of cellulitis  Status post 7 days of antibiotics for peritonitis  97-BOVUB metabolic encephalopathy:  Secondary to hypernatremia  Slightly improved  Cont to monitor closely  13-elevated blood presure:  Without prior h/o HTN  Most likely accelerated due to pain  Cont to monitor  bp adquate  14- picc line;  Per nursing and pt and her family, she is a very difficulty IV/lab stick  mutiple attempts made    Pt is requiring daily lab draws and multiple transfusions  Discussed with patient and her family and they are amenable to a PICC line  15-family:  Updated patient, her , and daughter at the bedside  Answered all questions  VTE Pharmacologic Prophylaxis: RX contraindicated due to: Pancytopenia  VTE Mechanical Prophylaxis: sequential compression device    Certification Statement: The patient will continue to require additional inpatient hospital stay due to need for further acute intervention for pancytopenia, blood transfusion    Status: inpatient     D/w Dr Remi Lesches  D/w Dr Zena Munoz  D/w pt's nurse at bedside  Total time spent reviewed records in chart, exam and interview of pt, d/w consultants:  47 min      ===================================================================    Subjective:  Patient relates she feels comfortable  She indicates the only pain she experiences during the day is abdominal pain  She relates her current dose of morphine completely alleviates her pain  She declines any changes to the dose  She denies any pain anywhere else  She denies any headache, chest pain, back pain, extremity pain  She denies any shortness of breath or cough  She denies any nausea, vomiting, diarrhea  She notes she is tolerating p o  Her family however relates that she has generalized weakness and is still unable to feed herself  They note that she has very poor p o  Intake  Patient denies any dizziness or lightheadedness  She notes dry mouth  She denies any other complaints  Physical Exam:   Temp:  [96 9 °F (36 1 °C)-98 3 °F (36 8 °C)] 96 9 °F (36 1 °C)  HR:  [] 89  Resp:  [18] 18  BP: (126-157)/(60-63) 134/60    Gen:  Pleasant, non-tachypnic, non-dyspnic  Conversant  Sitting up in bed   and daughter at the bedside  Heart: regular rate and rhythm, S1S2 present, no murmur, rub or gallop  Lungs: clear to ausculatation bilaterally  No wheezing, crackles, or rhonchi   No accessory muscle use or respiratory distress  Abd: soft, mildly tender with palpation diffusely  non-distended  NABS, no guarding, rebound or peritoneal signs  Extremities: no clubbing, cyanosis  2+pedal pulses bilaterally  Trace edema noted of both hands and forearms  Neuro: awake, alert  Fluent and goal directed speech  Answers questions appropriately  Follows commands  Skin: warm and dry: no petechiae, and rash  Ecchymoses noted in right forearm the in decubitus region and around the elbow  Ecchymoses noted both hands  LABS:     Results from last 7 days  Lab Units 02/05/18  0730 02/04/18  0449 02/03/18  0647   WBC Thousand/uL 3 04* 2 76* 2 17*   HEMOGLOBIN g/dL 6 2* 8 2* 6 9*   HEMATOCRIT % 18 8* 24 6* 21 2*   PLATELETS Thousands/uL 19* 21* 61*       Results from last 7 days  Lab Units 02/05/18  0730 02/04/18  1432 02/04/18  0449  02/03/18  0647   SODIUM mmol/L 158* 155* 160*  < > 162*   POTASSIUM mmol/L 4 0  --  4 2  --  4 3   CHLORIDE mmol/L 125*  --  127*  --  129*   CO2 mmol/L 27  --  29  --  26   BUN mg/dL 51*  --  57*  --  50*   CREATININE mg/dL 0 99  --  0 94  --  0 85   GLUCOSE RANDOM mg/dL 210*  --  276*  --  330*   CALCIUM mg/dL 7 5*  --  7 2*  --  7 0*   < > = values in this interval not displayed  Hospital Data:    2/2:  CT abdomen/pelvis: There is a large amount of air within the subcutaneous fat of the abdominal wall extending inferiorly into the pelvic wall   Small amount of air present in between the muscles of the right superior lateral abdominal wall   This may be iatrogenic from recent catheter insertion x2  Posterior basilar atelectatic change  Oral contrast extends to the rectum with no evidence of obstruction   Mild small and large bowel distention    Stable hypodensities within the spleen suggestive of infarctions    2/1:  Abdominal obstruction series:  1   No layering free contrast to suggest large leak   However, the study was limited for smaller or contained leaks due to retained contrast in the stomach and small bowel   This limited study was performed due to the patient's inability to undergo   diagnostic upper GI   If there is continued clinical concern for leak, a follow-up upper GI can be attempted when the patient is able   Alternatively, an additional abdomen x-ray in the a m  can be obtained to allow for passage of contrast into the colon   and better visualization of the underlying abdominal cavity   CT would also better evaluate for contained leak  2   Dilated small bowel without evidence of obstruction, likely adynamic ileus    1/29:  CT abdomen/pelvis:  Moderate pneumoperitoneum suggesting bowel perforation  Although the origin is uncertain, possibility of perforated gastric ulcer may be considered  Small amount of abdominal and pelvic ascites  No evidence for organized abscess  Mild small bowel dilatation may reflect reactive ileus  Cholelithiasis  1/29:  Chest x-ray:  No acute disease    1/31:  Blood culture:  Negative x2      ---------------------------------------------------------------------------------------------------------------  This note has been constructed using a voice recognition system

## 2018-02-05 NOTE — PROGRESS NOTES
Progress Note - General Surgery   Matteo Nowak 67 y o  female MRN: [id number]  Unit/Bed#: E4 -01 Encounter: [id number]      Subjective/Objective     Subjective: Feeling OK, weak and some abominal pain  Tolerating diet    Objective:     /60 (BP Location: Left arm)   Pulse 89   Temp 98 1 °F (36 7 °C) (Tympanic)   Resp 18   Ht 5' 7" (1 702 m)   Wt 77 2 kg (170 lb 3 1 oz)   SpO2 98%   BMI 26 66 kg/m²       Intake/Output Summary (Last 24 hours) at 02/05/18 1031  Last data filed at 02/05/18 1394   Gross per 24 hour   Intake             1962 ml   Output              555 ml   Net             1407 ml       Invasive Devices     Peripheral Intravenous Line            Peripheral IV 02/03/18 Right;Upper Forearm 1 day    Peripheral IV 02/04/18 Right Hand 1 day          Drain            Closed/Suction Drain Left Abdomen Bulb 15 Fr  6 days    Closed/Suction Drain Right;Other (Comment) Abdomen Bulb 15 Fr  6 days                Physical Exam:       General Appearance:    Alert, cooperative, no distress, appears older   Head:    Normocephalic, without obvious abnormality, atraumatic   Nose:   Nares normal   Throat:   Lips, mucosa normal, pharynx clear   Neck:   Supple, symmetrical   Chest/Breast:   Def   Lungs:     Clear to auscultation bilaterally, respirations unlabored   Heart:    Regular rate and rhythm, S1 and S2 normal, no murmur, rub    or gallop   Abdomen:     Soft, mildly tender, COREY serous       Genitalia:    Def   Rectal:    Def   Extremities:   Extremities normal, atraumatic, no cyanosis or edema   Skin:   Skin color, texture, turgor normal, no rashes or lesions   Neurologic:   CNII-XII grossly intact  Normal strength, sensation                 Lab, Imaging and other studies:I have personally reviewed pertinent lab results  Labs in chart were reviewed      VTE Pharmacologic Prophylaxis: Reason for no pharmacologic prophylaxis anemia  VTE Mechanical Prophylaxis: sequential compression device    Assessment/Plan:  Sepsis POA - due to perforated gastric ulcer with peritonitis  S/p lap silvia patch  Continue protonix  Off ABX per ID  Fulls  Malnutrition- add ensure    Hypernatremia - continue free water    Anemia - chronic poss due to immunosuppression - continue to transfuse as needed  No active signs of acute blood loss  Thrombocytopenia - - continue to transfuse  With plt 19 can cause spontaneous bleeding and oozing  No indication for EGD at this time      Patient Active Problem List   Diagnosis    [facility]ancy[facility] [address]    Type 2 diabetes mellitus [facility] [address] [facility]    Hyperglycemia due to type 2 diabetes mellitus ([address] )    BEVERLY (acute kidney injury) ([address] )    Hypoalbuminemia due to protein-calorie malnutrition ([address] )    Gastric ulcer    Pneumoperitoneum            This text is generated with voice recognition software  There may be translation, syntax,  or grammatical errors  If you have any questions, please contact the dictating provider

## 2018-02-05 NOTE — SOCIAL WORK
PT recommendation is home short stay rehab  Met with pt to discuss the need for rehab and gave her a list of SNF  Pt requested the CM call her spouse  TC to spouse and left a message with spouse Juan Hernandez at [phone] about the need for a short stay  They live at [facility]  Will wait to hear back from spouse

## 2018-02-05 NOTE — CASE MANAGEMENT
Continued Stay Review    Date:  2/5/2018    Vital Signs: /58 (BP Location: Right arm)   Pulse 88   Temp 98 °F (36 7 °C) (Tympanic)   Resp 18   Ht 5' 7" (1 702 m)   Wt 77 2 kg (170 lb 3 1 oz)   SpO2 98%   BMI 26 66 kg/m²     Medications:   Scheduled Meds:   Current Facility-Administered Medications:  calcium carbonate 500 mg Oral TID PRN Aviva Hopper MD    dextrose 100 mL/hr Intravenous Continuous Aviva Hopper MD Last Rate: 100 mL/hr (02/05/18 0956)   hydrALAZINE 10 mg Intravenous Q4H PRN PARRISH Nugent    insulin glargine 15 Units Subcutaneous Q12H [address] Aviva Hopper MD    insulin lispro 1-5 Units Subcutaneous TID With Meals Magdalene Delcid, DO    insulin lispro 5 Units Subcutaneous TID With Meals Aviva Hopper MD    levothyroxine 200 mcg Oral Early Morning Magdalene Delcid DO    morphine injection 2 mg Intravenous Q2H PRN Ary Najjar, MD    ondansetron 4 mg Intravenous Q4H PRN PARRISH Nugent    pantoprazole 40 mg Intravenous Q12H [address] Andre Torrez MD    sucralfate 1,000 mg Oral BID AC Ary Najjar, MD      Continuous Infusions:   dextrose 100 mL/hr Last Rate: 100 mL/hr (02/05/18 0956)     PRN Meds:   calcium carbonate    hydrALAZINE    morphine injection IV x 2    ondansetron    PICC Line Placed  Transfuse 1 U PC's      Abnormal Labs/Diagnostic Results: Results from last 7 days  Lab Units 02/05/18  0730 02/04/18  0449 02/03/18  0647   WBC Thousand/uL 3 04* 2 76* 2 17*   HEMOGLOBIN g/dL 6 2* 8 2* 6 9*   HEMATOCRIT % 18 8* 24 6* 21 2*   PLATELETS Thousands/uL 19* 21* 61*         Results from last 7 days  Lab Units 02/05/18  0730 02/04/18  1432 02/04/18  0449   02/03/18  0647   SODIUM mmol/L 158* 155* 160*  < > 162*   POTASSIUM mmol/L 4 0  --  4 2  --  4 3   CHLORIDE mmol/L 125*  --  127*  --  129*   CO2 mmol/L 27  --  29  --  26   BUN mg/dL 51*  --  57*  --  50*   CREATININE mg/dL 0 99  --  0 94  --  0 85   GLUCOSE RANDOM mg/dL 210*  --  276*  --  330*   CALCIUM mg/dL 7 5*  --  7 2*  --  7 0*   < > = values in this interval not displayed  Age/Sex: 67 y o  female     Assessment/Plan: 1-sepsis:  Present on admission:  Patient presented with sepsis  This was felt to be secondary to peritonitis secondary to a perforated gastric ulcer: The patient underwent surgical repair  She completed 7 days of antibiotics  Her sepsis has resolved      2-perforated gastric ulcer: With associated peritonitis  Patient presented with a perforated gastric ulcer  Her  notes that she had GI upset for quite some time prior to admission  She underwent laproscopic repair with silvia patch                 -continue proton pump inhibitor              -NG tube removed               -tolerating p o  Diet               -no evidence of leak      3-peritonitis:  Secondary to perforated gastric ulcer  Status post laparoscopic repair and washout               -status post 1 week of postoperative antibiotics with zosyn and fluconazole               -patient is followed by the Infectious Disease team   Antibiotics discontinued today  Continue to monitor      4-acute blood loss anemia:  Secondary to upper GI bleed secondary to perforated gastric ulcer:              -initial Hb 3's  She was transfused multiple units PRBC  -hemoglobin decreased from 8 2-6 9 today               -discussed case with Dr Ashtyn Batista  Patient has scant melanotic stool  Decrease in hemoglobin likely secondary to persistent oozing due to her thrombocytopenia/DIC  -will transfuse platelets and packed red blood cells at this time               -spoke with Dr Cayla Lomas, hematologist on call- suspects persistent DIC  Recheck DIC panel  He will re-eval pt today      5-diabetes type 2:  With hyperglycemia due to IV dextrose in her IV fluids, treating her hypernatremia              -blood sugar this morning was 411              -will increase Lantus to 15 units q 12 hours    Continue sliding scale insulin               -will add pre meal standing Humalog               -continue to titrate            6-hypernatremia:  Patient has hyperchloremic hyponatremia  Initially had significant free water deficit as she was NPO, had an NG tube perioperatively  Continues to have minimal p o  hydration due to her generalized weakness               -Her IV fluids have been titrated throughout the weekend  Had improvement in her sodium yesterday so her rate of her D5 IV fluids was decreased, however unfortunately today her sodium has worsened                 -Will change her rate back to 100 cc/hour  Will recheck a sodium level this afternoon                 -if patient does not significantly improve will consult Nephrology to coordinate her care      7-pancytopenia:  Patient presented with pancytopenia, with normal coags  Her profound anemia is likely secondary to her GI bleed from her perforated gastric ulcer  Patient however has persistent leukopenia without neutropenia, and thrombocytopenia               -she was evaluated by the hematology service who felt her pancytopenia is likely secondary to Imuran                 -pt was also noted to have DIC  -recheck DIC panel to eval benefit from cryo etc               -Continue supportive transfusions               -initial anemia with a hemoglobin in the threes  Patient was transfused packed red blood cells with initial improvement  Hemoglobin decreased from 8 yesterday to 6 9 again today               -monitor ANC:  Currently not neutropenic  If ANC< 1000 will give filgrastim 300mcg sq     8-hypothyroidism:  Continue Synthroid     9-hyperlipidemia:  Statin on hold     10-multiple sclerosis:  Imuran on hold     11-left hand cat bite:  No evidence of cellulitis  Status post 7 days of antibiotics for peritonitis     63-CRLVL metabolic encephalopathy:  Secondary to hypernatremia  Slightly improved  Cont to monitor closely       13-elevated blood presure:  Without prior h/o HTN  Most likely accelerated due to pain  Cont to monitor  bp adquate      14- picc line;  Per nursing and pt and her family, she is a very difficulty IV/lab stick  mutiple attempts made  Pt is requiring daily lab draws and multiple transfusions  Discussed with patient and her family and they are amenable to a PICC line        Discharge Plan:   To be determined

## 2018-02-05 NOTE — PROGRESS NOTES
Progress Note - Infectious Disease   Na Sethi 67 y o  female MRN: [id number]  Unit/Bed#: E4 -01 Encounter: [id number]      Impression/Plan:  1   Peritonitis-secondary to perforation of a gastric ulceration with contamination of the peritoneum with gastric contacts  Velia Abreu is now status post laparoscopic repair with washout with drains left in place   Consider patient for the possibility of more resistant organisms and yeast secondary infection as the patient had been Augmentin at the time of the perforation   Fortunately the patient has now undergone adequate source control  She has completed 1 week of antibiotics postop   -discontinue all antibiotics  -close surgical follow-up  -serial abdominal exams  -monitor CBC with diff and creatinine     2   Pancytopenia-likely secondary to the Imuran   Possibly another etiology   Patient has not neutropenic  Kae Ashby has been transfused and resuscitated for the severe anemia   The cell counts have improved a bit  Patient has received platelet transfusion yesterday  The patient remains quite pancytopenic  The neutrophil count remains greater than 1000 as of yesterday  -recheck CBC with diff tomorrow  -no additional Imuran for now  -hematology oncology follow-up  -transfusion is needed  -G-CSF if the neutrophil count is less than 1000     3   Chronic blood loss anemia-secondary to gastric ulceration  No labs done today   -monitor CBC with diff  -continue Protonix  -GI follow-up     4   Diabetes mellitus-type 2 with hyperglycemia     5   Multiple sclerosis-apparently on Imuran for a while   Seems to be neurologically stable  -will need close neurology follow-up  -would hold on Imuran for now     6   Cat bite-left hand   No overt cellulitis at this time   Which should be well covered by the antibiotics as above  The cat is being watched at home without any behavioral abnormalities    The hand is significantly improved with decreased edema   -discontinue antibiotics as above  -serial exams  -continue monitoring of the cat at home     7  Hypernatremia-patient with a large free water deficit  The patient is still fairly hypernatremic as of yesterday  No recurrence labs today   -frequent monitoring of BMP  -recheck BMP tomorrow  -consider Nephrology evaluation      8   GI bleed-in the setting of her recently repaired perforated peptic ulcer  Patient has been transfused  no recurrence of the GI bleed and CT of the abdomen and pelvis did not reveal source  -monitor CBC with diff  -no additional ID workup for now    Antibiotics:  Zosyn 8  Postop day 7  Fluconazole 8    Subjective:  Patient has no fever, chills, sweats; no nausea, vomiting, diarrhea; no cough, shortness of breath; no pain  No new symptoms  She seems much less confused and is feeling better overall  Her diet is being advanced  Objective:  Vitals:  HR:  [] 89  Resp:  [18] 18  BP: (126-157)/(60-63) 126/60  SpO2:  [98 %] 98 %  Temp (24hrs), Av 1 °F (36 7 °C), Min:98 °F (36 7 °C), Max:98 3 °F (36 8 °C)  Current: Temperature: 98 1 °F (36 7 °C)    Physical Exam:   General Appearance:  Alert, interactive, nontoxic, no acute distress  Throat: Oropharynx moist without lesions  Lungs:   Clear to auscultation bilaterally; no wheezes, rhonchi or rales; respirations unlabored   Heart:  RRR; no murmur, rub or gallop   Abdomen:   Soft, non-tender, non-distended, positive bowel sounds  Two COREY drains in place  Extremities: No clubbing, cyanosis   Decreased hand edema   Skin: No new rashes or lesions  No draining wounds noted         Labs, Imaging, & Other studies:   All pertinent labs and imaging studies were personally reviewed    Results from last 7 days  Lab Units 18  0449 18  0647 18  1714   WBC Thousand/uL 2 76* 2 17* 2 25*   HEMOGLOBIN g/dL 8 2* 6 9* 7 5*   PLATELETS Thousands/uL 21* 61* 44*       Results from last 7 days  Lab Units 18  1432 18  0449 18  1734 SODIUM mmol/L 155* 160* 162*   POTASSIUM mmol/L  --  4 2  --    CHLORIDE mmol/L  --  127*  --    CO2 mmol/L  --  29  --    ANION GAP mmol/L  --  4  --    BUN mg/dL  --  57*  --    CREATININE mg/dL  --  0 94  --    EGFR ml/min/1 73sq m  --  61  --    GLUCOSE RANDOM mg/dL  --  276*  --    CALCIUM mg/dL  --  7 2*  --        Results from last 7 days  Lab Units 01/31/18  1022   BLOOD CULTURE  No Growth After 4 Days  No Growth After 4 Days

## 2018-02-05 NOTE — PROCEDURES
Insert PICC line  Date/Time: 2/5/2018 1:47 PM  Performed by: Delmis Tarango by: Abbie Moore     Patient location:  Bedside  Other Assisting Provider: No    Consent:     Consent obtained:  Written (Constanza Pena obtained consent by Dr Balaji Maher)    Consent given by: pts daughter     Procedural risks discussed: by MD Giron protocol:     Procedure explained and questions answered to patient or proxy's satisfaction: yes      Relevant documents present and verified: yes      Test results available and properly labeled: yes      Imaging studies available: yes      Required blood products, implants, devices, and special equipment available: yes      Site/side marked: yes      Immediately prior to procedure, a time out was called: yes      Patient identity confirmed:  Verbally with patient and arm band  Pre-procedure details:     Hand hygiene: Hand hygiene performed prior to insertion      Sterile barrier technique: All elements of maximal sterile technique followed      Skin preparation:  ChloraPrep    Skin preparation agent: Skin preparation agent completely dried prior to procedure    Indications:     PICC line indications: no peripheral vascular access    Anesthesia (see MAR for exact dosages):      Anesthesia method:  Local infiltration    Local anesthetic:  Lidocaine 1% w/o epi (2 ml)  Procedure details:     Location:  Basilic    Vessel type: vein      Laterality:  Left    Site selection rationale:  Right upper arm w/ large amount of ecchymosis tender to touch     Approach: percutaneous technique used      Patient position:  Flat    Procedural supplies:  Double lumen    Catheter size:  5 Fr    Landmarks identified: yes      Ultrasound guidance: yes      Sterile ultrasound techniques: Sterile gel and sterile probe covers were used      Number of attempts:  2    Successful placement: yes      Vessel of catheter tip end:  Chest Xray needed to confirm placement    Total catheter length (cm):  41 Catheter out on skin (cm):  2    Max flow rate:  999 ml/hr     Arm circumference:  28  Post-procedure details:     Post-procedure:  Securement device placed    Assessment:  Blood return through all ports and placement verification pending x-ray result    Post-procedure complications: none      Patient tolerance of procedure:   Tolerated well, no immediate complications  Comments:      LOT # [id number] exp 1-31-19

## 2018-02-05 NOTE — PLAN OF CARE
Problem: DISCHARGE PLANNING - CARE MANAGEMENT  Goal: Discharge to post-acute care or home with appropriate resources  INTERVENTIONS:  - Conduct assessment to determine patient/family and health care team treatment goals, and need for post-acute services based on payer coverage, community resources, and patient preferences, and barriers to discharge  - Address psychosocial, clinical, and financial barriers to discharge as identified in assessment in conjunction with the patient/family and health care team  - Arrange appropriate level of post-acute services according to patients   needs and preference and payer coverage in collaboration with the physician and health care team  - Communicate with and update the patient/family, physician, and health care team regarding progress on the discharge plan  - Arrange appropriate transportation to post-acute venues   Outcome: Progressing  Made referral to [facility] as it is the close to pt's home  THIS WAS NOT DISCUSS WITH PT OR SPOUSE YET  Will f/u with pt/spouse

## 2018-02-05 NOTE — PLAN OF CARE
Problem: DISCHARGE PLANNING - CARE MANAGEMENT  Goal: Discharge to post-acute care or home with appropriate resources  INTERVENTIONS:  - Conduct assessment to determine patient/family and health care team treatment goals, and need for post-acute services based on payer coverage, community resources, and patient preferences, and barriers to discharge  - Address psychosocial, clinical, and financial barriers to discharge as identified in assessment in conjunction with the patient/family and health care team  - Arrange appropriate level of post-acute services according to patients   needs and preference and payer coverage in collaboration with the physician and health care team  - Communicate with and update the patient/family, physician, and health care team regarding progress on the discharge plan  - Arrange appropriate transportation to post-acute venues   Outcome: Progressing  PT recommendation is home short stay rehab  Met with pt to discuss the need for rehab and gave her a list of SNF  Pt requested the CM call her spouse  TC to spouse and left a message with spouse Michael Urbina at [phone] about the need for a short stay  They live at [facility]  Will wait to hear back from spouse

## 2018-02-05 NOTE — PROGRESS NOTES
The patient was seen and examined, she is sleeping, there is no evidence of clear bleeding, she had been on Unasyn 4 5 g every 6 hours, Imuran is on hold, it was noticed to have more pancytopenia  1  I believe pancytopenia at this time is combined secondary to long-acting bone marrow suppression of Imuran as well as Zosyn, Zosyn was discontinued today  2  There is no evidence of active bleeding  3  Transfuse with 1 unit of packed RBC to keep hemoglobin above 7 and platelets to keep platelet count above 30,000  4  Filgrastim 300 mcg subcu 1 dose of ANC below 1000  5    Discussed with Dr Alvarenga

## 2018-02-05 NOTE — PLAN OF CARE
Problem: DISCHARGE PLANNING - CARE MANAGEMENT  Goal: Discharge to post-acute care or home with appropriate resources  INTERVENTIONS:  - Conduct assessment to determine patient/family and health care team treatment goals, and need for post-acute services based on payer coverage, community resources, and patient preferences, and barriers to discharge  - Address psychosocial, clinical, and financial barriers to discharge as identified in assessment in conjunction with the patient/family and health care team  - Arrange appropriate level of post-acute services according to patients   needs and preference and payer coverage in collaboration with the physician and health care team  - Communicate with and update the patient/family, physician, and health care team regarding progress on the discharge plan  - Arrange appropriate transportation to post-acute venues   Outcome: Progressing  Made referral to [facility] as it is the close to pt's home  THIS WAS NOT DISCUSS WITH PT OR SPOUSE YET  Will f/u with pt/spouse

## 2018-02-05 NOTE — SOCIAL WORK
Made referral to [facility] as it is the close to pt's home  THIS WAS NOT DISCUSS WITH PT OR SPOUSE YET  Will f/u with pt/spouse

## 2018-02-05 NOTE — OCCUPATIONAL THERAPY NOTE
Occupational Therapy Cancellation Note        Patient Name: Anselmo Saha  Today's Date: 2/5/2018      Attempted to see pt for OT session, pt declined due to increased fatigue  Pt currently in between blood transfusions  Will continue to follow to address OT POC       Kiarra Green MS, OTR/L

## 2018-02-06 LAB
ABO GROUP BLD BPU: NORMAL
ALBUMIN SERPL BCP-MCNC: 1.5 G/DL (ref 3.5–5)
ALP SERPL-CCNC: 53 U/L (ref 46–116)
ALT SERPL W P-5'-P-CCNC: 19 U/L (ref 12–78)
ANION GAP SERPL CALCULATED.3IONS-SCNC: 5 MMOL/L (ref 4–13)
ANION GAP SERPL CALCULATED.3IONS-SCNC: 6 MMOL/L (ref 4–13)
AST SERPL W P-5'-P-CCNC: 22 U/L (ref 5–45)
BASOPHILS # BLD MANUAL: 0 THOUSAND/UL (ref 0–0.1)
BASOPHILS # BLD MANUAL: 0 THOUSAND/UL (ref 0–0.1)
BASOPHILS NFR MAR MANUAL: 0 % (ref 0–1)
BASOPHILS NFR MAR MANUAL: 0 % (ref 0–1)
BILIRUB SERPL-MCNC: 0.68 MG/DL (ref 0.2–1)
BPU ID: NORMAL
BUN SERPL-MCNC: 31 MG/DL (ref 5–25)
BUN SERPL-MCNC: 35 MG/DL (ref 5–25)
CALCIUM SERPL-MCNC: 8.6 MG/DL (ref 8.3–10.1)
CALCIUM SERPL-MCNC: 8.8 MG/DL (ref 8.3–10.1)
CHLORIDE SERPL-SCNC: 116 MMOL/L (ref 100–108)
CHLORIDE SERPL-SCNC: 116 MMOL/L (ref 100–108)
CO2 SERPL-SCNC: 27 MMOL/L (ref 21–32)
CO2 SERPL-SCNC: 28 MMOL/L (ref 21–32)
CREAT SERPL-MCNC: 0.72 MG/DL (ref 0.6–1.3)
CREAT SERPL-MCNC: 0.78 MG/DL (ref 0.6–1.3)
CROSSMATCH: NORMAL
EOSINOPHIL # BLD MANUAL: 0.11 THOUSAND/UL (ref 0–0.4)
EOSINOPHIL # BLD MANUAL: 0.15 THOUSAND/UL (ref 0–0.4)
EOSINOPHIL NFR BLD MANUAL: 3 % (ref 0–6)
EOSINOPHIL NFR BLD MANUAL: 5 % (ref 0–6)
ERYTHROCYTE [DISTWIDTH] IN BLOOD BY AUTOMATED COUNT: 15 % (ref 11.6–15.1)
ERYTHROCYTE [DISTWIDTH] IN BLOOD BY AUTOMATED COUNT: 15.1 % (ref 11.6–15.1)
GFR SERPL CREATININE-BSD FRML MDRD: 76 ML/MIN/1.73SQ M
GFR SERPL CREATININE-BSD FRML MDRD: 84 ML/MIN/1.73SQ M
GLUCOSE SERPL-MCNC: 292 MG/DL (ref 65–140)
GLUCOSE SERPL-MCNC: 296 MG/DL (ref 65–140)
GLUCOSE SERPL-MCNC: 300 MG/DL (ref 65–140)
GLUCOSE SERPL-MCNC: 344 MG/DL (ref 65–140)
GLUCOSE SERPL-MCNC: 365 MG/DL (ref 65–140)
GLUCOSE SERPL-MCNC: 368 MG/DL (ref 65–140)
HAPTOGLOB SERPL-MCNC: 136 MG/DL (ref 34–200)
HCT VFR BLD AUTO: 29 % (ref 34.8–46.1)
HCT VFR BLD AUTO: 32.1 % (ref 34.8–46.1)
HGB BLD-MCNC: 10.7 G/DL (ref 11.5–15.4)
HGB BLD-MCNC: 9.8 G/DL (ref 11.5–15.4)
LYMPHOCYTES # BLD AUTO: 1.85 THOUSAND/UL (ref 0.6–4.47)
LYMPHOCYTES # BLD AUTO: 2.23 THOUSAND/UL (ref 0.6–4.47)
LYMPHOCYTES # BLD AUTO: 60 % (ref 14–44)
LYMPHOCYTES # BLD AUTO: 61 % (ref 14–44)
MCH RBC QN AUTO: 29.5 PG (ref 26.8–34.3)
MCH RBC QN AUTO: 30 PG (ref 26.8–34.3)
MCHC RBC AUTO-ENTMCNC: 33.3 G/DL (ref 31.4–37.4)
MCHC RBC AUTO-ENTMCNC: 33.8 G/DL (ref 31.4–37.4)
MCV RBC AUTO: 88 FL (ref 82–98)
MCV RBC AUTO: 89 FL (ref 82–98)
MONOCYTES # BLD AUTO: 0.12 THOUSAND/UL (ref 0–1.22)
MONOCYTES # BLD AUTO: 0.18 THOUSAND/UL (ref 0–1.22)
MONOCYTES NFR BLD: 4 % (ref 4–12)
MONOCYTES NFR BLD: 5 % (ref 4–12)
NEUTROPHILS # BLD MANUAL: 0.8 THOUSAND/UL (ref 1.85–7.62)
NEUTROPHILS # BLD MANUAL: 1.02 THOUSAND/UL (ref 1.85–7.62)
NEUTS BAND NFR BLD MANUAL: 1 % (ref 0–8)
NEUTS BAND NFR BLD MANUAL: 5 % (ref 0–8)
NEUTS SEG NFR BLD AUTO: 23 % (ref 43–75)
NEUTS SEG NFR BLD AUTO: 25 % (ref 43–75)
NRBC BLD AUTO-RTO: 0 /100 WBCS
NRBC BLD AUTO-RTO: 0 /100 WBCS
PLATELET # BLD AUTO: 28 THOUSANDS/UL (ref 149–390)
PLATELET # BLD AUTO: 31 THOUSANDS/UL (ref 149–390)
PLATELET BLD QL SMEAR: ABNORMAL
PLATELET BLD QL SMEAR: ABNORMAL
PMV BLD AUTO: 10.3 FL (ref 8.9–12.7)
PMV BLD AUTO: 10.8 FL (ref 8.9–12.7)
POTASSIUM SERPL-SCNC: 3.9 MMOL/L (ref 3.5–5.3)
POTASSIUM SERPL-SCNC: 3.9 MMOL/L (ref 3.5–5.3)
PROT SERPL-MCNC: 4.6 G/DL (ref 6.4–8.2)
RBC # BLD AUTO: 3.27 MILLION/UL (ref 3.81–5.12)
RBC # BLD AUTO: 3.63 MILLION/UL (ref 3.81–5.12)
RBC MORPH BLD: NORMAL
RBC MORPH BLD: NORMAL
SODIUM SERPL-SCNC: 148 MMOL/L (ref 136–145)
SODIUM SERPL-SCNC: 150 MMOL/L (ref 136–145)
TOTAL CELLS COUNTED SPEC: 100
TOTAL CELLS COUNTED SPEC: 100
UNIT DISPENSE STATUS: NORMAL
UNIT PRODUCT CODE: NORMAL
UNIT RH: NORMAL
VARIANT LYMPHS # BLD AUTO: 3 %
VARIANT LYMPHS # BLD AUTO: 5 %
WBC # BLD AUTO: 3.08 THOUSAND/UL (ref 4.31–10.16)
WBC # BLD AUTO: 3.65 THOUSAND/UL (ref 4.31–10.16)

## 2018-02-06 PROCEDURE — 97530 THERAPEUTIC ACTIVITIES: CPT

## 2018-02-06 PROCEDURE — 80048 BASIC METABOLIC PNL TOTAL CA: CPT | Performed by: INTERNAL MEDICINE

## 2018-02-06 PROCEDURE — 97535 SELF CARE MNGMENT TRAINING: CPT

## 2018-02-06 PROCEDURE — 97110 THERAPEUTIC EXERCISES: CPT

## 2018-02-06 PROCEDURE — 85027 COMPLETE CBC AUTOMATED: CPT | Performed by: INTERNAL MEDICINE

## 2018-02-06 PROCEDURE — C9113 INJ PANTOPRAZOLE SODIUM, VIA: HCPCS | Performed by: SURGERY

## 2018-02-06 PROCEDURE — 82948 REAGENT STRIP/BLOOD GLUCOSE: CPT

## 2018-02-06 PROCEDURE — 80053 COMPREHEN METABOLIC PANEL: CPT | Performed by: INTERNAL MEDICINE

## 2018-02-06 PROCEDURE — 99233 SBSQ HOSP IP/OBS HIGH 50: CPT | Performed by: INTERNAL MEDICINE

## 2018-02-06 PROCEDURE — 85007 BL SMEAR W/DIFF WBC COUNT: CPT | Performed by: INTERNAL MEDICINE

## 2018-02-06 PROCEDURE — 99232 SBSQ HOSP IP/OBS MODERATE 35: CPT | Performed by: INTERNAL MEDICINE

## 2018-02-06 RX ORDER — INSULIN GLARGINE 100 [IU]/ML
18 INJECTION, SOLUTION SUBCUTANEOUS EVERY 12 HOURS SCHEDULED
Status: DISCONTINUED | OUTPATIENT
Start: 2018-02-07 | End: 2018-02-07

## 2018-02-06 RX ADMIN — INSULIN LISPRO 5 UNITS: 100 INJECTION, SOLUTION INTRAVENOUS; SUBCUTANEOUS at 14:51

## 2018-02-06 RX ADMIN — SUCRALFATE 1000 MG: 1 SUSPENSION ORAL at 06:34

## 2018-02-06 RX ADMIN — LEVOTHYROXINE SODIUM 200 MCG: 100 TABLET ORAL at 06:31

## 2018-02-06 RX ADMIN — SUCRALFATE 1000 MG: 1 SUSPENSION ORAL at 16:40

## 2018-02-06 RX ADMIN — INSULIN LISPRO 5 UNITS: 100 INJECTION, SOLUTION INTRAVENOUS; SUBCUTANEOUS at 09:21

## 2018-02-06 RX ADMIN — PANTOPRAZOLE SODIUM 40 MG: 40 INJECTION, POWDER, FOR SOLUTION INTRAVENOUS at 20:37

## 2018-02-06 RX ADMIN — DEXTROSE 100 ML/HR: 5 SOLUTION INTRAVENOUS at 08:24

## 2018-02-06 RX ADMIN — INSULIN GLARGINE 15 UNITS: 100 INJECTION, SOLUTION SUBCUTANEOUS at 08:24

## 2018-02-06 RX ADMIN — MORPHINE SULFATE 2 MG: 2 INJECTION, SOLUTION INTRAMUSCULAR; INTRAVENOUS at 09:55

## 2018-02-06 RX ADMIN — MORPHINE SULFATE 2 MG: 2 INJECTION, SOLUTION INTRAMUSCULAR; INTRAVENOUS at 21:28

## 2018-02-06 RX ADMIN — INSULIN LISPRO 5 UNITS: 100 INJECTION, SOLUTION INTRAVENOUS; SUBCUTANEOUS at 16:44

## 2018-02-06 RX ADMIN — PANTOPRAZOLE SODIUM 40 MG: 40 INJECTION, POWDER, FOR SOLUTION INTRAVENOUS at 09:19

## 2018-02-06 RX ADMIN — DEXTROSE 100 ML/HR: 5 SOLUTION INTRAVENOUS at 20:10

## 2018-02-06 NOTE — PLAN OF CARE
Problem: OCCUPATIONAL THERAPY ADULT  Goal: Performs self-care activities at highest level of function for planned discharge setting  See evaluation for individualized goals  Treatment Interventions: ADL retraining, Functional transfer training, Endurance training, UE strengthening/ROM, Patient/family training, Compensatory technique education, Activityengagement, Energy conservation, Cognitive reorientation, Equipment evaluation/education          See flowsheet documentation for full assessment, interventions and recommendations  Limitation: Decreased ADL status, Decreased UE strength, Decreased endurance, Decreased cognition, Decreased Safe judgement during ADL, Decreased self-care trans, Decreased high-level ADLs  Prognosis: Fair, Good  Assessment: Pt seen for skilled OT session focused on ADLs, functional transfers, bed mobility, b/l UE exercises  Pt supine in bed at start of session, pt w/ MAX encouragement to participate in session  Pt required MOD assist for grooming tasks of combing hair and washing face  Pt completed b/l UE exercises to increase strength and endurance pt required rest breaks between sets and increased time to complete  Pt required MAX assist x 2 supine>sit bed mobility w/ HOB elevated  Pt sat EOB x 15 minutes w/ Fair/Fair - sitting balance  Pt encouraged to complete standing activities  Pt required MAX assist x 2 sit<>Stand from bed x 2 trials w/ VCs and use of momentum and counting to 3  Pt self-limiting and sitting down  On third trial RN present and blocked pt knees, pt w/ MAX assist x 2 sit<>stand w/ RW support and MAX standing tolerance of 20 sec  Pt requesting to sit down and return to supine  Pt MAX assist x 2 sit>supine bed mobility  Pt upright in bed at end of session w/ pillow offloading L side and all needs met and bed alarm intact  Pt requires continued education on the importance of OOB and mobility   Pt continues to be limited due to self-limiting behaviors, impaired balance, impaired strength and endurance, decreased activity tolerance, increased pain in abdomen  Recommend STR when medically stable  Will continue to follow to address OT goals        OT Discharge Recommendation: Short Term Rehab  OT - OK to Discharge:  (to rehab when medically stable)      Comments: Ronny Guerrero MS, OTR/L

## 2018-02-06 NOTE — PROGRESS NOTES
Progress Note - Infectious Disease   Mónica Lincoln 67 y o  female MRN: [id number]  Unit/Bed#: E4 -01 Encounter: [id number]      Impression/Plan:  1   Peritonitis-secondary to perforation of a gastric ulceration with contamination of the peritoneum with gastric contacts  Merle Lundborg is now status post laparoscopic repair with washout with drains left in place   Consider patient for the possibility of more resistant organisms and yeast secondary infection as the patient had been Augmentin at the time of the perforation   Fortunately the patient has now undergone adequate source control  She has completed 1 week of antibiotics postop  Slight elevation in the temperature negative  -monitor off all antibiotic  -close surgical follow-up  -serial abdominal exams  -monitor CBC with diff and creatinine  -start incentive spirometry with the patient's slight elevation in the temperature     2   Pancytopenia-likely secondary to the Imuran   Possibly another etiology   Patient has not neutropenic  Evelyn Whitmore has been transfused and resuscitated for the severe anemia   The cell counts have improved a bit   Patient has received platelet transfusion yesterday  The patient remains quite pancytopenic  The neutrophil count remains greater than 1000 as of yesterday  The platelet count has come up a bit  -recheck CBC with diff tomorrow  -no additional Imuran or Zosyn for now  -hematology oncology follow-up  -transfusion is needed  -G-CSF if the neutrophil count is less than 1000     3   Chronic blood loss anemia-secondary to gastric ulceration   No labs done today   -monitor CBC with diff  -continue Protonix  -GI follow-up     4   Diabetes mellitus-type 2 with hyperglycemia     5   Multiple sclerosis-apparently on Imuran for a while   Seems to be neurologically stable  -will need close neurology follow-up  -would hold on Imuran for now     6   Cat bite-left hand   No overt cellulitis at this time   Which should be well covered by the antibiotics as above   The cat is being watched at home without any behavioral abnormalities  The hand is significantly improved with decreased edema   -no additional antibiotic  -serial exams  -continue monitoring of the cat at home     7   Hypernatremia-patient with a large free water deficit   The patient is still fairly hypernatremic as of yesterday  Hypernatremia is improved  -recheck BMP tomorrow  -free water replacement     8   GI bleed-in the setting of her recently repaired perforated peptic ulcer   Patient has been transfused   No recurrence of the GI bleed and CT of the abdomen and pelvis did not reveal source  -monitor CBC with diff  -no additional ID workup for now    9  Disposition-possible discharge to rehab soon  Antibiotics:  None    Subjective:  Patient has no fever, chills, sweats; at slight elevation in the temperature; no nausea, vomiting, diarrhea; no cough, shortness of breath; no increased pain  Still with mild abdominal pain  No new symptoms  Objective:  Vitals:  HR:  [85-93] 91  Resp:  [18-20] 19  BP: (134-187)/(58-84) 170/81  SpO2:  [97 %-100 %] 97 %  Temp (24hrs), Av 7 °F (36 5 °C), Min:96 2 °F (35 7 °C), Max:100 °F (37 8 °C)  Current: Temperature: 98 3 °F (36 8 °C)    Physical Exam:   General Appearance:  Alert, interactive, nontoxic, no acute distress  Throat: Oropharynx moist without lesions  Lungs:   Clear to auscultation anterior; no wheezes, rhonchi or rales; respirations unlabored   Heart:  RRR; no murmur, rub or gallop   Abdomen:   Soft, non-tender, non-distended, positive bowel sounds  COREY drains in place  Extremities: No clubbing, cyanosis or edema   Skin: No new rashes or lesions  No draining wounds noted         Labs, Imaging, & Other studies:   All pertinent labs and imaging studies were personally reviewed    Results from last 7 days  Lab Units 18  0638 18  0203 18  0730   WBC Thousand/uL 3 65* 3 08* 3 04*   HEMOGLOBIN g/dL 10 7* 9  8* 6 2*   PLATELETS Thousands/uL 31* 28* 19*       Results from last 7 days  Lab Units 02/06/18  0643 02/06/18  0028 02/05/18  1206  02/05/18  0730   SODIUM mmol/L 148* 150*  --   --  158*   POTASSIUM mmol/L 3 9 3 9  --   --  4 0   CHLORIDE mmol/L 116* 116*  --   --  125*   CO2 mmol/L 27 28  --   --  27   ANION GAP mmol/L 5 6  --   --  6   BUN mg/dL 31* 35*  --   --  51*   CREATININE mg/dL 0 72 0 78  --   --  0 99   EGFR ml/min/1 73sq m 84 76  --   --  57   GLUCOSE RANDOM mg/dL 300* 292*  --   --  210*   CALCIUM mg/dL 8 8 8 6  --   --  7 5*   AST U/L 22  --  22  --   --    ALT U/L 19  --  20  < >  --    ALK PHOS U/L 53  --  42*  < >  --    TOTAL PROTEIN g/dL 4 6*  --  4 2*  < >  --    BILIRUBIN TOTAL mg/dL 0 68  --  0 35  < >  --    < > = values in this interval not displayed  Results from last 7 days  Lab Units 01/31/18  1022   BLOOD CULTURE  No Growth After 5 Days  No Growth After 5 Days

## 2018-02-06 NOTE — SOCIAL WORK
TC to spouse to discuss rehab  Spouse is agreeable to [facility] [place] [facility] and [facility] can accept pt  Discuss with spouse that w/c is not covered by insurance

## 2018-02-06 NOTE — PLAN OF CARE
Problem: PHYSICAL THERAPY ADULT  Goal: Performs mobility at highest level of function for planned discharge setting  See evaluation for individualized goals  Treatment/Interventions: LE strengthening/ROM, Therapeutic exercise, Endurance training, Patient/family training, Bed mobility, transfer training, gait training, Continued evaluation, Spoke to nursing          See flowsheet documentation for full assessment, interventions and recommendations  Outcome: Progressing  Prognosis: Fair  Problem List: Decreased strength, Decreased endurance, Decreased mobility, Impaired balance, Decreased cognition, Decreased skin integrity, Pain  Assessment: Pt performing ADLs with OT at time of PT treatment  Pt was able to perform bed mobility this session ,however pt continues to require verbal instruction to redirect attention  Pt was able to sit EOB x 15 min and perform therex, however pt continues to try to lay supine back in bed  Pts BP sitting EOB was 139/90  Transfers were attempted this session, and pt was able to perform sit to stand transfer with max A x 2 and a third person to block pts knees  VC needed to redirect attention  Pt continues to be self limiting and resistive with all transfer attempts  Pt was educate on the importance of mobility in the hospital and being OOB in chair as much as tolerated  Pt states that she cannot sit in the chair at this time secondary to increased pain  Pt was assisted back into bed at conclusion of PT session with all needs within reach  PT will continue to follow  DC recommendation when medically cleared is rehab due to decreased functional mobility at current time and increased assistance needed from caregiver at current time  Barriers to Discharge: Decreased caregiver support, Inaccessible home environment     Recommendation: Short-term skilled PT     PT - OK to Discharge: Yes (to rehab when medically cleared )    See flowsheet documentation for full assessment

## 2018-02-06 NOTE — PLAN OF CARE
Problem: DISCHARGE PLANNING - CARE MANAGEMENT  Goal: Discharge to post-acute care or home with appropriate resources  INTERVENTIONS:  - Conduct assessment to determine patient/family and health care team treatment goals, and need for post-acute services based on payer coverage, community resources, and patient preferences, and barriers to discharge  - Address psychosocial, clinical, and financial barriers to discharge as identified in assessment in conjunction with the patient/family and health care team  - Arrange appropriate level of post-acute services according to patients   needs and preference and payer coverage in collaboration with the physician and health care team  - Communicate with and update the patient/family, physician, and health care team regarding progress on the discharge plan  - Arrange appropriate transportation to post-acute venues   Outcome: Progressing  TC to spouse to discuss rehab  Spouse is agreeable to [facility] and [facility] can accept pt  Discuss with spouse that w/c is not covered by insurance

## 2018-02-06 NOTE — PLAN OF CARE
Problem: DISCHARGE PLANNING - CARE MANAGEMENT  Goal: Discharge to post-acute care or home with appropriate resources  INTERVENTIONS:  - Conduct assessment to determine patient/family and health care team treatment goals, and need for post-acute services based on payer coverage, community resources, and patient preferences, and barriers to discharge  - Address psychosocial, clinical, and financial barriers to discharge as identified in assessment in conjunction with the patient/family and health care team  - Arrange appropriate level of post-acute services according to patients   needs and preference and payer coverage in collaboration with the physician and health care team  - Communicate with and update the patient/family, physician, and health care team regarding progress on the discharge plan  - Arrange appropriate transportation to post-acute venues   Outcome: Progressing  TC to spouse to discuss rehab  Spouse is agreeable to [facility] and [facility] can accept pt  Discuss with spouse that w/c is not covered by insurance

## 2018-02-06 NOTE — OCCUPATIONAL THERAPY NOTE
[address] Progress Note     Patient Name: Beni Bartholomew  JCVCI'O Date: 2/6/2018  Problem List  Patient Active Problem List   Diagnosis    Pancytopenia ([facility]    Type 2 diabetes mellitus ([facility] )    Hyperglycemia due to type 2 diabetes mellitus ([facility] )    [facility]) [facility]    Hypoalbuminemia due to protein-calorie malnutrition ([facility]    Gastric ulcer    Sepsis ([facility]    Acute perforated [facility] ulcer with hemorrhage [facility]    Peritonitis ([facility]    Acute blood loss anemia    Hypernatremia    Hypothyroidism    Hyperlipidemia    Multiple sclerosis (HCC)    Metabolic encephalopathy    DIC (disseminated intravascular coagulation) ([facility] )            Subjective:   02/06/18 1150   Restrictions/Precautions   Weight Bearing Precautions Per Order No   Other Precautions Cognitive; Chair Alarm; Bed Alarm; Fall Risk;Pain;Multiple lines   Pain Assessment   Pain Assessment 0-10   Pain Score Worst Possible Pain   Pain Type Acute pain;Surgical pain   Pain Location Abdomen   Pain Orientation Left   Hospital Pain Intervention(s) Ambulation/increased activity;Repositioned   Response to Interventions tolerated   ADL   Where Assessed Supine, bed   Grooming Assistance 3  Moderate Assistance   Grooming Deficit Setup;Verbal cueing;Supervision/safety; Increased time to complete;Wash/dry face;Brushing hair   Grooming Comments pt w/ increased encouragemtn to participate in task, increased fatigue   UB Dressing Assistance 2  Maximal Assistance   UB Dressing Deficit Setup;Verbal cueing;Supervision/safety; Increased time to complete   UB Dressing Comments don/doff hospital gown   Functional Standing Tolerance   Time 20sec   Activity pt w/ MOD assist x 2 support steadying in stance w/ RW   Comments pt resistive to standing and trying to sit back down, self-limiting   Bed Mobility   Rolling R 2  Maximal assistance   Additional items Assist x 1; Increased time required;Verbal cues;LE management; Bedrails   Rolling L 2  Maximal assistance   Additional items Assist x 1; Increased time required;Verbal cues;LE management; Bedrails   Supine to Sit 2  Maximal assistance   Additional items Assist x 2; Increased time required;Verbal cues;LE management; Bedrails;HOB elevated   Sit to Supine 2  Maximal assistance   Additional items Assist x 2; Increased time required;Verbal cues;LE management   Additional Comments pt seated EOB 15 minutes w/ Fair/Fair- balance   Transfers   Sit to Stand 2  Maximal assistance   Additional items Assist x 2; Increased time required;Verbal cues;Armrests  (x3 trials, assist of 3rd person to block knees on trial 3 )   Stand to Sit 2  Maximal assistance   Additional items Assist x 2; Increased time required;Verbal cues   Additional Comments Pt resistive during transfers and self-limiting,   Therapeutic Exercise - ROM   UE-ROM Yes   ROM- Right Upper Extremities   R Shoulder AROM; Flexion; Extension   R Elbow AROM;Elbow flexion;Elbow extension   R Weight/Reps/Sets 2 sets x 10 reps   RUE ROM Comment increased time and rest breaks between sets   ROM - Left Upper Extremities    L Shoulder AROM; Flexion; Extension   L Elbow AROM;Elbow flexion;Elbow extension   L Weight/Reps/Sets 2 sets x 10 reps   LUE ROM Comment increased time to complete tolerated, required encouragement to participate   Cognition   Overall Cognitive Status Impaired   Arousal/Participation Responsive   Attention Attends with cues to redirect   Orientation Level Oriented to person;Oriented to place   Memory Decreased short term memory;Decreased recall of precautions;Decreased recall of recent events   Following Commands Follows one step commands with increased time or repetition   Comments pt w/ impaired insight, impaired STM, decreased insight and safety awareness   Additional Activities   Additional Activities (education on participation on therapy)   Additional Activities Comments pt receptive, needs continued education   Activity Tolerance   Activity Tolerance Patient limited by fatigue;Patient limited by pain;Treatment limited secondary to medical complications (Comment)   Medical Staff Made Aware appropriate to see per RNImani assisted during treatment   Assessment   Assessment Pt seen for skilled OT session focused on ADLs, functional transfers, bed mobility, b/l UE exercises  Pt supine in bed at start of session, pt w/ MAX encouragement to participate in session  Pt required MOD assist for grooming tasks of combing hair and washing face  Pt completed b/l UE exercises to increase strength and endurance pt required rest breaks between sets and increased time to complete  Pt required MAX assist x 2 supine>sit bed mobility w/ HOB elevated  Pt sat EOB x 15 minutes w/ Fair/Fair - sitting balance  Pt encouraged to complete standing activities  Pt required MAX assist x 2 sit<>Stand from bed x 2 trials w/ VCs and use of momentum and counting to 3  Pt self-limiting and sitting down  On third trial RN present and blocked pt knees, pt w/ MAX assist x 2 sit<>stand w/ RW support and MAX standing tolerance of 20 sec  Pt requesting to sit down and return to supine  Pt MAX assist x 2 sit>supine bed mobility  Pt upright in bed at end of session w/ pillow offloading L side and all needs met and bed alarm intact  Pt requires continued education on the importance of OOB and mobility  Pt continues to be limited due to self-limiting behaviors, impaired balance, impaired strength and endurance, decreased activity tolerance, increased pain in abdomen  Recommend STR when medically stable  Will continue to follow to address OT goals      Plan   Goal Expiration Date 02/13/18   Treatment Day 1   OT Frequency 3-5x/wk   Recommendation   OT Discharge Recommendation Short Term Rehab   OT - OK to Discharge (to rehab when medically stable)   Barthel Index   Feeding 5   Bathing 0   Grooming Score 0   Dressing Score 5   Bladder Score 5   Bowels Score 10   Toilet Use Score 5   Transfers (Bed/Chair) Score 5   Mobility (Level Surface) Score 0   Stairs Score 0   Barthel Index Score 35   Modified Dairy Scale   Modified Dairy Scale 4     Documentation completed by: Margret Lamb MS, OTR/L

## 2018-02-06 NOTE — PHYSICAL THERAPY NOTE
PHYSICAL THERAPY NOTE          Patient Name: Reagan Narayan  QLNIW'T Date: 2/6/2018 02/06/18 1151   Pain Assessment   Pain Assessment 0-10   Pain Score Worst Possible Pain   Pain Type Acute pain;Surgical pain   Pain Location Abdomen   Pain Orientation Left   Pain Descriptors Aching   Pain Frequency Constant/continuous   Pain Onset Ongoing   Clinical Progression Gradually improving   Effect of Pain on Daily Activities increased pain with activity    Patient's Stated Pain Goal No pain   Hospital Pain Intervention(s) Ambulation/increased activity;Repositioned   Response to Interventions tolerated    Restrictions/Precautions   Weight Bearing Precautions Per Order No   Other Precautions Cognitive; Bed Alarm;Multiple lines; Fall Risk;Pain   General   Chart Reviewed Yes   Response to Previous Treatment Patient with no complaints from previous session  Family/Caregiver Present No   Cognition   Overall Cognitive Status Impaired   Arousal/Participation Responsive   Attention Attends with cues to redirect   Orientation Level Oriented to person;Oriented to place   Memory Decreased short term memory   Following Commands Follows one step commands without difficulty   Subjective   Subjective I'm having pain    Bed Mobility   Rolling R 2  Maximal assistance   Additional items Assist x 2; Increased time required;LE management;Verbal cues   Rolling L 2  Maximal assistance   Additional items Assist x 2; Increased time required;Verbal cues;LE management   Supine to Sit 2  Maximal assistance   Additional items Assist x 2; Increased time required;LE management;Verbal cues   Sit to Supine 2  Maximal assistance   Additional items Assist x 2; Increased time required;Verbal cues;LE management   Additional Comments Pt sat EOB x 15 min    Transfers   Sit to Stand 2  Maximal assistance   Additional items Assist x 2; Increased time required;Verbal cues  (third person needed to block knees )   Stand to Sit 2  Maximal assistance   Additional items Assist x 2; Increased time required;Verbal cues   Additional Comments Pt remains resistive with transfers    Ambulation/Elevation   Gait pattern Not tested  (decreased standing tolerance )   Balance   Static Sitting Fair   Static Standing Poor +   Ambulatory Poor   Endurance Deficit   Endurance Deficit Yes   Endurance Deficit Description fatigue and pain    Activity Tolerance   Activity Tolerance Patient limited by fatigue;Patient limited by pain   Nurse Made Aware pt able to be seen per Monico Thomas RN   Exercises   Knee AROM Long Arc Quad Sitting;10 reps;AROM; Bilateral  (x 2 sets )   Ankle Pumps Sitting;10 reps;AROM; Bilateral  (x 2 sets )   Assessment   Prognosis Fair   Problem List Decreased strength;Decreased endurance;Decreased mobility; Impaired balance;Decreased cognition;Decreased skin integrity;Pain   Assessment Pt performing ADLs with OT at time of PT treatment  Pt was able to perform bed mobility this session ,however pt continues to require verbal instruction to redirect attention  Pt was able to sit EOB x 15 min and perform therex, however pt continues to try to lay supine back in bed  Pts BP sitting EOB was 139/90  Transfers were attempted this session, and pt was able to perform sit to stand transfer with max A x 2 and a third person to block pts knees  VC needed to redirect attention  Pt continues to be self limiting and resistive with all transfer attempts  Pt was educate on the importance of mobility in the hospital and being OOB in chair as much as tolerated  Pt states that she cannot sit in the chair at this time secondary to increased pain  Pt was assisted back into bed at conclusion of PT session with all needs within reach  PT will continue to follow  DC recommendation when medically cleared is rehab due to decreased functional mobility at current time and increased assistance needed from caregiver at current time  Barriers to Discharge Decreased caregiver support; Inaccessible home environment   Goals   Patient Goals none stated    STG Expiration Date 02/16/18   Short Term Goal #1 n 10 days pt will complete: 1) Bed mobility skills with min A  2) Functional transfers with min A  3) Ambulate 20' using least restrictive AD with min A without LOB and stable vitals  4) Improve balance grades to fair 5) Improve BLE strength by 1/2 grade  6) PT for ongoing pt and family education; DME needs and D/C planning to promote highest level of function in least restrictive environment  Plan   Treatment/Interventions LE strengthening/ROM; Therapeutic exercise; Endurance training;Cognitive reorientation;Patient/family training;Equipment eval/education; Bed mobility;Gait training;Spoke to nursing;OT   Progress Slow progress, decreased activity tolerance   PT Frequency 5x/wk   Recommendation   Recommendation Short-term skilled PT   Equipment Recommended Walker   PT - OK to Discharge Yes  (to rehab when medically cleared )   Rafia Leone, PT

## 2018-02-07 PROBLEM — A41.9 SEPSIS (HCC): Status: RESOLVED | Noted: 2018-02-05 | Resolved: 2018-02-07

## 2018-02-07 PROBLEM — N17.9 AKI (ACUTE KIDNEY INJURY) (HCC): Status: RESOLVED | Noted: 2018-01-28 | Resolved: 2018-02-07

## 2018-02-07 LAB
ANION GAP SERPL CALCULATED.3IONS-SCNC: 6 MMOL/L (ref 4–13)
BUN SERPL-MCNC: 29 MG/DL (ref 5–25)
CALCIUM SERPL-MCNC: 8.9 MG/DL (ref 8.3–10.1)
CHLORIDE SERPL-SCNC: 106 MMOL/L (ref 100–108)
CO2 SERPL-SCNC: 25 MMOL/L (ref 21–32)
CREAT SERPL-MCNC: 0.74 MG/DL (ref 0.6–1.3)
GFR SERPL CREATININE-BSD FRML MDRD: 81 ML/MIN/1.73SQ M
GLUCOSE SERPL-MCNC: 257 MG/DL (ref 65–140)
GLUCOSE SERPL-MCNC: 261 MG/DL (ref 65–140)
GLUCOSE SERPL-MCNC: 266 MG/DL (ref 65–140)
GLUCOSE SERPL-MCNC: 320 MG/DL (ref 65–140)
GLUCOSE SERPL-MCNC: 366 MG/DL (ref 65–140)
GLUCOSE SERPL-MCNC: 387 MG/DL (ref 65–140)
POTASSIUM SERPL-SCNC: 4.6 MMOL/L (ref 3.5–5.3)
SODIUM SERPL-SCNC: 137 MMOL/L (ref 136–145)

## 2018-02-07 PROCEDURE — C9113 INJ PANTOPRAZOLE SODIUM, VIA: HCPCS | Performed by: SURGERY

## 2018-02-07 PROCEDURE — 82948 REAGENT STRIP/BLOOD GLUCOSE: CPT

## 2018-02-07 PROCEDURE — 99232 SBSQ HOSP IP/OBS MODERATE 35: CPT | Performed by: INTERNAL MEDICINE

## 2018-02-07 PROCEDURE — 80048 BASIC METABOLIC PNL TOTAL CA: CPT | Performed by: INTERNAL MEDICINE

## 2018-02-07 RX ORDER — PANTOPRAZOLE SODIUM 40 MG/1
40 TABLET, DELAYED RELEASE ORAL 2 TIMES DAILY
Qty: 60 TABLET | Refills: 1 | Status: SHIPPED | OUTPATIENT
Start: 2018-02-07 | End: 2021-11-26

## 2018-02-07 RX ORDER — INSULIN GLARGINE 100 [IU]/ML
20 INJECTION, SOLUTION SUBCUTANEOUS EVERY 12 HOURS SCHEDULED
Status: DISCONTINUED | OUTPATIENT
Start: 2018-02-07 | End: 2018-02-08

## 2018-02-07 RX ADMIN — INSULIN LISPRO 5 UNITS: 100 INJECTION, SOLUTION INTRAVENOUS; SUBCUTANEOUS at 08:28

## 2018-02-07 RX ADMIN — DEXTROSE 100 ML/HR: 5 SOLUTION INTRAVENOUS at 16:06

## 2018-02-07 RX ADMIN — PANTOPRAZOLE SODIUM 40 MG: 40 INJECTION, POWDER, FOR SOLUTION INTRAVENOUS at 08:29

## 2018-02-07 RX ADMIN — PANTOPRAZOLE SODIUM 40 MG: 40 INJECTION, POWDER, FOR SOLUTION INTRAVENOUS at 22:06

## 2018-02-07 RX ADMIN — INSULIN LISPRO 5 UNITS: 100 INJECTION, SOLUTION INTRAVENOUS; SUBCUTANEOUS at 16:07

## 2018-02-07 RX ADMIN — INSULIN GLARGINE 20 UNITS: 100 INJECTION, SOLUTION SUBCUTANEOUS at 22:00

## 2018-02-07 RX ADMIN — SUCRALFATE 1000 MG: 1 SUSPENSION ORAL at 08:29

## 2018-02-07 RX ADMIN — INSULIN GLARGINE 18 UNITS: 100 INJECTION, SOLUTION SUBCUTANEOUS at 08:29

## 2018-02-07 RX ADMIN — MORPHINE SULFATE 2 MG: 2 INJECTION, SOLUTION INTRAMUSCULAR; INTRAVENOUS at 02:23

## 2018-02-07 RX ADMIN — DEXTROSE 100 ML/HR: 5 SOLUTION INTRAVENOUS at 21:18

## 2018-02-07 RX ADMIN — MORPHINE SULFATE 2 MG: 2 INJECTION, SOLUTION INTRAMUSCULAR; INTRAVENOUS at 15:57

## 2018-02-07 RX ADMIN — DEXTROSE 100 ML/HR: 5 SOLUTION INTRAVENOUS at 06:44

## 2018-02-07 RX ADMIN — LEVOTHYROXINE SODIUM 200 MCG: 100 TABLET ORAL at 07:40

## 2018-02-07 RX ADMIN — INSULIN LISPRO 5 UNITS: 100 INJECTION, SOLUTION INTRAVENOUS; SUBCUTANEOUS at 12:27

## 2018-02-07 RX ADMIN — SUCRALFATE 1000 MG: 1 SUSPENSION ORAL at 16:02

## 2018-02-07 NOTE — PLAN OF CARE
Problem: DISCHARGE PLANNING - CARE MANAGEMENT  Goal: Discharge to post-acute care or home with appropriate resources  INTERVENTIONS:  - Conduct assessment to determine patient/family and health care team treatment goals, and need for post-acute services based on payer coverage, community resources, and patient preferences, and barriers to discharge  - Address psychosocial, clinical, and financial barriers to discharge as identified in assessment in conjunction with the patient/family and health care team  - Arrange appropriate level of post-acute services according to patients   needs and preference and payer coverage in collaboration with the physician and health care team  - Communicate with and update the patient/family, physician, and health care team regarding progress on the discharge plan  - Arrange appropriate transportation to post-acute venues   Outcome: Progressing  Rec a call from pt's dtr Eris Spann at [phone]  She would like other referrals made since [facility] had CMS Overall Rating of 2  Her first choice is [employer]  and then [facility] [facility] at [place]  Referral made to both today

## 2018-02-07 NOTE — PROGRESS NOTES
Progress Note - [facility] [place] 67 y o  female MRN: [id number]    Unit/Bed#: E4 -01 Encounter: [id number]      Assessment/Plan:  1-sepsis:  Present on admission:  Patient presented with sepsis  This was felt to be secondary to peritonitis secondary to a perforated gastric ulcer: The patient underwent surgical repair  She completed 7 days of antibiotics  Her sepsis has resolved  -currently afebrile  No leukocytosis      2-perforated gastric ulcer: With associated peritonitis  Patient presented with a perforated gastric ulcer  Her  notes that she had GI upset for quite some time prior to admission  She underwent laproscopic repair with silvia patch                 -continue proton pump inhibitor              -NG tube removed               -tolerating p o  Diet , but has poor appetite              -no evidence of leak      3-peritonitis:  Secondary to perforated gastric ulcer  Status post laparoscopic repair and washout               -status post 1 week of postoperative antibiotics with zosyn and fluconazole               -patient is followed by the Infectious Disease team   Antibiotics discontinued after 7 day course     Continue to monitor    -currently afebrile  No leukocytosis      4-acute blood loss anemia:  Secondary to upper GI bleed secondary to perforated gastric ulcer:              -initial Hb 3's  She was transfused multiple units PRBC  -hemoglobin decreased from 8 2-6 9 on 02/05  She was transfused 2 units of packed red blood cells  Current hemoglobin has improved to 10 7  Continue to monitor                 -discussed case with Dr Yessenia Griffin  Patient has scant melanotic stool    Decrease in hemoglobin likely secondary to persistent oozing due to her thrombocytopenia/DIC                  5-diabetes type 2:  With hyperglycemia due to IV dextrose in her IV fluids, treating her hypernatremia              -patient is still hyperglycemic today      -will increase her Lantus to 18 units b i d   Continue pre meal Humalog 5 t i d   Continue sliding scale insulin             6-hypernatremia:  Patient has hyperchloremic hyponatremia  Initially had significant free water deficit as she was NPO, had an NG tube perioperatively  Continues to have minimal p o  hydration due to her generalized weakness               -continue her IV fluids at current rate  Sodium has improved to 148 today  Continue to monitor for gradual correction  Avoid rapid normalization      7-pancytopenia:  Patient presented with pancytopenia, with normal coags  Her profound anemia is likely secondary to her GI bleed from her perforated gastric ulcer  Patient however has persistent leukopenia without neutropenia, and thrombocytopenia               -she was evaluated by the hematology service who felt her pancytopenia is likely secondary to Imuran                 -pt was also noted to have DIC                 -r continues to have elevated D-dimer  Fibrinogen greater than 200  No indication for cryoprecipitate at this time  Positive FDP  Normal INR and PTT  -Continue supportive transfusions               -initial anemia with a hemoglobin in the threes  Patient was transfused packed red blood cells with improvement  Monitor for decreased  -monitor ANC:  Currently not neutropenic  If ANC< 1000 will give filgrastim 300mcg sq     8-hypothyroidism:  Continue Synthroid     9-hyperlipidemia:  Statin on hold     10-multiple sclerosis:  Imuran on hold     11-left hand cat bite:  No evidence of cellulitis  Status post 7 days of antibiotics for peritonitis     12-MZKWA metabolic encephalopathy:  Secondary to hypernatremia  Slightly improved  Cont to monitor closely     13-elevated blood presure:  Without prior h/o HTN  Most likely accelerated due to pain  Cont to monitor    bp adquate          VTE Pharmacologic Prophylaxis: RX contraindicated due to: Pancytopenia  VTE Mechanical Prophylaxis: sequential compression device    Certification Statement: The patient will continue to require additional inpatient hospital stay due to need for further acute intervention for pancytopenia, postoperative care  Will need inpatient short-term rehab    Status: inpatient     ===================================================================    Subjective:  Patient notes she has abdominal pain  She is currently requesting pain medication  She denies any nausea  She declines antiemetic therapy  His had her abdomen she denies any pain anywhere else  She denies any headache, chest pain  She denies any back pain  She denies any pain in her extremities  She denies any shortness of breath, chest congestion or cough  She notes she does not have a good appetite  Physical Exam:   Temp:  [97 5 °F (36 4 °C)-100 °F (37 8 °C)] 97 5 °F (36 4 °C)  HR:  [] 97  Resp:  [18-20] 18  BP: (150-170)/(80-86) 166/86    Gen:  Pleasant, non-tachypnic, non-dyspnic  Conversant  Heart: regular rate and rhythm, S1S2 present, no murmur, rub or gallop  Lungs: clear to ausculatation bilaterally  No wheezing, crackles, or rhonchi  No accessory muscle use or respiratory distress  Abd: soft, tender with palpation diffusely  non-distended  NABS, no guarding, rebound or peritoneal signs  Extremities: no clubbing, cyanosis or edema  2+pedal pulses bilaterally  Neuro: awake, alert  Fluent speech  Unable to lift both legs off the bed  Unable to lift arms off the bed without assistance  Skin: warm and dry: no petechiae, purpura and rash      LABS:     Results from last 7 days  Lab Units 02/06/18  0638 02/06/18  0203 02/05/18  0730   WBC Thousand/uL 3 65* 3 08* 3 04*   HEMOGLOBIN g/dL 10 7* 9 8* 6 2*   HEMATOCRIT % 32 1* 29 0* 18 8*   PLATELETS Thousands/uL 31* 28* 19*       Results from last 7 days  Lab Units 02/06/18  0643 02/06/18  0028 02/05/18  0730   SODIUM mmol/L 148* 150* 158*   POTASSIUM mmol/L 3 9 3 9 4 0 CHLORIDE mmol/L 116* 116* 125*   CO2 mmol/L 27 28 27   BUN mg/dL 31* 35* 51*   CREATININE mg/dL 0 72 0 78 0 99   GLUCOSE RANDOM mg/dL 300* 292* 210*   CALCIUM mg/dL 8 8 8 6 7 [address] [place] [address] Data:    2/2:  CT abdomen/pelvis: There is a large amount of air within the subcutaneous fat of the abdominal wall extending inferiorly into the pelvic wall   Small amount of air present in between the muscles of the right superior lateral abdominal wall   This may be iatrogenic from recent catheter insertion x2  Posterior basilar atelectatic change  Oral contrast extends to the rectum with no evidence of obstruction   Mild small and large bowel distention  Stable hypodensities within the spleen suggestive of infarctions     2/1:  Abdominal obstruction series:  1   No layering free contrast to suggest large leak   However, the study was limited for smaller or contained leaks due to retained contrast in the stomach and small bowel   This limited study was performed due to the patient's inability to undergo   diagnostic upper GI   If there is continued clinical concern for leak, a follow-up upper GI can be attempted when the patient is able   Alternatively, an additional abdomen x-ray in the a m  can be obtained to allow for passage of contrast into the colon   and better visualization of the underlying abdominal cavity   CT would also better evaluate for contained leak  2   Dilated small bowel without evidence of obstruction, likely adynamic ileus     1/29:  CT abdomen/pelvis:  Moderate pneumoperitoneum suggesting bowel perforation  Although the origin is uncertain, possibility of perforated gastric ulcer may be considered  Small amount of abdominal and pelvic ascites  No evidence for organized abscess  Mild small bowel dilatation may reflect reactive ileus  Cholelithiasis       1/29:  Chest x-ray:  No acute disease     1/31:  Blood culture:  Negative x2           ---------------------------------------------------------------------------------------------------------------  This note has been constructed using a voice recognition system

## 2018-02-07 NOTE — PROGRESS NOTES
Progress Note - General Surgery   Iveth Méndez 67 y o  female MRN: [id number]  Unit/Bed#: E4 -01 Encounter: [id number]      Subjective/Objective     Subjective: Feeling OK, very tired and weak  Denies nausea or vomiting    Objective:     /68 (BP Location: Right arm)   Pulse 94   Temp (!) 96 8 °F (36 °C) (Tympanic)   Resp 18   Ht 5' 7" (1 702 m)   Wt 77 2 kg (170 lb 3 1 oz)   SpO2 97%   BMI 26 66 kg/m²       Intake/Output Summary (Last 24 hours) at 02/07/18 1115  Last data filed at 02/07/18 0800   Gross per 24 hour   Intake             2300 ml   Output              765 ml   Net             1535 ml       Invasive Devices     Peripherally Inserted Central Catheter Line            PICC Line 98/31/23 Left Basilic 1 day          Drain            Closed/Suction Drain Left Abdomen Bulb 15 Fr  8 days    Closed/Suction Drain Right;Other (Comment) Abdomen Bulb 15 Fr  8 days                Physical Exam:       General Appearance:    Alert, cooperative, no distress, appears older   Head:    Normocephalic, without obvious abnormality, atraumatic   Nose:   Nares normal   Throat:   Lips, mucosa normal, pharynx clear   Neck:   Supple, symmetrical   Chest/Breast:   Def   Lungs:     Clear to auscultation bilaterally, respirations unlabored   Heart:    Regular rate and rhythm, S1 and S2 normal, no murmur, rub    or gallop   Abdomen:     Soft, mildly tender, COREY serous       Genitalia:    Def   Rectal:    Def   Extremities:   Extremities normal, atraumatic, no cyanosis or edema   Skin:   Skin color, texture, turgor normal, no rashes or lesions   Neurologic:   CNII-XII grossly intact  Normal strength, sensation                 Lab, Imaging and other studies:I have personally reviewed pertinent lab results  Labs in chart were reviewed      VTE Pharmacologic Prophylaxis: Reason for no pharmacologic prophylaxis anemia  VTE Mechanical Prophylaxis: sequential compression device    Assessment/Plan:  Sepsis POA - due to perforated gastric ulcer with peritonitis  S/p lap silvia patch  Continue protonix  Tolerating diet  Off ABX per ID  Non ulcer denied diet  Malnutrition- add ensure    Hypernatremia - continue free water - improving    Anemia - chronic poss due to immunosuppression - continue to transfuse as needed  No active signs of acute blood loss  Thrombocytopenia - - continue to transfuse as needed  Counts improved -blood work this a m  awaiting repeat    DC planning -case management with referrals out awaiting approval      Patient Active Problem List   Diagnosis    [facility]    Type 2 diabetes mellitus ([facility]    Hyperglycemia due to type 2 diabetes mellitus ([facility]    BEVERLY (acute kidney injury) ([facility]    Hypoalbuminemia due to protein-calorie malnutrition ([facility] )    Gastric ulcer    Sepsis ([facility]    Acute perforated gastric ulcer with hemorrhage ([facility]    Peritonitis ([facility]    Acute blood loss anemia    Hypernatremia    Hypothyroidism    Hyperlipidemia    Multiple sclerosis ([facility]    Metabolic encephalopathy    D[facility]seminated intrav[facility] coagulation) [facility]            This text is generated with voice recognition software  There may be translation, syntax,  or grammatical errors  If you have any questions, please contact the dictating provider

## 2018-02-07 NOTE — PROGRESS NOTES
Progress Note - April Diaz 67 y o  female MRN: [id number]    Unit/Bed#: E4 -01 Encounter: [id number]      Assessment/Plan:  1-sepsis:  Present on admission:  Patient presented with sepsis   This was felt to be secondary to peritonitis secondary to a perforated gastric ulcer:  The patient underwent surgical repair   She completed 7 days of antibiotics   Her sepsis has resolved  -currently afebrile  No leukocytosis      2-perforated gastric ulcer:  With associated peritonitis   Patient presented with a perforated gastric ulcer  Chantal Waddell  notes that she had GI upset for quite some time prior to admission   She underwent laproscopic repair with silvia patch                 -continue proton pump inhibitor              -NG tube removed               -tolerating p o  Diet , but has poor appetite              -no evidence of leak      3-peritonitis:  Secondary to perforated gastric ulcer   Status post laparoscopic repair and washout               -status post 1 week of postoperative antibiotics with zosyn and fluconazole               -ODQVWQ is followed by the Infectious Disease team   Antibiotics discontinued after 7 day course  Mauro Shown to monitor               -currently afebrile  No leukocytosis      4-acute blood loss anemia:  Secondary to upper GI bleed secondary to perforated gastric ulcer:              -initial Hb 3's   She was transfused multiple units PRBC                 -ICASIROEYD decreased from 8 2-6 9 on 02/05  She was transfused 2 units of packed red blood cells  Current hemoglobin has improved to 10 7, as of yesterday  Repeat hemoglobin still pending today                 -FNLIYGS has scant melanotic stool   Decrease in hemoglobin likely secondary to persistent oozing due to her thrombocytopenia/DIC                 5-diabetes type 2:  With hyperglycemia due to IV dextrose in her IV fluids, treating her hypernatremia              -patient is still hyperglycemic today  -will increase her Lantus again to 20 units b i d  b i d   Continue pre meal Humalog 5 t i d   Continue sliding scale insulin           -anticipate her insulin requirement will decrease when she is off the dextrose IV fluids      6-hypernatremia:  Patient has hyperchloremic hyponatremia   Initially had significant free water deficit as she was NPO, had an NG tube perioperatively   Continues to have minimal p o  hydration due to her generalized weakness               -continue her IV fluids at current rate  Awaiting repeat sodium today     Continue to monitor for gradual correction  Avoid rapid normalization      7-pancytopenia:  Patient presented with pancytopenia, with normal coags   Her profound anemia is likely secondary to her GI bleed from her perforated gastric ulcer   Patient however has persistent leukopenia without neutropenia, and thrombocytopenia              -CGD was evaluated by the hematology service who felt her pancytopenia is likely secondary to Imuran                 -pt was also noted to have DIC                 -continues to have elevated D-dimer  Fibrinogen greater than 200  No indication for cryoprecipitate at this time  Positive FDP  Normal INR and PTT              -Continue supportive transfusions              -initial anemia with a hemoglobin in the threes   Patient was transfused packed red blood cells with improvement  Monitor for decreased               -monitor ANC:  Currently not neutropenic   If ANC< 1000 will give filgrastim 300mcg sq   -platelets had improved yesterday  Await final recheck today     8-hypothyroidism:  Continue Synthroid     9-hyperlipidemia:  Statin on hold     10-multiple sclerosis:  Imuran on hold     11-left hand cat bite:  No evidence of cellulitis   Status post 7 days of antibiotics for peritonitis     44-WWAVE metabolic encephalopathy:  Secondary to hypernatremia   Slightly improved   Cont to monitor closely       13-elevated blood presure:  Without prior h/o HTN   Most likely accelerated due to pain   Cont to monitor   bp adquate    14-profound weakness:  Patient has generalized profound weakness:  Most likely secondary to her critical illness and prolonged hospitalization    -continue physical therapy    15-left upper extremity edema:  Check ultrasound to evaluate for DVT at PICC site    16-family:   Updated daughter Veronica Ferrari via phone  She will update rest of family    D/w dr Janis Sanders  D/w pt's nurse    VTE Pharmacologic Prophylaxis: RX contraindicated due to: ABLA, thrombocytopenia  VTE Mechanical Prophylaxis: sequential compression device    Certification Statement: The patient will continue to require additional inpatient hospital stay due to need for further acute intervention for ABLA  Hypernatremia requiring ivf    Status: inpatient     ===================================================================    Subjective:  Patient relates she is tired  She denies any current pain anywhere  She notes previously she had abdominal pain however none currently  She denies any nausea or vomiting  She notes she is tolerating p o  but does not have a good appetite  She denies any shortness of breath or cough  She denies any pain anywhere else  She notes generalized weakness and fatigue  She notes she feels very tired  Physical Exam:   Temp:  [96 8 °F (36 °C)-97 8 °F (36 6 °C)] 96 8 °F (36 °C)  HR:  [] 94  Resp:  [18] 18  BP: (139-146)/(68-70) 139/68    Gen:  Pleasant, non-tachypnic, non-dyspnic  Conversant  Slightly somnolent but awakens to voice  Is then communicative, and answers questions and follows commands  Heart: regular rate and rhythm, S1S2 present, no murmur, rub or gallop  Lungs: clear to ausculatation bilaterally  No wheezing, crackles, or rhonchi  No accessory muscle use or respiratory distress  Abd: soft, mildly tender with palpation diffusely  non-distended   NABS, no guarding, rebound or peritoneal signs   Extremities: no clubbing, cyanosis  Trace edema of right hand  1+ edema of left hand and left forearm  2+ radial pulses  2+pedal pulses bilaterally  Neuro: awake, alert  Fluent speech  Answers questions with yes, or no  Bilateral  strength 5/5  Left biceps strength 2/5  Right biceps 3/5  Bilateral ankle flexion/extension 4/5  Hip flexion 0/5  Skin: warm and dry: no petechiae, purpura and rash  LABS:     Results from last 7 days  Lab Units 02/06/18  0638 02/06/18  0203 02/05/18  0730   WBC Thousand/uL 3 65* 3 08* 3 04*   HEMOGLOBIN g/dL 10 7* 9 8* 6 2*   HEMATOCRIT % 32 1* 29 0* 18 8*   PLATELETS Thousands/uL 31* 28* 19*       Results from last 7 days  Lab Units 02/06/18  0643 02/06/18  0028 02/05/18  0730   SODIUM mmol/L 148* 150* 158*   POTASSIUM mmol/L 3 9 3 9 4 0   CHLORIDE mmol/L 116* 116* 125*   CO2 mmol/L 27 28 27   BUN mg/dL 31* 35* 51*   CREATININE mg/dL 0 72 0 78 0 99   GLUCOSE RANDOM mg/dL 300* 292* 210*   CALCIUM mg/dL 8 8 8 6 7 [address] [place] [address] Data:  2/2:  CT abdomen/pelvis: There is a large amount of air within the subcutaneous fat of the abdominal wall extending inferiorly into the pelvic wall   Small amount of air present in between the muscles of the right superior lateral abdominal wall   This may be iatrogenic from recent catheter insertion x2   Posterior basilar atelectatic change   Oral contrast extends to the rectum with no evidence of obstruction   Mild small and large bowel distention    Stable hypodensities within the spleen suggestive of infarctions     2/1:  Abdominal obstruction series:  1   No layering free contrast to suggest large leak   However, the study was limited for smaller or contained leaks due to retained contrast in the stomach and small bowel   This limited study was performed due to the patient's inability to undergo   diagnostic upper GI   If there is continued clinical concern for leak, a follow-up upper GI can be attempted when the patient is able   Alternatively, an additional abdomen x-ray in the a m  can be obtained to allow for passage of contrast into the colon   and better visualization of the underlying abdominal cavity   CT would also better evaluate for contained leak  2   Dilated small bowel without evidence of obstruction, likely adynamic ileus     1/29:  CT abdomen/pelvis:  Moderate pneumoperitoneum suggesting bowel perforation  Although the origin is uncertain, possibility of perforated gastric ulcer may be considered  Small amount of abdominal and pelvic ascites  No evidence for organized abscess  Mild small bowel dilatation may reflect reactive ileus  Cholelithiasis     1/29:  Chest x-ray:  No acute disease     1/31:  Blood culture:  Negative x2          ---------------------------------------------------------------------------------------------------------------  This note has been constructed using a voice recognition system

## 2018-02-07 NOTE — PROGRESS NOTES
Noted that LUE was more swollen than at the time of am assessment  LUE warm to touch, red, +3 edema  Both ports of PICC flushed well; however, unable to get blood return on either  Unable to draw labs at the time  Dr Chanda Lawson notified, IR consult to check placement obtained  Received call from IR stating PICC placement was just recently verified  Dr Chanda Lawson notified once more, order for VAS upper limb duplex obtained  Labs drawn peripherally, R peripheral IV placed to be used for medication and continuous fluids   Will CTM

## 2018-02-07 NOTE — SOCIAL WORK
Rec a call from pt's dtr Luis Patterson at [phone]  She would like other referrals made since [facility] had CMS Overall Rating of 2  Her first choice is [facility]  and then [facility] [place]  Referral made to both today

## 2018-02-07 NOTE — PROGRESS NOTES
Pt sleep, easily awaken, changed drainage dressing, no complaint of pain, family visited, reviewed/agree with AM assessments

## 2018-02-07 NOTE — PROGRESS NOTES
Progress Note - Infectious Disease   Sumanth Hernandez 67 y o  female MRN: [id number]  Unit/Bed#: E4 -01 Encounter: [id number]      Impression/Plan:  1   Peritonitis-secondary to perforation of a gastric ulceration with contamination of the peritoneum with gastric contacts  Adore Curtis is now status post laparoscopic repair with washout with drains left in place   Consider patient for the possibility of more resistant organisms and yeast secondary infection as the patient had been Augmentin at the time of the perforation   Fortunately the patient has now undergone adequate source control   She has completed 1 week of antibiotics postop  No recurrence of any fever  -monitor off all antibiotic  -close surgical follow-up  -serial abdominal exams  -monitor CBC with diff and creatinine  -incentive spirometry     2   Pancytopenia-likely secondary to the Imuran   Possibly another etiology   Patient has not neutropenic  Natasha Patelnis has been transfused and resuscitated for the severe anemia   The cell counts have improved a bit   Patient has received platelet transfusion yesterday   The patient remains quite pancytopenic   The neutrophil count remains greater than 1000 as of yesterday  No labs done today  -no additional Imuran or Zosyn for now  -hematology oncology follow-up  -transfusion is needed  -G-CSF if the neutrophil count is less than 1000     3   Chronic blood loss anemia-secondary to gastric ulceration   No labs done today   -monitor CBC with diff  -continue Protonix  -GI follow-up     4   Diabetes mellitus-type 2 with hyperglycemia     5   Multiple sclerosis-apparently on Imuran for a while   Seems to be neurologically stable  -will need close neurology follow-up  -would hold on Imuran for now     6   Cat bite-left hand   No overt cellulitis at this time   Which should be well covered by the antibiotics as above   The cat is being watched at home without any behavioral abnormalities   The hand is significantly improved with decreased edema   -no additional antibiotic  -serial exams  -continue monitoring of the cat at home     7   Hypernatremia-patient with a large free water deficit  The hypernatremia has improved significantly although no labs were done today   -monitor BMP as per the primary  -free water replacement as per the primary     8   GI bleed-in the setting of her recently repaired perforated peptic ulcer   Patient has been transfused   No recurrence of the GI bleed and CT of the abdomen and pelvis did not reveal source  -monitor CBC with diff  -no additional ID workup for now     9  Disposition-possible discharge to rehab soon  Antibiotics:  None    Subjective:  Patient has no fever, chills, sweats; no nausea, vomiting, diarrhea; no cough, shortness of breath; no increased pain  No new symptoms  She says she is feeling better  Objective:  Vitals:  HR:  [] 94  Resp:  [18] 18  BP: (139-166)/(68-86) 139/68  SpO2:  [97 %-99 %] 97 %  Temp (24hrs), Av 4 °F (36 3 °C), Min:96 8 °F (36 °C), Max:97 8 °F (36 6 °C)  Current: Temperature: (!) 96 8 °F (36 °C)    Physical Exam:   General Appearance:  Alert, interactive, nontoxic, no acute distress  Throat: Oropharynx dry without lesions  Lungs:   Clear to auscultation anteriorly; no wheezes, rhonchi or rales; respirations unlabored   Heart:  RRR; no murmur, rub or gallop   Abdomen:   Soft, mildly tender, non-distended, positive bowel sounds  Extremities: No clubbing, cyanosis  Left hand with decreased edema  Left hand with no erythema   Skin: No new rashes or lesions  No draining wounds noted         Labs, Imaging, & Other studies:   All pertinent labs and imaging studies were personally reviewed    Results from last 7 days  Lab Units 18  0638 18  0203 18  0730   WBC Thousand/uL 3 65* 3 08* 3 04*   HEMOGLOBIN g/dL 10 7* 9 8* 6 2*   PLATELETS Thousands/uL 31* 28* 19*       Results from last 7 days  Lab Units 18  7930 02/06/18  0028 02/05/18  1206  02/05/18  0730   SODIUM mmol/L 148* 150*  --   --  158*   POTASSIUM mmol/L 3 9 3 9  --   --  4 0   CHLORIDE mmol/L 116* 116*  --   --  125*   CO2 mmol/L 27 28  --   --  27   ANION GAP mmol/L 5 6  --   --  6   BUN mg/dL 31* 35*  --   --  51*   CREATININE mg/dL 0 72 0 78  --   --  0 99   EGFR ml/min/1 73sq m 84 76  --   --  57   GLUCOSE RANDOM mg/dL 300* 292*  --   --  210*   CALCIUM mg/dL 8 8 8 6  --   --  7 5*   AST U/L 22  --  22  --   --    ALT U/L 19  --  20  < >  --    ALK PHOS U/L 53  --  42*  < >  --    TOTAL PROTEIN g/dL 4 6*  --  4 2*  < >  --    BILIRUBIN TOTAL mg/dL 0 68  --  0 35  < >  --    < > = values in this interval not displayed

## 2018-02-07 NOTE — PROGRESS NOTES
Progress Note - General Surgery   Christelle Hitchcock 67 y o  female MRN: [id number]  Unit/Bed#: E4 -01 Encounter: [id number]      Subjective/Objective     Subjective: Feeling OK, weak and some abominal pain  Tolerating diet    Objective:     /86 (BP Location: Right arm)   Pulse 97   Temp 97 5 °F (36 4 °C) (Temporal)   Resp 18   Ht 5' 7" (1 702 m)   Wt 77 2 kg (170 lb 3 1 oz)   SpO2 98%   BMI 26 66 kg/m²       Intake/Output Summary (Last 24 hours) at 02/06/18 1918  Last data filed at 02/06/18 1700   Gross per 24 hour   Intake              890 ml   Output              860 ml   Net               30 ml       Invasive Devices     Peripherally Inserted Central Catheter Line            PICC Line 61/69/57 Left Basilic 1 day          Drain            Closed/Suction Drain Left Abdomen Bulb 15 Fr  8 days    Closed/Suction Drain Right;Other (Comment) Abdomen Bulb 15 Fr  8 days                Physical Exam:       General Appearance:    Alert, cooperative, no distress, appears older   Head:    Normocephalic, without obvious abnormality, atraumatic   Nose:   Nares normal   Throat:   Lips, mucosa normal, pharynx clear   Neck:   Supple, symmetrical   Chest/Breast:   Def   Lungs:     Clear to auscultation bilaterally, respirations unlabored   Heart:    Regular rate and rhythm, S1 and S2 normal, no murmur, rub    or gallop   Abdomen:     Soft, mildly tender, COREY serous       Genitalia:    Def   Rectal:    Def   Extremities:   Extremities normal, atraumatic, no cyanosis or edema   Skin:   Skin color, texture, turgor normal, no rashes or lesions   Neurologic:   CNII-XII grossly intact  Normal strength, sensation                 Lab, Imaging and other studies:I have personally reviewed pertinent lab results  Labs in chart were reviewed      VTE Pharmacologic Prophylaxis: Reason for no pharmacologic prophylaxis anemia  VTE Mechanical Prophylaxis: sequential compression device    Assessment/Plan:  Sepsis POA - due to perforated gastric ulcer with peritonitis  S/p lap silvia patch  Continue protonix  Tolerating diet  Off ABX per ID  Fulls  Malnutrition- add ensure    Hypernatremia - continue free water - improving    Anemia - chronic poss due to immunosuppression - continue to transfuse as needed  No active signs of acute blood loss  Thrombocytopenia - - continue to transfuse as needed  Counts improved  DC planning      Patient Active Problem List   Diagnosis    [facility]    Type 2 diabetes mellitus ([facility]    Hyperglycemia due to type 2 diabetes mellitus ([address]    BEVERLY (acute kidney injury) ([address]    Hypoalbuminemia due to protein-calorie malnutrition (HCC)    Gastric ulcer    Sepsis ([address]    Acute perforated gastric ulcer with hemorrhage ([address]    Peritonitis ([address]    Acute blood loss anemia    Hypernatremia    Hypothyroidism    Hyperlipidemia    Multiple sclerosis (HCC)    Metabolic encephalopathy    DIC (disseminated intravascular coagulation) ([address] )            This text is generated with voice recognition software  There may be translation, syntax,  or grammatical errors  If you have any questions, please contact the dictating provider

## 2018-02-08 ENCOUNTER — APPOINTMENT (INPATIENT)
Dept: RADIOLOGY | Facility: HOSPITAL | Age: 73
DRG: 853 | End: 2018-02-08
Payer: MEDICARE

## 2018-02-08 ENCOUNTER — APPOINTMENT (INPATIENT)
Dept: NON INVASIVE DIAGNOSTICS | Facility: HOSPITAL | Age: 73
DRG: 853 | End: 2018-02-08
Payer: MEDICARE

## 2018-02-08 LAB
ANION GAP SERPL CALCULATED.3IONS-SCNC: 6 MMOL/L (ref 4–13)
BUN SERPL-MCNC: 22 MG/DL (ref 5–25)
CALCIUM SERPL-MCNC: 8.6 MG/DL (ref 8.3–10.1)
CHLORIDE SERPL-SCNC: 102 MMOL/L (ref 100–108)
CO2 SERPL-SCNC: 26 MMOL/L (ref 21–32)
CREAT SERPL-MCNC: 0.74 MG/DL (ref 0.6–1.3)
ERYTHROCYTE [DISTWIDTH] IN BLOOD BY AUTOMATED COUNT: 14.4 % (ref 11.6–15.1)
GFR SERPL CREATININE-BSD FRML MDRD: 81 ML/MIN/1.73SQ M
GLUCOSE SERPL-MCNC: 125 MG/DL (ref 65–140)
GLUCOSE SERPL-MCNC: 244 MG/DL (ref 65–140)
GLUCOSE SERPL-MCNC: 248 MG/DL (ref 65–140)
GLUCOSE SERPL-MCNC: 249 MG/DL (ref 65–140)
GLUCOSE SERPL-MCNC: 281 MG/DL (ref 65–140)
HCT VFR BLD AUTO: 27.1 % (ref 34.8–46.1)
HGB BLD-MCNC: 9.2 G/DL (ref 11.5–15.4)
MCH RBC QN AUTO: 30 PG (ref 26.8–34.3)
MCHC RBC AUTO-ENTMCNC: 33.9 G/DL (ref 31.4–37.4)
MCV RBC AUTO: 88 FL (ref 82–98)
PLATELET # BLD AUTO: 18 THOUSANDS/UL (ref 149–390)
POTASSIUM SERPL-SCNC: 3.8 MMOL/L (ref 3.5–5.3)
RBC # BLD AUTO: 3.07 MILLION/UL (ref 3.81–5.12)
SODIUM SERPL-SCNC: 134 MMOL/L (ref 136–145)
WBC # BLD AUTO: 2.84 THOUSAND/UL (ref 4.31–10.16)

## 2018-02-08 PROCEDURE — 99233 SBSQ HOSP IP/OBS HIGH 50: CPT | Performed by: INTERNAL MEDICINE

## 2018-02-08 PROCEDURE — 80048 BASIC METABOLIC PNL TOTAL CA: CPT | Performed by: INTERNAL MEDICINE

## 2018-02-08 PROCEDURE — 93971 EXTREMITY STUDY: CPT | Performed by: SURGERY

## 2018-02-08 PROCEDURE — 71045 X-RAY EXAM CHEST 1 VIEW: CPT

## 2018-02-08 PROCEDURE — 87040 BLOOD CULTURE FOR BACTERIA: CPT | Performed by: INTERNAL MEDICINE

## 2018-02-08 PROCEDURE — 82948 REAGENT STRIP/BLOOD GLUCOSE: CPT

## 2018-02-08 PROCEDURE — P9037 PLATE PHERES LEUKOREDU IRRAD: HCPCS

## 2018-02-08 PROCEDURE — C9113 INJ PANTOPRAZOLE SODIUM, VIA: HCPCS | Performed by: SURGERY

## 2018-02-08 PROCEDURE — 93971 EXTREMITY STUDY: CPT

## 2018-02-08 PROCEDURE — 85027 COMPLETE CBC AUTOMATED: CPT | Performed by: INTERNAL MEDICINE

## 2018-02-08 RX ORDER — ACETAMINOPHEN 325 MG/1
650 TABLET ORAL EVERY 4 HOURS PRN
Status: DISCONTINUED | OUTPATIENT
Start: 2018-02-08 | End: 2018-02-26 | Stop reason: HOSPADM

## 2018-02-08 RX ORDER — SODIUM CHLORIDE 450 MG/100ML
100 INJECTION, SOLUTION INTRAVENOUS CONTINUOUS
Status: DISCONTINUED | OUTPATIENT
Start: 2018-02-08 | End: 2018-02-09

## 2018-02-08 RX ORDER — INSULIN GLARGINE 100 [IU]/ML
15 INJECTION, SOLUTION SUBCUTANEOUS EVERY 12 HOURS SCHEDULED
Status: DISCONTINUED | OUTPATIENT
Start: 2018-02-08 | End: 2018-02-09

## 2018-02-08 RX ADMIN — PANTOPRAZOLE SODIUM 40 MG: 40 INJECTION, POWDER, FOR SOLUTION INTRAVENOUS at 09:54

## 2018-02-08 RX ADMIN — INSULIN LISPRO 5 UNITS: 100 INJECTION, SOLUTION INTRAVENOUS; SUBCUTANEOUS at 18:10

## 2018-02-08 RX ADMIN — SUCRALFATE 1000 MG: 1 SUSPENSION ORAL at 09:54

## 2018-02-08 RX ADMIN — INSULIN LISPRO 5 UNITS: 100 INJECTION, SOLUTION INTRAVENOUS; SUBCUTANEOUS at 09:55

## 2018-02-08 RX ADMIN — LEVOTHYROXINE SODIUM 200 MCG: 100 TABLET ORAL at 06:42

## 2018-02-08 RX ADMIN — INSULIN LISPRO 5 UNITS: 100 INJECTION, SOLUTION INTRAVENOUS; SUBCUTANEOUS at 13:40

## 2018-02-08 RX ADMIN — SODIUM CHLORIDE 100 ML/HR: 0.45 INJECTION, SOLUTION INTRAVENOUS at 18:58

## 2018-02-08 RX ADMIN — INSULIN GLARGINE 15 UNITS: 100 INJECTION, SOLUTION SUBCUTANEOUS at 21:04

## 2018-02-08 RX ADMIN — MORPHINE SULFATE 2 MG: 2 INJECTION, SOLUTION INTRAMUSCULAR; INTRAVENOUS at 21:04

## 2018-02-08 RX ADMIN — ACETAMINOPHEN 650 MG: 325 TABLET, FILM COATED ORAL at 21:02

## 2018-02-08 RX ADMIN — INSULIN GLARGINE 20 UNITS: 100 INJECTION, SOLUTION SUBCUTANEOUS at 09:54

## 2018-02-08 RX ADMIN — MORPHINE SULFATE 2 MG: 2 INJECTION, SOLUTION INTRAMUSCULAR; INTRAVENOUS at 01:56

## 2018-02-08 RX ADMIN — MORPHINE SULFATE 2 MG: 2 INJECTION, SOLUTION INTRAMUSCULAR; INTRAVENOUS at 13:44

## 2018-02-08 RX ADMIN — SUCRALFATE 1000 MG: 1 SUSPENSION ORAL at 18:10

## 2018-02-08 RX ADMIN — PANTOPRAZOLE SODIUM 40 MG: 40 INJECTION, POWDER, FOR SOLUTION INTRAVENOUS at 21:04

## 2018-02-08 RX ADMIN — DEXTROSE 100 ML/HR: 5 SOLUTION INTRAVENOUS at 18:19

## 2018-02-08 NOTE — PLAN OF CARE
Problem: DISCHARGE PLANNING - CARE MANAGEMENT  Goal: Discharge to post-acute care or home with appropriate resources  INTERVENTIONS:  - Conduct assessment to determine patient/family and health care team treatment goals, and need for post-acute services based on payer coverage, community resources, and patient preferences, and barriers to discharge  - Address psychosocial, clinical, and financial barriers to discharge as identified in assessment in conjunction with the patient/family and health care team  - Arrange appropriate level of post-acute services according to patients   needs and preference and payer coverage in collaboration with the physician and health care team  - Communicate with and update the patient/family, physician, and health care team regarding progress on the discharge plan  - Arrange appropriate transportation to post-acute venues   Outcome: Progressing  TC to dtr and informed her [facility] will not have a bed, but [facility] would [facility] a bed and [facility]  Dtr has selected [facility]\""  Sent [facility] that this is family choice

## 2018-02-08 NOTE — PROGRESS NOTES
Progress Note - Chrystine Postal 67 y o  female MRN: [id number]    Unit/Bed#: E4 -01 Encounter: [id number]      Assessment/Plan:  1-sepsis:  Present on admission:  Patient presented with sepsis   This was felt to be secondary to peritonitis secondary to a perforated gastric ulcer:  The patient underwent surgical repair   She completed 7 days of antibiotics   Her sepsis has resolved               -currently afebrile   No leukocytosis      2-perforated gastric ulcer:  With associated peritonitis   Patient presented with a perforated gastric ulcer  Vitaliy Appiah  notes that she had GI upset for quite some time prior to admission   She underwent laproscopic repair with silvia patch                 -continue proton pump inhibitor              -NG tube removed               -tolerating p o  Diet , but has poor appetite              -no evidence of leak      3-peritonitis:  Secondary to perforated gastric ulcer   Status post laparoscopic repair and washout               -status post 1 week of postoperative antibiotics with zosyn and fluconazole               -PEWORUB is followed by the Infectious Disease team   Antibiotics discontinued after 7 day course  Ty Canter to monitor               -currently afebrile   No leukocytosis      4-acute blood loss anemia:  Secondary to upper GI bleed secondary to perforated gastric ulcer:              -initial Hb 3's   She was transfused multiple units PRBC                 -AEWWKVWMUO decreased from 8 2-6 9 on 02/05   She was transfused 2 units of packed red blood cells  Subsequent hemoglobin had improved to 10 7  Is 9 2 today  Monitor for need for future transfusions                 5-diabetes type 2:  With hyperglycemia due to IV dextrose in her IV fluids, treating her hypernatremia             -increase her Lantus again to 20 units b i d  b i d     Continue pre meal Humalog 5 t i d     Continue sliding scale insulin                      -patient's Accu-Cheks today were 281, 248  Improved on her higher dose of Lantus      6-hypernatremia:  Patient had hyperchloremic hyponatremia   This was felt to be secondary to free water deficit  Her normal filling which changed to half-normal saline, and then dextrose  Patient's hypernatremia has improved  -will change her fluids back to half normal saline  Patient is currently not tolerating enough p o  to remain hydrated      7-pancytopenia:  Patient presented with pancytopenia, with normal coags   Her profound anemia is likely secondary to her GI bleed from her perforated gastric ulcer   Patient however has persistent leukopenia without neutropenia, and thrombocytopenia              -GVX was evaluated by the hematology service who felt her pancytopenia is likely secondary to Imuran                 -pt was also noted to have DIC                 -continues to have elevated D-dimer   Fibrinogen greater than 200   No indication for cryoprecipitate at this time   Positive FDP   Normal INR and PTT              -Continue supportive transfusions              -initial anemia with a hemoglobin in the threes   Patient was transfused packed red blood cells with improvement   Monitor for decreased               -monitor ANC:  Currently not neutropenic   If ANC< 1000 will give filgrastim 300mcg sq              -patient was transfused 1 group platelets today  Will continue to monitor      8-hypothyroidism:  Continue Synthroid     9-hyperlipidemia:  Statin on hold     10-multiple sclerosis:  Imuran on hold     11-left hand cat bite:  No evidence of cellulitis   Status post 7 days of antibiotics for peritonitis     53-TFTFQ metabolic encephalopathy:  Secondary to hypernatremia   Slightly improved   Cont to monitor closely     13-elevated blood presure:  Without prior h/o HTN    Most likely accelerated due to pain   Cont to monitor   bp adquate     14-profound weakness:  Patient has generalized profound weakness:  Most likely secondary to her critical illness and prolonged hospitalization               -continue physical therapy     15-left upper extremity DVT:  Reviewed with Dr Lashay Thornton  Will remove PICC line  Unfortunately patient may not be on anticoagulation due to DIC/anemia/thrombocytopenia/bloody stool  16-family:   Updated daughter Ghanshyam Ambrocio      D/w dr Puneet Pino  D/w pt's nurse  D/w dr Alejandro Stewart     VTE Pharmacologic Prophylaxis: RX contraindicated due to: ABLA, thrombocytopenia  VTE Mechanical Prophylaxis: sequential compression device    Certification Statement: The patient will continue to require additional inpatient hospital stay due to need for further acute intervention for pancytopenia, hypernatremia    Status: inpatient     Total time 35 min    ===================================================================    Subjective:  Patient denies any current pain anywhere  She notes she did get pain medication for abdominal pain, which has alleviated her pain  She denies any shortness of breath  She denies any cough  She denies any nausea or vomiting  She notes she does not have a good appetite  She complains of generalized weakness    Physical Exam:   Temp:  [96 2 °F (35 7 °C)-99 °F (37 2 °C)] 99 °F (37 2 °C)  HR:  [] 97  Resp:  [18-20] 20  BP: (126-147)/(61-68) 147/61    Gen:  Pleasant, non-tachypnic, non-dyspnic  Conversant  Smiling and joking  Heart: regular rate and rhythm, S1S2 present, no murmur, rub or gallop  Lungs:  Decreased breath sounds both bases, however otherwise clear to ausculatation bilaterally  No wheezing, crackles, or rhonchi  No accessory muscle use or respiratory distress  Abd: soft, mildly tender with palpation diffusely  non-distended  NABS, no guarding, rebound or peritoneal signs  Extremities: no clubbing, cyanosis  1+ left upper extremity edema distal to the elbow  2+ left radial pulses   Neuro: awake, alert  Fluent speech  Unable to hold up both arms off the bed against gravity    Skin: warm and dry: no petechiae, purpura and rash  LABS:     Results from last 7 days  Lab Units 02/08/18  0633 02/06/18  0638 02/06/18  0203   WBC Thousand/uL 2 84* 3 65* 3 08*   HEMOGLOBIN g/dL 9 2* 10 7* 9 8*   HEMATOCRIT % 27 1* 32 1* 29 0*   PLATELETS Thousands/uL 18* 31* 28*       Results from last 7 days  Lab Units 02/08/18  0633 02/07/18  1551 02/06/18  0643   SODIUM mmol/L 134* 137 148*   POTASSIUM mmol/L 3 8 4 6 3 9   CHLORIDE mmol/L 102 106 116*   CO2 mmol/L 26 25 27   BUN mg/dL 22 29* 31*   CREATININE mg/dL 0 74 0 74 0 72   GLUCOSE RANDOM mg/dL 244* 261* 300*   CALCIUM mg/dL 8 6 8 9 8 8       Hospital Data:  2/2:  CT abdomen/pelvis: There is a large amount of air within the subcutaneous fat of the abdominal wall extending inferiorly into the pelvic wall   Small amount of air present in between the muscles of the right superior lateral abdominal wall   This may be iatrogenic from recent catheter insertion x2   Posterior basilar atelectatic change   Oral contrast extends to the rectum with no evidence of obstruction   Mild small and large bowel distention  Stable hypodensities within the spleen suggestive of infarctions     2/1:  Abdominal obstruction series:  1   No layering free contrast to suggest large leak   However, the study was limited for smaller or contained leaks due to retained contrast in the stomach and small bowel   This limited study was performed due to the patient's inability to undergo   diagnostic upper GI   If there is continued clinical concern for leak, a follow-up upper GI can be attempted when the patient is able   Alternatively, an additional abdomen x-ray in the a m  can be obtained to allow for passage of contrast into the colon   and better visualization of the underlying abdominal cavity   CT would also better evaluate for contained leak    2   Dilated small bowel without evidence of obstruction, likely adynamic ileus     1/29:  CT abdomen/pelvis:  Moderate pneumoperitoneum suggesting bowel perforation  Although the origin is uncertain, possibility of perforated gastric ulcer may be considered  Small amount of abdominal and pelvic ascites  No evidence for organized abscess  Mild small bowel dilatation may reflect reactive ileus  Cholelithiasis     1/29:  Chest x-ray:  No acute disease     1/31:  Blood culture:  Negative x2    2/8:  Left upper extremity ultrasound:  LEFT UPPER LIMB: Abnormal  (LIMITED)  There is acute deep vein thrombosis in the axillary vein  There is acute superficial thrombophlebitis in the visualized cephalic vein in  the mid and proximal upper arm  Doppler evaluation shows a normal response to augmentation maneuvers        ---------------------------------------------------------------------------------------------------------------  This note has been constructed using a voice recognition system

## 2018-02-08 NOTE — SOCIAL WORK
MD feels pt may benefit from LTAC  Referral made to [facility] since pt lives in [place] and this maybe closer for family  This has NOT been discuss with pt or family yet

## 2018-02-08 NOTE — PHYSICAL THERAPY NOTE
PT YOKO    Spoke with Nsg regarding seeing pt for PT Rx this PM   Nsg indicated to hold pt Rx this afternoon as pt with new Dx of LUE DVT  Will attempt to see pt tomorrow  Radha Lozano, PTA

## 2018-02-08 NOTE — PROGRESS NOTES
Progress Note - Infectious Disease   Na Sethi 67 y o  female MRN: [id number]  Unit/Bed#: E4 -01 Encounter: [id number]      Impression/Plan:  1   Peritonitis-secondary to perforation of a gastric ulceration with contamination of the peritoneum with gastric contacts  Velia Abreu is now status post laparoscopic repair with washout with drains left in place   Consider patient for the possibility of more resistant organisms and yeast secondary infection as the patient had been Augmentin at the time of the perforation   Fortunately the patient has now undergone adequate source control   She has completed 1 week of antibiotics postop   No recurrence of any fever  -monitor off all antibiotic  -close surgical follow-up  -drain management  -serial abdominal exams  -monitor CBC with diff and creatinine  -incentive spirometry     2   Pancytopenia-likely secondary to the Imuran   Possibly another etiology   Patient has not neutropenic  Kaecarlos Ashby has been transfused and resuscitated for the severe anemia   The cell counts have improved a bit   Patient has received platelet transfusion yesterday   The patient remains quite pancytopenic   The neutrophil count remains greater than 1000 as of yesterday  The white count and the platelet count dropped since yesterday   -no additional Imuran or Zosyn for now  -hematology oncology follow-up  -recheck CBC with diff tomorrow  -transfusion is needed  -G-CSF if the neutrophil count is less than 1000     3   Chronic blood loss anemia-secondary to gastric ulceration   No labs done today   -monitor CBC with diff  -continue Protonix  -GI follow-up     4   Diabetes mellitus-type 2 with hyperglycemia     5   Multiple sclerosis-apparently on Imuran for a while   Seems to be neurologically stable  -will need close neurology follow-up  -would hold on Imuran for now     6   Cat bite-left hand   No overt cellulitis at this time   Which should be well covered by the antibiotics as above   The cat is being watched at home without any behavioral abnormalities   The hand is significantly improved with decreased edema   -no additional antibiotic  -serial exams  -continue monitoring of the cat at home     7   Hypernatremia-patient with a large free water deficit  the hypernatremia now seems to have resolved  -monitor BMP as per the primary  -free water replacement as per the primary     8   GI bleed-in the setting of her recently repaired perforated peptic ulcer   Patient has been transfused   No recurrence of the GI bleed and CT of the abdomen and pelvis did not reveal source  -monitor CBC with diff  -no additional ID workup for now     9   Disposition-possible discharge to rehab soon  Discussed with Dr Brannon Duarte    Antibiotics:  None    Subjective:  Patient has no fever, chills, sweats; no nausea, vomiting, diarrhea; no cough, shortness of breath; decreased abdominal pain  No new symptoms  She is feeling better overall  Objective:  Vitals:  HR:  [] 87  Resp:  [18] 18  BP: (126-142)/(64-70) 136/64  SpO2:  [97 %-100 %] 98 %  Temp (24hrs), Av 6 °F (36 4 °C), Min:97 °F (36 1 °C), Max:98 2 °F (36 8 °C)  Current: Temperature: 98 2 °F (36 8 °C)    Physical Exam:   General Appearance:  Alert, interactive, nontoxic, no acute distress  Throat: Oropharynx moist without lesions  Lungs:   Decreased breath sounds bilaterally; no wheezes, rhonchi or rales; respirations unlabored   Heart:  RRR; no murmur, rub or gallop   Abdomen:   Soft, non-tender, non-distended, positive bowel sounds  Two COREY drains in place  Extremities: No clubbing, cyanosis or edema   Skin: No new rashes or lesions  No draining wounds noted         Labs, Imaging, & Other studies:   All pertinent labs and imaging studies were personally reviewed    Results from last 7 days  Lab Units 18  0633 18  0638 18  0203   WBC Thousand/uL 2 84* 3 65* 3 08*   HEMOGLOBIN g/dL 9 2* 10 7* 9 8*   PLATELETS Thousands/uL 18* 31* 28* Results from last 7 days  Lab Units 02/08/18  0633 02/07/18  1551 02/06/18  0643  02/05/18  1206   SODIUM mmol/L 134* 137 148*  < >  --    POTASSIUM mmol/L 3 8 4 6 3 9  < >  --    CHLORIDE mmol/L 102 106 116*  < >  --    CO2 mmol/L 26 25 27  < >  --    ANION GAP mmol/L 6 6 5  < >  --    BUN mg/dL 22 29* 31*  < >  --    CREATININE mg/dL 0 74 0 74 0 72  < >  --    EGFR ml/min/1 73sq m 81 81 84  < >  --    GLUCOSE RANDOM mg/dL 244* 261* 300*  < >  --    CALCIUM mg/dL 8 6 8 9 8 8  < >  --    AST U/L  --   --  22  --  22   ALT U/L  --   --  19  --  20   ALK PHOS U/L  --   --  53  --  42*   TOTAL PROTEIN g/dL  --   --  4 6*  --  4 2*   BILIRUBIN TOTAL mg/dL  --   --  0 68  --  0 35   < > = values in this interval not displayed

## 2018-02-08 NOTE — POST OP PROGRESS NOTES
Progress Note - General Surgery   Freddy Perla 67 y o  female MRN: [id number]  Unit/Bed#: E4 -01 Encounter: [id number]    Assessment:  Perforated gastric ulcer with pneumoperitoneum and peritonitis  Postop day  10 Status post laparoscopic Velvet Seip patch repair of gastric ulcer, abdominal washout, placement of drains  Sepsis POA- resolved, currently afebrile with no leukocytosis, off antibiotics per ID  Severe Anemia/ABLA- thought to be related to GI bleed secondary to perforated gastric ulcer, hemoglobin appears to have stabilized although patient said to continue to have black tarry stools yesterday  May be related to thrombocytopenia rather than continue GI bleeding  Pancytopenia-  possible medication effect, patient has been on Imuran for history of multiple sclerosis, hematology following  DIC with elevated d-dimer  Acute severe protein calorie malnutrition related to GI distress , poor po intake  Multiple sclerosis with h/o chronic steroid use and Imuran use- currently off both  Diabetes mellitus 2- hyperglycemia, patient currently on D5 for treatment of hypernatremia  Hypernatremia- improved, sodium low today  Acute hypoxic respiratory failure- resolved  Cat bite left hand with some swelling- no signs of cellulitis, resolved       Plan:  Continue supportive care, medical management  Appreciate Hematology input regarding continued thrombocytopenia  Continue non ulcerogenic diet, encourage p o  intake, nutritional supplements added  Continue to monitor H&H and platelet count- transfuse prn  Continue Protonix   Change D5 IVFs since hypernatremia resolved  DVT prophylaxix on hold due to recent GI bleeding  PT/OT    Subjective/Objective   Chief Complaint: weak    Subjective:  Patient still complains of feeling weak  Abdominal pain has improved  Claims she has been eating better  Tolerating diet  Non N/V  Out of bed  Feels too weak to ambulate  Passing flatus    Having dark bowel movements per nursing  Objective:     Blood pressure 136/64, pulse 87, temperature 98 2 °F (36 8 °C), temperature source Temporal, resp  rate 18, height 5' 7" (1 702 m), weight 77 2 kg (170 lb 3 1 oz), SpO2 98 %  ,Body mass index is 26 66 kg/m²  Intake/Output Summary (Last 24 hours) at 02/08/18 0908  Last data filed at 02/08/18 0601   Gross per 24 hour   Intake             1500 ml   Output              445 ml   Net             1055 ml       Invasive Devices     Peripherally Inserted Central Catheter Line            PICC Line 60/32/41 Left Basilic 2 days          Peripheral Intravenous Line            Peripheral IV 02/07/18 Right Hand less than 1 day          Drain            Closed/Suction Drain Left Abdomen Bulb 15 Fr  9 days    Closed/Suction Drain Right;Other (Comment) Abdomen Bulb 15 Fr  9 days                Physical Exam:  Gen:  Alert and oriented, fatigued, NAD  HEENT:   sclera anicteric, mucous membranes moist  Neck:  Supple negative for lymphadenopathy  Chest: clear to auscultation, decreased at bases  Cor:  Regular rate and rhythm  Abd:  Soft, non-tender, some distention, active bowel sounds  Ext:  There is mild edema in her bilateral lower extremities  Skin:  Warm and dry, incision sites are clean dry and intact  Abdominal drains with serosanguineous output, no bile    Lab, Imaging and other studies:  I have personally reviewed pertinent lab results    , CBC:   Lab Results   Component Value Date    WBC 2 84 (L) 02/08/2018    HGB 9 2 (L) 02/08/2018    HCT 27 1 (L) 02/08/2018    MCV 88 02/08/2018    PLT 18 (LL) 02/08/2018    MCH 30 0 02/08/2018    MCHC 33 9 02/08/2018    RDW 14 4 02/08/2018   , CMP:   Lab Results   Component Value Date     (L) 02/08/2018    K 3 8 02/08/2018     02/08/2018    CO2 26 02/08/2018    ANIONGAP 6 02/08/2018    BUN 22 02/08/2018    CREATININE 0 74 02/08/2018    GLUCOSE 244 (H) 02/08/2018    CALCIUM 8 6 02/08/2018    EGFR 81 02/08/2018     VTE Pharmacologic Prophylaxis: Reason for no pharmacologic prophylaxis anemia, GI bleed  VTE Mechanical Prophylaxis: sequential compression device     Ashia Concepcion PA-C

## 2018-02-08 NOTE — PLAN OF CARE
Problem: DISCHARGE PLANNING - CARE MANAGEMENT  Goal: Discharge to post-acute care or home with appropriate resources  INTERVENTIONS:  - Conduct assessment to determine patient/family and health care team treatment goals, and need for post-acute services based on payer coverage, community resources, and patient preferences, and barriers to discharge  - Address psychosocial, clinical, and financial barriers to discharge as identified in assessment in conjunction with the patient/family and health care team  - Arrange appropriate level of post-acute services according to patients   needs and preference and payer coverage in collaboration with the physician and health care team  - Communicate with and update the patient/family, physician, and health care team regarding progress on the discharge plan  - Arrange appropriate transportation to post-acute venues   Outcome: Progressing  MD feels pt may benefit from [address]  Referral made to [facility] since pt lives in [place] and this maybe closer for family  This has NOT been discuss with pt or family yet

## 2018-02-08 NOTE — SOCIAL WORK
TC to dtr and informed her [facility] will not have a bed, but [facility]  Dtr has selected [facility]  [place] [facility] [place] is family choice

## 2018-02-09 ENCOUNTER — APPOINTMENT (INPATIENT)
Dept: CT IMAGING | Facility: HOSPITAL | Age: 73
DRG: 853 | End: 2018-02-09
Payer: MEDICARE

## 2018-02-09 LAB
ABO GROUP BLD BPU: NORMAL
ABO GROUP BLD: NORMAL
ACANTHOCYTES BLD QL SMEAR: PRESENT
ANION GAP SERPL CALCULATED.3IONS-SCNC: 8 MMOL/L (ref 4–13)
ANISOCYTOSIS BLD QL SMEAR: PRESENT
BACTERIA UR QL AUTO: ABNORMAL /HPF
BASOPHILS # BLD MANUAL: 0 THOUSAND/UL (ref 0–0.1)
BASOPHILS NFR MAR MANUAL: 0 % (ref 0–1)
BILIRUB UR QL STRIP: NEGATIVE
BLD GP AB SCN SERPL QL: NEGATIVE
BPU ID: NORMAL
BUN SERPL-MCNC: 14 MG/DL (ref 5–25)
BURR CELLS BLD QL SMEAR: PRESENT
CALCIUM SERPL-MCNC: 6.2 MG/DL (ref 8.3–10.1)
CAOX CRY URNS QL MICRO: ABNORMAL /HPF
CHLORIDE SERPL-SCNC: 98 MMOL/L (ref 100–108)
CLARITY UR: CLEAR
CO2 SERPL-SCNC: 19 MMOL/L (ref 21–32)
COLOR UR: YELLOW
CREAT SERPL-MCNC: 0.48 MG/DL (ref 0.6–1.3)
EOSINOPHIL # BLD MANUAL: 0.03 THOUSAND/UL (ref 0–0.4)
EOSINOPHIL NFR BLD MANUAL: 2 % (ref 0–6)
ERYTHROCYTE [DISTWIDTH] IN BLOOD BY AUTOMATED COUNT: 14.2 % (ref 11.6–15.1)
ERYTHROCYTE [DISTWIDTH] IN BLOOD BY AUTOMATED COUNT: 14.4 % (ref 11.6–15.1)
GFR SERPL CREATININE-BSD FRML MDRD: 98 ML/MIN/1.73SQ M
GLUCOSE SERPL-MCNC: 117 MG/DL (ref 65–140)
GLUCOSE SERPL-MCNC: 138 MG/DL (ref 65–140)
GLUCOSE SERPL-MCNC: 154 MG/DL (ref 65–140)
GLUCOSE SERPL-MCNC: 184 MG/DL (ref 65–140)
GLUCOSE SERPL-MCNC: 44 MG/DL (ref 65–140)
GLUCOSE SERPL-MCNC: 66 MG/DL (ref 65–140)
GLUCOSE UR STRIP-MCNC: ABNORMAL MG/DL
HCT VFR BLD AUTO: 18.6 % (ref 34.8–46.1)
HCT VFR BLD AUTO: 25.9 % (ref 34.8–46.1)
HGB BLD-MCNC: 6.1 G/DL (ref 11.5–15.4)
HGB BLD-MCNC: 8.8 G/DL (ref 11.5–15.4)
HGB UR QL STRIP.AUTO: ABNORMAL
HYALINE CASTS #/AREA URNS LPF: ABNORMAL /LPF
KETONES UR STRIP-MCNC: NEGATIVE MG/DL
LEUKOCYTE ESTERASE UR QL STRIP: NEGATIVE
LYMPHOCYTES # BLD AUTO: 0.87 THOUSAND/UL (ref 0.6–4.47)
LYMPHOCYTES # BLD AUTO: 50 % (ref 14–44)
MCH RBC QN AUTO: 29.3 PG (ref 26.8–34.3)
MCH RBC QN AUTO: 29.9 PG (ref 26.8–34.3)
MCHC RBC AUTO-ENTMCNC: 32.8 G/DL (ref 31.4–37.4)
MCHC RBC AUTO-ENTMCNC: 34 G/DL (ref 31.4–37.4)
MCV RBC AUTO: 88 FL (ref 82–98)
MCV RBC AUTO: 89 FL (ref 82–98)
MONOCYTES # BLD AUTO: 0.14 THOUSAND/UL (ref 0–1.22)
MONOCYTES NFR BLD: 8 % (ref 4–12)
NEUTROPHILS # BLD MANUAL: 0.7 THOUSAND/UL (ref 1.85–7.62)
NEUTS BAND NFR BLD MANUAL: 3 % (ref 0–8)
NEUTS SEG NFR BLD AUTO: 37 % (ref 43–75)
NITRITE UR QL STRIP: NEGATIVE
NON-SQ EPI CELLS URNS QL MICRO: ABNORMAL /HPF
NRBC BLD AUTO-RTO: 2 /100 WBCS
NRBC BLD AUTO-RTO: 3 /100 WBC (ref 0–2)
PH UR STRIP.AUTO: 6 [PH] (ref 4.5–8)
PLATELET # BLD AUTO: 14 THOUSANDS/UL (ref 149–390)
PLATELET # BLD AUTO: 21 THOUSANDS/UL (ref 149–390)
PLATELET BLD QL SMEAR: ABNORMAL
PMV BLD AUTO: 13 FL (ref 8.9–12.7)
PMV BLD AUTO: 13.6 FL (ref 8.9–12.7)
POLYCHROMASIA BLD QL SMEAR: PRESENT
POTASSIUM SERPL-SCNC: 3 MMOL/L (ref 3.5–5.3)
PROT UR STRIP-MCNC: NEGATIVE MG/DL
RBC # BLD AUTO: 2.08 MILLION/UL (ref 3.81–5.12)
RBC # BLD AUTO: 2.94 MILLION/UL (ref 3.81–5.12)
RBC #/AREA URNS AUTO: ABNORMAL /HPF
RH BLD: POSITIVE
SCHISTOCYTES BLD QL SMEAR: PRESENT
SODIUM SERPL-SCNC: 125 MMOL/L (ref 136–145)
SODIUM SERPL-SCNC: 136 MMOL/L (ref 136–145)
SP GR UR STRIP.AUTO: 1.01 (ref 1–1.03)
SPECIMEN EXPIRATION DATE: NORMAL
TOTAL CELLS COUNTED SPEC: 100
UNIT DISPENSE STATUS: NORMAL
UNIT PRODUCT CODE: NORMAL
UNIT RH: NORMAL
UROBILINOGEN UR QL STRIP.AUTO: 1 E.U./DL
WBC # BLD AUTO: 1.74 THOUSAND/UL (ref 4.31–10.16)
WBC # BLD AUTO: 2.12 THOUSAND/UL (ref 4.31–10.16)
WBC #/AREA URNS AUTO: ABNORMAL /HPF

## 2018-02-09 PROCEDURE — 85027 COMPLETE CBC AUTOMATED: CPT | Performed by: INTERNAL MEDICINE

## 2018-02-09 PROCEDURE — 81001 URINALYSIS AUTO W/SCOPE: CPT | Performed by: INTERNAL MEDICINE

## 2018-02-09 PROCEDURE — 70450 CT HEAD/BRAIN W/O DYE: CPT

## 2018-02-09 PROCEDURE — 74177 CT ABD & PELVIS W/CONTRAST: CPT

## 2018-02-09 PROCEDURE — 86923 COMPATIBILITY TEST ELECTRIC: CPT

## 2018-02-09 PROCEDURE — 82948 REAGENT STRIP/BLOOD GLUCOSE: CPT

## 2018-02-09 PROCEDURE — 99223 1ST HOSP IP/OBS HIGH 75: CPT | Performed by: NURSE PRACTITIONER

## 2018-02-09 PROCEDURE — 99233 SBSQ HOSP IP/OBS HIGH 50: CPT | Performed by: INTERNAL MEDICINE

## 2018-02-09 PROCEDURE — 86644 CMV ANTIBODY: CPT

## 2018-02-09 PROCEDURE — 85007 BL SMEAR W/DIFF WBC COUNT: CPT | Performed by: INTERNAL MEDICINE

## 2018-02-09 PROCEDURE — P9037 PLATE PHERES LEUKOREDU IRRAD: HCPCS

## 2018-02-09 PROCEDURE — 80048 BASIC METABOLIC PNL TOTAL CA: CPT | Performed by: INTERNAL MEDICINE

## 2018-02-09 PROCEDURE — P9040 RBC LEUKOREDUCED IRRADIATED: HCPCS

## 2018-02-09 PROCEDURE — 86850 RBC ANTIBODY SCREEN: CPT | Performed by: INTERNAL MEDICINE

## 2018-02-09 PROCEDURE — 83010 ASSAY OF HAPTOGLOBIN QUANT: CPT | Performed by: INTERNAL MEDICINE

## 2018-02-09 PROCEDURE — 84295 ASSAY OF SERUM SODIUM: CPT | Performed by: INTERNAL MEDICINE

## 2018-02-09 PROCEDURE — 86901 BLOOD TYPING SEROLOGIC RH(D): CPT | Performed by: INTERNAL MEDICINE

## 2018-02-09 PROCEDURE — 86900 BLOOD TYPING SEROLOGIC ABO: CPT | Performed by: INTERNAL MEDICINE

## 2018-02-09 PROCEDURE — C9113 INJ PANTOPRAZOLE SODIUM, VIA: HCPCS | Performed by: SURGERY

## 2018-02-09 RX ORDER — METRONIDAZOLE 500 MG/1
500 TABLET ORAL EVERY 8 HOURS SCHEDULED
Status: DISCONTINUED | OUTPATIENT
Start: 2018-02-09 | End: 2018-02-13

## 2018-02-09 RX ORDER — DEXTROSE MONOHYDRATE 25 G/50ML
50 INJECTION, SOLUTION INTRAVENOUS ONCE
Status: COMPLETED | OUTPATIENT
Start: 2018-02-09 | End: 2018-02-09

## 2018-02-09 RX ORDER — POTASSIUM CHLORIDE 20MEQ/15ML
40 LIQUID (ML) ORAL ONCE
Status: COMPLETED | OUTPATIENT
Start: 2018-02-09 | End: 2018-02-09

## 2018-02-09 RX ORDER — HYDRALAZINE HYDROCHLORIDE 20 MG/ML
5 INJECTION INTRAMUSCULAR; INTRAVENOUS EVERY 6 HOURS PRN
Status: DISCONTINUED | OUTPATIENT
Start: 2018-02-09 | End: 2018-02-26 | Stop reason: HOSPADM

## 2018-02-09 RX ADMIN — MORPHINE SULFATE 2 MG: 2 INJECTION, SOLUTION INTRAMUSCULAR; INTRAVENOUS at 11:28

## 2018-02-09 RX ADMIN — PANTOPRAZOLE SODIUM 40 MG: 40 INJECTION, POWDER, FOR SOLUTION INTRAVENOUS at 08:20

## 2018-02-09 RX ADMIN — TBO-FILGRASTIM 300 MCG: 300 INJECTION, SOLUTION SUBCUTANEOUS at 15:16

## 2018-02-09 RX ADMIN — SUCRALFATE 1000 MG: 1 SUSPENSION ORAL at 16:49

## 2018-02-09 RX ADMIN — IOHEXOL 100 ML: 350 INJECTION, SOLUTION INTRAVENOUS at 17:46

## 2018-02-09 RX ADMIN — METRONIDAZOLE 500 MG: 500 TABLET ORAL at 15:15

## 2018-02-09 RX ADMIN — LEVOTHYROXINE SODIUM 200 MCG: 100 TABLET ORAL at 06:09

## 2018-02-09 RX ADMIN — INSULIN LISPRO 5 UNITS: 100 INJECTION, SOLUTION INTRAVENOUS; SUBCUTANEOUS at 16:47

## 2018-02-09 RX ADMIN — PANTOPRAZOLE SODIUM 40 MG: 40 INJECTION, POWDER, FOR SOLUTION INTRAVENOUS at 20:24

## 2018-02-09 RX ADMIN — METRONIDAZOLE 500 MG: 500 TABLET ORAL at 21:01

## 2018-02-09 RX ADMIN — POTASSIUM CHLORIDE 40 MEQ: 20 SOLUTION ORAL at 15:15

## 2018-02-09 RX ADMIN — SUCRALFATE 1000 MG: 1 SUSPENSION ORAL at 06:09

## 2018-02-09 RX ADMIN — MORPHINE SULFATE 2 MG: 2 INJECTION, SOLUTION INTRAMUSCULAR; INTRAVENOUS at 17:05

## 2018-02-09 RX ADMIN — CEFEPIME HYDROCHLORIDE 2000 MG: 2 INJECTION, POWDER, FOR SOLUTION INTRAVENOUS at 14:34

## 2018-02-09 RX ADMIN — SODIUM CHLORIDE 100 ML/HR: 0.45 INJECTION, SOLUTION INTRAVENOUS at 06:09

## 2018-02-09 RX ADMIN — DEXTROSE MONOHYDRATE 50 ML: 25 INJECTION, SOLUTION INTRAVENOUS at 08:20

## 2018-02-09 NOTE — PROGRESS NOTES
Progress Note - Infectious Disease   April Diaz 67 y o  female MRN: [id number]  Unit/Bed#: E4 -01 Encounter: [id number]      Impression/Plan:  1   Peritonitis-secondary to perforation of a gastric ulceration with contamination of the peritoneum with gastric contacts  Jose Armando Escobedo is now status post laparoscopic repair with washout with drains left in place   Consider patient for the possibility of more resistant organisms and yeast secondary infection as the patient had been Augmentin at the time of the perforation   Fortunately the patient has now undergone adequate source control   She has completed 1 week of antibiotics postop  Unfortunately she is now having fever once again   -close surgical follow-up  -drain management  -serial abdominal exams  -monitor CBC with diff and creatinine  -incentive spirometry  -recommend repeat CT of the abdomen and pelvis    2  Febrile neutropenia-the neutropenia is felt to be secondary to Imuran, but is now worsening despite being off Imuran and Zosyn  -recheck blood cultures x2 sets  -cefepime 2 g IV q 12 hours  -recommend CT of the abdomen and pelvis as above  -will also add Flagyl while awaiting CT scan result  -may need to add antifungals if intra-abdominal source found     3   Pancytopenia-likely secondary to the Imuran   Possibly another etiology  The patient remains quite pancytopenic  Pancytopenia is now worsening as above  -no additional Imuran or Zosyn for now  -hematology oncology follow-up  -recheck CBC with diff tomorrow  -transfusion is needed  -recommend G-CSF     4   Chronic blood loss anemia-secondary to gastric ulceration   No labs done today   -monitor CBC with diff  -continue Protonix  -GI follow-up     5   Diabetes mellitus-type 2 with hyperglycemia     6   Multiple sclerosis-apparently on Imuran for a while   Seems to be neurologically stable  -will need close neurology follow-up  -would hold on Imuran for now     7   Cat bite-left hand   No overt cellulitis at this time  Nena Couch should be well covered by the antibiotics as above   The cat is being watched at home without any behavioral abnormalities   The hand is significantly improved with decreased edema   -serial exams  -continue monitoring of the cat at home     8   Hypernatremia-patient with a large free water deficit    the hypernatremia now seems to have resolved  Patient is now hyponatremia  -monitor BMP as per the primary  -the volume management as per the primary service  -check BMP tomorrow     9   GI bleed-in the setting of her recently repaired perforated peptic ulcer   Patient has been transfused   No recurrence of the GI bleed and CT of the abdomen and pelvis did not reveal source  -monitor CBC with diff  -no additional ID workup for now    Antibiotics:  None    Subjective:  Patient has now spiked a high fever; no recurrence of the fever today; no nausea, vomiting, diarrhea; no cough, shortness of breath; no increased pain  She is still having some abdominal pain No new symptoms  Objective:  Vitals:  HR:  [] 88  Resp:  [18-20] 20  BP: (117-151)/(54-70) 139/63  SpO2:  [94 %-99 %] 99 %  Temp (24hrs), Av 3 °F (36 8 °C), Min:96 2 °F (35 7 °C), Max:102 1 °F (38 9 °C)  Current: Temperature: 98 °F (36 7 °C)    Physical Exam:   General Appearance:  Alert, interactive, nontoxic, no acute distress  Throat: Oropharynx dry without lesions  Lungs:   Clear to auscultation bilaterally; no wheezes, rhonchi or rales; respirations unlabored   Heart:  RRR; no murmur, rub or gallop   Abdomen:   Soft, non-tender, non-distended, positive bowel sounds  Two COREY drains in place  Extremities: No clubbing, cyanosis    Skin: No new rashes or lesions  No draining wounds noted         Labs, Imaging, & Other studies:   All pertinent labs and imaging studies were personally reviewed    Results from last 7 days  Lab Units 18  0518 18  0633 18  0638   WBC Thousand/uL 1 74* 2 84* 3 65* HEMOGLOBIN g/dL 6 1* 9 2* 10 7*   PLATELETS Thousands/uL 14* 18* 31*       Results from last 7 days  Lab Units 02/09/18  0518 02/08/18  5899 02/07/18  1551 02/06/18  0643  02/05/18  1206   SODIUM mmol/L 125* 134* 137 148*  < >  --    POTASSIUM mmol/L 3 0* 3 8 4 6 3 9  < >  --    CHLORIDE mmol/L 98* 102 106 116*  < >  --    CO2 mmol/L 19* 26 25 27  < >  --    ANION GAP mmol/L 8 6 6 5  < >  --    BUN mg/dL 14 22 29* 31*  < >  --    CREATININE mg/dL 0 48* 0 74 0 74 0 72  < >  --    EGFR ml/min/1 73sq m 98 81 81 84  < >  --    GLUCOSE RANDOM mg/dL 44* 244* 261* 300*  < >  --    CALCIUM mg/dL 6 2* 8 6 8 9 8 8  < >  --    AST U/L  --   --   --  22  --  22   ALT U/L  --   --   --  19  --  20   ALK PHOS U/L  --   --   --  53  --  42*   TOTAL PROTEIN g/dL  --   --   --  4 6*  --  4 2*   BILIRUBIN TOTAL mg/dL  --   --   --  0 68  --  0 35   < > = values in this interval not displayed

## 2018-02-09 NOTE — CONSULTS
Consultation - Neurology   Reagan Narayan 67 y o  female MRN: [id number]  Unit/Bed#: ICU 03 Encounter: [id number]      Assessment/Plan   Assessment:  1  Toxic metabolic encephalopathy  Contributions include:  Infection/peritonitis, inflammatory processes (currently with acute left  DVT), severe anemia, hypoglycemia following prolonged hyperglycemia, intermittent  hypertension, medications (morphine), electrolyte derangement, malnutrition  Patient received morphine shortly before this exam, which likely led to the inconsistent neurologic exam documented below  2   Hiistory of relapsing remitting multiple sclerosis (per chart)  Lesion burden and location unknown, but potentially can contribute to altered mental status in combination with insults noted in 1   3   Has risk for central pontine myelinolysis due to excessive rapid correction of serum sodium  Likely too early at this point 4 symptoms to be present  4  Thrombocytopenia  Plan:  1  CT head today to evaluate for acute changes (ischemia, blood) as well as degree of white matter changes  2   MRI brain if no metal incompatibilities, may be deferred until wedding rings can be removed  3  Have asked for medical records to be obtained from her private neurologist  4  Supportive care per primary service and multiple consultants including ID, hematology oncology  5  Minimize sedating medicines as able  6  Imuron remains on hold, not a candidate for other immuno modulating therapies at this time  7  Maximize nutritional status; avoid derangements in electrolytes, blood glucose  8  PT/OT as tolerated  9  Not a candidate at present for anticoagulation or anti-platelet therapy  10  Repeat exam when not influenced by morphine  11    Further comments and recommendations per the attending neurologist  History of Present Illness     Physician Requesting Consult: Delvis Yang MD  Reason for Consult / Principal Problem: MS, metabolic encephalopathy  Hx and PE limited by:  Delirium    HPI: April Diaz is a 67y o  year old female who has a history of relapsing remitting multiple sclerosis maintained on Imuran 300 mg per day for many years  She was seen in the [address] [place] [address] Emergency Department on 1/21/2018 for left hand cellulitis secondary to a cat bite   She was prescribed Augmentin and naproxen, and given a DPT booster  She then was seen by her family doctor, who prescribed a Medrol Dosepak  3 days later she became aesthenic, and apparently collapsed in her PCPs office  emergency department at [address] [place] [address] on 1/28/2018 with progressive weakness, fatigue, anorexia for 3 weeks  She appeared to be clinically in congestive heart failure with 4+ pitting edema in both lower extremities as well as wheezing and rales noted on lung exam   Profound anemia was found:  Hgb 3 2/Hct 10, platelet count 59  Blood glucoses were in the 500s but acetone was negative  Sodium was decreased at 133, potassium 5 6  She was transferred to [facility] arriving on 01/29 at 0043 hours, where she was found to have pertonitis/sepsis  She underwent diagnostic laparotomy, was found to have a perforated gastric ulcer, for which she underwent patch repair and abdominal wash out  Antibiotic coverage included Zosyn and Flagyl, she received stress dose steroids  Hemodynamically she improved on 01/30, and was extubated  Imuran was held  Over the next several days she had dark blood per the NG tube, complained of diffuse abdominal pain, and was lethargic  She received multiple transfusions of packed red blood cells and platelets, with goal to keep platelet count greater than 20,000, and ANC greater than 1000  Gerhardt Sep Her profound anemia was thought due to her GI bleed, the pan/thrombocytopenia and leukopenia were attributed to myelosuppression due to infection and both Imuran and Zosyn  Zosyn was discontinued on 02/05    Over the next several days she continued to have drops in hemoglobin, attributed to gastropexy and stress gastritis  She developed DIC; other issues included hypernatremia (peak serum sodium 164) likely secondary to free water deficit, hyperglycemia worsened by D5W IV infusions, and uncontrolled hypertension  On 02/08 left upper extremity DVT was identified but anticoagulation is contraindicated at this time  On 2/9, she was hypoglycemic after adjustments of insulin for hyperglycemia, hyponatremic after over correction of hypernatremia, had febrile (102 1) leukopenia (worsening) with drop in [address] to 700  She exhibited worsening generalized weakness  She is being considered for G-CSF (granulocyte colony stimulating factor);  bone marrow biopsy is anticipated by the Hematology service to evaluate for underlying lymphoproliferative disorder  Repeat CT the abdomen is pending, cefepime has been started, Flagyl has been restarted, in ID questions the need for additional antifungal coverage  Lastly, she has protein calorie malnutrition (had been anorexic for 3 weeks prior to admission in addition to postop NPO status, poor intake when diet upgraded), and sequelae of prolonged immobilization  Neurology has been asked to evaluate for confusion and her current status of multiple sclerosis  In reviewing the chart, slight confusion was noted as far back as 2/1  While she was primarily described over the ensuing week as lethargic and weak, on 02/03 and 2/4 she was reportedly more awake, less confused, oriented, and appropriate  By 2/9 however she was poorly oriented, confused  The family provides this history of multiple sclerosis: In 1978 or 1979 she developed numbness in her hands and feet, then was unable to walk  A diagnosis of MS was made, and she was initially placed on prednisone for many years  That was discontinued, and she has been on Imuran for many years    Family is unaware of any prior MRIs, and cannot state if she has had any exacerbations in the past   She has had no recent imaging  Patient is unreliable as historian, notes that she occasionally has weakness here and there    Her neurologist is Reta Fuentes MD in [place]  Inpatient consult to Neurology  Consult performed by: Tika Guillermo ordered by: Earnestine Gunter          Review of Systems   Constitutional: Positive for activity change, appetite change and fatigue  Cardiovascular: Positive for leg swelling  Gastrointestinal: Positive for abdominal distention and abdominal pain  Musculoskeletal:        Pain over buttocks   Neurological: Positive for weakness (Generalized) and numbness (Hands and feet)  Psychiatric/Behavioral: Positive for confusion and decreased concentration  All other systems reviewed and are negative  Historical Information   Past Medical History:   Diagnosis Date    Diabetes mellitus ([facility] )     Disease of thyroid gland     Hyperlipidemia     Multiple sclerosis ([facility] )    Hypothyroidism  Past Surgical History:   Procedure Laterality Date    MS LAP,DIAGNOSTIC ABDOMEN N/A 1/29/2018    Procedure: LAPAROSCOPY DIAGNOSTIC, with repair of perforated gastric ulcer;  Surgeon: Humphrey South MD;  Location: [facility] OR;  Service: General     Social History   History   Alcohol Use    Yes     Comment: rarely     History   Drug Use No     History   Smoking Status    Former Smoker    Types: Cigarettes    Quit date: 2007   Smokeless Tobacco    Never Used     Family History: History reviewed  No pertinent family history  Review of previous medical records was completed       Meds/Allergies   current meds:   Current Facility-Administered Medications   Medication Dose Route Frequency    acetaminophen (TYLENOL) tablet 650 mg  650 mg Oral Q4H PRN    calcium carbonate (TUMS) chewable tablet 500 mg  500 mg Oral TID PRN    cefepime (MAXIPIME) 2,000 mg in dextrose 5 % 50 mL IVPB  2,000 mg Intravenous Q12H    hydrALAZINE (APRESOLINE) injection 5 mg  5 mg Intravenous Q6H PRN    insulin lispro (HumaLOG) 100 units/mL subcutaneous injection 1-5 Units  1-5 Units Subcutaneous TID With Meals    insulin lispro (HumaLOG) 100 units/mL subcutaneous injection 5 Units  5 Units Subcutaneous TID With Meals    levothyroxine tablet 200 mcg  200 mcg Oral Early Morning    metroNIDAZOLE (FLAGYL) tablet 500 mg  500 mg Oral Q8H [address]    morphine injection 2 mg  2 mg Intravenous Q2H PRN    ondansetron (ZOFRAN) injection 4 mg  4 mg Intravenous Q4H PRN    pantoprazole (PROTONIX) injection 40 mg  40 mg Intravenous Q12H [address]    potassium chloride 10 % oral solution 40 mEq  40 mEq Oral Once    sucralfate (CARAFATE) oral suspension 1,000 mg  1,000 mg Oral BID AC    tbo-filgrastim (GRANIX) subcutnaeous injection 300 mcg  300 mcg Subcutaneous Daily    and PTA meds:   Prior to Admission Medications   Prescriptions Last Dose Informant Patient Reported?  Taking?   acetaminophen-codeine (TYLENOL #3) 300-30 mg per tablet   Yes No   Sig: Take 1 tablet by mouth every 6 (six) hours as needed for moderate pain   alendronate (FOSAMAX) 70 mg tablet   Yes No   Sig: Take 70 mg by mouth every 7 days   amoxicillin-clavulanate (AUGMENTIN) 875-125 mg per tablet   No No   Sig: Take 1 tablet by mouth every 12 (twelve) hours for 7 days   atorvastatin (LIPITOR) 40 mg tablet   Yes No   Sig: Take 40 mg by mouth daily   azaTHIOprine (IMURAN) 50 mg tablet   Yes No   Sig: Take 50 mg by mouth 3 (three) times a day   cholecalciferol (VITAMIN D3) 1,000 units tablet   Yes No   Sig: Take 1,000 Units by mouth daily   ergocalciferol (VITAMIN D2) 50,000 units   Yes No   Sig: Take 50,000 Units by mouth once a week wednesday    glipiZIDE (GLUCOTROL) 10 mg tablet   Yes No   Sig: Take 5 mg by mouth 2 (two) times a day before meals     ibuprofen (MOTRIN) 800 mg tablet   Yes No   Sig: Take by mouth every 8 (eight) hours as needed for mild pain   levothyroxine 200 mcg tablet   Yes No   Sig: Take 200 mcg by mouth daily Skip Sundays    metFORMIN (GLUCOPHAGE) 1000 MG tablet   Yes No   Sig: Take 1,000 mg by mouth 2 (two) times a day with meals   methylprednisolone (MEDROL) 4 mg tablet   Yes No   Sig: Take 4 mg by mouth see administration instructions   naproxen (NAPROSYN) 500 mg tablet   No No   Sig: Take 1 tablet by mouth 2 (two) times a day with meals for 7 days      Facility-Administered Medications: None       No Known Allergies    Objective   Vitals:Blood pressure 139/63, pulse 88, temperature 98 °F (36 7 °C), temperature source Tympanic, resp  rate 20, height 5' 7" (1 702 m), weight 77 2 kg (170 lb 3 1 oz), SpO2 99 %  ,Body mass index is 26 66 kg/m²  Intake/Output Summary (Last 24 hours) at 02/09/18 1247  Last data filed at 02/09/18 1006   Gross per 24 hour   Intake          1601 67 ml   Output             1425 ml   Net           176 67 ml       Physical Exam   Constitutional: No distress  Appears chronically ill, sallow complexion, sparse hair   HENT:   Head: Normocephalic and atraumatic  Oral membranes mildly dry   Eyes: Conjunctivae are normal  Pupils are equal, round, and reactive to light  Neck: Normal range of motion  Neck supple  Cardiovascular: Normal rate  Murmur (Grade 2 systolic ejection murmur heard best along the left sternal border, radiates into the left carotid) heard  Pulmonary/Chest: Breath sounds normal    Limited inspiratory effort   Abdominal:   Trace distended in minimally tympanitic, tenderness to percussion and palpation throughout   Musculoskeletal: Normal range of motion  She exhibits edema (Bilateral hands greater than feet)  Neurological: Finger-nose-finger test: Not testable  Heel-to-shin test: Not testable  Reflex Scores:       Tricep reflexes are 1+ on the right side and 1+ on the left side  Bicep reflexes are 1+ on the right side and 2+ on the left side  Brachioradialis reflexes are 1+ on the right side and 2+ on the left side         Patellar reflexes are 0 on the right side and 0 on the left side  Achilles reflexes are 0 on the right side and 0 on the left side  Skin: Skin is warm and dry  She is not diaphoretic  Severe onychomycosis toenails both feet  Psychiatric: Her speech is delayed and tangential    Vitals reviewed  Neurologic Exam     Mental Status   Disoriented to place  (Reports that is a rehab, unknown city)  Disoriented to time  Disoriented to year, month, date and season  Recall of objects at 5 minutes: After 7 minutes, recalled current city location, not name of hospital  Follows 1 step (Less than 50%, limited by poor attention) commands  Attention: decreased  Level of consciousness: drowsy, arousable by verbal stimuli ,  arousable by tactile stimuli (Brief alertness with stimuli poorly maintained  Easily distracted)  Able to repeat (1 word only)  Unable to report reason for admission  When briefly alert, verbal output is rambling with loose associations  Social speech is preserved  + common object identification  Unable to formulate responses to directed questions  Cranial Nerves     CN II   Right visual field deficit: Decreased blink to threat  Left visual field deficit: Decreased blink to threat  CN III, IV, VI   Pupils are equal, round, and reactive to light  Right pupil: Size: 4 mm  Shape: regular  Reactivity: brisk  Left pupil: Size: 4 mm  Shape: regular (Postop IOL)  Reactivity: brisk  Nystagmus: none   Conjugate gaze: present    CN V   Facial sensation intact  Right facial sensation deficit: none (Brisker reaction to pinprick on right versus left midface)  Left facial sensation deficit: none    CN VII   Right facial weakness: + right mid facial weakness  Left facial weakness: none    CN VIII   Hearing impaired: Not reliably testable  CN IX, X   CN IX normal    CN X normal    Palate: symmetric    CN XI   Right sternocleidomastoid strength: Impaired effort/attention    Left sternocleidomastoid strength: normal  Right trapezius strength: normal  Left trapezius strength: normal    CN XII   Tongue deviation: none (Mobile)  Impaired visual pursuits to both right and left  Unable to visualize optic discs  No forced lid closure weakness, but alternates ptosis initially on right followed by left  Motor Exam   Overall muscle tone: normalPronator drift not testable  Unable to formally test motor strength due to poor effort  At best elevates right arm off the bed 2 feet, resists left arm elevation (active or passive) due to pain  Elevates each lower extremity off the bed 3-4 inches for dieting of socks, but decline to elevate to command  Sensory Exam   Inattentive to exam   Reacts to pinprick in all distal limbs, however is brisker in the right lower as compared to the left lower extremity  Upper extremity reactions were symmetric  Gait, Coordination, and Reflexes     Gait  Gait: (Not testable)    Coordination   Finger-nose-finger test: Not testable  Heel-to-shin test: Not testable      Tremor   Resting tremor: absent  Intention tremor: absent  Action tremor: absent    Reflexes   Right brachioradialis: 1+  Left brachioradialis: 2+  Right biceps: 1+  Left biceps: 2+  Right triceps: 1+  Left triceps: 1+  Right patellar: 0  Left patellar: 0  Right achilles: 0  Left achilles: 0  Right plantar: normal  Left plantar: normal  Right ankle clonus: absent  Left ankle clonus: absentFine motor repetitions not testable           Lab Results:   CBC:   Results from last 7 days  Lab Units 02/09/18  1238 02/09/18  0518 02/08/18  0633   WBC Thousand/uL 2 12* 1 74* 2 84*   RBC Million/uL 2 94* 2 08* 3 07*   HEMOGLOBIN g/dL 8 8* 6 1* 9 2*   HEMATOCRIT % 25 9* 18 6* 27 1*   MCV fL 88 89 88   PLATELETS Thousands/uL 21* 14* 18*   , BMP/CMP:   Results from last 7 days  Lab Units 02/09/18  1238 02/09/18  0518 02/08/18  0633 02/07/18  1551 02/06/18  0643  02/05/18  1206   SODIUM mmol/L 136 125* 134* 137 148*  < >  --    POTASSIUM mmol/L  --  3 0* 3 8 4 6 3 9  < >  --    CHLORIDE mmol/L  --  98* 102 106 116*  < >  --    CO2 mmol/L  --  19* 26 25 27  < >  --    ANION GAP mmol/L  --  8 6 6 5  < >  --    BUN mg/dL  --  14 22 29* 31*  < >  --    CREATININE mg/dL  --  0 48* 0 74 0 74 0 72  < >  --    GLUCOSE RANDOM mg/dL  --  44* 244* 261* 300*  < >  --    CALCIUM mg/dL  --  6 2* 8 6 8 9 8 8  < >  --    AST U/L  --   --   --   --  22  --  22   ALT U/L  --   --   --   --  19  --  20   ALK PHOS U/L  --   --   --   --  53  --  42*   TOTAL PROTEIN g/dL  --   --   --   --  4 6*  --  4 2*   BILIRUBIN TOTAL mg/dL  --   --   --   --  0 68  --  0 35   EGFR ml/min/1 73sq m  --  98 81 81 84  < >  --    < > = values in this interval not displayed  , Vitamin B12:   , TSH:   , Coagulation:   Results from last 7 days  Lab Units 02/05/18  0730   INR  1 10   , Urinalysis:   Results from last 7 days  Lab Units 02/09/18  1000   COLOR UA  Yellow   CLARITY UA  Clear   SPEC GRAV UA  1 010   PH UA  6 0   LEUKOCYTES UA  Negative   NITRITE UA  Negative   PROTEIN UA mg/dl Negative   GLUCOSE UA mg/dl 250 (1/4%)*   KETONES UA mg/dl Negative   BILIRUBIN UA  Negative   BLOOD UA  Trace-lysed*    Lab Results   Component Value Date    HGBA1C 5 0 02/02/2018     , trop 0 15, proBNP 4178, haptoglobin 136  D-Dimer 2742=>3888, fibrinogen 326=>340, FDP >20<40  Free T4 1 24  Peak Na+ 164    Imaging Studies: I have personally reviewed pertinent reports  and I have personally reviewed pertinent films in PACS   No neuro imaging    EKG, Pathology, and Other Studies: I have personally reviewed pertinent reports  02/08 CXR:The cardiomediastinal silhouette is stable  The lungs are clear  No pleural effusion  Osseous structures are age appropriate  02/08 UE venous duplex:  RIGHT UPPER LIMB LIMITED:  Evaluation shows no evidence of thrombus in the internal jugular vein, subclavian vein, and the brachiocephalic vein    LEFT UPPER LIMB: Abnormal (LIMITED)  There is acute deep vein thrombosis in the axillary vein  There is acute superficial thrombophlebitis in the visualized cephalic vein in the mid and proximal upper arm  Doppler evaluation shows a normal response to augmentation maneuvers  02/09 CT abdomen PND    02/02 CT abdomen: There is a large amount of air within the subcutaneous fat of the abdominal wall extending inferiorly into the pelvic wall  Small amount of air present in between the muscles of the right superior lateral abdominal wall  This may be iatrogenic from recent catheter insertion x2  Posterior basilar atelectatic change  Oral contrast extends to the rectum with no evidence of obstruction  Mild small and large bowel distention  Stable hypodensities within the spleen suggestive of infarctions      EKG: Normal sinus rhythm Left axis deviation Moderate voltage criteria for LVH, may be normal variant Nonspecific ST abnormality Abnormal ECG No previous ECGs available    VTE Prophylaxis: Sequential compression device (Venodyne)

## 2018-02-09 NOTE — PROGRESS NOTES
Progress Note - Dahiana Mason 67 y o  female MRN: [id number]    Unit/Bed#: E4 -01 Encounter: [id number]      Assessment/Plan:  1-sepsis:  Present on admission:  Patient presented with sepsis   This was felt to be secondary to peritonitis secondary to a perforated gastric ulcer:  The patient underwent surgical repair   She completed 7 days of antibiotics   Her sepsis had resolved               -patient spiked a fever last evening, her temperature peaked at 102 1    -UA pending, patient incontinent of urine will need straight catheterization  Will also check bladder scans to evaluate for urinary retention    -check blood cultures x2   -chest x-ray without any acute disease   -worsening leukopenia  [address]   -left upper extremity clot:  Question if fever is due to phlebitis      -will confer with ID    2-perforated gastric ulcer:  With associated peritonitis   Patient presented with a perforated gastric ulcer  Michelle Lindquist  notes that she had GI upset for quite some time prior to admission   She underwent laproscopic repair with silvia patch                 -continue proton pump inhibitor              -tolerating p o  Diet , but has poor appetite              -no evidence of leak      3-status post peritonitis:  Secondary to perforated gastric ulcer   Status post laparoscopic repair and washout               -status post 1 week of postoperative antibiotics with zosyn and fluconazole              4-acute blood loss anemia:  Secondary to upper GI bleed secondary to perforated gastric ulcer:              -initial Hb 3's   She was transfused multiple units PRBC   Patient however continues to have drop in her hemoglobin despite recurrent transfusions    -discussed with the surgical and Hematology/Oncology services:  Patient does not appear to currently be in DIC  Does not appear to be hemolyzing    Patient has having scant melanotic stool occasionally which is not felt to represent a new or acute GI bleed, but rather oozing due to her thrombocytopenia    -2 units of packed red blood cells today                  5-diabetes type 2:  Patient had persistent hyperglycemia secondary to her dextrose IV fluids that were for her hypernatremia  This required her Lantus dose to be increased multiple times      -Patient's hypernatremia corrected so her fluids were changed to half normal saline  Patient however had subsequent hypoglycemia     6-hypernatremia:  Patient had hyperchloremic hyponatremia   This was felt to be secondary to free water deficit  Her normal filling which changed to half-normal saline, and then dextrose  Patient's hypernatremia had normalized, however unfortunately she has subsequent hyponatremia today    -will hold IV fluids at this time  Will have gradual correction of her hyponatremia  Continue p o  hydration      7-pancytopenia:  Patient presented with pancytopenia, with normal coags   Her profound anemia was felt to be most likely secondary to her GI bleed from her perforated gastric ulcer      -Patient however has persistent leukopenia and thrombocytopenia              -PGQ has worsening of her leukopenia today  She is borderline neutropenic as her [address] is approximately [address]  As she had a fever last evening she will be given a dose of filgrastim today    -patient has worsening thrombocytopenia today despite transfusion of platelets yesterday  Transfuse platelets again    -discussed with Dr Fernie Lakhani:  Recommended BM biopsy    -transfuse platelets and prbc today  Does not appear to be c/w TTP as no hemolysis  No renal failure  Not currently in dic      8-hypothyroidism:  Continue Synthroid     9-hyperlipidemia:  Statin on hold     10-multiple sclerosis:  Imuran on hold  Pt with generalized weakness  Unable to hold up arms/legs to gravity  Patient has longstanding history of multiple sclerosis, followed by her neurologist in [place]  Daughter notes week prior to admission pt was still able to walk  3 days PTA pt had "collapsed" in her doctor's office with generalized weakness and was unable to walk it was felt to be due to a reaction to her abx so she returned home  She remained home for 3 days after that with worsening generalized weakness and unable to walk    -family notes worsening generalized weakness and new confusion  Suspect it is likely due to her acute illness and prolonged ICU/hospital stay  -Will consult neurology   -PT following     11-left hand cat bite:  No evidence of cellulitis   Status post 7 days of antibiotics     85-TZHKA metabolic encephalopathy:  Pt with confusion since admission  Most likely multifactorial, secondary to hypernatremia, sepsis, hypernatremia, hyperglycemia  -pt confused and not oriented to year, month, day, president  New since her acute illness began, per family     13-intermittent elevated blood presure:  Without prior h/o HTN   Most likely accelerated due to pain   Cont to monitor   bp adquate today at 139/63     14-left upper extremity DVT:  Reviewed with heme-onc: removed PICC line  Unfortunately patient may not be on anticoagulation due to DIC/anemia/thrombocytopenia/bloody stool      16-family:   Updated daughter Otilio Elder via phone at length about current condition and transfer    Will transfer to [facility] for closer monitoring  D/w pt's nurse   D/w dr Speedy Leyva  D/w Dr Pako Mckay     VTE Pharmacologic Prophylaxis: RX contraindicated due to: ABLA, thrombocytopenia  VTE Mechanical Prophylaxis: sequential compression device    Certification Statement: The patient will continue to require additional inpatient hospital stay due to need for further acute intervention for fever     Status: inpatient     Total time spent today:  60 min, including interview, exam, reviewing records, d/w dr Speedy Leyva and dr Dottie Gaona and pt's nurse       D/w dr Speedy Leyva:  Will transfer to medical service     ===================================================================    Subjective:  Patient denies any current pain anywhere  She denies any abdominal pain  She denies any pain in her left arm  However when she is being moved, she indicates she is having pain in her left arm  She denies any nausea, vomiting, diarrhea  She denies any shortness of breath, chest congestion or cough  She endorses generalized weakness  Physical Exam:   Temp:  [96 2 °F (35 7 °C)-102 1 °F (38 9 °C)] 98 °F (36 7 °C)  HR:  [] 88  Resp:  [18-20] 20  BP: (117-151)/(54-70) 139/63    Gen:  Pleasant, non-tachypnic, non-dyspnic  Conversant  Smiling and joking  Lying with head of the bed at 60°  Heart: regular rate and rhythm, S1S2 present, no murmur, rub or gallop  Lungs:  Decreased breath sounds both bases, however clear to ausculatation bilaterally  No wheezing, crackles, or rhonchi  No accessory muscle use or respiratory distress  Abd: soft, mildly tender with palpation  non-distended  NABS, no guarding, rebound or peritoneal signs  Extremities: no clubbing, cyanosis  Trace edema of left hand  1+ edema of left upper extremity, improved since yesterday  2+ edema both feet  2+ pedal pulses  2+ left radial pulse  Neuro: awake, alert  Good eye contact  Not lethargic  Not oriented to year, month, season, president   5/5 bilaterally  Bicep/tricep 2/5 bilat  Skin: warm and dry: no petechiae, purpura and rash      LABS:     Results from last 7 days  Lab Units 02/09/18  0518 02/08/18  0633 02/06/18  0638   WBC Thousand/uL 1 74* 2 84* 3 65*   HEMOGLOBIN g/dL 6 1* 9 2* 10 7*   HEMATOCRIT % 18 6* 27 1* 32 1*   PLATELETS Thousands/uL 14* 18* 31*       Results from last 7 days  Lab Units 02/09/18  0518 02/08/18  0633 02/07/18  1551   SODIUM mmol/L 125* 134* 137   POTASSIUM mmol/L 3 0* 3 8 4 6   CHLORIDE mmol/L 98* 102 106   CO2 mmol/L 19* 26 25   BUN mg/dL 14 22 29*   CREATININE mg/dL 0 48* 0 74 0 74 GLUCOSE RANDOM mg/dL 44* 244* 261*   CALCIUM mg/dL 6 2* 8 6 8 9       Hospital Data:  2/9:  Chest x-ray:  No acute disease    2/2:  CT abdomen/pelvis: There is a large amount of air within the subcutaneous fat of the abdominal wall extending inferiorly into the pelvic wall   Small amount of air present in between the muscles of the right superior lateral abdominal wall   This may be iatrogenic from recent catheter insertion x2   Posterior basilar atelectatic change   Oral contrast extends to the rectum with no evidence of obstruction   Mild small and large bowel distention  Stable hypodensities within the spleen suggestive of infarctions     2/1:  Abdominal obstruction series:  1   No layering free contrast to suggest large leak   However, the study was limited for smaller or contained leaks due to retained contrast in the stomach and small bowel   This limited study was performed due to the patient's inability to undergo   diagnostic upper GI   If there is continued clinical concern for leak, a follow-up upper GI can be attempted when the patient is able   Alternatively, an additional abdomen x-ray in the a m  can be obtained to allow for passage of contrast into the colon   and better visualization of the underlying abdominal cavity   CT would also better evaluate for contained leak  2   Dilated small bowel without evidence of obstruction, likely adynamic ileus     1/29:  CT abdomen/pelvis:  Moderate pneumoperitoneum suggesting bowel perforation  Although the origin is uncertain, possibility of perforated gastric ulcer may be considered  Small amount of abdominal and pelvic ascites  No evidence for organized abscess  Mild small bowel dilatation may reflect reactive ileus  Cholelithiasis       1/29:  Chest x-ray:  No acute disease     2/8:  Blood cultures:  Pending x2  1/31:  Blood culture:  Negative x2     2/8:  Left upper extremity ultrasound:  LEFT UPPER LIMB: Abnormal  (LIMITED)  There is acute deep vein thrombosis in the axillary vein  There is acute superficial thrombophlebitis in the visualized cephalic vein in  the mid and proximal upper arm  Doppler evaluation shows a normal response to augmentation maneuvers        ---------------------------------------------------------------------------------------------------------------  This note has been constructed using a voice recognition system

## 2018-02-09 NOTE — WOUND OSTOMY CARE
Progress Note - Wound   Donte Jaimes 67 y o  female MRN: [id number]  Unit/Bed#: ICU 03 Encounter: [id number]      Assessment:   Patient seen for wound care follow-up  Lying in bed, complains of abdominal and sacral pain  Patient is incontinent of bowel and bladder  Patient has a midline abdominal incision with 2 COREY drains to the abdomen  Scab intact to left hand secondary to cat scratch--no erythema or drainage at this time  Bilateral upper extremities are bruised with moderate pitting, weeping edema  Bilateral feet and ankles have tan scaling  Findings:  1  Hospital acquired deep tissue injury (suspect stage 3 or 4) to midline coccyx and sacrum in the presence of MASD (incontinence associated dermatitis)--non-blanchable deep purple discoloration to coccyx, b/l sacrum, and extending down bilateral buttocks  Wound has significantly increased in size  Patient is resistant to any turning and repositioning due to abdominal pain  2   Hospital acquired stage 1 pressure injury to right heel--wound is non-blanchable erythema measuring 0 6 x 0 6 x 0 cm  Plan:   1  Turn and reposition patient every 2 hours--utilize bed rotation in addition as tolerated by patient  2  Prevalon boots to offload b/l heels  3  Moisturize skin daily with nourishing lotion  4  Apply Hydraguard lotion to b/l buttocks, sacrum, and heels TID & PRN  5  Sofcare cushion when patient getting OOB to chair  6  Low air-loss mattress  7  Wound care team to follow weekly  Plan of care reviewed with patient's family (at bedside), patient, and primary RN, Khanh Newsome made aware of deep tissue injury increasing in size and new HA right heel stage 1 pressure injury  Subjective:  Limited assessment performed--patient having significant amount of pain with any turning (moaning, yelling out) and preparing for CT scan        Objective:    Vitals: Blood pressure 152/72, pulse 94, temperature 98 7 °F (37 1 °C), temperature source Temporal, resp  rate 13, height 5' 7" (1 702 m), weight 77 2 kg (170 lb 3 1 oz), SpO2 95 %  ,Body mass index is 26 66 kg/m²  Pressure Ulcer 02/02/18 Coccyx Mid;Left;Right Sacrum Deep tissue injury--non-blanchable deep purple, skin intact  (Active)   Staging Deep Tissue Injury 2/9/2018  3:40 PM   Wound Description Light purple;Non-blanchable erythema;Fragile 2/9/2018  3:40 PM   Mira-wound Assessment Fragile 2/9/2018  3:40 PM   Shape Butterfly 2/9/2018  3:40 PM   Wound Length (cm) 7 cm 2/9/2018  3:40 PM   Wound Width (cm) 6 5 cm 2/9/2018  3:40 PM   Wound Depth (cm) 0 2/9/2018  3:40 PM   Calculated Wound Area (cm^2) 45 5 cm^2 2/9/2018  3:40 PM   Calculated Wound Volume (cm^3) 0 cm^3 2/9/2018  3:40 PM   Drainage Amount None 2/9/2018  3:40 PM   Treatment Cleansed; Offload; Turn & reposition 2/9/2018  3:40 PM   Dressing Protective barrier 2/9/2018 12:45 PM   Patient Tolerance Tolerated well 2/2/2018  6:00 PM   Dressing Status Open to air 2/9/2018 12:45 PM         Javi KING, RN, Otis Wong ()

## 2018-02-09 NOTE — PROGRESS NOTES
Progress Note - Hunter Ivey 67 y o  female MRN: [id number]    Unit/Bed#: ICU 03 Encounter: [id number]      Assessment:  In summary, this is a 24-year-old female history of pancytopenia  Initially this had been attributed to microangiopathic hemolytic anemia/DIC considered secondary to sepsis with possible contribution from Imuran and/or antibiotics  Despite stabilization and discontinuation of these medications, her counts have continued to decrease  She has an absolute neutrophil count less than 1000  Given fever last night, G-CSF seems warranted with favorable therapeutic ratio  Regarding potential explanations for her pancytopenia, and noting that she has occasional atypical lymphocytes, I wonder about the possibility of lymphoproliferative disorder  Bone marrow biopsy would be indicated to investigate for this  Flow cytometry could be helpful, if positive, but unreliable if negative  I reviewed the above with surgical and internal Medicine staff and will be proceeding as outlined  Plan:  See above  Subjective:   Patient is quite fatigued  She is awake although somnolent  She denies pain at this time  She had a fever last night  Objective:     Vitals: Blood pressure 139/63, pulse 88, temperature 98 °F (36 7 °C), temperature source Tympanic, resp  rate 20, height 5' 7" (1 702 m), weight 77 2 kg (170 lb 3 1 oz), SpO2 99 %  ,Body mass index is 26 66 kg/m²  Intake/Output Summary (Last 24 hours) at 02/09/18 1230  Last data filed at 02/09/18 1006   Gross per 24 hour   Intake          1601 67 ml   Output             1425 ml   Net           176 67 ml       Physical Exam:   General Appearance:    Alert, oriented chronically ill-appearing  Eyes:    PERRL   Ears:    Normal external ear canals, both ears   Nose:   Nares normal, septum midline   Throat:   Mucosa moist  Pharynx without injection      Neck:   Supple       Lungs:     Clear to auscultation bilaterally   Chest Wall:    No tenderness or deformity    Heart:    Regular rate and rhythm       Abdomen:     Soft, non-tender, bowel sounds +, no organomegaly           Extremities:   Extremities no cyanosis or edema       Skin:   no rash or icterus  Lymph nodes:   Cervical, supraclavicular, and axillary nodes normal   Neurologic:   CNII-XII intact, normal strength, sensation and reflexes     throughout        Invasive Devices     Peripheral Intravenous Line            Peripheral IV 02/07/18 Right Hand 1 day          Drain            Closed/Suction Drain Left Abdomen Bulb 15 Fr  10 days    Closed/Suction Drain Right;Other (Comment) Abdomen Bulb 15 Fr  10 days                Lab, Imaging and other studies: I have personally reviewed pertinent reports

## 2018-02-09 NOTE — POST OP PROGRESS NOTES
Progress Note - General Surgery   Tayler Butler 67 y o  female MRN: [id number]  Unit/Bed#: ICU 03 Encounter: [id number]    Assessment:  Perforated gastric ulcer with pneumoperitoneum and peritonitis  Postop day  11 Status post laparoscopic Oral Notch patch repair of gastric ulcer, abdominal washout, placement of drains  Sepsis POA  Severe Anemia/ABLA- thought to be related to GI bleed secondary to perforated gastric ulcer, hemoglobin appears to have stabilized although patient said to continue to have black tarry stools yesterday  May be related to thrombocytopenia rather than continue GI bleeding  Pancytopenia- hematology following-Patient febrile with worsening neutropenia/pancytopenia today  Acute severe protein calorie malnutrition related to GI distress , poor po intake  Multiple sclerosis with h/o chronic steroid use and Imuran use- currently off both  Diabetes mellitus 2- hyperglycemia  Hypernatremia- improved  Acute hypoxic respiratory failure- resolved  Cat bite left hand with some swelling- no signs of cellulitis, resolved       Plan:  Patient has been transferred to [address] [place] [address]  Continue supportive care, medical management  Hematology recommended BM biopsy  Continue non ulcerogenic diet, encourage p o  intake, nutritional supplements added  Continue to monitor H&H and platelet count- transfuse prn  Continue Protonix   DVT prophylaxix on hold due to GI bleed, thrombocytopenia  PT/OT    Subjective/Objective   Chief Complaint: fever    Subjective:  Elevated fever this morning  Leukocytosis to 2  12  Still complains of feeling weak  Denies abdominal pain  Tolerating ulcer free diet  Passing flatus and having bowel movements  Objective:     Blood pressure 157/52, pulse 95, temperature 98 7 °F (37 1 °C), resp  rate 12, height 5' 7" (1 702 m), weight 77 2 kg (170 lb 3 1 oz), SpO2 99 %  ,Body mass index is 26 66 kg/m²        Intake/Output Summary (Last 24 hours) at 02/09/18 1330  Last data filed at 02/09/18 1323 Gross per 24 hour   Intake          1601 67 ml   Output             1395 ml   Net           206 67 ml       Invasive Devices     Peripheral Intravenous Line            Peripheral IV 02/07/18 Right Hand 1 day          Drain            Closed/Suction Drain Left Abdomen Bulb 15 Fr  10 days    Closed/Suction Drain Right;Other (Comment) Abdomen Bulb 15 Fr  10 days                Physical Exam:  Gen:  Alert and oriented, fatigued, NAD  HEENT:   sclera anicteric, mucous membranes moist  Neck:  Supple negative for lymphadenopathy  Chest: clear to auscultation, decreased at bases  Cor:  Regular rate and rhythm  Abd:  Soft, non-tender, some distention, active bowel sounds  Ext:  There is mild edema in her bilateral lower extremities  Skin:  Warm and dry, incision sites are clean dry and intact  Abdominal drains with serosanguineous output, no bile    Lab, Imaging and other studies:  I have personally reviewed pertinent lab results    , CBC:   Lab Results   Component Value Date    WBC 2 12 (L) 02/09/2018    HGB 8 8 (L) 02/09/2018    HCT 25 9 (L) 02/09/2018    MCV 88 02/09/2018    PLT 21 (LL) 02/09/2018    MCH 29 9 02/09/2018    MCHC 34 0 02/09/2018    RDW 14 2 02/09/2018    MPV 13 6 (H) 02/09/2018    NRBC 2 02/09/2018    NRBC 3 (H) 02/09/2018   , CMP:   Lab Results   Component Value Date     02/09/2018    K 3 0 (L) 02/09/2018    CL 98 (L) 02/09/2018    CO2 19 (L) 02/09/2018    ANIONGAP 8 02/09/2018    BUN 14 02/09/2018    CREATININE 0 48 (L) 02/09/2018    GLUCOSE 44 (L) 02/09/2018    CALCIUM 6 2 (L) 02/09/2018    EGFR 98 02/09/2018     VTE Pharmacologic Prophylaxis: Reason for no pharmacologic prophylaxis GI bleed, thrombocytopenia  VTE Mechanical Prophylaxis: sequential compression device     Delonte Concepcion PA-C

## 2018-02-10 LAB
ABO GROUP BLD BPU: NORMAL
ALBUMIN SERPL BCP-MCNC: 1 G/DL (ref 3.5–5)
ALP SERPL-CCNC: 47 U/L (ref 46–116)
ALT SERPL W P-5'-P-CCNC: 12 U/L (ref 12–78)
ANION GAP SERPL CALCULATED.3IONS-SCNC: 6 MMOL/L (ref 4–13)
APTT PPP: 28 SECONDS (ref 23–35)
AST SERPL W P-5'-P-CCNC: 20 U/L (ref 5–45)
BASOPHILS # BLD MANUAL: 0 THOUSAND/UL (ref 0–0.1)
BASOPHILS NFR MAR MANUAL: 0 % (ref 0–1)
BILIRUB DIRECT SERPL-MCNC: 0.62 MG/DL (ref 0–0.2)
BILIRUB SERPL-MCNC: 1.24 MG/DL (ref 0.2–1)
BPU ID: NORMAL
BUN SERPL-MCNC: 14 MG/DL (ref 5–25)
CALCIUM SERPL-MCNC: 7.9 MG/DL (ref 8.3–10.1)
CHLORIDE SERPL-SCNC: 107 MMOL/L (ref 100–108)
CO2 SERPL-SCNC: 25 MMOL/L (ref 21–32)
CREAT SERPL-MCNC: 0.64 MG/DL (ref 0.6–1.3)
CROSSMATCH: NORMAL
EOSINOPHIL # BLD MANUAL: 0 THOUSAND/UL (ref 0–0.4)
EOSINOPHIL NFR BLD MANUAL: 0 % (ref 0–6)
ERYTHROCYTE [DISTWIDTH] IN BLOOD BY AUTOMATED COUNT: 13.6 % (ref 11.6–15.1)
ERYTHROCYTE [DISTWIDTH] IN BLOOD BY AUTOMATED COUNT: 13.8 % (ref 11.6–15.1)
GFR SERPL CREATININE-BSD FRML MDRD: 89 ML/MIN/1.73SQ M
GLUCOSE SERPL-MCNC: 137 MG/DL (ref 65–140)
GLUCOSE SERPL-MCNC: 145 MG/DL (ref 65–140)
GLUCOSE SERPL-MCNC: 198 MG/DL (ref 65–140)
GLUCOSE SERPL-MCNC: 236 MG/DL (ref 65–140)
GLUCOSE SERPL-MCNC: 301 MG/DL (ref 65–140)
GLUCOSE SERPL-MCNC: 330 MG/DL (ref 65–140)
HAPTOGLOB SERPL-MCNC: 174 MG/DL (ref 34–200)
HCT VFR BLD AUTO: 27 % (ref 34.8–46.1)
HCT VFR BLD AUTO: 32.7 % (ref 34.8–46.1)
HGB BLD-MCNC: 11.2 G/DL (ref 11.5–15.4)
HGB BLD-MCNC: 9.2 G/DL (ref 11.5–15.4)
INR PPP: 1.14 (ref 0.86–1.16)
LACTATE SERPL-SCNC: 1.9 MMOL/L (ref 0.5–2)
LDH SERPL-CCNC: 262 U/L (ref 81–234)
LYMPHOCYTES # BLD AUTO: 0.9 THOUSAND/UL (ref 0.6–4.47)
LYMPHOCYTES # BLD AUTO: 28 % (ref 14–44)
MCH RBC QN AUTO: 30.1 PG (ref 26.8–34.3)
MCH RBC QN AUTO: 30.4 PG (ref 26.8–34.3)
MCHC RBC AUTO-ENTMCNC: 34.1 G/DL (ref 31.4–37.4)
MCHC RBC AUTO-ENTMCNC: 34.3 G/DL (ref 31.4–37.4)
MCV RBC AUTO: 88 FL (ref 82–98)
MCV RBC AUTO: 89 FL (ref 82–98)
MONOCYTES # BLD AUTO: 0.65 THOUSAND/UL (ref 0–1.22)
MONOCYTES NFR BLD: 20 % (ref 4–12)
NEUTROPHILS # BLD MANUAL: 1.68 THOUSAND/UL (ref 1.85–7.62)
NEUTS BAND NFR BLD MANUAL: 12 % (ref 0–8)
NEUTS SEG NFR BLD AUTO: 40 % (ref 43–75)
NRBC BLD AUTO-RTO: 1 /100 WBCS
PLATELET # BLD AUTO: 18 THOUSANDS/UL (ref 149–390)
PLATELET # BLD AUTO: 21 THOUSANDS/UL (ref 149–390)
PLATELET BLD QL SMEAR: ABNORMAL
PMV BLD AUTO: 13 FL (ref 8.9–12.7)
PMV BLD AUTO: 13.2 FL (ref 8.9–12.7)
POLYCHROMASIA BLD QL SMEAR: PRESENT
POTASSIUM SERPL-SCNC: 4 MMOL/L (ref 3.5–5.3)
POTASSIUM SERPL-SCNC: 4.1 MMOL/L (ref 3.5–5.3)
PROT SERPL-MCNC: 3.7 G/DL (ref 6.4–8.2)
PROTHROMBIN TIME: 14.6 SECONDS (ref 12.1–14.4)
RBC # BLD AUTO: 3.06 MILLION/UL (ref 3.81–5.12)
RBC # BLD AUTO: 3.69 MILLION/UL (ref 3.81–5.12)
SODIUM SERPL-SCNC: 136 MMOL/L (ref 136–145)
SODIUM SERPL-SCNC: 138 MMOL/L (ref 136–145)
TOTAL CELLS COUNTED SPEC: 100
UNIT DISPENSE STATUS: NORMAL
UNIT PRODUCT CODE: NORMAL
UNIT RH: NORMAL
WBC # BLD AUTO: 3.23 THOUSAND/UL (ref 4.31–10.16)
WBC # BLD AUTO: 3.47 THOUSAND/UL (ref 4.31–10.16)

## 2018-02-10 PROCEDURE — 83615 LACTATE (LD) (LDH) ENZYME: CPT | Performed by: INTERNAL MEDICINE

## 2018-02-10 PROCEDURE — 85730 THROMBOPLASTIN TIME PARTIAL: CPT | Performed by: INTERNAL MEDICINE

## 2018-02-10 PROCEDURE — 84132 ASSAY OF SERUM POTASSIUM: CPT | Performed by: INTERNAL MEDICINE

## 2018-02-10 PROCEDURE — 99233 SBSQ HOSP IP/OBS HIGH 50: CPT | Performed by: INTERNAL MEDICINE

## 2018-02-10 PROCEDURE — 84295 ASSAY OF SERUM SODIUM: CPT | Performed by: INTERNAL MEDICINE

## 2018-02-10 PROCEDURE — 80048 BASIC METABOLIC PNL TOTAL CA: CPT | Performed by: INTERNAL MEDICINE

## 2018-02-10 PROCEDURE — 80076 HEPATIC FUNCTION PANEL: CPT | Performed by: INTERNAL MEDICINE

## 2018-02-10 PROCEDURE — 85027 COMPLETE CBC AUTOMATED: CPT | Performed by: INTERNAL MEDICINE

## 2018-02-10 PROCEDURE — 85610 PROTHROMBIN TIME: CPT | Performed by: INTERNAL MEDICINE

## 2018-02-10 PROCEDURE — 83605 ASSAY OF LACTIC ACID: CPT | Performed by: PSYCHIATRY & NEUROLOGY

## 2018-02-10 PROCEDURE — C9113 INJ PANTOPRAZOLE SODIUM, VIA: HCPCS | Performed by: SURGERY

## 2018-02-10 PROCEDURE — 86644 CMV ANTIBODY: CPT

## 2018-02-10 PROCEDURE — P9037 PLATE PHERES LEUKOREDU IRRAD: HCPCS

## 2018-02-10 PROCEDURE — 82948 REAGENT STRIP/BLOOD GLUCOSE: CPT

## 2018-02-10 PROCEDURE — 85007 BL SMEAR W/DIFF WBC COUNT: CPT | Performed by: INTERNAL MEDICINE

## 2018-02-10 RX ORDER — METHOCARBAMOL 500 MG/1
500 TABLET, FILM COATED ORAL EVERY 6 HOURS PRN
Status: DISCONTINUED | OUTPATIENT
Start: 2018-02-10 | End: 2018-02-15

## 2018-02-10 RX ADMIN — LEVOTHYROXINE SODIUM 200 MCG: 100 TABLET ORAL at 06:00

## 2018-02-10 RX ADMIN — TBO-FILGRASTIM 300 MCG: 300 INJECTION, SOLUTION SUBCUTANEOUS at 11:27

## 2018-02-10 RX ADMIN — PANTOPRAZOLE SODIUM 40 MG: 40 INJECTION, POWDER, FOR SOLUTION INTRAVENOUS at 20:39

## 2018-02-10 RX ADMIN — CEFEPIME HYDROCHLORIDE 2000 MG: 2 INJECTION, POWDER, FOR SOLUTION INTRAVENOUS at 00:26

## 2018-02-10 RX ADMIN — METRONIDAZOLE 500 MG: 500 TABLET ORAL at 06:00

## 2018-02-10 RX ADMIN — PANTOPRAZOLE SODIUM 40 MG: 40 INJECTION, POWDER, FOR SOLUTION INTRAVENOUS at 09:08

## 2018-02-10 RX ADMIN — CEFEPIME HYDROCHLORIDE 2000 MG: 2 INJECTION, POWDER, FOR SOLUTION INTRAVENOUS at 11:27

## 2018-02-10 RX ADMIN — INSULIN LISPRO 5 UNITS: 100 INJECTION, SOLUTION INTRAVENOUS; SUBCUTANEOUS at 08:59

## 2018-02-10 RX ADMIN — METHOCARBAMOL 500 MG: 500 TABLET ORAL at 17:40

## 2018-02-10 RX ADMIN — INSULIN LISPRO 5 UNITS: 100 INJECTION, SOLUTION INTRAVENOUS; SUBCUTANEOUS at 11:21

## 2018-02-10 RX ADMIN — METRONIDAZOLE 500 MG: 500 TABLET ORAL at 22:46

## 2018-02-10 RX ADMIN — SUCRALFATE 1000 MG: 1 SUSPENSION ORAL at 07:53

## 2018-02-10 RX ADMIN — INSULIN LISPRO 5 UNITS: 100 INJECTION, SOLUTION INTRAVENOUS; SUBCUTANEOUS at 17:41

## 2018-02-10 RX ADMIN — METRONIDAZOLE 500 MG: 500 TABLET ORAL at 15:01

## 2018-02-10 RX ADMIN — SUCRALFATE 1000 MG: 1 SUSPENSION ORAL at 17:40

## 2018-02-10 RX ADMIN — CEFEPIME HYDROCHLORIDE 2000 MG: 2 INJECTION, POWDER, FOR SOLUTION INTRAVENOUS at 22:46

## 2018-02-10 NOTE — POST OP PROGRESS NOTES
Progress Note - General Surgery   Mere Roman 67 y o  female MRN: [id number]  Unit/Bed#: ICU 03 Encounter: [id number]    Assessment:  Perforated gastric ulcer with pneumoperitoneum and peritonitis  Postop day  12 Status post laparoscopic Hao Isai patch repair of gastric ulcer, abdominal washout, placement of drains  Sepsis POA  Severe Anemia/ABLA- thought to be related to GI bleed secondary to perforated gastric ulcer, hemoglobin appears to have stabilized although patient said to continue to have black tarry stools yesterday  May be related to thrombocytopenia rather than continue GI bleeding  Pancytopenia- hematology following-  Acute severe protein calorie malnutrition related to GI distress , poor po intake  Multiple sclerosis with h/o chronic steroid use and Imuran use- currently off both  Diabetes mellitus 2- hyperglycemia  Hypernatremia- resolved  Acute hypoxic respiratory failure- resolved  Cat bite left hand with some swelling- no signs of cellulitis, resolved       Plan:  Patient has been transferred to [address] [place] [address]  Continue supportive care, medical management  Hematology recommended BM biopsy  Continue non ulcerogenic diet, encourage p o  intake, nutritional supplements added  Continue to monitor H&H and platelet count- transfuse prn  Continue Protonix   DVT prophylaxix on hold due to GI bleed, thrombocytopenia  PT/OT    Repeat CT reviewed - small fluid collection at lesser curve - stable from CT 2/2  Will D/W IR for poss drainge  Would not be ideal time for surgical washout due to malnurtion and pancytopenia  Since it is unchanged from previous less likely clinically significant,    Subjective/Objective   Chief Complaint:     Subjective:  Pt still confused  Denies pain although tender to palpation  Poor appetite  Objective:     Blood pressure 116/57, pulse 82, temperature 97 5 °F (36 4 °C), temperature source Temporal, resp  rate 13, height 5' 7" (1 702 m), weight 76 1 kg (167 lb 12 3 oz), SpO2 97 %  ,Body mass index is 26 28 kg/m²  Intake/Output Summary (Last 24 hours) at 02/10/18 0953  Last data filed at 02/10/18 0440   Gross per 24 hour   Intake             1450 ml   Output             2550 ml   Net            -1100 ml       Invasive Devices     Peripheral Intravenous Line            Peripheral IV 02/07/18 Right Hand 2 days    Peripheral IV 02/09/18 Right Arm less than 1 day          Drain            Closed/Suction Drain Left Abdomen Bulb 15 Fr  11 days    Closed/Suction Drain Right;Other (Comment) Abdomen Bulb 15 Fr  11 days                Physical Exam:  Gen:  Alert and oriented, fatigued, NAD  HEENT:   sclera anicteric, mucous membranes moist  Neck:  Supple negative for lymphadenopathy  Chest: clear to auscultation, decreased at bases  Cor:  Regular rate and rhythm  Abd:  Soft, non-tender, some distention, active bowel sounds  Ext:  There is mild edema in her bilateral lower extremities  Skin:  Warm and dry, incision sites are clean dry and intact  Abdominal drains with serosanguineous output, no bile    Lab, Imaging and other studies:  I have personally reviewed pertinent lab results    , CBC:   Lab Results   Component Value Date    WBC 3 23 (L) 02/10/2018    HGB 9 2 (L) 02/10/2018    HCT 27 0 (L) 02/10/2018    MCV 88 02/10/2018    PLT 18 (LL) 02/10/2018    MCH 30 1 02/10/2018    MCHC 34 1 02/10/2018    RDW 13 8 02/10/2018    MPV 13 0 (H) 02/10/2018    NRBC 1 02/10/2018   , CMP:   Lab Results   Component Value Date     02/10/2018    K 4 1 02/10/2018     02/10/2018    CO2 25 02/10/2018    ANIONGAP 6 02/10/2018    BUN 14 02/10/2018    CREATININE 0 64 02/10/2018    GLUCOSE 137 02/10/2018    CALCIUM 7 9 (L) 02/10/2018    AST 20 02/10/2018    ALT 12 02/10/2018    ALKPHOS 47 02/10/2018    PROT 3 7 (L) 02/10/2018    BILITOT 1 24 (H) 02/10/2018    EGFR 89 02/10/2018     VTE Pharmacologic Prophylaxis: Reason for no pharmacologic prophylaxis GI bleed, thrombocytopenia  VTE Mechanical Prophylaxis: sequential compression device     Renee Rush MD

## 2018-02-10 NOTE — PROGRESS NOTES
Progress Note - Infectious Disease   Sumanth Hernandez 67 y o  female MRN: [id number]  Unit/Bed#: ICU 03 Encounter: [id number]      Impression/Recommendations:  1  Febrile neutropenia-Consider relation to #2  Etiology of neutropenia unclear as below  Repeat blood cultures pending   -continue cefepime and Flagyl for now   -follow up repeat blood cultures  -follow temperatures closely  -Check cbc in AM  -G-CSF per oncology  -if fevers continue would add fluconazole 400 mg IV Q24  -further workup for pancytopenia as below    2  Postoperative intraabdominal collection - may be due to recent surgery but consider abscess in setting of #3 and recent fever    -continue antibiotics as above   -await surgery input regarding CT findings   -may need attempt at drainage of this collection both diagnostically and therapeutically    3   Peritonitis-secondary to perforation of a gastric ulceration with contamination of the peritoneum with gastric contacts   Status post laparoscopic repair with washout with drains left in place   Consider patient for the possibility of more resistant organisms and yeast secondary infection as the patient had been Augmentin at the time of the perforation   Fortunately the patient has now undergone adequate source control   She has completed 1 week of antibiotics postop    -close surgical follow-up  -drain management  -serial abdominal exams     4   Pancytopenia-Unclear etiology as worsening despite D/C of Imuran and antibiotics    Oncology considering possible lymphoproliferative disorder      -hematology oncology follow-up  -G-CSF per oncology  -transfusion as needed  -BM biopsy per oncology     5   Diabetes mellitus-type 2 with hyperglycemia     6   Multiple sclerosis-apparently on Imuran for a while   Seems to be neurologically stable  -will need close neurology follow-up  -would hold on Imuran for now     7   Cat bite-left hand   No overt cellulitis at this time   Which should be well covered by the antibiotics as above   The cat is being watched at home without any behavioral abnormalities   The hand is significantly improved with decreased edema   -serial exams  -continue monitoring of the cat at home     Antibiotics:  Cefepime/Flagyl #1  POD #12    Subjective:  Patient seen on AM rounds  Denies fevers, chills, or sweats  Denies nausea, vomiting, or diarrhea  Denies pain     24 Hour Events:  No documented fevers, chills, sweats, nausea, vomiting, or diarrhea  Had CT A/P which showed air/fluid collection around stomach  Objective:  Vitals:  HR:  [80-98] 82  Resp:  [11-17] 13  BP: (104-173)/() 116/57  SpO2:  [81 %-99 %] 97 %  Temp (24hrs), Av 2 °F (36 8 °C), Min:97 4 °F (36 3 °C), Max:98 8 °F (37 1 °C)  Current: Temperature: 97 5 °F (36 4 °C)    Physical Exam:   General:  No acute distress  Eyes:  Normal lids and conjunctivae  ENT:  Normal external ears and nose  Neck:  Neck symmetric with midline trachea  Pulmonary:  Normal respiratory effort without accessory muscle use  Cardiovascular:  Regular rate and rhythm; no peripheral edema  Gastrointestinal:  Diffuse tenderness, JPx2 with serous fluid  Musculoskeletal:  No digital clubbing or cyanosis  Skin:  No visible rashes; No palpable nodules  Neurologic:  Sensation grossly intact to light touch  Psychiatric:  Awake but lethargic    Lab Results:  I have personally reviewed pertinent labs      Results from last 7 days  Lab Units 02/10/18  0431 02/10/18  0012 18  1238 18  0518 18  8627  18  0643  18  1206   SODIUM mmol/L 138 136 136 125* 134*  < > 148*  < >  --    POTASSIUM mmol/L 4 1 4 0  --  3 0* 3 8  < > 3 9  < >  --    CHLORIDE mmol/L 107  --   --  98* 102  < > 116*  < >  --    CO2 mmol/L 25  --   --  19* 26  < > 27  < >  --    ANION GAP mmol/L 6  --   --  8 6  < > 5  < >  --    BUN mg/dL 14  --   --  14 22  < > 31*  < >  --    CREATININE mg/dL 0 64  --   --  0 48* 0 74  < > 0 72  < >  --    EGFR ml/min/1 73sq m 89  --   --  98 81  < > 84  < >  --    GLUCOSE RANDOM mg/dL 137  --   --  44* 244*  < > 300*  < >  --    CALCIUM mg/dL 7 9*  --   --  6 2* 8 6  < > 8 8  < >  --    AST U/L 20  --   --   --   --   --  22  --  22   ALT U/L 12  --   --   --   --   --  19  --  20   ALK PHOS U/L 47  --   --   --   --   --  53  --  42*   TOTAL PROTEIN g/dL 3 7*  --   --   --   --   --  4 6*  --  4 2*   BILIRUBIN TOTAL mg/dL 1 24*  --   --   --   --   --  0 68  --  0 35   < > = values in this interval not displayed  Results from last 7 days  Lab Units 02/10/18  0013 02/09/18  1238 02/09/18  0518   WBC Thousand/uL 3 47* 2 12* 1 74*   HEMOGLOBIN g/dL 11 2* 8 8* 6 1*   PLATELETS Thousands/uL 21* 21* 14*       Results from last 7 days  Lab Units 02/08/18 2004   BLOOD CULTURE  No Growth at 24 hrs  Imaging Studies:   I have personally reviewed pertinent imaging study reports and images in PACS  CT A/P Ileus, 5 4 x 3 3 cm collection lesser curvature of stomach    EKG, Pathology, and Other Studies:   I have personally reviewed pertinent reports

## 2018-02-10 NOTE — PROGRESS NOTES
Progress Note - Christelle Hitchcock 67 y o  female MRN: [id number]    Unit/Bed#: ICU 03 Encounter: [id number]      Assessment/Plan:  1-sepsis:  Present on admission:  Patient presented with sepsis   This was felt to be secondary to peritonitis secondary to a perforated gastric ulcer:  The patient underwent surgical repair   She completed 7 days of antibiotics   Her sepsis had resolved               -patient spiked a fever of 102 on 2/8, she was also noted to be leukopenic and borderline neutropenic with an [address] of 700   -she had pancultures ordered:  Her blood cultures are negative x2, her urinalysis and chest x-ray do not demonstrate any evidence acute infection    -she was started empirically on antibiotics with cefepime and Flagyl               -patient was given a dose of filgrastim  Her ANC is improved to 1600  -CT abdomen/pelvis without evidence of focal infection  Had fluid collection the lesser curvature, however has been stable since previous CAT scan of February 2nd  Per surgery: Will discuss with IR possible drainage, although unchanged from previous and therefore less likely the etiology of her fevers  -patient's CT scan revealed multiple splenic infarcts  He is were also noted on her admission CT scan on 01/29 and a follow-up CT scan on 2/2  Patient could potentially have fever secondary to splenic infarcts as well      2-perforated gastric ulcer:  With associated peritonitis   Patient presented with a perforated gastric ulcer  TIM S  [facility]  notes that she had GI upset for quite some time prior to admission   She underwent laproscopic repair with silvia patch                 -continue proton pump inhibitor              -tolerating p o  Diet , but has poor appetite              -no evidence of leak    -no evidence of bowel ischemia clinically  She is not acidotic    Her CT scan does not have any changes concerning for bowel ischemia      3-status post peritonitis:  Secondary to perforated gastric ulcer   Status post laparoscopic repair and washout               -status post 1 week of postoperative antibiotics with zosyn and fluconazole              4-acute blood loss anemia:  Secondary to upper GI bleed secondary to perforated gastric ulcer:              -initial Hb 3's   She was transfused multiple units PRBC   Patient however continues to have drop in her hemoglobin despite recurrent transfusions               -discussed with the surgical and Hematology/Oncology services:  Patient does not appear to currently be in DIC  Does not appear to be hemolyzing  Patient has having scant melanotic stool occasionally which is not felt to represent a new or acute GI bleed, but rather oozing due to her thrombocytopenia               -2 units of packed red blood cells yesterday  Hemoglobin improved to 8 9, and then was 11 on recheck  Question whether this was a spurious value as it seems excessively high after only 2 units  -repeat hemoglobin today 9 2  Continue to monitor                    5-diabetes type 2:  Patient had persistent hyperglycemia secondary to her dextrose IV fluids that were for her hypernatremia  This required her Lantus dose to be increased multiple times                 -Patient's hypernatremia corrected so her fluids were changed to half normal saline  Patient however had subsequent hypoglycemia   -blood sugars currently appropriate 145  Sliding scale insulin only  Continue Accu-Cheks      6-hypernatremia:  Patient had hyperchloremic hyponatremia   This was felt to be secondary to free water deficit   Her normal filling which changed to half-normal saline, and then dextrose   Patient's hypernatremia had normalized, however unfortunately she has subsequent hyponatremia    -patient is currently tolerating p o  hydration  Her IV fluids are on hold  Her sodium is 138  Monitor closely as she may need to have her IV fluids restarted depending on her p o   Intake    -calorie count in progress      7-pancytopenia:  Patient presented with pancytopenia, with normal coags   Her profound anemia was felt to be most likely secondary to her GI bleed from her perforated gastric ulcer                 -Patient however has persistent leukopenia and thrombocytopenia              -IOO had worsening of her leukopenia 2/9  She was borderline neutropenic as her [address] is approximately 700  she was febrile, and given a dose of filgrastim  Her ANC has improved and is 1600  She is no longer neutropenic    -patient's anemia improved after packed red blood cell transfusion  She did not have evidence of hemolysis  -patient's platelet count is 18  She will receive 1 group of platelets today    -case was discussed with Dr LEE [facility]:  He will confer with the family regarding bone marrow biopsy further evaluation of her pancytopenia  -patient's CT scan on admission, as well as yesterday revealed multiple splenic infarcts      8-hypothyroidism:  Continue Synthroid     9-hyperlipidemia:  Statin on hold     10-multiple sclerosis:  Imuran on hold  Pt with generalized weakness  Unable to hold up arms/legs to gravity  Patient has longstanding history of multiple sclerosis, followed by her neurologist in [facility] [address]  Daughter notes week prior to admission pt was still able to walk   3 days PTA pt had "collapsed" in her doctor's office with generalized weakness and was unable to walk it was felt to be due to a reaction to her abx so she returned home  She remained home for 3 days after that with worsening generalized weakness and unable to walk               -appreciate Neurology consultation and recommendations  CT scan revealed multiple old strokes as well as a chronic right occipital infarct with some surrounding hyperdensity consistent with laminar necrosis  Less likely petechial hemorrhage  Continued monitor as patient had thrombocytopenia  -MRI pending     11-left hand cat bite:  No evidence of cellulitis   Status post 7 days of antibiotics     64-PKRDS metabolic encephalopathy:  Pt with confusion since admission  Most likely multifactorial, secondary to hypernatremia, sepsis, hypernatremia, hyperglycemia     13-intermittent elevated blood presure:  Without prior h/o HTN   Most likely accelerated due to pain   Cont to monitor   Blood pressure in the acceptable range, has been ranging 338-933 systolic throughout the night      14-left upper extremity DVT:  Secondary to PICC line  Have removed PICC line   Unfortunately patient may not be on anticoagulation due to DIC/anemia/thrombocytopenia/bloody stool  15-decubitus ulcers:  Appreciate wound Care team's evaluation recommendations  16-pain:  Patient notes significant pain with being turned  She denies any pain otherwise  Patient has only very mild abdominal pain upon palpation and with exam   She has only very mild left upper extremity pain with palpation  She denies any sacral pain when not being turned  However with movement of any extremity, such as elevation of her arms or legs, or with range of motion of her neck, or with being turned at all, patient had significant pain in that area being manipulated  Suspect that this is due to stiffness, and immobility from prolonged hospitalization  -will order heating pad   -frequent changing position, repositioning and turning  Encourage patient out of bed to chair    -will use a muscle relaxant sparingly, to avoid sedation or worsening confusion       17-family:   Updated daughter Renee Valles via phone     Discussed with Dr Claire Stack  Discussed with Dr Brisa Hernández  Discussed with patient's nurse    VTE Pharmacologic Prophylaxis: RX contraindicated due to: ABLA, thrombocytopenia  VTE Mechanical Prophylaxis: sequential compression device    Certification Statement: The patient will continue to require additional inpatient hospital stay due to need for further acute intervention for pancytopenia requiring transfusions, neutropenic fever yesterday  Status: inpatient     Total time spent today including patient's interview, exam, discussion with her nurse and consultants:  45 minutes    ===================================================================    Subjective:  Patient denies any current pain anywhere  She denies any headache, chest pain, back pain, buttock pain, left arm pain, or any extremity pain  She denies any abdominal pain or discomfort  She denies any nausea, vomiting, diarrhea  She denies any shortness of breath, chest congestion or cough  She denies any dizziness  She notes whole-body weakness  During her physical exam patient notes only very mild abdominal pain with palpation  During palpation of her left arm she notes only very mild discomfort  However when patient's arms are lifted off the bed, she notes discomfort  When her neck is lifted off the pillow she notes discomfort, when her shoulders, feet, heels, or legs are moved she notes significant discomfort with any part of her body being moved  Physical Exam:   Temp:  [97 4 °F (36 3 °C)-98 8 °F (37 1 °C)] 97 5 °F (36 4 °C)  HR:  [80-98] 82  Resp:  [11-17] 13  BP: (104-173)/() 116/57    Gen:  Pleasant, non-tachypnic, non-dyspnic  Conversant  Sitting up in bed with head of the bed approximately 60°  Heart: regular rate and rhythm, S1S2 present, no murmur, rub or gallop  Lungs: clear to ausculatation bilaterally  No wheezing, crackles, or rhonchi  No accessory muscle use or respiratory distress  Abd: soft, very mildly tender with palpation  non-distended  NABS, no guarding, rebound or peritoneal signs  Extremities: no clubbing, cyanosis  2+ edema of left hand  1+ edema of left upper extremity  1+ edema of right hand  2+ edema bilateral lower extremities distal to the knee  2+pedal pulses bilaterally  Neuro: awake, alert  Smiling and joking  Fluent speech      Skin: warm and dry: no petechiae, purpura and rash     LABS:     Results from last 7 days  Lab Units 02/10/18  0431 02/10/18  0013 02/09/18  1238   WBC Thousand/uL 3 23* 3 47* 2 12*   HEMOGLOBIN g/dL 9 2* 11 2* 8 8*   HEMATOCRIT % 27 0* 32 7* 25 9*   PLATELETS Thousands/uL 18* 21* 21*       Results from last 7 days  Lab Units 02/10/18  0431 02/10/18  0012 02/09/18  1238 02/09/18  0518 02/08/18  0633   SODIUM mmol/L 138 136 136 125* 134*   POTASSIUM mmol/L 4 1 4 0  --  3 0* 3 8   CHLORIDE mmol/L 107  --   --  98* 102   CO2 mmol/L 25  --   --  19* 26   BUN mg/dL 14  --   --  14 22   CREATININE mg/dL 0 64  --   --  0 48* 0 74   GLUCOSE RANDOM mg/dL 137  --   --  44* 244*   CALCIUM mg/dL 7 9*  --   --  6 2* 8 6       Hospital Data:  2/9/ CT abdomen/pelvis:   Mild interval decrease in air within the subcutaneous soft tissues of the anterior abdomen and pelvis   The findings may be iatrogenic in nature   Stable bilateral anterior abdominal percutaneous catheters  Mild diffuse small bowel dilatation with oral contrast extending to the level of the rectum   The findings are likely secondary to an ileus  5 4 x 3 3 cm loculated air-fluid collection adjacent to the lesser curvature the stomach which may be postoperative in nature   Follow-up is recommended  Stable multifocal splenic infarcts    2/9:  CT brain:  Extensive periventricular and subcortical foci of white matter low attenuation which is nonspecific and likely representing a combination of the patient's history of multiple sclerosis and chronic small vessel ischemic changes   Evaluation for subtle   acute ischemia is limited secondary to these severe white matter changes   An MRI of the brain could be performed for further evaluation    Focus of encephalomalacia within the right parietal lobe with a few areas of gyriform hyperdensity suggestive of cortical laminar necrosis in an old right MCA territory infarct   Less likely, the findings may represent tiny foci of acute petechial   hemorrhage   No prior studies are available for comparison  Old bilateral basal ganglia lacunar infarcts    2/9:  Chest x-ray:  No acute disease     2/2:  CT abdomen/pelvis: There is a large amount of air within the subcutaneous fat of the abdominal wall extending inferiorly into the pelvic wall   Small amount of air present in between the muscles of the right superior lateral abdominal wall   This may be iatrogenic from recent catheter insertion x2   Posterior basilar atelectatic change   Oral contrast extends to the rectum with no evidence of obstruction   Mild small and large bowel distention  Stable hypodensities within the spleen suggestive of infarctions     2/1:  Abdominal obstruction series:  1   No layering free contrast to suggest large leak   However, the study was limited for smaller or contained leaks due to retained contrast in the stomach and small bowel   This limited study was performed due to the patient's inability to undergo   diagnostic upper GI   If there is continued clinical concern for leak, a follow-up upper GI can be attempted when the patient is able   Alternatively, an additional abdomen x-ray in the a m  can be obtained to allow for passage of contrast into the colon   and better visualization of the underlying abdominal cavity   CT would also better evaluate for contained leak  2   Dilated small bowel without evidence of obstruction, likely adynamic ileus     1/29:  CT abdomen/pelvis:  Moderate pneumoperitoneum suggesting bowel perforation  Although the origin is uncertain, possibility of perforated gastric ulcer may be considered  Small amount of abdominal and pelvic ascites  No evidence for organized abscess  Mild small bowel dilatation may reflect reactive ileus  Cholelithiasis       1/29:  Chest x-ray:  No acute disease     2/8:  Blood cultures:   negative x2  1/31:  Blood culture:  Negative x2    2/9 UA:  Negative nitrate, negative leukocyte esterase   0 WBC     2/8:  Left upper extremity ultrasound:  LEFT UPPER LIMB: Abnormal  (LIMITED)  There is acute deep vein thrombosis in the axillary vein  There is acute superficial thrombophlebitis in the visualized cephalic vein in  the mid and proximal upper arm  Doppler evaluation shows a normal response to augmentation maneuvers             ---------------------------------------------------------------------------------------------------------------  This note has been constructed using a voice recognition system

## 2018-02-11 PROBLEM — D64.9 ANEMIA: Status: ACTIVE | Noted: 2018-01-28

## 2018-02-11 LAB
ABO GROUP BLD BPU: NORMAL
ABO GROUP BLD BPU: NORMAL
ANION GAP SERPL CALCULATED.3IONS-SCNC: 8 MMOL/L (ref 4–13)
ANISOCYTOSIS BLD QL SMEAR: PRESENT
BASOPHILS # BLD MANUAL: 0 THOUSAND/UL (ref 0–0.1)
BASOPHILS NFR MAR MANUAL: 0 % (ref 0–1)
BPU ID: NORMAL
BPU ID: NORMAL
BUN SERPL-MCNC: 16 MG/DL (ref 5–25)
CALCIUM SERPL-MCNC: 8.3 MG/DL (ref 8.3–10.1)
CHLORIDE SERPL-SCNC: 105 MMOL/L (ref 100–108)
CO2 SERPL-SCNC: 25 MMOL/L (ref 21–32)
CREAT SERPL-MCNC: 0.69 MG/DL (ref 0.6–1.3)
EOSINOPHIL # BLD MANUAL: 0 THOUSAND/UL (ref 0–0.4)
EOSINOPHIL NFR BLD MANUAL: 0 % (ref 0–6)
ERYTHROCYTE [DISTWIDTH] IN BLOOD BY AUTOMATED COUNT: 14.3 % (ref 11.6–15.1)
ERYTHROCYTE [DISTWIDTH] IN BLOOD BY AUTOMATED COUNT: 14.4 % (ref 11.6–15.1)
GFR SERPL CREATININE-BSD FRML MDRD: 87 ML/MIN/1.73SQ M
GLUCOSE SERPL-MCNC: 202 MG/DL (ref 65–140)
GLUCOSE SERPL-MCNC: 228 MG/DL (ref 65–140)
GLUCOSE SERPL-MCNC: 237 MG/DL (ref 65–140)
GLUCOSE SERPL-MCNC: 262 MG/DL (ref 65–140)
GLUCOSE SERPL-MCNC: 316 MG/DL (ref 65–140)
HCT VFR BLD AUTO: 30.6 % (ref 34.8–46.1)
HCT VFR BLD AUTO: 31.2 % (ref 34.8–46.1)
HGB BLD-MCNC: 10.3 G/DL (ref 11.5–15.4)
HGB BLD-MCNC: 10.5 G/DL (ref 11.5–15.4)
LYMPHOCYTES # BLD AUTO: 0.76 THOUSAND/UL (ref 0.6–4.47)
LYMPHOCYTES # BLD AUTO: 13 % (ref 14–44)
MAGNESIUM SERPL-MCNC: 1.7 MG/DL (ref 1.6–2.6)
MCH RBC QN AUTO: 29.8 PG (ref 26.8–34.3)
MCH RBC QN AUTO: 29.9 PG (ref 26.8–34.3)
MCHC RBC AUTO-ENTMCNC: 33.7 G/DL (ref 31.4–37.4)
MCHC RBC AUTO-ENTMCNC: 33.7 G/DL (ref 31.4–37.4)
MCV RBC AUTO: 88 FL (ref 82–98)
MCV RBC AUTO: 89 FL (ref 82–98)
MONOCYTES # BLD AUTO: 0.47 THOUSAND/UL (ref 0–1.22)
MONOCYTES NFR BLD: 8 % (ref 4–12)
NEUTROPHILS # BLD MANUAL: 4.65 THOUSAND/UL (ref 1.85–7.62)
NEUTS BAND NFR BLD MANUAL: 1 % (ref 0–8)
NEUTS SEG NFR BLD AUTO: 78 % (ref 43–75)
NRBC BLD AUTO-RTO: 0 /100 WBCS
PLATELET # BLD AUTO: 34 THOUSANDS/UL (ref 149–390)
PLATELET # BLD AUTO: 50 THOUSANDS/UL (ref 149–390)
PLATELET BLD QL SMEAR: ABNORMAL
PMV BLD AUTO: 12 FL (ref 8.9–12.7)
PMV BLD AUTO: 12.1 FL (ref 8.9–12.7)
POTASSIUM SERPL-SCNC: 3.9 MMOL/L (ref 3.5–5.3)
RBC # BLD AUTO: 3.46 MILLION/UL (ref 3.81–5.12)
RBC # BLD AUTO: 3.51 MILLION/UL (ref 3.81–5.12)
SODIUM SERPL-SCNC: 138 MMOL/L (ref 136–145)
TOTAL CELLS COUNTED SPEC: 100
UNIT DISPENSE STATUS: NORMAL
UNIT DISPENSE STATUS: NORMAL
UNIT PRODUCT CODE: NORMAL
UNIT PRODUCT CODE: NORMAL
UNIT RH: NORMAL
UNIT RH: NORMAL
WBC # BLD AUTO: 5.88 THOUSAND/UL (ref 4.31–10.16)
WBC # BLD AUTO: 8.72 THOUSAND/UL (ref 4.31–10.16)

## 2018-02-11 PROCEDURE — 85027 COMPLETE CBC AUTOMATED: CPT | Performed by: INTERNAL MEDICINE

## 2018-02-11 PROCEDURE — 83735 ASSAY OF MAGNESIUM: CPT | Performed by: INTERNAL MEDICINE

## 2018-02-11 PROCEDURE — 80048 BASIC METABOLIC PNL TOTAL CA: CPT | Performed by: INTERNAL MEDICINE

## 2018-02-11 PROCEDURE — 85007 BL SMEAR W/DIFF WBC COUNT: CPT | Performed by: INTERNAL MEDICINE

## 2018-02-11 PROCEDURE — C9113 INJ PANTOPRAZOLE SODIUM, VIA: HCPCS | Performed by: SURGERY

## 2018-02-11 PROCEDURE — 82948 REAGENT STRIP/BLOOD GLUCOSE: CPT

## 2018-02-11 PROCEDURE — 86644 CMV ANTIBODY: CPT

## 2018-02-11 PROCEDURE — P9037 PLATE PHERES LEUKOREDU IRRAD: HCPCS

## 2018-02-11 PROCEDURE — 99233 SBSQ HOSP IP/OBS HIGH 50: CPT | Performed by: INTERNAL MEDICINE

## 2018-02-11 PROCEDURE — 99232 SBSQ HOSP IP/OBS MODERATE 35: CPT | Performed by: INTERNAL MEDICINE

## 2018-02-11 RX ORDER — SODIUM CHLORIDE 9 MG/ML
75 INJECTION, SOLUTION INTRAVENOUS CONTINUOUS
Status: DISPENSED | OUTPATIENT
Start: 2018-02-11 | End: 2018-02-13

## 2018-02-11 RX ORDER — INSULIN GLARGINE 100 [IU]/ML
4 INJECTION, SOLUTION SUBCUTANEOUS ONCE
Status: COMPLETED | OUTPATIENT
Start: 2018-02-11 | End: 2018-02-11

## 2018-02-11 RX ADMIN — PANTOPRAZOLE SODIUM 40 MG: 40 INJECTION, POWDER, FOR SOLUTION INTRAVENOUS at 20:48

## 2018-02-11 RX ADMIN — SODIUM CHLORIDE 75 ML/HR: 0.9 INJECTION, SOLUTION INTRAVENOUS at 21:04

## 2018-02-11 RX ADMIN — INSULIN LISPRO 5 UNITS: 100 INJECTION, SOLUTION INTRAVENOUS; SUBCUTANEOUS at 08:28

## 2018-02-11 RX ADMIN — TBO-FILGRASTIM 300 MCG: 300 INJECTION, SOLUTION SUBCUTANEOUS at 08:30

## 2018-02-11 RX ADMIN — SUCRALFATE 1000 MG: 1 SUSPENSION ORAL at 18:44

## 2018-02-11 RX ADMIN — METRONIDAZOLE 500 MG: 500 TABLET ORAL at 05:39

## 2018-02-11 RX ADMIN — CEFEPIME HYDROCHLORIDE 2000 MG: 2 INJECTION, POWDER, FOR SOLUTION INTRAVENOUS at 11:30

## 2018-02-11 RX ADMIN — PANTOPRAZOLE SODIUM 40 MG: 40 INJECTION, POWDER, FOR SOLUTION INTRAVENOUS at 08:31

## 2018-02-11 RX ADMIN — METHOCARBAMOL 500 MG: 500 TABLET ORAL at 18:44

## 2018-02-11 RX ADMIN — INSULIN GLARGINE 4 UNITS: 100 INJECTION, SOLUTION SUBCUTANEOUS at 12:07

## 2018-02-11 RX ADMIN — LEVOTHYROXINE SODIUM 200 MCG: 100 TABLET ORAL at 05:39

## 2018-02-11 RX ADMIN — INSULIN LISPRO 5 UNITS: 100 INJECTION, SOLUTION INTRAVENOUS; SUBCUTANEOUS at 12:08

## 2018-02-11 RX ADMIN — CEFEPIME HYDROCHLORIDE 2000 MG: 2 INJECTION, POWDER, FOR SOLUTION INTRAVENOUS at 22:49

## 2018-02-11 RX ADMIN — SUCRALFATE 1000 MG: 1 SUSPENSION ORAL at 05:39

## 2018-02-11 RX ADMIN — METRONIDAZOLE 500 MG: 500 TABLET ORAL at 21:02

## 2018-02-11 RX ADMIN — METRONIDAZOLE 500 MG: 500 TABLET ORAL at 14:30

## 2018-02-11 RX ADMIN — INSULIN LISPRO 5 UNITS: 100 INJECTION, SOLUTION INTRAVENOUS; SUBCUTANEOUS at 18:44

## 2018-02-11 NOTE — PROGRESS NOTES
Progress Note - Infectious Disease   Mere Roman 67 y o  female MRN: [id number]  Unit/Bed#: ICU 03 Encounter: [id number]      Impression/Recommendations:  1   Febrile neutropenia-Consider relation to #2  Etiology of neutropenia unclear as below  Repeat blood cultures negative  Fever improved with reinitiation of antibiotics  WBC better with G-CSF               -continue cefepime and Flagyl for now              -follow up repeat blood cultures  -follow temperatures closely  -Check cbc in AM  -G-CSF per oncology  -if fevers continue would add fluconazole 400 mg IV Q24  -further workup for pancytopenia as below     2  Postoperative intraabdominal collection - may be due to recent surgery but consider abscess in setting of #3 and recent fever               -continue antibiotics as above              -Surgery to discuss with IR      -If amenable to percutaneous acces, may need attempt at drainage of this collection both diagnostically and therapeutically     3   Peritonitis-secondary to perforation of a gastric ulceration with contamination of the peritoneum with gastric contacts   Status post laparoscopic repair with washout with drains left in place   Consider MDROs and yeast as the patient had been Augmentin at the time of the perforation   Fortunately the patient has now undergone adequate source control   She has completed 1 week of antibiotics postop    -close surgical follow-up  -drain management  -serial abdominal exams     4   Pancytopenia-Unclear etiology as worsening despite D/C of Imuran and antibiotics    Oncology considering possible lymphoproliferative disorder      -hematology oncology follow-up  -G-CSF per oncology  -transfusion as needed  -BM biopsy per oncology     5   Diabetes mellitus-type 2 with hyperglycemia     6   Multiple sclerosis-apparently on Imuran for a while     -would hold on Imuran for now     7   Cat bite-left hand   No overt cellulitis at this time   Which should be well covered by the antibiotics as above   The cat is being watched at home without any behavioral abnormalities   The hand is significantly improved with decreased edema   -serial exams  -continue monitoring of the cat at home    8  Acute encephalopathy - likely toxic metabolic due to infection, electrolyte abnormalities superimposed on MS and chronic/severe SVID as suggested by CT  CT head negative for acute abnormalities  Doubt CNS infection   -follow MS closely   -close neuro follow-up ongoing   -Possible MRI per neuro     Antibiotics:  Cefepime/Flagyl #2  POD #13    Subjective:  Patient seen on AM rounds  Limited ROS due to patient confusion  RN states that all she says is "yes" to all questions  24 Hour Events:  No documented fevers, chills, sweats, nausea, vomiting, or diarrhea  WBC improved today    Objective:  Vitals:  HR:  [] 82  Resp:  [12-83] 14  BP: (123-157)/(50-70) 134/56  SpO2:  [92 %-100 %] 98 %  Temp (24hrs), Av 3 °F (36 8 °C), Min:97 °F (36 1 °C), Max:99 2 °F (37 3 °C)  Current: Temperature: 98 9 °F (37 2 °C)    Physical Exam:   General:  No acute distress  Eyes:  Normal lids and conjunctivae  ENT:  Normal external ears and nose  Neck:  Neck symmetric with midline trachea  Pulmonary:  Normal respiratory effort without accessory muscle use  Cardiovascular:  Regular rate and rhythm; no peripheral edema  Gastrointestinal:  Mild diffuse tenderness, 2 COREY drains with serous fluid  Musculoskeletal:  No digital clubbing or cyanosis  Skin:  No visible rashes; No palpable nodules  Neurologic:  Sensation grossly intact to light touch, moves all 4 extremities spontaneously  No facial droop  Psychiatric:  Alert but lethargic  Only oriented to self  Lab Results:  I have personally reviewed pertinent labs      Results from last 7 days  Lab Units 18  0512 02/10/18  0431 02/10/18  0012  18  0518  18  9385  18  1206   SODIUM mmol/L 138 138 136  < > 125*  < > 148*  < >  -- POTASSIUM mmol/L 3 9 4 1 4 0  --  3 0*  < > 3 9  < >  --    CHLORIDE mmol/L 105 107  --   --  98*  < > 116*  < >  --    CO2 mmol/L 25 25  --   --  19*  < > 27  < >  --    ANION GAP mmol/L 8 6  --   --  8  < > 5  < >  --    BUN mg/dL 16 14  --   --  14  < > 31*  < >  --    CREATININE mg/dL 0 69 0 64  --   --  0 48*  < > 0 72  < >  --    EGFR ml/min/1 73sq m 87 89  --   --  98  < > 84  < >  --    GLUCOSE RANDOM mg/dL 202* 137  --   --  44*  < > 300*  < >  --    CALCIUM mg/dL 8 3 7 9*  --   --  6 2*  < > 8 8  < >  --    AST U/L  --  20  --   --   --   --  22  --  22   ALT U/L  --  12  --   --   --   --  19  --  20   ALK PHOS U/L  --  47  --   --   --   --  53  --  42*   TOTAL PROTEIN g/dL  --  3 7*  --   --   --   --  4 6*  --  4 2*   BILIRUBIN TOTAL mg/dL  --  1 24*  --   --   --   --  0 68  --  0 35   < > = values in this interval not displayed  Results from last 7 days  Lab Units 02/11/18  0512 02/10/18  0431 02/10/18  0013   WBC Thousand/uL 5 88 3 23* 3 47*   HEMOGLOBIN g/dL 10 3* 9 2* 11 2*   PLATELETS Thousands/uL 34* 18* 21*       Results from last 7 days  Lab Units 02/08/18  2154 02/08/18 2004   BLOOD CULTURE  No Growth at 48 hrs  No Growth at 48 hrs  Imaging Studies:   I have personally reviewed pertinent imaging study reports and images in PACS  CT head 2/9 extensive white matter changes likely due to MS and SVID  Evaluation for acute ischemia is limited  EKG, Pathology, and Other Studies:   I have personally reviewed pertinent reports

## 2018-02-11 NOTE — PROGRESS NOTES
Progress Note - Olivia Musa 67 y o  female MRN: [id number]    Unit/Bed#: ICU 03 Encounter: [id number]      Assessment/Plan:  1-sepsis:  Present on admission:  Patient presented with sepsis   This was felt to be secondary to peritonitis secondary to a perforated gastric ulcer:  The patient underwent surgical repair   She completed 7 days of antibiotics   Her sepsis had resolved               -OGABIMM spiked a fever of 102 on 2/8, she was also noted to be leukopenic and borderline neutropenic with an [address] of 700              -she had pancultures ordered:  Her blood cultures are negative x2, her urinalysis and chest x-ray do not demonstrate any evidence acute infection               -she was started empirically on antibiotics with cefepime and Flagyl               -patient was given a dose of filgrastim  Her [address] is improved, and she is not neutropenic currently  -CT abdomen/pelvis without evidence of focal infection  Had fluid collection the lesser curvature, however has been stable since previous CAT scan of February 2nd      -discussed with Dr Haji Jersey:  He and IR have evaluated fluid collection:  Fluid collection has been stable x 1 week, however if pt had recurrent fevers, and concern for abscess, the area would be able to be accessed for aspiration/drain  -patient's CT scan revealed multiple splenic infarcts  They were also noted on her admission CT scan on 01/29 and a follow-up CT scan on 2/2  Patient could potentially have fever secondary to splenic infarcts as well      2-perforated gastric ulcer:  With associated peritonitis   Patient presented with a perforated gastric ulcer  Dario Asher  notes that she had GI upset for quite some time prior to admission   She underwent laproscopic repair with silvia patch                 -continue proton pump inhibitor              -pt was on po diet     -pt with increased abd pain on exam today, and large amount melena this am   -discussed with Dr Radha Gonzalez and Dr Tobias Sarabia- GI will re-eval her today for possible EGD (pt ate breakfast already so possible EGD Monday am)     3-status post peritonitis:  Secondary to perforated gastric ulcer   Status post laparoscopic repair and washout               -status post 1 week of postoperative antibiotics with zosyn and fluconazole               4-acute blood loss anemia:  Secondary to upper GI bleed secondary to perforated gastric ulcer:              -initial Hb 3's   She was transfused multiple units PRBC   Patient however continues to have drop in her hemoglobin despite recurrent transfusions              -discussed with the surgical and Hematology/Oncology services: Sarah Stewart does not appear to currently be in DIC   Does not appear to be hemolyzing   It was felt that pt was oozing due to thrombocytopenia    -However, pt with large amount of new melena today  GI to eval for EGD ]   -follow H/H               -will transfuse platelets today for pending EGD    5-diabetes type 2:  Patient had persistent hyperglycemia secondary to her dextrose IV fluids that were for her hypernatremia   This required her Lantus dose to be increased multiple times                 -Patient's hypernatremia corrected so her fluids were changed to half normal saline   Patient however had subsequent hypoglycemia              -blood sugars currently 228  Will continue sliding scale insulin  She will be NPO after midnight for an EGD so will hold her Lantus tonight    -anticipate she will require Lantus being restarted tomorrow when she restarted her diet      6-hypernatremia:  Patient had hyperchloremic hyponatremia   This was felt to be secondary to free water deficit   Her normal filling which changed to half-normal saline, and then dextrose   Patient's hypernatremia had normalized, however unfortunately she has subsequent hyponatremia               -patient is currently tolerating p o  hydration  Her IV fluids are on hold    Her sodium is 138   Monitor closely as she may need to have her IV fluids restarted depending on her p o  Intake               -calorie count in progress    -will start ivf when NPO after Midnight tonight      7-pancytopenia:  Patient presented with pancytopenia, with normal coags   Her profound anemia was felt to be most likely secondary to her GI bleed from her perforated gastric ulcer                 -Patient however has persistent leukopenia and thrombocytopenia              -TNG had worsening of her leukopenia 2/9   She was borderline neutropenic as her ANC was approximately 700    she was febrile, and given a dose of filgrastim  Her [address] has improved and she is not  neutropenic               -patient's platelet count is 31    she has received multiple platelet transfusions  At this time she is having large amounts of melanotic stool  EGD pending  Will transfuse to have platelets greater than 50 preprocedure   -She will receive 1 group of platelets again today               -hematology-oncologist recommends bone marrow biopsy further evaluation of her pancytopenia  -patient's CT scan on admission, as well as yesterday revealed multiple splenic infarcts      8-hypothyroidism:  Continue Synthroid     9-hyperlipidemia:  Statin on hold     10-multiple sclerosis:  Imuran on hold   Pt with generalized weakness   Unable to hold up arms/legs to gravity   Patient has longstanding history of multiple sclerosis, followed by her neurologist in [facility] notes week prior to admission pt was still able to walk   3 days PTA pt had "collapsed" in her doctor's office with generalized weakness and was unable to walk it was felt to be due to a reaction to her abx so she returned home   She remained home for 3 days after that with worsening generalized weakness and unable to walk               -appreciate Neurology consultation and recommendations    CT scan revealed multiple old strokes as well as a chronic right occipital infarct with some surrounding hyperdensity consistent with laminar necrosis  Less likely petechial hemorrhage  Continued monitor as patient had thrombocytopenia  -MRI pending     11-left hand cat bite:  No evidence of cellulitis   Status post 7 days of antibiotics     47-[facility] metabolic encephalopathy:  Pt with confusion since admission   Most likely multifactorial, secondary to hypernatremia, sepsis, hypernatremia, hyperglycemia                 13-intermittent elevated blood presure:  Without prior h/o HTN   Most likely accelerated due to pain   Cont to monitor   Blood pressure in the acceptable range, has been ranging 489-756 systolic throughout the night      14-left upper extremity DVT:  Secondary to PICC line  Have removed PICC line   Unfortunately patient may not be on anticoagulation due to DIC/anemia/thrombocytopenia/bloody stool      15-decubitus ulcers:  Appreciate wound Care team's evaluation recommendations     16-pain:  Patient notes significant pain with being turned  She denies any pain otherwise  Patient has only very mild abdominal pain upon palpation and with exam   She has only very mild left upper extremity pain with palpation  She denies any sacral pain when not being turned  However with movement of any extremity, such as elevation of her arms or legs, or with range of motion of her neck, or with being turned at all, patient had significant pain in that area being manipulated  Suspect that this is due to stiffness, and immobility from prolonged hospitalization               -cont heating pad              -frequent changing position, repositioning and turning  Encourage patient out of bed to chair               -will use a muscle relaxant sparingly, to avoid sedation or worsening confusion     17-family:   Updated daughter Rigo Neff via phone    Reviewed test results and EGD     Discussed with Dr Fabiola Allan  D/w dr Mahad Turner  Discussed with patient's nurse     VTE Pharmacologic Prophylaxis: RX contraindicated due to: ABLA, thrombocytopenia  VTE Mechanical Prophylaxis: sequential compression device    Certification Statement: The patient will continue to require additional inpatient hospital stay due to need for further acute intervention for pancytopenia, melena    Status: inpatient     Total time spent today including interview, exam, review of chart, d/w dr Madelin Stanley:  45 min    ===================================================================    Subjective:  Patient denies any complaints  She denies any pain anywhere at all  She denies any pain in her arms  She denies any abdominal pain  She denies any extremity pain  However during exam, with palpation of her abdomen patient indicates she has pain throughout her anterior abdomen  Patient also verbalizes discomfort when her arms are being elevated  She denies any shortness of breath  She denies any cough or phlegm  She denies any nausea, vomiting, diarrhea  She denies any dizziness or lightheadedness  Patient's nurse notes that patient passed a large amount of melanotic stool today  Physical Exam:   Temp:  [97 °F (36 1 °C)-99 2 °F (37 3 °C)] 98 9 °F (37 2 °C)  HR:  [] 82  Resp:  [12-83] 14  BP: (131-157)/(50-70) 134/56    Gen:  Pleasant, non-tachypnic, non-dyspnic  Conversant  Heart: regular rate and rhythm, S1S2 present, no murmur, rub or gallop  Lungs: clear to ausculatation bilaterally  No wheezing, crackles, or rhonchi  No accessory muscle use or respiratory distress  Abd: soft, mildly tender diffusely with palpation  non-distended  NABS, no guarding, rebound or peritoneal signs  Extremities: no clubbing, cyanosis  1+ bilateral upper extremity  2+ bilateral pedal edema  1+ bilateral pretibial edema     2+pedal pulses bilaterally  Neuro: awake, alert  Fluent speech      LABS:     Results from last 7 days  Lab Units 02/11/18  0512 02/10/18  0431 02/10/18  0013   WBC Thousand/uL 5 88 3 23* 3 47*   HEMOGLOBIN g/dL 10 3* 9 2* 11 2*   HEMATOCRIT % 30 6* 27 0* 32 7*   PLATELETS Thousands/uL 34* 18* 21*       Results from last 7 days  Lab Units 02/11/18  0512 02/10/18  0431 02/10/18  0012  02/09/18  0518   SODIUM mmol/L 138 138 136  < > 125*   POTASSIUM mmol/L 3 9 4 1 4 0  --  3 0*   CHLORIDE mmol/L 105 107  --   --  98*   CO2 mmol/L 25 25  --   --  19*   BUN mg/dL 16 14  --   --  14   CREATININE mg/dL 0 69 0 64  --   --  0 48*   GLUCOSE RANDOM mg/dL 202* 137  --   --  44*   CALCIUM mg/dL 8 3 7 9*  --   --  6 2*   < > = values in this interval not displayed  Hospital Data:    2/9/ CT abdomen/pelvis:   Mild interval decrease in air within the subcutaneous soft tissues of the anterior abdomen and pelvis   The findings may be iatrogenic in nature   Stable bilateral anterior abdominal percutaneous catheters  Mild diffuse small bowel dilatation with oral contrast extending to the level of the rectum   The findings are likely secondary to an ileus  5 4 x 3 3 cm loculated air-fluid collection adjacent to the lesser curvature the stomach which may be postoperative in nature   Follow-up is recommended  Stable multifocal splenic infarcts     2/9:  CT brain:  Extensive periventricular and subcortical foci of white matter low attenuation which is nonspecific and likely representing a combination of the patient's history of multiple sclerosis and chronic small vessel ischemic changes   Evaluation for subtle   acute ischemia is limited secondary to these severe white matter changes   An MRI of the brain could be performed for further evaluation  Focus of encephalomalacia within the right parietal lobe with a few areas of gyriform hyperdensity suggestive of cortical laminar necrosis in an old right MCA territory infarct   Less likely, the findings may represent tiny foci of acute petechial   hemorrhage   No prior studies are available for comparison    Old bilateral basal ganglia lacunar infarcts     2/9:  Chest x-ray:  No acute disease     2/2:  CT abdomen/pelvis: There is a large amount of air within the subcutaneous fat of the abdominal wall extending inferiorly into the pelvic wall   Small amount of air present in between the muscles of the right superior lateral abdominal wall   This may be iatrogenic from recent catheter insertion x2   Posterior basilar atelectatic change   Oral contrast extends to the rectum with no evidence of obstruction   Mild small and large bowel distention  Stable hypodensities within the spleen suggestive of infarctions     2/1:  Abdominal obstruction series:  1   No layering free contrast to suggest large leak   However, the study was limited for smaller or contained leaks due to retained contrast in the stomach and small bowel   This limited study was performed due to the patient's inability to undergo   diagnostic upper GI   If there is continued clinical concern for leak, a follow-up upper GI can be attempted when the patient is able   Alternatively, an additional abdomen x-ray in the a m  can be obtained to allow for passage of contrast into the colon   and better visualization of the underlying abdominal cavity   CT would also better evaluate for contained leak  2   Dilated small bowel without evidence of obstruction, likely adynamic ileus     1/29:  CT abdomen/pelvis:  Moderate pneumoperitoneum suggesting bowel perforation  Although the origin is uncertain, possibility of perforated gastric ulcer may be considered  Small amount of abdominal and pelvic ascites  No evidence for organized abscess  Mild small bowel dilatation may reflect reactive ileus  Cholelithiasis       1/29:  Chest x-ray:  No acute disease     2/8:  Blood cultures:   negative x2  1/31:  Blood culture:  Negative x2     2/9 UA:  Negative nitrate, negative leukocyte esterase   0 WBC     2/8:  Left upper extremity ultrasound:  LEFT UPPER LIMB: Abnormal  (LIMITED)  There is acute deep vein thrombosis in the axillary vein  There is acute superficial thrombophlebitis in the visualized cephalic vein in  the mid and proximal upper arm  Doppler evaluation shows a normal response to augmentation maneuvers        ---------------------------------------------------------------------------------------------------------------  This note has been constructed using a voice recognition system

## 2018-02-11 NOTE — POST OP PROGRESS NOTES
Progress Note - General Surgery   Audrey Metcalf 67 y o  female MRN: [id number]  Unit/Bed#: ICU 03 Encounter: [id number]    Assessment:  Perforated gastric ulcer with pneumoperitoneum and peritonitis  Postop day  13 Status post laparoscopic Darus Ebbs patch repair of gastric ulcer, abdominal washout, placement of drains  Sepsis POA  Severe Anemia/ABLA- thought to be related to GI bleed secondary to perforated gastric ulcer, hemoglobin appears to have stabilized although patient said to continue to have black tarry stools yesterday  May be related to thrombocytopenia rather than continue GI bleeding  Pancytopenia- hematology following-  Acute severe protein calorie malnutrition related to GI distress , poor po intake  Multiple sclerosis with h/o chronic steroid use and Imuran use- currently off both  Diabetes mellitus 2- hyperglycemia  Hypernatremia- resolved  Acute hypoxic respiratory failure- resolved  Cat bite left hand with some swelling- no signs of cellulitis, resolved       Plan:    Continue supportive care, medical management  Hematology recommended BM biopsy  Continue non ulcerogenic diet, encourage p o  intake, nutritional supplements added  Continue to monitor H&H and platelet count- transfuse prn  Continue Protonix   DVT prophylaxix on hold due to GI bleed, thrombocytopenia  PT/OT    Repeat CT reviewed - small fluid collection at lesser curve - stable from CT 2/2  IR states it is amenable to drainage would have the transhepatic  In light of patient's anemia and thrombocytopenia would recommend on observation at this time  However if patient shows signs of infection may need to readdress this at that time  Continued melena - GI considering EGD tomorrow  I believe this is reasonable as she will be 2 weeks postop  Although no signs of active bleeding and more of a generalized slow deterioration due to thrombocytopenia    Subjective/Objective   Chief Complaint:     Subjective:  Pt still confused   Denies pain although tender to palpation  Poor appetite  Objective:     Blood pressure 133/60, pulse 90, temperature 97 6 °F (36 4 °C), temperature source Temporal, resp  rate 12, height 5' 7" (1 702 m), weight 76 1 kg (167 lb 12 3 oz), SpO2 98 %  ,Body mass index is 26 28 kg/m²  Intake/Output Summary (Last 24 hours) at 02/11/18 1154  Last data filed at 02/11/18 0400   Gross per 24 hour   Intake              574 ml   Output              335 ml   Net              239 ml       Invasive Devices     Peripheral Intravenous Line            Peripheral IV 02/09/18 Right Arm 1 day          Drain            Closed/Suction Drain Left Abdomen Bulb 15 Fr  12 days    Closed/Suction Drain Right;Other (Comment) Abdomen Bulb 15 Fr  12 days                Physical Exam:  Gen:  Alert and oriented, fatigued, NAD  HEENT:   sclera anicteric, mucous membranes moist  Neck:  Supple negative for lymphadenopathy  Chest: clear to auscultation, decreased at bases  Cor:  Regular rate and rhythm  Abd:  Soft, non-tender, some distention, active bowel sounds  Ext:  There is mild edema in her bilateral lower extremities  Skin:  Warm and dry, incision sites are clean dry and intact  Abdominal drains with serosanguineous output, no bile    Lab, Imaging and other studies:  I have personally reviewed pertinent lab results    , CBC:   Lab Results   Component Value Date    WBC 5 88 02/11/2018    HGB 10 3 (L) 02/11/2018    HCT 30 6 (L) 02/11/2018    MCV 88 02/11/2018    PLT 34 (LL) 02/11/2018    MCH 29 8 02/11/2018    MCHC 33 7 02/11/2018    RDW 14 3 02/11/2018    MPV 12 1 02/11/2018    NRBC 0 02/11/2018   , CMP:   Lab Results   Component Value Date     02/11/2018    K 3 9 02/11/2018     02/11/2018    CO2 25 02/11/2018    ANIONGAP 8 02/11/2018    BUN 16 02/11/2018    CREATININE 0 69 02/11/2018    GLUCOSE 202 (H) 02/11/2018    CALCIUM 8 3 02/11/2018    EGFR 87 02/11/2018     VTE Pharmacologic Prophylaxis: Reason for no pharmacologic prophylaxis GI bleed, thrombocytopenia  VTE Mechanical Prophylaxis: sequential compression device     Gustavo Prescott MD

## 2018-02-12 ENCOUNTER — ANESTHESIA (INPATIENT)
Dept: GASTROENTEROLOGY | Facility: HOSPITAL | Age: 73
DRG: 853 | End: 2018-02-12
Payer: MEDICARE

## 2018-02-12 ENCOUNTER — ANESTHESIA EVENT (INPATIENT)
Dept: GASTROENTEROLOGY | Facility: HOSPITAL | Age: 73
DRG: 853 | End: 2018-02-12
Payer: MEDICARE

## 2018-02-12 LAB
ABO GROUP BLD BPU: NORMAL
ABO GROUP BLD: NORMAL
ANION GAP SERPL CALCULATED.3IONS-SCNC: 6 MMOL/L (ref 4–13)
APTT PPP: 29 SECONDS (ref 23–35)
BLD GP AB SCN SERPL QL: NEGATIVE
BPU ID: NORMAL
BUN SERPL-MCNC: 12 MG/DL (ref 5–25)
CALCIUM SERPL-MCNC: 8.4 MG/DL (ref 8.3–10.1)
CHLORIDE SERPL-SCNC: 106 MMOL/L (ref 100–108)
CO2 SERPL-SCNC: 25 MMOL/L (ref 21–32)
CREAT SERPL-MCNC: 0.58 MG/DL (ref 0.6–1.3)
ERYTHROCYTE [DISTWIDTH] IN BLOOD BY AUTOMATED COUNT: 14.4 % (ref 11.6–15.1)
FIBRINOGEN PPP-MCNC: 471 MG/DL (ref 227–495)
GFR SERPL CREATININE-BSD FRML MDRD: 92 ML/MIN/1.73SQ M
GLUCOSE SERPL-MCNC: 162 MG/DL (ref 65–140)
GLUCOSE SERPL-MCNC: 189 MG/DL (ref 65–140)
GLUCOSE SERPL-MCNC: 204 MG/DL (ref 65–140)
GLUCOSE SERPL-MCNC: 216 MG/DL (ref 65–140)
GLUCOSE SERPL-MCNC: 247 MG/DL (ref 65–140)
HCT VFR BLD AUTO: 32.7 % (ref 34.8–46.1)
HGB BLD-MCNC: 11.1 G/DL (ref 11.5–15.4)
INR PPP: 1.05 (ref 0.86–1.16)
MCH RBC QN AUTO: 30.4 PG (ref 26.8–34.3)
MCHC RBC AUTO-ENTMCNC: 33.9 G/DL (ref 31.4–37.4)
MCV RBC AUTO: 90 FL (ref 82–98)
PLATELET # BLD AUTO: 43 THOUSANDS/UL (ref 149–390)
PMV BLD AUTO: 11.6 FL (ref 8.9–12.7)
POTASSIUM SERPL-SCNC: 4.2 MMOL/L (ref 3.5–5.3)
PROTHROMBIN TIME: 13.7 SECONDS (ref 12.1–14.4)
RBC # BLD AUTO: 3.65 MILLION/UL (ref 3.81–5.12)
RH BLD: POSITIVE
SODIUM SERPL-SCNC: 137 MMOL/L (ref 136–145)
SPECIMEN EXPIRATION DATE: NORMAL
UNIT DISPENSE STATUS: NORMAL
UNIT PRODUCT CODE: NORMAL
UNIT RH: NORMAL
WBC # BLD AUTO: 9.24 THOUSAND/UL (ref 4.31–10.16)

## 2018-02-12 PROCEDURE — 86644 CMV ANTIBODY: CPT

## 2018-02-12 PROCEDURE — 99233 SBSQ HOSP IP/OBS HIGH 50: CPT | Performed by: INTERNAL MEDICINE

## 2018-02-12 PROCEDURE — 99232 SBSQ HOSP IP/OBS MODERATE 35: CPT | Performed by: INTERNAL MEDICINE

## 2018-02-12 PROCEDURE — 86850 RBC ANTIBODY SCREEN: CPT | Performed by: INTERNAL MEDICINE

## 2018-02-12 PROCEDURE — 85027 COMPLETE CBC AUTOMATED: CPT | Performed by: INTERNAL MEDICINE

## 2018-02-12 PROCEDURE — 82948 REAGENT STRIP/BLOOD GLUCOSE: CPT

## 2018-02-12 PROCEDURE — P9037 PLATE PHERES LEUKOREDU IRRAD: HCPCS

## 2018-02-12 PROCEDURE — 0DJ08ZZ INSPECTION OF UPPER INTESTINAL TRACT, VIA NATURAL OR ARTIFICIAL OPENING ENDOSCOPIC: ICD-10-PCS | Performed by: INTERNAL MEDICINE

## 2018-02-12 PROCEDURE — 85730 THROMBOPLASTIN TIME PARTIAL: CPT | Performed by: INTERNAL MEDICINE

## 2018-02-12 PROCEDURE — C9113 INJ PANTOPRAZOLE SODIUM, VIA: HCPCS | Performed by: SURGERY

## 2018-02-12 PROCEDURE — 86901 BLOOD TYPING SEROLOGIC RH(D): CPT | Performed by: INTERNAL MEDICINE

## 2018-02-12 PROCEDURE — 85384 FIBRINOGEN ACTIVITY: CPT | Performed by: INTERNAL MEDICINE

## 2018-02-12 PROCEDURE — 86900 BLOOD TYPING SEROLOGIC ABO: CPT | Performed by: INTERNAL MEDICINE

## 2018-02-12 PROCEDURE — 80048 BASIC METABOLIC PNL TOTAL CA: CPT | Performed by: INTERNAL MEDICINE

## 2018-02-12 PROCEDURE — 85610 PROTHROMBIN TIME: CPT | Performed by: INTERNAL MEDICINE

## 2018-02-12 RX ORDER — INSULIN GLARGINE 100 [IU]/ML
4 INJECTION, SOLUTION SUBCUTANEOUS ONCE
Status: COMPLETED | OUTPATIENT
Start: 2018-02-12 | End: 2018-02-12

## 2018-02-12 RX ORDER — SODIUM CHLORIDE 9 MG/ML
125 INJECTION, SOLUTION INTRAVENOUS CONTINUOUS
Status: DISCONTINUED | OUTPATIENT
Start: 2018-02-12 | End: 2018-02-12

## 2018-02-12 RX ORDER — PROPOFOL 10 MG/ML
INJECTION, EMULSION INTRAVENOUS AS NEEDED
Status: DISCONTINUED | OUTPATIENT
Start: 2018-02-12 | End: 2018-02-12 | Stop reason: SURG

## 2018-02-12 RX ADMIN — CEFEPIME HYDROCHLORIDE 2000 MG: 2 INJECTION, POWDER, FOR SOLUTION INTRAVENOUS at 23:21

## 2018-02-12 RX ADMIN — LEVOTHYROXINE SODIUM 200 MCG: 100 TABLET ORAL at 05:37

## 2018-02-12 RX ADMIN — METRONIDAZOLE 500 MG: 500 TABLET ORAL at 21:12

## 2018-02-12 RX ADMIN — SODIUM CHLORIDE 75 ML/HR: 0.9 INJECTION, SOLUTION INTRAVENOUS at 18:57

## 2018-02-12 RX ADMIN — SODIUM CHLORIDE 75 ML/HR: 0.9 INJECTION, SOLUTION INTRAVENOUS at 05:49

## 2018-02-12 RX ADMIN — PANTOPRAZOLE SODIUM 40 MG: 40 INJECTION, POWDER, FOR SOLUTION INTRAVENOUS at 09:49

## 2018-02-12 RX ADMIN — METRONIDAZOLE 500 MG: 500 TABLET ORAL at 14:39

## 2018-02-12 RX ADMIN — SUCRALFATE 1000 MG: 1 SUSPENSION ORAL at 05:38

## 2018-02-12 RX ADMIN — METRONIDAZOLE 500 MG: 500 TABLET ORAL at 05:37

## 2018-02-12 RX ADMIN — INSULIN GLARGINE 4 UNITS: 100 INJECTION, SOLUTION SUBCUTANEOUS at 09:48

## 2018-02-12 RX ADMIN — PANTOPRAZOLE SODIUM 40 MG: 40 INJECTION, POWDER, FOR SOLUTION INTRAVENOUS at 21:12

## 2018-02-12 RX ADMIN — PROPOFOL 50 MG: 10 INJECTION, EMULSION INTRAVENOUS at 12:46

## 2018-02-12 RX ADMIN — SUCRALFATE 1000 MG: 1 SUSPENSION ORAL at 16:07

## 2018-02-12 RX ADMIN — CEFEPIME HYDROCHLORIDE 2000 MG: 2 INJECTION, POWDER, FOR SOLUTION INTRAVENOUS at 10:56

## 2018-02-12 RX ADMIN — TBO-FILGRASTIM 300 MCG: 300 INJECTION, SOLUTION SUBCUTANEOUS at 09:48

## 2018-02-12 NOTE — POST OP PROGRESS NOTES
Progress Note - General Surgery   Home Taveras 67 y o  female MRN: [id number]  Unit/Bed#: ICU 03 Encounter: [id number]    Assessment:  Perforated gastric ulcer POD 14 s/p laparoscopic Grahm patch and repair  Sepsis POA  Severe Anemia/ABLA- thought to be related to GI bleed secondary to perforated gastric ulcer, hemoglobin appears to have stabilized although patient said to continue to have black tarry stools yesterday   May be related to thrombocytopenia rather than continue GI bleeding  Pancytopenia- hematology following-  Acute severe protein calorie malnutrition related to GI distress , poor po intake  Multiple sclerosis with h/o chronic steroid use and Imuran use- currently off both  Diabetes mellitus 2- hyperglycemia  Hypernatremia- resolved  Acute hypoxic respiratory failure- resolved    Plan:  Hematology management  Continue diet and nutritional support  PT/OT  Continue COREY drains  Supportive measures continue  Medical management  Monitor electrolyte   Watch for signs of infection    Subjective/Objective   Chief Complaint: abd pain    Subjective: denies pain, tolerating some diet, poor mobility    Objective:     Blood pressure 156/64, pulse 90, temperature 97 5 °F (36 4 °C), temperature source Temporal, resp  rate 14, height 5' 7" (1 702 m), weight 76 1 kg (167 lb 12 3 oz), SpO2 98 %  ,Body mass index is 26 28 kg/m²  Intake/Output Summary (Last 24 hours) at 02/12/18 0749  Last data filed at 02/12/18 0601   Gross per 24 hour   Intake          1378 25 ml   Output              752 ml   Net           626 25 ml       Invasive Devices     Peripheral Intravenous Line            Peripheral IV 02/11/18 Right Forearm less than 1 day          Drain            Closed/Suction Drain Left Abdomen Bulb 15 Fr  13 days    Closed/Suction Drain Right;Other (Comment) Abdomen Bulb 15 Fr   13 days                Physical Exam: General appearance: alert and cooperative  Lungs: clear to auscultation bilaterally  Heart: regular rate and rhythm, S1, S2 normal, no murmur, click, rub or gallop  Abdomen: soft, non-tender; bowel sounds normal; no masses,  no organomegaly  Extremities: pitting edema  incisons: c/d/i    Lab, Imaging and other studies:I have personally reviewed pertinent lab results      VTE Pharmacologic Prophylaxis: Heparin  VTE Mechanical Prophylaxis: sequential compression device

## 2018-02-12 NOTE — ANESTHESIA PREPROCEDURE EVALUATION
Review of Systems/Medical History  Patient summary reviewed  Chart reviewed      Cardiovascular  Hyperlipidemia,    Pulmonary    Comment: S/p  VDRF     GI/Hepatic    PUD,   Comment: Hx of acute gastric ulcer           Endo/Other  Diabetes type 2 , History of thyroid disease , hypothyroidism,      GYN       Hematology  Anemia ,     Musculoskeletal    Comment: Multiple sclerosis      Neurology   Psychology           Physical Exam    Airway    Mallampati score: I  TM Distance: <3 FB  Neck ROM: full     Dental       Cardiovascular  Rhythm: regular, Rate: normal, Cardiovascular exam normal    Pulmonary  Pulmonary exam normal     Other Findings        Anesthesia Plan  ASA Score- 3     Anesthesia Type- IV sedation with anesthesia with ASA Monitors  Additional Monitors:   Airway Plan:         Plan Factors-    Induction- intravenous  Postoperative Plan-     Informed Consent- Anesthetic plan and risks discussed with son

## 2018-02-12 NOTE — OP NOTE
OPERATIVE REPORT  PATIENT NAME: Cally Degroot    :  1945  MRN: [id number]  Pt Location: [facility] ROOM 01    SURGERY DATE: 2018  Endoscopic Gastroduodenoscopy Procedure Note    Procedure: Endoscopic Gastroduodenoscopy --diagnostic    Pre-operative Diagnosis: anemia; known gastric ulcer    Post-operative Diagnosis: duodenal ulcers; gastric ulcer ; gastritis    Indications: Follow-up gastric ulcer    Sedation: as per anesthesia    Procedure Details     Appropriate informed consent was obtained prior to the procedure from gabriela (son) at [phone]  Risks including sedation, infection, perforation, hemorrhage, adverse drug reaction, missed lesion and aspiration were discussed  The patient was placed in the left lateral decubitus position  Based on the pre-procedure assessment, including review of the patient's medical history, medications, allergies, and review of systems, she had been deemed to be an appropriate candidate for conscious sedation; she was therefore sedated with the medications listed below  She was monitored continuously with ECG tracing, pulse oximetry, blood pressure monitoring, and direct observation  The gastroscope was inserted into the mouth and advanced under direct vision to second portion of the duodenum  A careful inspection was made as the gastroscope was withdrawn, including a retroflexed view of the proximal stomach; findings and interventions are described below  Appropriate photodocumentation was obtained  Findings:  -ulcer(s): 25 mm in size, located in the prepyloric region, with a clean base, not actively bleeding; Suture is seen within the ulcer base in one region  Pyloric opening does not seem to be involved  Gastritis, mild seen  bx not taken for h pylori given negative surgical specimen for the same  2 small clean base duodenal bulb ulcer seen  Esophagus normal     Specimens: none           Complications:  None; patient tolerated the procedure well  Disposition: PACU            Condition: stable    Attending Attestation: I was present for the entire procedure    Impression:    -See post-procedure diagnoses  Recommendations:  -Acid suppression with a proton pump inhibitor    Surgeon(s) and Role:     * Brian Hoang MD - Primary    Preop Diagnosis:  Gastric ulcer [K25 9]  Anemia [D64 9]    Post-Op Diagnosis Codes:     * Gastric ulcer [K25 9]     * Anemia [D64 9]    Procedure(s) (LRB):  ESOPHAGOGASTRODUODENOSCOPY (EGD) (N/A)    Specimen(s):  * No specimens in log *    Estimated Blood Loss:   none  Drains:  Closed/Suction Drain Right;Other (Comment) Abdomen Bulb 15 Fr  (Active)   Site Description Unable to view 2/12/2018 10:56 AM   Dressing Status Clean;Dry; Intact 2/12/2018 10:56 AM   Drainage Appearance Serous 2/12/2018 12:10 PM   Status To bulb suction 2/12/2018 12:10 PM   Output (mL) 40 mL 2/12/2018 10:56 AM   Number of days: 14       Closed/Suction Drain Left Abdomen Bulb 15 Fr  (Active)   Site Description Unable to view 2/12/2018 10:56 AM   Dressing Status Clean;Dry; Intact 2/12/2018 10:56 AM   Drainage Appearance Serous 2/12/2018 12:10 PM   Status To bulb suction 2/12/2018 12:10 PM   Output (mL) 120 mL 2/12/2018 10:56 AM   Number of days: 14       Anesthesia Type:   IV Sedation with Anesthesia    Operative Indications:  Gastric ulcer [K25 9]  Anemia [D64 9]      SIGNATURE: Brian Hoang MD  DATE: February 12, 2018  TIME: 12:50 PM

## 2018-02-12 NOTE — PROGRESS NOTES
Progress Note - Infectious Disease   Henrique Self 67 y o  female MRN: [id number]  Unit/Bed#: ICU 03 Encounter: [id number]      Impression/Plan:  1   Febrile neutropenia-Consider relation to #2   Etiology of neutropenia unclear as below  Repeat blood cultures negative  Fever improved with reinitiation of antibiotics  WBC better with G-CSF               -continue cefepime and Flagyl for now              -follow up repeat blood cultures  -follow temperatures closely  -Check cbc in AM  -G-CSF per oncology  -if fevers recur would add fluconazole 400 mg IV Q24  -further workup for pancytopenia as below  -if no plan to aspirate the postoperative collection, will discontinue all antibiotics and let any infectious process declare     2   Postoperative intraabdominal collection - may be due to recent surgery but consider abscess in setting of #3 and recent fever               -continue antibiotics as above              -Surgery to discuss with IR                 -possible IR aspiration of the collection     3   Peritonitis-secondary to perforation of a gastric ulceration with contamination of the peritoneum with gastric contacts   Status post laparoscopic repair with washout with drains left in place   Consider MDROs and yeast as the patient had been Augmentin at the time of the perforation   Fortunately the patient has now undergone adequate source control   She has completed 1 week of antibiotics postop   The follow-up CT of the abdomen pelvis reveals a collection, but just appears postoperative and is unlikely to be infected    -close surgical follow-up  -drain management  -serial abdominal exams     4   Pancytopenia-Unclear etiology as worsening despite D/C of Imuran and antibiotics   Oncology considering possible lymphoproliferative disorder      -hematology oncology follow-up  -G-CSF per oncology  -transfusion as needed  -BM biopsy per oncology     5   Diabetes mellitus-type 2 with hyperglycemia     6   Multiple sclerosis-apparently on Imuran for a while     -would hold on Imuran for now     7   Cat bite-left hand   No overt cellulitis at this time  Linda Dilling should be well covered by the antibiotics as above   The cat is being watched at home without any behavioral abnormalities   The hand is significantly improved with decreased edema   -serial exams  -continue monitoring of the cat at home     8  Acute encephalopathy - likely toxic metabolic due to infection, electrolyte abnormalities superimposed on MS and chronic/severe SVID as suggested by CT  CT head negative for acute abnormalities  Doubt CNS infection              -follow MS closely              -close neuro follow-up ongoing              -Possible MRI per neuro    Antibiotics:  Cefepime/Flagyl 4    Subjective:  Patient has no fever, chills, sweats; no nausea, vomiting, diarrhea; no cough, shortness of breath; no pain  No new symptoms  She remains quite confused but is alert interactive  She has not had any recurrence of the isolated fever spike  Objective:  Vitals:  HR:  [88-96] 90  Resp:  [14-15] 14  BP: (156-188)/(64-89) 156/64  SpO2:  [98 %-99 %] 98 %  Temp (24hrs), Av 8 °F (36 6 °C), Min:97 2 °F (36 2 °C), Max:98 7 °F (37 1 °C)  Current: Temperature: 97 5 °F (36 4 °C)    Physical Exam:   General Appearance:  Alert, confused, nontoxic, no acute distress  Throat: Oropharynx dry without lesions  Lungs:   Decreased breath sounds bilaterally; no wheezes, rhonchi or rales; respirations unlabored   Heart:  RRR; no murmur, rub or gallop   Abdomen:   Soft, non-tender, non-distended, positive bowel sounds  COREY drains in place  Extremities: No clubbing, cyanosis or edema   Skin: No new rashes or lesions  No draining wounds noted         Labs, Imaging, & Other studies:   All pertinent labs and imaging studies were personally reviewed    Results from last 7 days  Lab Units 18  0442 189 18  0512   WBC Thousand/uL 9 24 8 72 5 88 HEMOGLOBIN g/dL 11 1* 10 5* 10 3*   PLATELETS Thousands/uL 43* 50* 34*       Results from last 7 days  Lab Units 02/12/18  0442 02/11/18  0512 02/10/18  0431  02/06/18  0643  02/05/18  1206   SODIUM mmol/L 137 138 138  < > 148*  < >  --    POTASSIUM mmol/L 4 2 3 9 4 1  < > 3 9  < >  --    CHLORIDE mmol/L 106 105 107  < > 116*  < >  --    CO2 mmol/L 25 25 25  < > 27  < >  --    ANION GAP mmol/L 6 8 6  < > 5  < >  --    BUN mg/dL 12 16 14  < > 31*  < >  --    CREATININE mg/dL 0 58* 0 69 0 64  < > 0 72  < >  --    EGFR ml/min/1 73sq m 92 87 89  < > 84  < >  --    GLUCOSE RANDOM mg/dL 189* 202* 137  < > 300*  < >  --    CALCIUM mg/dL 8 4 8 3 7 9*  < > 8 8  < >  --    AST U/L  --   --  20  --  22  --  22   ALT U/L  --   --  12  --  19  --  20   ALK PHOS U/L  --   --  47  --  53  --  42*   TOTAL PROTEIN g/dL  --   --  3 7*  --  4 6*  --  4 2*   BILIRUBIN TOTAL mg/dL  --   --  1 24*  --  0 68  --  0 35   < > = values in this interval not displayed  Results from last 7 days  Lab Units 02/08/18  2154 02/08/18 2004   BLOOD CULTURE  No Growth at 72 hrs  No Growth at 72 hrs

## 2018-02-12 NOTE — PROGRESS NOTES
Pt is post egd  She received some platelets prior to procedure  There was no evidence for active or recent upper gi bleeding  There were 2 small healing Duodenal bulb ulcers and the large gastric ulcer is seen, measuring about 2 5 cm  Pt remains on PPI and carafate which is appropriate  Diet as tolerated  Probably soft mechanical given location of ulcer with liquid supplements  Should have repeat egd in 2 mos to confirm healing although the ulcer has benign features  The results were shared with Angie Antonio, pt's son and also with the patient  Please call if needed  Consider changing to PO ppi once tolerating PO intake

## 2018-02-12 NOTE — CASE MANAGEMENT
Continued Stay Review    Date:   2/12/2018    Vital Signs: /64   Pulse 80   Temp 97 5 °F (36 4 °C) (Tympanic)   Resp 13   Ht 5' 7" (1 702 m)   Wt 76 1 kg (167 lb 12 3 oz)   SpO2 96%   BMI 26 28 kg/m²     Medications:   Scheduled Meds:   Current Facility-Administered Medications:  acetaminophen 650 mg Oral Q4H PRN Sam Guerrier MD    calcium carbonate 500 mg Oral TID PRN Sam Guerrier MD    cefepime 2,000 mg Intravenous Q12H Luis Miguel Melgar MD Last Rate: 2,000 mg (02/12/18 1056)   hydrALAZINE 5 mg Intravenous Q6H PRN Sam Guerrier MD    insulin lispro 1-5 Units Subcutaneous Q6H Magdalene Delcid,     iohexol 50 mL Oral Once in imaging Sam Guerrier MD    levothyroxine 200 mcg Oral Early Morning Magdalene Delcid, DO    methocarbamol 500 mg Oral Q6H PRN Sam Guerrier MD    metroNIDAZOLE 500 mg Oral [facility] Luis Miguel Melgar MD    morphine injection 2 mg Intravenous Q2H PRN Bolivar Thompson MD    ondansetron 4 mg Intravenous Q4H PRN PARRISH Varma    pantoprazole 40 mg Intravenous Q12H [address] Vania Sierra MD    sodium chloride 75 mL/hr Intravenous Continuous Sam Guerrier MD Last Rate: 75 mL/hr (02/12/18 0549)   sucralfate 1,000 mg Oral BID AC Bolivar Thompson MD    tbo-filgrastim 300 mcg Subcutaneous Daily Kelvin Hinojosa DO      Continuous Infusions:   sodium chloride 75 mL/hr Last Rate: 75 mL/hr (02/12/18 0549)     PRN Meds:   acetaminophen    calcium carbonate    hydrALAZINE    iohexol    methocarbamol    morphine injection    ondansetron    Abnormal Labs/Diagnostic Results:    H/H  11 1/32 7  Platelets   43    Age/Sex: 67 y o  female     Assessment/Plan:    -sepsis:  Present on admission:  secondary to peritonitis due to a perforated gastric ulcer:  The patient underwent surgical repair   She completed 7 days of antibiotics   Her sepsis had resolved  However pt spiked a fever of 102 on 2/8, she was also noted to be leukopenic and borderline neutropenic with an [address] of 700   she was started by ID on cefepime and Flagyl  CT abdomen/pelvis without evidence of focal infection   Had fluid collection the lesser curvature, however has been stable since previous CAT scan of February 2nd   There was discussion about IR to drain this fluid  Will await ID and junitoery follow up about fluid collection       2-perforated gastric ulcer:  she was orginally in the ICU and then transferred to surgery service after silvia patch repair  She was then switched to internal medicine service as she is pod 14  She will continue with ppi  She is npo for EGD by GI today      3  Acute/chronic blood loss anemia and pancytopenia- pt had acute blood loss due to ulcer  She has chronic anemia due to pancytopenia  She was transfused approx 7 units of prbc  She is s/p silvia patch by surgery but having EGD by GI today due to ongoing melena       Active problems  1  -diabetes type 2:  continue to hold po antidiabetic medications  Change sliding scale to q6 hours  Will redose lantus of 4 units today while npo       2-hypernatremia:resolved     3-pancytopenia: appreciate hematology recommendations  S/p granix  Pt will need bone marrow biopsy  Scheduled for tomorrow  Platelets are less than 50,000 with ongoing melena  Will transfuse one more unit       4-hypothyroidism:  Continue Synthroid  5-hyperlipidemia:  Statin on hold     6-multiple sclerosis:  Imuran on hold  appreciate Neurology consultation and recommendations   CT scan revealed multiple old strokes as well as a chronic right occipital infarct with some surrounding hyperdensity consistent with laminar necrosis   Less likely petechial hemorrhage   Continued monitor as patient had thrombocytopenia  MRI pending     7-left hand cat bite:  No evidence of cellulitis   Status post 7 days of antibiotics      8-acute metabolic encephalopathy: Most likely multifactorial, secondary to hypernatremia, sepsis, hypernatremia, hyperglycemia   resolving                 9-intermittent elevated blood presure:  Without prior h/o HTN   Most likely accelerated due to pain   Cont to monitor   continue prn antihypertensive medications     10 left upper extremity DVT:  Secondary to PICC line   Have removed PICC line   Unfortunately patient may not be on anticoagulation due to pancytopenia     11-decubitus ulcers:  Appreciate wound Care team's evaluation recommendations      Discharge Plan:    SNF

## 2018-02-13 ENCOUNTER — TELEPHONE (OUTPATIENT)
Dept: RADIOLOGY | Facility: HOSPITAL | Age: 73
End: 2018-02-13

## 2018-02-13 ENCOUNTER — APPOINTMENT (INPATIENT)
Dept: CT IMAGING | Facility: HOSPITAL | Age: 73
DRG: 853 | End: 2018-02-13
Attending: INTERNAL MEDICINE
Payer: MEDICARE

## 2018-02-13 LAB
ABO GROUP BLD BPU: NORMAL
ANION GAP SERPL CALCULATED.3IONS-SCNC: 6 MMOL/L (ref 4–13)
BACTERIA BLD CULT: NORMAL
BPU ID: NORMAL
BUN SERPL-MCNC: 9 MG/DL (ref 5–25)
CALCIUM SERPL-MCNC: 8.2 MG/DL (ref 8.3–10.1)
CHLORIDE SERPL-SCNC: 107 MMOL/L (ref 100–108)
CO2 SERPL-SCNC: 24 MMOL/L (ref 21–32)
CREAT SERPL-MCNC: 0.53 MG/DL (ref 0.6–1.3)
ERYTHROCYTE [DISTWIDTH] IN BLOOD BY AUTOMATED COUNT: 14.7 % (ref 11.6–15.1)
GFR SERPL CREATININE-BSD FRML MDRD: 95 ML/MIN/1.73SQ M
GLUCOSE SERPL-MCNC: 182 MG/DL (ref 65–140)
GLUCOSE SERPL-MCNC: 192 MG/DL (ref 65–140)
GLUCOSE SERPL-MCNC: 201 MG/DL (ref 65–140)
GLUCOSE SERPL-MCNC: 211 MG/DL (ref 65–140)
GLUCOSE SERPL-MCNC: 216 MG/DL (ref 65–140)
HCT VFR BLD AUTO: 31.5 % (ref 34.8–46.1)
HGB BLD-MCNC: 10.6 G/DL (ref 11.5–15.4)
MCH RBC QN AUTO: 30.5 PG (ref 26.8–34.3)
MCHC RBC AUTO-ENTMCNC: 33.7 G/DL (ref 31.4–37.4)
MCV RBC AUTO: 91 FL (ref 82–98)
PLATELET # BLD AUTO: 51 THOUSANDS/UL (ref 149–390)
PMV BLD AUTO: 11.7 FL (ref 8.9–12.7)
POTASSIUM SERPL-SCNC: 4.2 MMOL/L (ref 3.5–5.3)
RBC # BLD AUTO: 3.47 MILLION/UL (ref 3.81–5.12)
SODIUM SERPL-SCNC: 137 MMOL/L (ref 136–145)
UNIT DISPENSE STATUS: NORMAL
UNIT PRODUCT CODE: NORMAL
UNIT RH: NORMAL
WBC # BLD AUTO: 11.98 THOUSAND/UL (ref 4.31–10.16)

## 2018-02-13 PROCEDURE — 88342 IMHCHEM/IMCYTCHM 1ST ANTB: CPT | Performed by: INTERNAL MEDICINE

## 2018-02-13 PROCEDURE — 88365 INSITU HYBRIDIZATION (FISH): CPT | Performed by: INTERNAL MEDICINE

## 2018-02-13 PROCEDURE — 88333 PATH CONSLTJ SURG CYTO XM 1: CPT | Performed by: INTERNAL MEDICINE

## 2018-02-13 PROCEDURE — 88311 DECALCIFY TISSUE: CPT | Performed by: INTERNAL MEDICINE

## 2018-02-13 PROCEDURE — 88305 TISSUE EXAM BY PATHOLOGIST: CPT | Performed by: INTERNAL MEDICINE

## 2018-02-13 PROCEDURE — 99233 SBSQ HOSP IP/OBS HIGH 50: CPT | Performed by: INTERNAL MEDICINE

## 2018-02-13 PROCEDURE — 85097 BONE MARROW INTERPRETATION: CPT | Performed by: INTERNAL MEDICINE

## 2018-02-13 PROCEDURE — 77012 CT SCAN FOR NEEDLE BIOPSY: CPT

## 2018-02-13 PROCEDURE — 88373 M/PHMTRC ALYS ISHQUANT/SEMIQ: CPT | Performed by: INTERNAL MEDICINE

## 2018-02-13 PROCEDURE — 88360 TUMOR IMMUNOHISTOCHEM/MANUAL: CPT | Performed by: INTERNAL MEDICINE

## 2018-02-13 PROCEDURE — 38222 DX BONE MARROW BX & ASPIR: CPT

## 2018-02-13 PROCEDURE — 88237 TISSUE CULTURE BONE MARROW: CPT | Performed by: RADIOLOGY

## 2018-02-13 PROCEDURE — 80048 BASIC METABOLIC PNL TOTAL CA: CPT | Performed by: INTERNAL MEDICINE

## 2018-02-13 PROCEDURE — 88185 FLOWCYTOMETRY/TC ADD-ON: CPT

## 2018-02-13 PROCEDURE — 88262 CHROMOSOME ANALYSIS 15-20: CPT | Performed by: RADIOLOGY

## 2018-02-13 PROCEDURE — 88313 SPECIAL STAINS GROUP 2: CPT | Performed by: INTERNAL MEDICINE

## 2018-02-13 PROCEDURE — 82948 REAGENT STRIP/BLOOD GLUCOSE: CPT

## 2018-02-13 PROCEDURE — 88367 INSITU HYBRIDIZATION AUTO: CPT | Performed by: INTERNAL MEDICINE

## 2018-02-13 PROCEDURE — 99232 SBSQ HOSP IP/OBS MODERATE 35: CPT | Performed by: INTERNAL MEDICINE

## 2018-02-13 PROCEDURE — 07DR3ZX EXTRACTION OF ILIAC BONE MARROW, PERCUTANEOUS APPROACH, DIAGNOSTIC: ICD-10-PCS | Performed by: RADIOLOGY

## 2018-02-13 PROCEDURE — 88189 FLOWCYTOMETRY/READ 16 & >: CPT | Performed by: INTERNAL MEDICINE

## 2018-02-13 PROCEDURE — 85027 COMPLETE CBC AUTOMATED: CPT | Performed by: INTERNAL MEDICINE

## 2018-02-13 PROCEDURE — 88341 IMHCHEM/IMCYTCHM EA ADD ANTB: CPT | Performed by: INTERNAL MEDICINE

## 2018-02-13 PROCEDURE — C9113 INJ PANTOPRAZOLE SODIUM, VIA: HCPCS | Performed by: SURGERY

## 2018-02-13 PROCEDURE — 99291 CRITICAL CARE FIRST HOUR: CPT | Performed by: PHYSICIAN ASSISTANT

## 2018-02-13 PROCEDURE — 88184 FLOWCYTOMETRY/ TC 1 MARKER: CPT | Performed by: INTERNAL MEDICINE

## 2018-02-13 PROCEDURE — 88374 M/PHMTRC ALYS ISHQUANT/SEMIQ: CPT | Performed by: INTERNAL MEDICINE

## 2018-02-13 RX ORDER — MIDAZOLAM HYDROCHLORIDE 1 MG/ML
INJECTION INTRAMUSCULAR; INTRAVENOUS CODE/TRAUMA/SEDATION MEDICATION
Status: COMPLETED | OUTPATIENT
Start: 2018-02-13 | End: 2018-02-13

## 2018-02-13 RX ORDER — LISINOPRIL 20 MG/1
20 TABLET ORAL DAILY
Status: DISCONTINUED | OUTPATIENT
Start: 2018-02-13 | End: 2018-02-15

## 2018-02-13 RX ORDER — INSULIN GLARGINE 100 [IU]/ML
4 INJECTION, SOLUTION SUBCUTANEOUS ONCE
Status: COMPLETED | OUTPATIENT
Start: 2018-02-13 | End: 2018-02-13

## 2018-02-13 RX ORDER — FENTANYL CITRATE 50 UG/ML
INJECTION, SOLUTION INTRAMUSCULAR; INTRAVENOUS CODE/TRAUMA/SEDATION MEDICATION
Status: COMPLETED | OUTPATIENT
Start: 2018-02-13 | End: 2018-02-13

## 2018-02-13 RX ADMIN — SUCRALFATE 1000 MG: 1 SUSPENSION ORAL at 17:27

## 2018-02-13 RX ADMIN — LISINOPRIL 20 MG: 20 TABLET ORAL at 09:53

## 2018-02-13 RX ADMIN — INSULIN GLARGINE 4 UNITS: 100 INJECTION, SOLUTION SUBCUTANEOUS at 09:00

## 2018-02-13 RX ADMIN — PANTOPRAZOLE SODIUM 40 MG: 40 INJECTION, POWDER, FOR SOLUTION INTRAVENOUS at 08:36

## 2018-02-13 RX ADMIN — METRONIDAZOLE 500 MG: 500 TABLET ORAL at 06:12

## 2018-02-13 RX ADMIN — INSULIN LISPRO 1 UNITS: 100 INJECTION, SOLUTION INTRAVENOUS; SUBCUTANEOUS at 21:43

## 2018-02-13 RX ADMIN — LEVOTHYROXINE SODIUM 200 MCG: 100 TABLET ORAL at 06:12

## 2018-02-13 RX ADMIN — FENTANYL CITRATE 25 MCG: 50 INJECTION, SOLUTION INTRAMUSCULAR; INTRAVENOUS at 15:56

## 2018-02-13 RX ADMIN — MIDAZOLAM 0.5 MG: 1 INJECTION INTRAMUSCULAR; INTRAVENOUS at 15:51

## 2018-02-13 RX ADMIN — TBO-FILGRASTIM 300 MCG: 300 INJECTION, SOLUTION SUBCUTANEOUS at 08:36

## 2018-02-13 RX ADMIN — FENTANYL CITRATE 25 MCG: 50 INJECTION, SOLUTION INTRAMUSCULAR; INTRAVENOUS at 15:51

## 2018-02-13 RX ADMIN — PANTOPRAZOLE SODIUM 40 MG: 40 INJECTION, POWDER, FOR SOLUTION INTRAVENOUS at 21:43

## 2018-02-13 RX ADMIN — MIDAZOLAM 0.5 MG: 1 INJECTION INTRAMUSCULAR; INTRAVENOUS at 15:56

## 2018-02-13 NOTE — PROGRESS NOTES
Progress Note - Gloria Felix 67 y o  female MRN: [id number]    Unit/Bed#: ICU 03 Encounter: [id number]      Assessment:  In summary, this is a 27-year-old female history of pancytopenia  WBC is are elevated at this time, attributable to Granix  Granix to be discontinued  Hemoglobin is stable though improved compared to week ago  Platelets likewise  Pancytopenia had been fluctuating during the course of this admission over about 2 weeks time  Prior values are not available  In order to rule out the possibility of underlying bone marrow disorder bone marrow biopsy is requested despite the current adequacy of her blood counts  Plan:  See above  Subjective:   Patient is clinically stable  She is generally weak  Appetite is fair at best   She has no nausea, vomiting  She has no bleeding symptoms  Objective:  WBC 11 9, hemoglobin 10 6, platelet count 51  Vitals: Blood pressure 156/66, pulse 76, temperature 98 6 °F (37 °C), temperature source Temporal, resp  rate 13, height 5' 7" (1 702 m), weight 76 1 kg (167 lb 12 3 oz), SpO2 96 %  ,Body mass index is 26 28 kg/m²  Intake/Output Summary (Last 24 hours) at 02/13/18 0929  Last data filed at 02/13/18 0801   Gross per 24 hour   Intake             2130 ml   Output             2125 ml   Net                5 ml       Physical Exam:  General Appearance:    Alert, oriented, weak and chronically ill-appearing  Eyes:    PERRL   Ears:    Normal external ear canals, both ears   Nose:   Nares normal, septum midline   Throat:   Mucosa moist  Pharynx without injection  Neck:   Supple       Lungs:     Clear to auscultation bilaterally   Chest Wall:    No tenderness or deformity    Heart:    Regular rate and rhythm       Abdomen:     Soft, non-tender, bowel sounds +, no organomegaly           Extremities:   Extremities no cyanosis or edema       Skin:   no rash or icterus      Lymph nodes:   Cervical, supraclavicular, and axillary nodes normal Neurologic:   CNII-XII intact, normal strength, sensation and reflexes     throughout          Invasive Devices     Peripheral Intravenous Line            Peripheral IV 02/11/18 Right Forearm 1 day          Drain            Closed/Suction Drain Left Abdomen Bulb 15 Fr  14 days    Closed/Suction Drain Right;Other (Comment) Abdomen Bulb 15 Fr  14 days                Lab, Imaging and other studies: I have personally reviewed pertinent reports

## 2018-02-13 NOTE — POST OP PROGRESS NOTES
Progress Note - General Surgery   Audrey Metcalf 67 y o  female MRN: [id number]  Unit/Bed#: ICU 03 Encounter: [id number]    Assessment:  Perforated gastric ulcer POD 15 s/p laparoscopic Grahm patch and repair  Sepsis POA- resolved  Severe Anemia/ABLA- hemoglobin stable  Pancytopenia- hematology following-bone marrow biopsy today  Acute severe protein calorie malnutrition related to GI distress , poor po intake- improving  Multiple sclerosis with h/o chronic steroid use and Imuran use- currently off both  Diabetes mellitus 2- hyperglycemia  Hypernatremia- resolved  Acute hypoxic respiratory failure- resolved  Increased COREY output    Plan:  Hematology management- for bone marrow biopsy today  Continue diet and nutritional support  Continue Protonix  PT/OT  Continue COREY drains- monitor output, plan for removal tomorrow   Continue supportive care and medical management  Monitor electrolyte   Watch for signs of infection       Subjective/Objective   Chief Complaint:  Weak    Subjective:  Patient still complains of weakness although states she is feeling better  Tolerating diet  Continues to have bowel movements  Patient for bone marrow biopsy today  Objective:     Blood pressure (!) 186/75, pulse 84, temperature 98 5 °F (36 9 °C), temperature source Temporal, resp  rate 12, height 5' 7" (1 702 m), weight 76 1 kg (167 lb 12 3 oz), SpO2 96 %  ,Body mass index is 26 28 kg/m²  Intake/Output Summary (Last 24 hours) at 02/13/18 1235  Last data filed at 02/13/18 0801   Gross per 24 hour   Intake             1875 ml   Output             1965 ml   Net              -90 ml       Invasive Devices     Peripheral Intravenous Line            Peripheral IV 02/11/18 Right Forearm 1 day          Drain            Closed/Suction Drain Left Abdomen Bulb 15 Fr  14 days    Closed/Suction Drain Right;Other (Comment) Abdomen Bulb 15 Fr   14 days              Physical Exam: General appearance: alert and cooperative  Elderly, fatigued  Lungs: clear to auscultation bilaterally  Heart: regular rate and rhythm, S1, S2 normal, no murmur, click, rub or gallop  Abdomen: soft, non-tender; nondistended, active bowel sounds  Extremities: pitting edema, anasarca  incisons: c/d/i  COREY drains with serosanguineous output       Lab, Imaging and other studies:  I have personally reviewed pertinent lab results    , CBC:   Lab Results   Component Value Date    WBC 11 98 (H) 02/13/2018    HGB 10 6 (L) 02/13/2018    HCT 31 5 (L) 02/13/2018    MCV 91 02/13/2018    PLT 51 (L) 02/13/2018    MCH 30 5 02/13/2018    MCHC 33 7 02/13/2018    RDW 14 7 02/13/2018    MPV 11 7 02/13/2018   , CMP:   Lab Results   Component Value Date     02/13/2018    K 4 2 02/13/2018     02/13/2018    CO2 24 02/13/2018    ANIONGAP 6 02/13/2018    BUN 9 02/13/2018    CREATININE 0 53 (L) 02/13/2018    GLUCOSE 192 (H) 02/13/2018    CALCIUM 8 2 (L) 02/13/2018    EGFR 95 02/13/2018     VTE Pharmacologic Prophylaxis: Reason for no pharmacologic prophylaxis GI bleed, thrombocytopenia  VTE Mechanical Prophylaxis: sequential compression device     Gisel Concepcion PA-C

## 2018-02-13 NOTE — CASE MANAGEMENT
Continued Stay Review    Date:    2/13/2018    Vital Signs: BP (!) 186/75   Pulse 84   Temp 98 6 °F (37 °C) (Temporal)   Resp 12   Ht 5' 7" (1 702 m)   Wt 76 1 kg (167 lb 12 3 oz)   SpO2 96%   BMI 26 28 kg/m²     Medications:   Scheduled Meds:   Current Facility-Administered Medications:  acetaminophen 650 mg Oral Q4H PRN Christopher Isaacs MD    calcium carbonate 500 mg Oral TID PRN Christopher Isaacs MD    hydrALAZINE 5 mg Intravenous Q6H PRN Christopher Isaacs MD    insulin lispro 1-5 Units Subcutaneous Q6H Magdalene Delcid, DO    iohexol 50 mL Oral Once in imaging Christopher Isaacs MD    levothyroxine 200 mcg Oral Early Morning Magdalene Ambron, DO    lisinopril 20 mg Oral Daily Magdalene Ambron, DO    methocarbamol 500 mg Oral Q6H PRN Christopher Isaacs MD    morphine injection 2 mg Intravenous Q2H PRN Cinthay Melara MD    ondansetron 4 mg Intravenous Q4H PRN PARRISH Mcguire    pantoprazole 40 mg Intravenous Q12H [address] Lashawn Minaya MD    sodium chloride 75 mL/hr Intravenous Continuous Christopher Isaacs MD Last Rate: 75 mL/hr (02/12/18 1857)   sucralfate 1,000 mg Oral BID AC Cinthya Melara MD      Continuous Infusions:   sodium chloride 75 mL/hr Last Rate: 75 mL/hr (02/12/18 1857)     PRN Meds:   acetaminophen    calcium carbonate    hydrALAZINE    iohexol    methocarbamol    morphine injection    ondansetron    Abnormal Labs/Diagnostic Results:    WBC   11 98  H/H  10 6/31 5  Platelets   51    Age/Sex: 67 y o  female     Assessment/Plan:    -sepsis:  Present on admission:  secondary to peritonitis due to a perforated gastric ulcer:  The patient underwent surgical repair   She completed 7 days of antibiotics   Her sepsis had resolved  However pt spiked a fever of 102 on 2/8, she was also noted to be leukopenic and borderline neutropenic with an [address] of 700  she was started by ID on cefepime and Flagyl   CT abdomen/pelvis without evidence of focal infection   Had fluid collection the lesser curvature, however has been stable since previous CAT scan of February 2nd   There was discussion about IR to drain this fluid  It is recommended to monitor pt since collection has been stable  ID will likely d/c antibiotics and see if pt becomes febrile  If pt does will rediscuss with IR about drainage of fluid collection again  -of note was alerted by her nurse that her COREY drain is draining large amount of fluid consistently  It is serosanguinous at times but will become cloudy  Will await surgery follow up to assess this       2-perforated gastric ulcer:  she was orginally in the ICU and then transferred to surgery service after silvia patch repair  She was then switched to internal medicine service as she is pod 15  S/p EGD by GI showed 2 small healing duodenal bulb ulcers and a large gastric ulcer  She is to continue ppi bid and carafate  Again please see principal problem number 1 about COREY drain       3  Acute/chronic blood loss anemia and pancytopenia- pt had acute blood loss due to ulcer  She has chronic anemia due to pancytopenia  She was transfused approx 7 units of prbc  No further bleeding ulcer  Please see principal problem number 2  Will continue to monitor h/h     Active problems  1  -diabetes type 2:  continue to hold po antidiabetic medications  Change sliding scale to q6 hours  Will again redose lantus of 4 units today while npo       2-hypernatremia:resolved  -pancytopenia: appreciate hematology recommendations  S/p granix  Pt will need bone marrow biopsy  Scheduled for tomorrow  Platelets are stable after transfusion  Would not transfuse further as bleeding has subsided       4-hypothyroidism:  Continue Synthroid     5-hyperlipidemia:  Statin on hold     6-multiple sclerosis:  Imuran on hold  appreciate Neurology consultation and recommendations   CT scan revealed multiple old strokes as well as a chronic right occipital infarct with some surrounding hyperdensity consistent with laminar necrosis  Maren Acosta likely petechial hemorrhage   Continued monitor as patient had thrombocytopenia  MRI pending     7-left hand cat bite:  No evidence of cellulitis   Status post 7 days of antibiotics      8-acute metabolic encephalopathy: Most likely multifactorial, secondary to hypernatremia, sepsis, hypernatremia, hyperglycemia  resolving                 9-intermittent elevated blood presure:  Without prior h/o HTN   pts sbp is consistently in 140-160   Will start lisinopril po       10 left upper extremity DVT:  Secondary to PICC line   Have removed PICC line   Unfortunately patient may not be on anticoagulation   11-decubitus ulcers:  Appreciate wound Care team's evaluation recommendations    Discharge Plan:    TBD

## 2018-02-13 NOTE — BRIEF OP NOTE (RAD/CATH)
BONE MARROW BIOPSY:  Procedure Note    PATIENT NAME: Roula Gtz  : 1945  MRN: [id number]     Pre-op Diagnosis:   1  Anemia    2  Gastric ulcer    3  Pneumoperitoneum    4  Acute gastric ulcer with hemorrhage and perforation ([address] )    5  Acute posthemorrhagic anemia    6  Type 2 diabetes mellitus with hyperglycemia, unspecified long term insulin use status ([facility] [address] )    7  BEVERLY (acute kidney injury) ([facility] [address] )    8  Pancytopenia ([address] )    9  Acute blood loss anemia    10  Multiple sclerosis ([address] )    11  Metabolic encephalopathy      Post-op Diagnosis:   1  Anemia    2  Gastric ulcer    3  Pneumoperitoneum    4  Acute gastric ulcer with hemorrhage and perforation ([address] )    5  Acute posthemorrhagic anemia    6  Type 2 diabetes mellitus with hyperglycemia, unspecified long term insulin use status ([facility] [address] )    7  BEVERLY (acute kidney injury) ([facility] [address] )    8  Pancytopenia ([facility] [address] )    9  Acute blood loss anemia    10  Multiple sclerosis ([facility] [address] )    11  Metabolic encephalopathy        Surgeon:   Zaina Davalos MD  Assistants:     No qualified resident was available  Estimated Blood Loss: None  Findings: Right posterior superior iliac crest amenable  Specimens: 7 mL bone marrow; 1 5 cm bone core  Complications:  None      Anesthesia: Conscious sedation and Tim Ackerman MD     Date: 2018  Time: 5:00 PM

## 2018-02-13 NOTE — PROGRESS NOTES
Progress Note - Infectious Disease   Sumanth Hernandez 67 y o  female MRN: [id number]  Unit/Bed#: ICU 03 Encounter: [id number]      Impression/Plan:  1   Febrile neutropenia-Consider relation to #2   Etiology of neutropenia unclear as below  Repeat blood cultures negative   Fever has resolved   WBC better with G-CSF  The patient is no longer neutropenic               -discontinue cefepime Flagyl  -monitor off all antibiotic              -follow up repeat blood cultures  -follow temperatures closely  -further workup for pancytopenia as below     2   Postoperative intraabdominal collection - may be due to recent surgery but consider abscess in setting of #3 and recent fever  However the collection seems to be stable since 2/3/2018              -continue antibiotics as above              -Surgery to discuss with IR                 -no plan to sample collection for now  -allowing the infection to clear     3   Peritonitis-secondary to perforation of a gastric ulceration with contamination of the peritoneum with gastric contacts   Status post laparoscopic repair with washout with drains left in place   Consider MDROs and yeast as the patient had been Augmentin at the time of the perforation   Fortunately the patient has now undergone adequate source control   She has completed 1 week of antibiotics postop   The follow-up CT of the abdomen pelvis reveals a collection, but just appears postoperative and is unlikely to be infected  -close surgical follow-up  -drain management  -serial abdominal exams     4   Pancytopenia-Unclear etiology as worsening despite D/C of Imuran and antibiotics   Oncology considering possible lymphoproliferative disorder    The white cell count has recovered with G-CSF      -hematology oncology follow-up  -G-CSF per oncology  -transfusion as needed  -BM biopsy per oncology     5   Diabetes mellitus-type 2 with hyperglycemia     6   Multiple sclerosis-apparently on Imuran for a while     -would hold on Imuran for now     7   Cat bite-left hand   No overt cellulitis at this time  Genevia Boxer should be well covered by the antibiotics as above   The cat is being watched at home without any behavioral abnormalities   The hand is significantly improved with decreased edema   -serial exams  -continue monitoring of the cat at home     8   Acute encephalopathy - likely toxic metabolic due to infection, electrolyte abnormalities superimposed on MS and chronic/severe SVID as suggested by CT   CT head negative for acute abnormalities   Doubt CNS infection  Her cognition seems better              -follow MS closely              -neuro follow-up              -Possible MRI per neuro    Discussed in detail with Dr Diego Veliz    Antibiotics:  Cefepime 5  Flagyl 5    Subjective:  Patient has no fever, chills, sweats; no nausea, vomiting, diarrhea; no cough, shortness of breath; no pain  No new symptoms  She is feeling better overall  She seems a bit less confused    Objective:  Vitals:  HR:  [76-90] 84  Resp:  [12-18] 12  BP: (133-186)/(59-77) 186/75  SpO2:  [93 %-99 %] 96 %  Temp (24hrs), Av 1 °F (36 7 °C), Min:97 2 °F (36 2 °C), Max:99 1 °F (37 3 °C)  Current: Temperature: 98 6 °F (37 °C)    Physical Exam:   General Appearance:  Alert, interactive, nontoxic, no acute distress  Throat: Oropharynx moist without lesions  Lungs:   Clear to auscultation anteriorly; no wheezes, rhonchi or rales; respirations unlabored   Heart:  RRR; no murmur, rub or gallop   Abdomen:   Soft, non-tender, non-distended, positive bowel sounds  Drains in place    Extremities: No clubbing, cyanosis or edema   Skin: No new rashes or lesions  No draining wounds noted         Labs, Imaging, & Other studies:   All pertinent labs and imaging studies were personally reviewed    Results from last 7 days  Lab Units 18  0612 18  0442 18  2129   WBC Thousand/uL 11 98* 9 24 8 72   HEMOGLOBIN g/dL 10 6* 11 1* 10 5*   PLATELETS Thousands/uL 51* 43* 50*       Results from last 7 days  Lab Units 02/13/18  0612 02/12/18  0442 02/11/18  0512 02/10/18  0431   SODIUM mmol/L 137 137 138 138   POTASSIUM mmol/L 4 2 4 2 3 9 4 1   CHLORIDE mmol/L 107 106 105 107   CO2 mmol/L 24 25 25 25   ANION GAP mmol/L 6 6 8 6   BUN mg/dL 9 12 16 14   CREATININE mg/dL 0 53* 0 58* 0 69 0 64   EGFR ml/min/1 73sq m 95 92 87 89   GLUCOSE RANDOM mg/dL 192* 189* 202* 137   CALCIUM mg/dL 8 2* 8 4 8 3 7 9*   AST U/L  --   --   --  20   ALT U/L  --   --   --  12   ALK PHOS U/L  --   --   --  47   TOTAL PROTEIN g/dL  --   --   --  3 7*   BILIRUBIN TOTAL mg/dL  --   --   --  1 24*       Results from last 7 days  Lab Units 02/08/18  2154 02/08/18 2004   BLOOD CULTURE  No Growth After 4 Days  No Growth After 4 Days

## 2018-02-13 NOTE — PROGRESS NOTES
Progress Note - Neurology   Mamta Benitez 67 y o  female MRN: [id number]  Unit/Bed#: ICU 03 Encounter: [id number]    Assessment/ Plan:  1  Toxic metabolic encephalopathy - resolving  Multifactorial in setting of sepsis 2/2 perforated gastric ulcer, acute left  DVT, severe anemia, hypoglycemia following prolonged hyperglycemia, intermittent  hypertension, medications (morphine), prior severe hyponatremia followed by severe hypernatremia, malnutrition      -Continue to maximize medical and metabolic management per primary team     2  Hx of R MCA territory infarction and chronic b/l basal ganglia infarctions and acute L DVT   -Hold anticoagulation/ antiplatelet for now in setting of recent pancytopenia and unclear coagulopathy   -Recommend 2D ECHO    -MRI B pending     3  Hiistory of relapsing remitting multiple sclerosis (per chart)  -Imuron held at this point      4  Pancytopenia:    -Platelets now stable    -Awaiting bone marrow biopsy today      Subjective:   No acute events overnight  On exam today, the pt is pleasant, polite and conversational   She is able to readily follow commands  The patient remained somewhat confused, and is disoriented to place, day, month, and year  Unable to complete simple calculations  She is able to repeat and name objects without dysarthria or paraphasic errors  Otherwise nonfocal neurological exam is detailed below      ROS:  See Subjective     Medications:  Scheduled Meds:  Current Facility-Administered Medications:  acetaminophen 650 mg Oral Q4H PRN Thais Pineda MD    calcium carbonate 500 mg Oral TID PRN Thais Pineda MD    cefepime 2,000 mg Intravenous Q12H Cesar Ponce MD Last Rate: 2,000 mg (02/12/18 6433)   hydrALAZINE 5 mg Intravenous Q6H PRN Thais Pineda MD    insulin lispro 1-5 Units Subcutaneous Q6H Magdalene Delcid DO    iohexol 50 mL Oral Once in imaging Thais Pineda MD    levothyroxine 200 mcg Oral Early Morning Magdalene Delcid DO    lisinopril 20 mg Oral Daily Magdalene Delcid DO    methocarbamol 500 mg Oral Q6H PRN Genna Peña MD    metroNIDAZOLE 500 mg Oral [facility] Ghanshyam Bustillo MD    morphine injection 2 mg Intravenous Q2H PRN Rola Sanchez MD    ondansetron 4 mg Intravenous Q4H PRN PARRISH Peñaloza    pantoprazole 40 mg Intravenous Q12H [facility] Shubham Harrell MD    sodium chloride 75 mL/hr Intravenous Continuous Genna Peña MD Last Rate: 75 mL/hr (02/12/18 1857)   sucralfate 1,000 mg Oral BID AC Rola Sanchez MD      Continuous Infusions:  sodium chloride 75 mL/hr Last Rate: 75 mL/hr (02/12/18 1857)     PRN Meds:   acetaminophen    calcium carbonate    hydrALAZINE    iohexol    methocarbamol    morphine injection    ondansetron      Vitals: Blood pressure (!) 186/75, pulse 84, temperature 98 6 °F (37 °C), temperature source Temporal, resp  rate 12, height 5' 7" (1 702 m), weight 76 1 kg (167 lb 12 3 oz), SpO2 96 %  ,Body mass index is 26 28 kg/m²  Physical Exam:   Physical Exam   Constitutional: She appears well-developed and well-nourished  She appears ill  No distress  HENT:   Head: Normocephalic and atraumatic  Right Ear: External ear normal    Left Ear: External ear normal    Nose: Nose normal    Mouth/Throat: Oropharynx is clear and moist  No oropharyngeal exudate  Eyes: Conjunctivae are normal  Right eye exhibits no discharge  Left eye exhibits no discharge  No scleral icterus  Neck: Normal range of motion  No tracheal deviation present  No thyromegaly present  Pulmonary/Chest: Effort normal  No respiratory distress  Musculoskeletal: Normal range of motion  She exhibits tenderness  She exhibits no edema or deformity  Tenderness of RUE 2/2 to acute DVT    Skin: Skin is warm and dry  She is not diaphoretic    (+) discoloration of RUE    Psychiatric: She has a normal mood and affect  Her speech is normal and behavior is normal    Nursing note and vitals reviewed      Neurologic Exam     Mental Status   Oriented to person  Disoriented to place  (States she's in a "rehab")  Disoriented to year, month and date  Follows 2 step commands  Attention: normal  Concentration: normal    Speech: speech is normal   Level of consciousness: alert  Unable to perform simple calculations  Able to name object  Able to repeat  Abnormal comprehension  Cranial Nerves   Cranial nerves II through XII intact  Could not visualize fundi      Motor Exam   Lifts all extremities equally anti-gravity     Sensory Exam   Light touch normal      Gait, Coordination, and Reflexes     Gait  Gait: (deferred for safety )    Tremor   Resting tremor: absent    Reflexes   Right ankle clonus: absent  Left ankle clonus: absent        Lab, Imaging and other studies: I have personally reviewed pertinent reports  Recent Results (from the past 24 hour(s))   Fingerstick Glucose (POCT)    Collection Time: 02/12/18  2:29 PM   Result Value Ref Range    POC Glucose 162 (H) 65 - 140 mg/dl   Fingerstick Glucose (POCT)    Collection Time: 02/12/18  5:21 PM   Result Value Ref Range    POC Glucose 247 (H) 65 - 140 mg/dl   Fingerstick Glucose (POCT)    Collection Time: 02/12/18 10:42 PM   Result Value Ref Range    POC Glucose 204 (H) 65 - 140 mg/dl   Fingerstick Glucose (POCT)    Collection Time: 02/13/18 12:02 AM   Result Value Ref Range    POC Glucose 211 (H) 65 - 140 mg/dl   Prepare platelet pheresis:Transfusion Indications: Prophylactic Perioperative/Periprocedure: Platelet count less than 50,000 (major invasive procedure except vaginal deliveries) Platelet count less than 100,000 (neurologic or opthalmic surgery); S      Collection Time: 02/13/18  5:30 AM   Result Value Ref Range    Unit Product Code O7341I67     Unit Number [id number]     Unit ABO A     Unit [facility] POS     Unit Dispense Status Presumed Trans    Basic metabolic panel    Collection Time: 02/13/18  6:12 AM   Result Value Ref Range    Sodium 137 136 - 145 mmol/L    Potassium 4 2 3 5 - 5 3 mmol/L Chloride 107 100 - 108 mmol/L    CO2 24 21 - 32 mmol/L    Anion Gap 6 4 - 13 mmol/L    BUN 9 5 - 25 mg/dL    Creatinine 0 53 (L) 0 60 - 1 30 mg/dL    Glucose 192 (H) 65 - 140 mg/dL    Calcium 8 2 (L) 8 3 - 10 1 mg/dL    eGFR 95 ml/min/1 73sq m   CBC    Collection Time: 02/13/18  6:12 AM   Result Value Ref Range    WBC 11 98 (H) 4 31 - 10 16 Thousand/uL    RBC 3 47 (L) 3 81 - 5 12 Million/uL    Hemoglobin 10 6 (L) 11 5 - 15 4 g/dL    Hematocrit 31 5 (L) 34 8 - 46 1 %    MCV 91 82 - 98 fL    MCH 30 5 26 8 - 34 3 pg    MCHC 33 7 31 4 - 37 4 g/dL    RDW 14 7 11 6 - 15 1 %    Platelets 51 (L) 133 - 390 Thousands/uL    MPV 11 7 8 9 - 12 7 fL   Fingerstick Glucose (POCT)    Collection Time: 02/13/18  6:33 AM   Result Value Ref Range    POC Glucose 182 (H) 65 - 140 mg/dl   ]      VTE Prophylaxis: Sequential compression device (Venodyne)     Counseling / Coordination of Care  Total Critical Care time spent 25 minutes excluding procedures, teaching and family updates

## 2018-02-13 NOTE — PROGRESS NOTES
[facility] Progress Note  Patient: Mere Roman 67 y o  female   MRN: [id number]  PCP: Faraz Mccarthy DO  Unit/Bed#: ICU 03 Encounter: [id number]  Date Of Visit: 02/13/18      Assessment/plan  Principal problem  1-sepsis:  Present on admission:  secondary to peritonitis due to a perforated gastric ulcer:  The patient underwent surgical repair   She completed 7 days of antibiotics   Her sepsis had resolved  However pt spiked a fever of 102 on 2/8, she was also noted to be leukopenic and borderline neutropenic with an [address] of 700  she was started by ID on cefepime and Flagyl  CT abdomen/pelvis without evidence of focal infection   Had fluid collection the lesser curvature, however has been stable since previous CAT scan of February 2nd   There was discussion about IR to drain this fluid  It is recommended to monitor pt since collection has been stable  ID will likely d/c antibiotics and see if pt becomes febrile  If pt does will rediscuss with IR about drainage of fluid collection again  -of note was alerted by her nurse that her COREY drain is draining large amount of fluid consistently  It is serosanguinous at times but will become cloudy  Will await surgery follow up to assess this       2-perforated gastric ulcer:  she was orginally in the ICU and then transferred to surgery service after silvia patch repair  She was then switched to internal medicine service as she is pod 15  S/p EGD by GI showed 2 small healing duodenal bulb ulcers and a large gastric ulcer  She is to continue ppi bid and carafate  Again please see principal problem number 1 about COREY drain       3  Acute/chronic blood loss anemia and pancytopenia- pt had acute blood loss due to ulcer  She has chronic anemia due to pancytopenia  She was transfused approx 7 units of prbc  No further bleeding ulcer  Please see principal problem number 2   Will continue to monitor h/h     Active problems  1  -diabetes type 2:  continue to hold po antidiabetic medications  Change sliding scale to q6 hours  Will again redose lantus of 4 units today while npo       2-hypernatremia:resolved     3-pancytopenia: appreciate hematology recommendations  S/p granix  Pt will need bone marrow biopsy  Scheduled for tomorrow  Platelets are stable after transfusion  Would not transfuse further as bleeding has subsided       4-hypothyroidism:  Continue Synthroid     5-hyperlipidemia:  Statin on hold     6-multiple sclerosis:  Imuran on hold  appreciate Neurology consultation and recommendations   CT scan revealed multiple old strokes as well as a chronic right occipital infarct with some surrounding hyperdensity consistent with laminar necrosis   Less likely petechial hemorrhage   Continued monitor as patient had thrombocytopenia  MRI pending     7-left hand cat bite:  No evidence of cellulitis   Status post 7 days of antibiotics      8-acute metabolic encephalopathy: Most likely multifactorial, secondary to hypernatremia, sepsis, hypernatremia, hyperglycemia  resolving                 9-intermittent elevated blood presure:  Without prior h/o HTN   pts sbp is consistently in 140-160  Will start lisinopril po       10 left upper extremity DVT:  Secondary to PICC line   Have removed PICC line   Unfortunately patient may not be on anticoagulation due to pancytopenia     11-decubitus ulcers:  Appreciate wound Care team's evaluation recommendations      dispo-  Awaiting bone marrow biopsy  Awaiting physical therapy  Continue to monitor in step down until after procedures  If pt is stable tomorrow could consider transferring to med/surg    Subjective:   Pt seen and examined  Pt has mild confusion this am  She has not had further bleeding  No melena no brbpr  She has had high outpt from COREY in pelvis  No other problems were noted   She denies f/c no cp no sob no n/v/d abd pain is controlled per pt    Objective:     Vitals: Blood pressure 167/72, pulse 84, temperature 98 6 °F (37 °C), temperature source Temporal, resp  rate 13, height 5' 7" (1 702 m), weight 76 1 kg (167 lb 12 3 oz), SpO2 93 %  ,Body mass index is 26 28 kg/m²  Lab, Imaging and other studies:    Results from last 7 days  Lab Units 02/13/18  0612 02/12/18  0442   WBC Thousand/uL 11 98* 9 24   HEMOGLOBIN g/dL 10 6* 11 1*   HEMATOCRIT % 31 5* 32 7*   PLATELETS Thousands/uL 51* 43*   INR   --  1 05       Results from last 7 days  Lab Units 02/13/18  0612  02/10/18  0431   SODIUM mmol/L 137  < > 138   POTASSIUM mmol/L 4 2  < > 4 1   CHLORIDE mmol/L 107  < > 107   CO2 mmol/L 24  < > 25   BUN mg/dL 9  < > 14   CREATININE mg/dL 0 53*  < > 0 64   CALCIUM mg/dL 8 2*  < > 7 9*   TOTAL PROTEIN g/dL  --   --  3 7*   BILIRUBIN TOTAL mg/dL  --   --  1 24*   ALK PHOS U/L  --   --  47   ALT U/L  --   --  12   AST U/L  --   --  20   GLUCOSE RANDOM mg/dL 192*  < > 137   < > = values in this interval not displayed  Lab Results   Component Value Date    BLOODCX No Growth After 4 Days  02/08/2018    BLOODCX No Growth After 4 Days  02/08/2018    BLOODCX No Growth After 5 Days  01/31/2018    BLOODCX No Growth After 5 Days   01/31/2018       Scheduled Meds:   Current Facility-Administered Medications:  acetaminophen 650 mg Oral Q4H PRDAVID Pineda MD    calcium carbonate 500 mg Oral TID PRDAVID Pineda MD    cefepime 2,000 mg Intravenous Q12H Cesar Ponce MD Last Rate: 2,000 mg (02/12/18 2321)   hydrALAZINE 5 mg Intravenous Q6H PRDAVID Pineda MD    insulin glargine 4 Units Subcutaneous Once Magdalene Ambron, DO    insulin lispro 1-5 Units Subcutaneous Q6H Magdalene Ambron, DO    iohexol 50 mL Oral Once in imaging Thais Pineda MD    levothyroxine 200 mcg Oral Early Morning Magdalene Ambron, DO    methocarbamol 500 mg Oral Q6H PRN Thais Pineda MD    metroNIDAZOLE 500 mg Oral [facility] Cesar Ponce MD    morphine injection 2 mg Intravenous Q2H PRN Jc Lorenz MD    ondansetron 4 mg Intravenous Q4H PRN PARRISH Martinez pantoprazole 40 mg Intravenous Q12H [address] Yun Denise MD    sodium chloride 75 mL/hr Intravenous Continuous Tonny Gomes MD Last Rate: 75 mL/hr (02/12/18 1857)   sucralfate 1,000 mg Oral BID AC Dacia Carroll MD    tbo-filgrastim 300 mcg Subcutaneous Daily Robert Price DO      Continuous Infusions:   sodium chloride 75 mL/hr Last Rate: 75 mL/hr (02/12/18 1857)     PRN Meds:   acetaminophen    calcium carbonate    hydrALAZINE    iohexol    methocarbamol    morphine injection    ondansetron      Physical exam:  Physical Exam  General appearance: alert and oriented, in no acute distress  Head: Normocephalic, without obvious abnormality, atraumatic  Eyes: conjunctivae/corneas clear  PERRL, EOM's intact  Fundi benign    Neck: no adenopathy, no carotid bruit, no JVD, supple, symmetrical, trachea midline and thyroid not enlarged, symmetric, no tenderness/mass/nodules  Lungs: slight decrease in breath sounds at bases bilateral  Heart: regular rate and rhythm, S1, S2 normal, no murmur, click, rub or gallop  Abdomen: soft minimally ttp nd +bs  Extremities: extremities normal, warm and well-perfused; no cyanosis, clubbing, or edema  Pulses: 2+ and symmetric  Skin: Skin color, texture, turgor normal  No rashes or lesions  Neurologic: Mental status: awake alert oriented x3 she only needed prompting for the month      VTE Pharmacologic Prophylaxis: Sequential compression device (Venodyne)   VTE Mechanical Prophylaxis: sequential compression device    Counseling / Coordination of Care  Total floor / unit time spent today 20 minutes   Current Length of Stay: 16 day(s)    Current Patient Status: Inpatient       Code Status: Level 3 - DNAR and DNI

## 2018-02-14 ENCOUNTER — APPOINTMENT (INPATIENT)
Dept: MRI IMAGING | Facility: HOSPITAL | Age: 73
DRG: 853 | End: 2018-02-14
Payer: MEDICARE

## 2018-02-14 ENCOUNTER — APPOINTMENT (INPATIENT)
Dept: NON INVASIVE DIAGNOSTICS | Facility: HOSPITAL | Age: 73
DRG: 853 | End: 2018-02-14
Payer: MEDICARE

## 2018-02-14 LAB
ANION GAP SERPL CALCULATED.3IONS-SCNC: 5 MMOL/L (ref 4–13)
BACTERIA BLD CULT: NORMAL
BUN SERPL-MCNC: 8 MG/DL (ref 5–25)
CALCIUM SERPL-MCNC: 8 MG/DL (ref 8.3–10.1)
CHLORIDE SERPL-SCNC: 108 MMOL/L (ref 100–108)
CO2 SERPL-SCNC: 24 MMOL/L (ref 21–32)
CREAT SERPL-MCNC: 0.45 MG/DL (ref 0.6–1.3)
ERYTHROCYTE [DISTWIDTH] IN BLOOD BY AUTOMATED COUNT: 14.8 % (ref 11.6–15.1)
GFR SERPL CREATININE-BSD FRML MDRD: 100 ML/MIN/1.73SQ M
GLUCOSE SERPL-MCNC: 154 MG/DL (ref 65–140)
GLUCOSE SERPL-MCNC: 169 MG/DL (ref 65–140)
GLUCOSE SERPL-MCNC: 211 MG/DL (ref 65–140)
GLUCOSE SERPL-MCNC: 274 MG/DL (ref 65–140)
GLUCOSE SERPL-MCNC: 285 MG/DL (ref 65–140)
HCT VFR BLD AUTO: 32.8 % (ref 34.8–46.1)
HGB BLD-MCNC: 10.8 G/DL (ref 11.5–15.4)
MCH RBC QN AUTO: 30.1 PG (ref 26.8–34.3)
MCHC RBC AUTO-ENTMCNC: 32.9 G/DL (ref 31.4–37.4)
MCV RBC AUTO: 91 FL (ref 82–98)
PLATELET # BLD AUTO: 52 THOUSANDS/UL (ref 149–390)
PMV BLD AUTO: 12.1 FL (ref 8.9–12.7)
POTASSIUM SERPL-SCNC: 5.2 MMOL/L (ref 3.5–5.3)
RBC # BLD AUTO: 3.59 MILLION/UL (ref 3.81–5.12)
SODIUM SERPL-SCNC: 137 MMOL/L (ref 136–145)
WBC # BLD AUTO: 17.14 THOUSAND/UL (ref 4.31–10.16)

## 2018-02-14 PROCEDURE — 99232 SBSQ HOSP IP/OBS MODERATE 35: CPT | Performed by: INTERNAL MEDICINE

## 2018-02-14 PROCEDURE — C9113 INJ PANTOPRAZOLE SODIUM, VIA: HCPCS | Performed by: SURGERY

## 2018-02-14 PROCEDURE — 93306 TTE W/DOPPLER COMPLETE: CPT

## 2018-02-14 PROCEDURE — 82948 REAGENT STRIP/BLOOD GLUCOSE: CPT

## 2018-02-14 PROCEDURE — 93306 TTE W/DOPPLER COMPLETE: CPT | Performed by: INTERNAL MEDICINE

## 2018-02-14 PROCEDURE — 80048 BASIC METABOLIC PNL TOTAL CA: CPT | Performed by: INTERNAL MEDICINE

## 2018-02-14 PROCEDURE — 97530 THERAPEUTIC ACTIVITIES: CPT

## 2018-02-14 PROCEDURE — 85027 COMPLETE CBC AUTOMATED: CPT | Performed by: INTERNAL MEDICINE

## 2018-02-14 PROCEDURE — 97110 THERAPEUTIC EXERCISES: CPT

## 2018-02-14 PROCEDURE — 70551 MRI BRAIN STEM W/O DYE: CPT

## 2018-02-14 RX ORDER — INSULIN GLARGINE 100 [IU]/ML
4 INJECTION, SOLUTION SUBCUTANEOUS EVERY MORNING
Status: DISCONTINUED | OUTPATIENT
Start: 2018-02-14 | End: 2018-02-15

## 2018-02-14 RX ADMIN — SUCRALFATE 1000 MG: 1 SUSPENSION ORAL at 05:53

## 2018-02-14 RX ADMIN — INSULIN LISPRO 1 UNITS: 100 INJECTION, SOLUTION INTRAVENOUS; SUBCUTANEOUS at 07:57

## 2018-02-14 RX ADMIN — SUCRALFATE 1000 MG: 1 SUSPENSION ORAL at 16:55

## 2018-02-14 RX ADMIN — LISINOPRIL 20 MG: 20 TABLET ORAL at 07:56

## 2018-02-14 RX ADMIN — INSULIN LISPRO 3 UNITS: 100 INJECTION, SOLUTION INTRAVENOUS; SUBCUTANEOUS at 16:56

## 2018-02-14 RX ADMIN — ACETAMINOPHEN 650 MG: 325 TABLET, FILM COATED ORAL at 08:53

## 2018-02-14 RX ADMIN — LEVOTHYROXINE SODIUM 200 MCG: 100 TABLET ORAL at 05:48

## 2018-02-14 RX ADMIN — PANTOPRAZOLE SODIUM 40 MG: 40 INJECTION, POWDER, FOR SOLUTION INTRAVENOUS at 21:18

## 2018-02-14 RX ADMIN — INSULIN GLARGINE 4 UNITS: 100 INJECTION, SOLUTION SUBCUTANEOUS at 12:12

## 2018-02-14 RX ADMIN — INSULIN LISPRO 2 UNITS: 100 INJECTION, SOLUTION INTRAVENOUS; SUBCUTANEOUS at 12:12

## 2018-02-14 RX ADMIN — PANTOPRAZOLE SODIUM 40 MG: 40 INJECTION, POWDER, FOR SOLUTION INTRAVENOUS at 07:58

## 2018-02-14 RX ADMIN — INSULIN LISPRO 3 UNITS: 100 INJECTION, SOLUTION INTRAVENOUS; SUBCUTANEOUS at 21:41

## 2018-02-14 NOTE — PHYSICAL THERAPY NOTE
PHYSICAL THERAPY NOTE          Patient Name: Henrique Self  [id number] Date: 2/14/2018 02/14/18 1538   Pain Assessment   Pain Assessment FLACC   Pain Rating: FLACC (Rest) - Face 1   Pain Rating: FLACC (Rest) - Legs 1   Pain Rating: FLACC (Rest) - Activity 1   Pain Rating: FLACC (Rest) - Cry 0   Pain Rating: FLACC (Rest) - Consolability 0   Score: FLACC (Rest) 3   Pain Rating: FLACC (Activity) - Face 1   Pain Rating: FLACC (Activity) - Legs 1   Pain Rating: FLACC (Activity) - Activity 1   Pain Rating: FLACC (Activity) - Cry 0   Pain Rating: FLACC (Activity) - Consolability 0   Score: FLACC (Activity) 3   Restrictions/Precautions   Weight Bearing Precautions Per Order No   Other Precautions Cognitive; Bed Alarm;Multiple lines;Telemetry; Fall Risk   General   Chart Reviewed Yes   Response to Previous Treatment Patient with no complaints from previous session  Family/Caregiver Present Yes   Cognition   Overall Cognitive Status Impaired   Arousal/Participation Responsive   Attention Attends with cues to redirect   Orientation Level Oriented to person   Memory Decreased short term memory   Following Commands Follows one step commands with increased time or repetition   Bed Mobility   Rolling R 2  Maximal assistance   Additional items Assist x 2; Increased time required;Verbal cues   Rolling L 2  Maximal assistance   Additional items Assist x 2; Increased time required   Supine to Sit 2  Maximal assistance   Additional items Assist x 2; Increased time required;Verbal cues   Sit to Supine 2  Maximal assistance   Additional items Assist x 2; Increased time required;Verbal cues   Additional Comments PT sat EOB x 15 min and was able to perform therex    Transfers   Sit to Stand Unable to assess  (decreased sitting tolerance at this time )   Ambulation/Elevation   Gait pattern Not tested   Balance   Static Sitting Fair   Dynamic Sitting Fair - Endurance Deficit   Endurance Deficit Yes   Endurance Deficit Description fatigue and pain    Activity Tolerance   Activity Tolerance Patient limited by fatigue;Patient limited by pain   Nurse Made Aware pt able to be seen per nsg    Exercises   Knee AROM Long Arc Quad Sitting;10 reps;AROM; Bilateral  (x 2 sets )   Ankle Pumps Sitting;10 reps;AROM; Bilateral  (x 2 sets )   Marching Sitting;10 reps;AROM; Bilateral  (x 2 sets )   Assessment   Prognosis Fair   Problem List Decreased strength;Decreased range of motion;Decreased endurance; Impaired balance;Decreased mobility; Decreased cognition;Decreased safety awareness; Impaired judgement;Pain   Assessment Pt resting in bed at time of PT treatment  Pt was able to tolerate treatment, however continues to be limited by increased fatigue with all activity  Pt was able to perform bed mobility with less A required compared to previous session, however pt continues to require frequent verbal instruction to redirect attention and for hand placement  Pt was able to sit EOB x 15 min while performing therex and requires verbal instruction for proper form and pacing with all therex  Pt continues to have poor sitting balance and needs frequent VC and TC to correct posterior lean in sitting  Pt was unable to perform sit to stand transfers this session due to decreased sitting tolerance and poor sitting balance  Pt assisted back into bed at conclusion of PT session with all needs within reach  Pt and pts family deny any further questions at this time  PT will continue to follow  DC recommendation when medically cleared is rehab due to decreased functional mobility at current time and increased A needed from caregiver at current time  Barriers to Discharge Decreased caregiver support; Inaccessible home environment   Goals   Patient Goals none stated    STG Expiration Date 02/16/18   Plan   Treatment/Interventions Functional transfer training;LE strengthening/ROM; Therapeutic exercise; Endurance training;Cognitive reorientation;Patient/family training;Equipment eval/education; Bed mobility;Gait training;Spoke to nursing;OT;Family   Progress Slow progress, decreased activity tolerance   PT Frequency 5x/wk   Recommendation   Recommendation Short-term skilled PT   Equipment Recommended Walker   PT - OK to Discharge Yes  (to rehab when medically cleared )   Lorie Guzmán, PT

## 2018-02-14 NOTE — PROGRESS NOTES
[facility] Progress Note  Patient: Home Taveras 67 y o  female   MRN: [id number]  PCP: Raquel Kay DO  Unit/Bed#: ICU 03 Encounter: [id number]  Date Of Visit: 02/14/18      Assessment/plan  Principal problem  1-sepsis:  Present on admission:  secondary to peritonitis due to a perforated gastric ulcer:  The patient underwent surgical repair   She completed 7 days of antibiotics   Her sepsis had resolved  However pt spiked a fever of 102 on 2/8, she was also noted to be leukopenic and borderline neutropenic with an [address] of 700  she was started by ID on cefepime and Flagyl  CT abdomen/pelvis without evidence of focal infection   Had fluid collection the lesser curvature, however has been stable since previous CAT scan of February 2nd   There was discussion about IR to drain this fluid  It is recommended to monitor pt since collection has been stable  ID d/jesus antibiotics to see if infection declares itself  If pt does have a fever will need to rediscuss with IR about drainage of fluid collection  -appreciate surgery re eval  Possible removal of amilcar drains today     2-perforated gastric ulcer:  she was orginally in the ICU and then transferred to surgery service after silvia patch repair  She was then switched to internal medicine service as she is pod 15  S/p EGD by GI showed 2 small healing duodenal bulb ulcers and a large gastric ulcer  She is to continue ppi bid and carafate  Again please see principal problem number 1 about AMILCAR drain       3  Acute/chronic blood loss anemia and pancytopenia- pt had acute blood loss due to ulcer  She has chronic anemia due to pancytopenia  She was transfused approx 7 units of prbc  No further bleeding ulcer  Please see principal problem number 2  Will continue to monitor h/h which has been stable     Active problems  1  -diabetes type 2:  continue to hold po antidiabetic medications  Continue insulin sliding scale   redose lantus as pt has very minimal po intake     2-hypernatremia:resolved     3-pancytopenia: appreciate hematology recommendations  S/p granix  S/p bone marrow biopsy  Await results  Thrombocytopenia is stable       4-hypothyroidism:  Continue Synthroid     5-hyperlipidemia:  Statin on hold     6-multiple sclerosis:  Imuran on hold  appreciate Neurology consultation and recommendations   CT scan revealed multiple old strokes as well as a chronic right occipital infarct with some surrounding hyperdensity consistent with laminar necrosis   Less likely petechial hemorrhage   Continued monitor as patient had thrombocytopenia  MRI pending  Echo pending     7-left hand cat bite:  No evidence of cellulitis   Status post 7 days of antibiotics      8-acute metabolic encephalopathy: Most likely multifactorial, secondary to hypernatremia, sepsis, hypernatremia, hyperglycemia  Had been resolving but worsened today  Likely due to recent anesthesia x2 for procedures  Will continue to monitor  Mri is pending                   9-intermittent elevated blood presure:  Without prior h/o HTN   pts sbp is 120-160  Continue lisinopril       10 left upper extremity DVT:  Secondary to PICC line   Have removed PICC line   Unfortunately patient may not be on anticoagulation due to pancytopenia     11-decubitus ulcers:  Appreciate wound Care team's evaluation recommendations      dispo-  Awaiting physical therapy  Continue to monitor in step down due to worsened encephalopathy  If pt is stable tomorrow could consider transferring to med/surg  Son at bedside and updated    Subjective:   Pt seen and examined  Pt is weaker today  She had difficulty lifting her right arm and took a lot of direction for pt to figure out how to lift her arm  She is also confused  No melena or brbpr  No f/c no cp no sob no n/v/d no abd pain  However pt is c/o pain to nurse but then when nurse asks her where the pain is pt denies pain  amilcar on the left is still with high outpt       Objective:     Vitals: Blood pressure 168/68, pulse 86, temperature 97 7 °F (36 5 °C), temperature source Temporal, resp  rate 14, height 5' 7" (1 702 m), weight 76 1 kg (167 lb 12 3 oz), SpO2 94 %  ,Body mass index is 26 28 kg/m²  Lab, Imaging and other studies:    Results from last 7 days  Lab Units 02/14/18  0549  02/12/18  0442   WBC Thousand/uL 17 14*  < > 9 24   HEMOGLOBIN g/dL 10 8*  < > 11 1*   HEMATOCRIT % 32 8*  < > 32 7*   PLATELETS Thousands/uL 52*  < > 43*   INR   --   --  1 05   < > = values in this interval not displayed  Results from last 7 days  Lab Units 02/14/18  0505  02/10/18  0431   SODIUM mmol/L 137  < > 138   POTASSIUM mmol/L 5 2  < > 4 1   CHLORIDE mmol/L 108  < > 107   CO2 mmol/L 24  < > 25   BUN mg/dL 8  < > 14   CREATININE mg/dL 0 45*  < > 0 64   CALCIUM mg/dL 8 0*  < > 7 9*   TOTAL PROTEIN g/dL  --   --  3 7*   BILIRUBIN TOTAL mg/dL  --   --  1 24*   ALK PHOS U/L  --   --  47   ALT U/L  --   --  12   AST U/L  --   --  20   GLUCOSE RANDOM mg/dL 154*  < > 137   < > = values in this interval not displayed  Lab Results   Component Value Date    BLOODCX No Growth After 5 Days  02/08/2018    BLOODCX No Growth After 5 Days  02/08/2018    BLOODCX No Growth After 5 Days  01/31/2018    BLOODCX No Growth After 5 Days   01/31/2018     Scheduled Meds:   Current Facility-Administered Medications:  acetaminophen 650 mg Oral Q4H PRN David Savage MD   calcium carbonate 500 mg Oral TID PRN David Savage MD   hydrALAZINE 5 mg Intravenous Q6H PRN David Savage MD   insulin lispro 1-5 Units Subcutaneous TID  Robert W Spatzer, CRNP   insulin lispro 1-5 Units Subcutaneous HS Catrina Cordon Spatzer, CRNP   iohexol 50 mL Oral Once in imaging David Savage MD   levothyroxine 200 mcg Oral Early Morning Magdalene Delcid DO   lisinopril 20 mg Oral Daily Magdalnee Farhana DO   methocarbamol 500 mg Oral Q6H PRN David Savage MD   morphine injection 2 mg Intravenous Q2H PRN Terri Angeles MD   ondansetron 4 mg Intravenous Q4H PRN PARRISH Nugent   pantoprazole 40 mg Intravenous Q12H [address] Andre Torrez MD   sucralfate 1,000 mg Oral BID [facility] Ary Najjar, MD     Continuous Infusions:    PRN Meds:   acetaminophen    calcium carbonate    hydrALAZINE    iohexol    methocarbamol    morphine injection    ondansetron      Physical exam:  Physical Exam  General appearance: awake alert confused  Head: Normocephalic, without obvious abnormality, atraumatic  Eyes: conjunctivae/corneas clear  PERRL, EOM's intact  Fundi benign  Neck: no adenopathy, no carotid bruit, no JVD, supple, symmetrical, trachea midline and thyroid not enlarged, symmetric, no tenderness/mass/nodules  Lungs: clear to auscultation bilaterally  Heart: regular rate and rhythm, S1, S2 normal, no murmur, click, rub or gallop  Abdomen: soft, non-tender; bowel sounds normal; no masses,  no organomegaly  Extremities: edema trace in all 4 ext  Pulses: 2+ and symmetric  Skin: Skin color, texture, turgor normal  No rashes or lesions  Neurologic: Mental status: confused  difficult for her to follow commands   4/5 muscle strenght in all      VTE Pharmacologic Prophylaxis: Reason for no pharmacologic prophylaxis pancytopenia  VTE Mechanical Prophylaxis: sequential compression device    Counseling / Coordination of Care  Total floor / unit time spent today 20 minutes      Current Length of Stay: 17 day(s)    Current Patient Status: Inpatient       Code Status: Level 3 - DNAR and DNI

## 2018-02-14 NOTE — PLAN OF CARE
Problem: PHYSICAL THERAPY ADULT  Goal: Performs mobility at highest level of function for planned discharge setting  See evaluation for individualized goals  Treatment/Interventions: LE strengthening/ROM, Therapeutic exercise, Endurance training, Patient/family training, Bed mobility, transfer training, gait training, Continued evaluation, Spoke to nursing          See flowsheet documentation for full assessment, interventions and recommendations  Outcome: Progressing  Prognosis: Fair  Problem List: Decreased strength, Decreased range of motion, Decreased endurance, Impaired balance, Decreased mobility, Decreased cognition, Decreased safety awareness, Impaired judgement, Pain  Assessment: Pt resting in bed at time of PT treatment  Pt was able to tolerate treatment, however continues to be limited by increased fatigue with all activity  Pt was able to perform bed mobility with less A required compared to previous session, however pt continues to require frequent verbal instruction to redirect attention and for hand placement  Pt was able to sit EOB x 15 min while performing therex and requires verbal instruction for proper form and pacing with all therex  Pt continues to have poor sitting balance and needs frequent VC and TC to correct posterior lean in sitting  Pt was unable to perform sit to stand transfers this session due to decreased sitting tolerance and poor sitting balance  Pt assisted back into bed at conclusion of PT session with all needs within reach  Pt and pts family deny any further questions at this time  PT will continue to follow  DC recommendation when medically cleared is rehab due to decreased functional mobility at current time and increased A needed from caregiver at current time     Barriers to Discharge: Decreased caregiver support, Inaccessible home environment     Recommendation: Short-term skilled PT     PT - OK to Discharge: Yes (to rehab when medically cleared )    See flowsheet documentation for full assessment

## 2018-02-14 NOTE — PROGRESS NOTES
Progress Note - Infectious Disease   Lani Mcclellan 67 y o  female MRN: [id number]  Unit/Bed#: ICU 03 Encounter: [id number]      Impression/Plan:  1   Febrile neutropenia-Consider relation to #2   Etiology of neutropenia unclear as below  Repeat blood cultures negative   Fever has resolved   WBC better with G-CSF  The patient is no longer neutropenic   -monitor off all antibiotic              -follow up repeat blood cultures  -follow temperatures closely  -further workup for pancytopenia as below     2   Postoperative intraabdominal collection - may be due to recent surgery but consider abscess in setting of #3 and recent fever  However the collection seems to be stable since 2/3/2018              -no additional antibiotics needed              -possible removal of drains today              -no plan to sample collection for now  -allowing the infection to clear     3   Peritonitis-secondary to perforation of a gastric ulceration with contamination of the peritoneum with gastric contacts   Status post laparoscopic repair with washout with drains left in place   Consider MDROs and yeast as the patient had been Augmentin at the time of the perforation   Fortunately the patient has now undergone adequate source control   She has completed 1 week of antibiotics postop   The follow-up CT of the abdomen pelvis reveals a collection, but just appears postoperative and is unlikely to be infected  -close surgical follow-up  -drain management  -serial abdominal exams     4   Pancytopenia-Unclear etiology as worsening despite D/C of Imuran and antibiotics   Oncology considering possible lymphoproliferative disorder    The white cell count has recovered with G-CSF   the patient now has a leukocytosis that is likely all related to the G-CSF     -hematology oncology follow-up  -no additional G-CSF  -transfusion as needed  -follow BM biopsy per oncology     5   Diabetes mellitus-type 2 with hyperglycemia     6   Multiple sclerosis-apparently on Imuran for a while     -would hold on Imuran for now     7   Cat bite-left hand   No overt cellulitis at this time  [place] should be well covered by the antibiotics as above   The cat is being watched at home without any behavioral abnormalities   The hand is significantly improved with decreased edema   -serial exams  -continue monitoring of the cat at home  -no additional antibiotics needed     8   Acute encephalopathy - likely toxic metabolic due to infection, electrolyte abnormalities superimposed on MS and chronic/severe SVID as suggested by CT   CT head negative for acute abnormalities   Doubt CNS infection  Her cognition seems better              -follow MS closely              -no additional ID workup      Discussed in detail with Dr Santos Notice  Will sign off for now  Please call if questions    Antibiotics:  None    Subjective:  Patient has no fever, chills, sweats; no nausea, vomiting, diarrhea; no cough, shortness of breath; no pain  No new symptoms  She is feeling much better overall  She states that she is eating a lot, but does not appear much has been removed    Objective:  Vitals:  HR:  [76-88] 86  Resp:  [12-22] 14  BP: (108-178)/(55-84) 168/68  SpO2:  [94 %-100 %] 94 %  Temp (24hrs), Av °F (36 7 °C), Min:97 7 °F (36 5 °C), Max:98 3 °F (36 8 °C)  Current: Temperature: 97 7 °F (36 5 °C)    Physical Exam:   General Appearance:  Alert, interactive, nontoxic, no acute distress  Throat: Oropharynx moist without lesions  Lungs:   Clear to auscultation bilaterally; no wheezes, rhonchi or rales; respirations unlabored   Heart:  RRR; no murmur, rub or gallop   Abdomen:   Soft, non-tender, non-distended, positive bowel sounds  COREY drains in place     Extremities: No clubbing, cyanosis or edema   Skin: No new rashes or lesions  No draining wounds noted         Labs, Imaging, & Other studies:   All pertinent labs and imaging studies were personally reviewed    Results from last 7 days  Lab Units 02/14/18  0549 02/13/18  0612 02/12/18  0442   WBC Thousand/uL 17 14* 11 98* 9 24   HEMOGLOBIN g/dL 10 8* 10 6* 11 1*   PLATELETS Thousands/uL 52* 51* 43*       Results from last 7 days  Lab Units 02/14/18  0505 02/13/18  0612 02/12/18  0442  02/10/18  0431   SODIUM mmol/L 137 137 137  < > 138   POTASSIUM mmol/L 5 2 4 2 4 2  < > 4 1   CHLORIDE mmol/L 108 107 106  < > 107   CO2 mmol/L 24 24 25  < > 25   ANION GAP mmol/L 5 6 6  < > 6   BUN mg/dL 8 9 12  < > 14   CREATININE mg/dL 0 45* 0 53* 0 58*  < > 0 64   EGFR ml/min/1 73sq m 100 95 92  < > 89   GLUCOSE RANDOM mg/dL 154* 192* 189*  < > 137   CALCIUM mg/dL 8 0* 8 2* 8 4  < > 7 9*   AST U/L  --   --   --   --  20   ALT U/L  --   --   --   --  12   ALK PHOS U/L  --   --   --   --  47   TOTAL PROTEIN g/dL  --   --   --   --  3 7*   BILIRUBIN TOTAL mg/dL  --   --   --   --  1 24*   < > = values in this interval not displayed  Results from last 7 days  Lab Units 02/08/18  2154 02/08/18 2004   BLOOD CULTURE  No Growth After 5 Days  No Growth After 5 Days

## 2018-02-14 NOTE — PROGRESS NOTES
Patient's wedding ring and band were taken off and placed in a specimen cup inside of her med box in the ICU, prior to MRI

## 2018-02-14 NOTE — PLAN OF CARE
Problem: OCCUPATIONAL THERAPY ADULT  Goal: Performs self-care activities at highest level of function for planned discharge setting  See evaluation for individualized goals  Treatment Interventions: ADL retraining, Functional transfer training, Endurance training, UE strengthening/ROM, Patient/family training, Compensatory technique education, Activityengagement, Energy conservation, Cognitive reorientation, Equipment evaluation/education          See flowsheet documentation for full assessment, interventions and recommendations  Outcome: Progressing  Limitation: Decreased ADL status, Decreased UE strength, Decreased endurance, Decreased cognition, Decreased Safe judgement during ADL, Decreased self-care trans, Decreased high-level ADLs  Prognosis: Fair, Good  Assessment: Pt seen for skilled OT session focused on grooming, bed mobility, functional transfers and EOB sitting tolerance  Pt MAX assist x 2 supine>sit bed mobility  Pt w/ MOD assist x 2 Fair-/Poor+ sitting balance w/ max sitting tolerance of 15 minutes w/ posterior lean  Pt completed AROM/AAROM exercises seen above to increase strength and endurance for ADLs  Pt w/ MAX assist x 2 sit>supine bed mobility and assist to be repositioned in bed  Pt positioned w/ pillow offloading heels in bed  Pt w/ MAX assist to hold cup to drink for self-feeding  Pt cotinues to be limited 2* pain, impaired balance, impaired activity tolerance, impaired functional reach, impaired cognition  Recommend STR when medically stable  Will continue to follow to address OT goals  Extend goals 10 days  Pt encouraged to complete own self-feeding and grooming       OT Discharge Recommendation: Short Term Rehab  OT - OK to Discharge:  (when medically stable)      Comments: Josué Yi MS, OTR/L

## 2018-02-14 NOTE — PROGRESS NOTES
Progress Note - Neurology   Crystal Monique 67 y o  female MRN: [id number]  Unit/Bed#: ICU 03 Encounter: [id number]    Assessment:  1  Encephalopathy  Acute toxic-metabolic (multifactorial) component improved, but lack of improvement in cognition (executive function, memory, orientation, language, reasoning and judgment) raises possibility that this is chronic, potentially related to white matter disease either from [address] or microangiopathy  2  Left homonymous hemianopsia, left sensory neglect, apraxia consistent with chronic ischemic infarcts identified on head CT (right parietal, right occipital, left parietal, bilateral basal ganglia)  3  Proximal 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  4  History of multiple sclerosis, details unknown except as reported in initial consult  5  Thrombocytopenia  Plan:  1  MRI brain scheduled for later today  Will review as available  2  Continue supportive care  3  Very important to obtain private neurologist records, discussed with staff  4  Minimize sedating medications  5  PT/ OT Re evaluations, speech language evaluation  6  Further comments recommendations after MRI review, and as per attending neurologist    Subjective: Interval chart reviewed  S/P bone marrow biopsy to/13  Unclear if  records have been requested or received from the private neurologist   Waiting readings have not yet been removed, causing delay in scheduling MRI brain    Patient can provide no meaningful history, and is unreliable regarding review of systems        Current Facility-Administered Medications:  acetaminophen 650 mg Oral Q4H PRN Paul Braxton MD   calcium carbonate 500 mg Oral TID PRN Paul Braxton MD   hydrALAZINE 5 mg Intravenous Q6H PRN Paul Braxton MD   insulin glargine 4 Units Subcutaneous QAM Magdalene Delcid DO   insulin lispro 1-5 Units Subcutaneous TID AC Robert W Spatzer, CRNP   insulin lispro 1-5 Units Subcutaneous HS PARRISH Marquez iohexol 50 mL Oral Once in imaging Brenda Chavarria MD   levothyroxine 200 mcg Oral Early Morning Magdalene Delcid,    lisinopril 20 mg Oral Daily Magdalene Delcid DO   methocarbamol 500 mg Oral Q6H PRN Brenda Chavarria MD   morphine injection 2 mg Intravenous Q2H PRN Shen Mukherjee MD   ondansetron 4 mg Intravenous Q4H PRN PARRISH Baron   pantoprazole 40 mg Intravenous Q12H [address] Kusum Layne MD   sucralfate 1,000 mg Oral BID AC Shen Mukherjee MD    Fentanyl and Versed 2 doses were given on 02/13  No recent morphine administration  Antibiotics, Flagyl discontinued 2/13    Vitals: Blood pressure 151/60, pulse 80, temperature (!) 96 9 °F (36 1 °C), temperature source Temporal, resp  rate (!) 11, height 5' 7" (1 702 m), weight 76 1 kg (167 lb 12 3 oz), SpO2 93 %  ,Body mass index is 26 28 kg/m²  POC -211  Physical Exam: (this represents best albeit limited effort despite cuing and encouragement)  Gen:  Lying quietly in bed  Neck supple, although patient cried out during passive range of motion, she then denied neck pain  Edema persists in both distal upper extremities  Rufino's horn toenails b/l  Neuro    MSE awake, more so than 2/9 evaluation  Wandering attention, poor concentration  Social conversation appropriate  Orientation:(-) to place, reason for admission ("epilepsy"), year, date;(+) Allyssa's Day  Requires multiple cues and encouragement to follow one-step commands seems to have receptive/comprehension deficits     Impaired deducted reasoning  Memory:  No recall of year, reason for admission after 3 minutes  Labile mood, varying from irritability to giddiness, frequent juvenile affect    CN conjugate gaze, constant squinting on right  Extraocular movements intact, however in persistent pursuits  Visual field testing reveals left homonymous hemianopsia  + right facial weakness    Tongue midline    Motor: shoulder abduction and flexion greater than antigravity bilaterally, but poorly tolerant of resistance testing  Biceps, triceps, and  full bilaterally  No volitional hip flexion bilaterally; with gravity eliminated, knee extension is full bilaterally  APF/ADFs full bilaterally  + pain in feet/heels with range of motion  Apraxic use right upper extremity    Sens consistently extinguishes on left to double simultaneous stimulation  Correctly identifies her left hand as her own    Coord finger to nose intact on right, widely dystaxic consistent with visual loss on the left      Lab, Imaging and other studies:   CBC:   Results from last 7 days  Lab Units 02/14/18  0549 02/13/18  0612 02/12/18 0442   WBC Thousand/uL 17 14* 11 98* 9 24   RBC Million/uL 3 59* 3 47* 3 65*   HEMOGLOBIN g/dL 10 8* 10 6* 11 1*   HEMATOCRIT % 32 8* 31 5* 32 7*   MCV fL 91 91 90   PLATELETS Thousands/uL 52* 51* 43*   , BMP/CMP:   Results from last 7 days  Lab Units 02/14/18  0505 02/13/18  0612 02/12/18 0442  02/10/18  0431   SODIUM mmol/L 137 137 137  < > 138   POTASSIUM mmol/L 5 2 4 2 4 2  < > 4 1   CHLORIDE mmol/L 108 107 106  < > 107   CO2 mmol/L 24 24 25  < > 25   ANION GAP mmol/L 5 6 6  < > 6   BUN mg/dL 8 9 12  < > 14   CREATININE mg/dL 0 45* 0 53* 0 58*  < > 0 64   GLUCOSE RANDOM mg/dL 154* 192* 189*  < > 137   CALCIUM mg/dL 8 0* 8 2* 8 4  < > 7 9*   AST U/L  --   --   --   --  20   ALT U/L  --   --   --   --  12   ALK PHOS U/L  --   --   --   --  47   TOTAL PROTEIN g/dL  --   --   --   --  3 7*   BILIRUBIN TOTAL mg/dL  --   --   --   --  1 24*   EGFR ml/min/1 73sq m 100 95 92  < > 89   < > = values in this interval not displayed  Leukemia/lymphoma panel pending    02/09 CT head (personally reviewed on PACs): There are extensive periventricular and subcortical foci of white matter low attenuation which is nonspecific and likely related to the patient's history of multiple sclerosis and/or chronic small vessel ischemic changes    There is an area of encephalomalacia involving the right parietal lobe with a few areas of gyriform hyperdensity likely related to cortical laminar necrosis  There is ex vacuo dilatation of the adjacent right lateral ventricular posterior horn  There is low-attenuation noted within the right occipital lobe which is likely the sequela of prior ischemia  No intracranial mass, mass effect or midline shift  No CT signs of acute infarction  There is no parenchymal hemorrhage  There are old bilateral basal ganglia lacunar infarcts  There is a small old posterior left parietal lobe infarct  There is prominence of the ventricles and sulci related to mild volume loss  Counseling / Coordination of Care  Total time spent today 60 minutes  Greater than 50% of total time was spent with the patient and / or family counseling and / or coordination of care   A description of the counseling / coordination of care: n/a

## 2018-02-14 NOTE — OCCUPATIONAL THERAPY NOTE
[address] Progress Note     Patient Name: Olivia Musa  YYVBQ'C Date: 2/14/2018  Problem List  Patient Active Problem List   Diagnosis    Pancytopenia ([facility] )    Type 2 diabetes mellitus ([place] [address] )    Hyperglycemia due to type 2 diabetes mellitus ([place] [address] )    Hypoalbuminemia due to protein-calorie malnutrition (HCC)    Gastric ulcer    Acute perforated gastric ulcer with hemorrhage ([place] [address] )    Peritonitis ([place] [address] )    Acute blood loss anemia    Hypernatremia    Hypothyroidism    Hyperlipidemia    Multiple sclerosis (HCC)    Metabolic encephalopathy    DIC (disseminated intravascular coagulation) ([place] [address] )    Anemia            Subjective:     02/14/18 1535   Restrictions/Precautions   Weight Bearing Precautions Per Order No   Other Precautions Cognitive; Bed Alarm;Multiple lines;Telemetry; Fall Risk   Pain Assessment   Pain Assessment (Simultaneous filing  User may not have seen previous data )   Pain Type Chronic pain;Surgical pain   Pain Location Generalized   Pain Orientation Bilateral  (abdomen)   Hospital Pain Intervention(s) Ambulation/increased activity;Repositioned   Response to Interventions tolerated   ADL   Where Assessed Chair   Eating Assistance 3  Moderate Assistance   Eating Deficit Setup;Supervision/safety; Increased time to complete;Bringing food to mouth assist   Eating Comments assist to hold onto drinks   Grooming Assistance 2  Maximal Assistance   Grooming Deficit Setup;Supervision/safety; Increased time to complete;Wash/dry face   Grooming Comments increased encouragement to participate   Bed Mobility   Rolling R 2  Maximal assistance   Additional items Assist x 2; Increased time required;Verbal cues;LE management; Bedrails   Rolling L 2  Maximal assistance   Additional items Assist x 2; Increased time required;Verbal cues;LE management; Bedrails   Supine to Sit 2  Maximal assistance   Additional items Assist x 2; Increased time required;Verbal cues;LE management; Bedrails;HOB elevated   Sit to Supine 2  Maximal assistance   Additional items Assist x 2; Increased time required;Verbal cues;LE management; Bedrails   Additional Comments pt sat EOB x 15 minutes w/ MOD assist x 2    Transfers   Sit to Stand Unable to assess   Additional Comments not safe to complete   Therapeutic Exercise - ROM   UE-ROM Yes   ROM- Right Upper Extremities   R Elbow AROM;Elbow flexion;Elbow extension   R Weight/Reps/Sets 2 sets x 10 reps   RUE ROM Comment tolerated well   ROM - Left Upper Extremities    L Elbow AROM;Elbow flexion;Elbow extension   L Weight/Reps/Sets 2 sets x 10 reps    LUE ROM Comment tolerated well   Cognition   Overall Cognitive Status Impaired   Arousal/Participation Responsive; Cooperative   Attention Attends with cues to redirect   Orientation Level Oriented to person;Disoriented to time;Disoriented to situation;Oriented to place   Memory Decreased short term memory;Decreased recall of recent events;Decreased recall of precautions   Following Commands Follows one step commands with increased time or repetition   Comments pt w/ impaired insight, impaired safety awareness   Additional Activities   Additional Activities (educated on benefit of OOB positoning)   Additional Activities Comments pt receptive   Activity Tolerance   Activity Tolerance Patient limited by pain; Patient limited by fatigue;Treatment limited secondary to medical complications (Comment)   Medical Staff Made Aware appropriate to see per Randolph TSANG   Assessment   Assessment Pt seen for skilled OT session focused on grooming, bed mobility, functional transfers and EOB sitting tolerance  Pt MAX assist x 2 supine>sit bed mobility  Pt w/ MOD assist x 2 Fair-/Poor+ sitting balance w/ max sitting tolerance of 15 minutes w/ posterior lean  Pt completed AROM/AAROM exercises seen above to increase strength and endurance for ADLs  Pt w/ MAX assist x 2 sit>supine bed mobility and assist to be repositioned in bed   Pt positioned w/ pillow offloading heels in bed  Pt w/ MAX assist to hold cup to drink for self-feeding  Pt cotinues to be limited 2* pain, impaired balance, impaired activity tolerance, impaired functional reach, impaired cognition  Recommend STR when medically stable  Will continue to follow to address OT goals  Extend goals 10 days  Pt encouraged to complete own self-feeding and grooming  Plan   Treatment Interventions ADL retraining;Functional transfer training;UE strengthening/ROM; Endurance training;Patient/family training; Compensatory technique education; Energy conservation; Activityengagement;Equipment evaluation/education   Goal Expiration Date 02/24/18   Treatment Day 2   OT Frequency 3-5x/wk   Recommendation   OT Discharge Recommendation Short Term Rehab   OT - OK to Discharge (when medically stable)   Barthel Index   Feeding 5   Bathing 0   Grooming Score 0   Dressing Score 0   Bladder Score 5   Bowels Score 10   Toilet Use Score 0   Transfers (Bed/Chair) Score 5   Mobility (Level Surface) Score 0   Stairs Score 0   Barthel Index Score 25   Modified Burnett Scale   Modified Nilo Scale 4     Documentation completed by: Leigha Prado MS, OTR/L

## 2018-02-15 LAB
ANION GAP SERPL CALCULATED.3IONS-SCNC: 6 MMOL/L (ref 4–13)
BUN SERPL-MCNC: 8 MG/DL (ref 5–25)
CALCIUM SERPL-MCNC: 8.3 MG/DL (ref 8.3–10.1)
CHLORIDE SERPL-SCNC: 106 MMOL/L (ref 100–108)
CO2 SERPL-SCNC: 27 MMOL/L (ref 21–32)
CREAT SERPL-MCNC: 0.54 MG/DL (ref 0.6–1.3)
ERYTHROCYTE [DISTWIDTH] IN BLOOD BY AUTOMATED COUNT: 14.9 % (ref 11.6–15.1)
GFR SERPL CREATININE-BSD FRML MDRD: 95 ML/MIN/1.73SQ M
GLUCOSE SERPL-MCNC: 196 MG/DL (ref 65–140)
GLUCOSE SERPL-MCNC: 208 MG/DL (ref 65–140)
GLUCOSE SERPL-MCNC: 211 MG/DL (ref 65–140)
GLUCOSE SERPL-MCNC: 251 MG/DL (ref 65–140)
GLUCOSE SERPL-MCNC: 324 MG/DL (ref 65–140)
HCT VFR BLD AUTO: 35.1 % (ref 34.8–46.1)
HGB BLD-MCNC: 11.7 G/DL (ref 11.5–15.4)
MCH RBC QN AUTO: 30.1 PG (ref 26.8–34.3)
MCHC RBC AUTO-ENTMCNC: 33.3 G/DL (ref 31.4–37.4)
MCV RBC AUTO: 90 FL (ref 82–98)
PLATELET # BLD AUTO: 63 THOUSANDS/UL (ref 149–390)
PMV BLD AUTO: 11.2 FL (ref 8.9–12.7)
POTASSIUM SERPL-SCNC: 3.7 MMOL/L (ref 3.5–5.3)
RBC # BLD AUTO: 3.89 MILLION/UL (ref 3.81–5.12)
SCAN RESULT: NORMAL
SODIUM SERPL-SCNC: 139 MMOL/L (ref 136–145)
WBC # BLD AUTO: 15.11 THOUSAND/UL (ref 4.31–10.16)

## 2018-02-15 PROCEDURE — G8988 SELF CARE GOAL STATUS: HCPCS

## 2018-02-15 PROCEDURE — G8978 MOBILITY CURRENT STATUS: HCPCS

## 2018-02-15 PROCEDURE — G8987 SELF CARE CURRENT STATUS: HCPCS

## 2018-02-15 PROCEDURE — 97164 PT RE-EVAL EST PLAN CARE: CPT

## 2018-02-15 PROCEDURE — 97110 THERAPEUTIC EXERCISES: CPT

## 2018-02-15 PROCEDURE — 99232 SBSQ HOSP IP/OBS MODERATE 35: CPT | Performed by: INTERNAL MEDICINE

## 2018-02-15 PROCEDURE — 85027 COMPLETE CBC AUTOMATED: CPT | Performed by: INTERNAL MEDICINE

## 2018-02-15 PROCEDURE — C9113 INJ PANTOPRAZOLE SODIUM, VIA: HCPCS | Performed by: SURGERY

## 2018-02-15 PROCEDURE — 80048 BASIC METABOLIC PNL TOTAL CA: CPT | Performed by: INTERNAL MEDICINE

## 2018-02-15 PROCEDURE — 97168 OT RE-EVAL EST PLAN CARE: CPT

## 2018-02-15 PROCEDURE — 97530 THERAPEUTIC ACTIVITIES: CPT

## 2018-02-15 PROCEDURE — 82948 REAGENT STRIP/BLOOD GLUCOSE: CPT

## 2018-02-15 PROCEDURE — G8979 MOBILITY GOAL STATUS: HCPCS

## 2018-02-15 RX ORDER — ATORVASTATIN CALCIUM 40 MG/1
40 TABLET, FILM COATED ORAL
Status: DISCONTINUED | OUTPATIENT
Start: 2018-02-15 | End: 2018-02-26 | Stop reason: HOSPADM

## 2018-02-15 RX ORDER — INSULIN GLARGINE 100 [IU]/ML
6 INJECTION, SOLUTION SUBCUTANEOUS EVERY MORNING
Status: DISCONTINUED | OUTPATIENT
Start: 2018-02-16 | End: 2018-02-16

## 2018-02-15 RX ORDER — PANTOPRAZOLE SODIUM 40 MG/1
40 TABLET, DELAYED RELEASE ORAL
Status: DISCONTINUED | OUTPATIENT
Start: 2018-02-15 | End: 2018-02-16

## 2018-02-15 RX ORDER — LISINOPRIL 20 MG/1
40 TABLET ORAL DAILY
Status: DISCONTINUED | OUTPATIENT
Start: 2018-02-16 | End: 2018-02-26 | Stop reason: HOSPADM

## 2018-02-15 RX ADMIN — INSULIN LISPRO 2 UNITS: 100 INJECTION, SOLUTION INTRAVENOUS; SUBCUTANEOUS at 22:30

## 2018-02-15 RX ADMIN — INSULIN LISPRO 1 UNITS: 100 INJECTION, SOLUTION INTRAVENOUS; SUBCUTANEOUS at 07:37

## 2018-02-15 RX ADMIN — LEVOTHYROXINE SODIUM 200 MCG: 100 TABLET ORAL at 05:04

## 2018-02-15 RX ADMIN — ATORVASTATIN CALCIUM 40 MG: 40 TABLET, FILM COATED ORAL at 16:47

## 2018-02-15 RX ADMIN — INSULIN GLARGINE 4 UNITS: 100 INJECTION, SOLUTION SUBCUTANEOUS at 08:21

## 2018-02-15 RX ADMIN — SUCRALFATE 1000 MG: 1 SUSPENSION ORAL at 16:47

## 2018-02-15 RX ADMIN — INSULIN LISPRO 3 UNITS: 100 INJECTION, SOLUTION INTRAVENOUS; SUBCUTANEOUS at 16:47

## 2018-02-15 RX ADMIN — ACETAMINOPHEN 650 MG: 325 TABLET, FILM COATED ORAL at 08:21

## 2018-02-15 RX ADMIN — LISINOPRIL 20 MG: 20 TABLET ORAL at 08:21

## 2018-02-15 RX ADMIN — INSULIN LISPRO 2 UNITS: 100 INJECTION, SOLUTION INTRAVENOUS; SUBCUTANEOUS at 12:09

## 2018-02-15 RX ADMIN — PANTOPRAZOLE SODIUM 40 MG: 40 INJECTION, POWDER, FOR SOLUTION INTRAVENOUS at 08:21

## 2018-02-15 RX ADMIN — SUCRALFATE 1000 MG: 1 SUSPENSION ORAL at 07:37

## 2018-02-15 RX ADMIN — PANTOPRAZOLE SODIUM 40 MG: 40 TABLET, DELAYED RELEASE ORAL at 16:47

## 2018-02-15 NOTE — PROGRESS NOTES
[address] Internal Medicine Progress Note  Patient: Mamta Benitez 67 y o  female   MRN: [id number]  PCP: Seven Green, DO  Unit/Bed#: ICU 03 Encounter: [id number]  Date Of Visit: 02/15/18      Assessment/plan  Principal problem  1-sepsis:  Present on admission:  secondary to peritonitis due to a perforated gastric ulcer:  The patient underwent surgical repair   She completed 7 days of antibiotics   Her sepsis had resolved  However pt spiked a fever of 102 on 2/8, she was also noted to be leukopenic and borderline neutropenic with an [address] of 700  she was started by ID on cefepime and Flagyl  CT abdomen/pelvis without evidence of focal infection   Had fluid collection the lesser curvature, however has been stable since previous CAT scan of February 2nd   There was discussion about IR to drain this fluid  It is recommended to monitor pt since collection has been stable  ID d/jesus antibiotics to see if infection declares itself  If pt does have a fever will need to rediscuss with IR about drainage of fluid collection               -appreciate surgery re eval  Possible removal of amilcar drains soon     2-perforated gastric ulcer:  she was orginally in the ICU and then transferred to surgery service after silvia patch repair  She was then switched to internal medicine service as she is pod 15  S/p EGD by GI showed 2 small healing duodenal bulb ulcers and a large gastric ulcer  She is to continue ppi bid and carafate  Again please see principal problem number 1 about AMILCAR drain  She will need repeat egd in 2 months     3  Acute/chronic blood loss anemia and pancytopenia- pt had acute blood loss due to ulcer  She has chronic anemia due to pancytopenia  She was transfused approx 7 units of prbc  No further bleeding ulcer  Please see principal problem number 2  Will continue to monitor h/h which has been stable    4  Acute cva- unfortunately can not anticoagulate due to thrombocytopenia  Will continue statin   Appreciate neurology recommendations       Active problems  1  -diabetes type 2:  continue to hold po antidiabetic medications  Continue insulin sliding scale  Will increase lantus to 8 units and monitor       2-hypernatremia:resolved     3-pancytopenia: appreciate hematology recommendations  S/p granix  S/p bone marrow biopsy  Await results  Thrombocytopenia is stable       4-hypothyroidism:  Continue Synthroid     5-hyperlipidemia:  Statin on hold     6-multiple sclerosis:  Imuran on hold  appreciate Neurology consultation and recommendations  Unfortunately unable to restart imuran due to pancytopenia  No steroids due to recent infection and steroids  Also pt will likely not respond to steroids as she was on these chronically outpt  Likely slow recovery  Neurology awaiting records of type of ms       7-left hand cat bite:  No evidence of cellulitis   Status post 7 days of antibiotics      8-acute metabolic encephalopathy: Most likely multifactorial, secondary to sedating medications, acute cva, ms, likely underlying dementia, possible hypoxic injury from anemia  Pt improved today  Will limit sedating medications                    9-intermittent elevated blood presure:  Without prior h/o HTN   pts sbp is 120-160  Continue lisinopril       10 left upper extremity DVT:  Secondary to PICC line   Have removed PICC line   Unfortunately patient may not be on anticoagulation due to pancytopenia     11-decubitus ulcers:  Appreciate wound Care team's evaluation recommendations  12- deconditioning- 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  Pt will need STR  May be difficult to get pt to participate in physical therapy     dispo- Pt will need str  Spoke with son  Discussed that pt is improving in terms of her surgery, and her pancytopenia  Worry that her recovery will be very long  Worry that may not be able to restart medications for MS  Worry about another stroke as can not use antiplatelets or ac   Worry that pt may not return to her previous baseline and may be long term NH dependent  Asked son if he would be agreeable to speak with palliative care in order to provide guidance  That palliative care could continue to follow her outpt to help if needed make the transition to comfort care if pt was not progressing  Okay to transfer out of stepdown       Subjective:   Pt seen and examined  Pt is still confused but knew she was in a facility today  She could not tell me where  She still had difficulty with right side but was able to move it the more attention that was brought to the right side  She is still unable to feed herself  She is still having high outpt of amilcar drain on left  No other problems over night  Objective:     Vitals: Blood pressure (!) 174/74, pulse 84, temperature 97 6 °F (36 4 °C), resp  rate 13, height 5' 7" (1 702 m), weight 76 8 kg (169 lb 5 oz), SpO2 96 %  ,Body mass index is 26 52 kg/m²  Lab, Imaging and other studies:    Results from last 7 days  Lab Units 02/15/18  0455  02/12/18  0442   WBC Thousand/uL 15 11*  < > 9 24   HEMOGLOBIN g/dL 11 7  < > 11 1*   HEMATOCRIT % 35 1  < > 32 7*   PLATELETS Thousands/uL 63*  < > 43*   INR   --   --  1 05   < > = values in this interval not displayed  Results from last 7 days  Lab Units 02/15/18  0455  02/10/18  0431   SODIUM mmol/L 139  < > 138   POTASSIUM mmol/L 3 7  < > 4 1   CHLORIDE mmol/L 106  < > 107   CO2 mmol/L 27  < > 25   BUN mg/dL 8  < > 14   CREATININE mg/dL 0 54*  < > 0 64   CALCIUM mg/dL 8 3  < > 7 9*   TOTAL PROTEIN g/dL  --   --  3 7*   BILIRUBIN TOTAL mg/dL  --   --  1 24*   ALK PHOS U/L  --   --  47   ALT U/L  --   --  12   AST U/L  --   --  20   GLUCOSE RANDOM mg/dL 208*  < > 137   < > = values in this interval not displayed  Lab Results   Component Value Date    BLOODCX No Growth After 5 Days  02/08/2018    BLOODCX No Growth After 5 Days  02/08/2018    BLOODCX No Growth After 5 Days  01/31/2018    BLOODCX No Growth After 5 Days   01/31/2018 Scheduled Meds:   Current Facility-Administered Medications:  acetaminophen 650 mg Oral Q4H PRN Curt Martin MD   atorvastatin 40 mg Oral Daily With Yesy-Avery, DO   calcium carbonate 500 mg Oral TID PRN Curt Martin MD   hydrALAZINE 5 mg Intravenous Q6H PRN Curt Martin MD   insulin glargine 4 Units Subcutaneous QAM Magdalene Ambwellington, DO   insulin lispro 1-5 Units Subcutaneous TID AC Robert W Spatzer, CRNP   insulin lispro 1-5 Units Subcutaneous HS Leoneletta Gimenez Spatzer, CRNP   iohexol 50 mL Oral Once in imaging Curt Martin MD   levothyroxine 200 mcg Oral Early Morning Magdalene Ambron, DO   lisinopril 20 mg Oral Daily Magdalene Ambron, DO   methocarbamol 500 mg Oral Q6H PRN Curt Martin MD   morphine injection 2 mg Intravenous Q2H PRN Dayan Albert MD   ondansetron 4 mg Intravenous Q4H PRN PARRISH Reyes   pantoprazole 40 mg Intravenous Q12H [address] Jacobsen MD   sucralfate 1,000 mg Oral BID AC Dayan Albert MD     Continuous Infusions:    PRN Meds:   acetaminophen    calcium carbonate    hydrALAZINE    iohexol    methocarbamol    morphine injection    ondansetron      Physical exam:  Physical Exam  General appearance: awake, oriented x2 person, place  Head: Normocephalic, without obvious abnormality, atraumatic  Eyes: perrla  Neck: no adenopathy, no carotid bruit, no JVD, supple, symmetrical, trachea midline and thyroid not enlarged, symmetric, no tenderness/mass/nodules  Lungs: clear to auscultation bilaterally  Heart: regular rate and rhythm, S1, S2 normal, no murmur, click, rub or gallop  Abdomen: soft, non-tender; bowel sounds normal; no masses,  no organomegaly  Extremities: edema +1 edema in all  Pulses: 2+ and symmetric  Skin: Skin color, texture, turgor normal  No rashes or lesions  Neurologic: Mental status: awake alert oriented x2 person, place  difficult to get pt to follow commands         VTE Pharmacologic Prophylaxis: Reason for no pharmacologic prophylaxis thrombocytopenia  VTE Mechanical Prophylaxis: sequential compression device    Counseling / Coordination of Care  Total floor / unit time spent today 20 minutes      Current Length of Stay: 18 day(s)    Current Patient Status: Inpatient       Code Status: Level 3 - DNAR and DNI

## 2018-02-15 NOTE — OCCUPATIONAL THERAPY NOTE
[address]  Evaluation     Patient Name: Crystal BECKWITHZ'S Date: 2/15/2018  Problem List  Patient Active Problem List   Diagnosis    Pancytopenia ([facility] )    Type 2 diabetes mellitus ([facility] )    Hyperglycemia due to type 2 diabetes mellitus ([facility] )    Hypoalbuminemia due to protein-calorie malnutrition (HCC)    Gastric ulcer    Acute perforated gastric ulcer with hemorrhage ([facility] )    Peritonitis (HCC)    Acute blood loss anemia    Hypernatremia    Hypothyroidism    Hyperlipidemia    Multiple sclerosis (HCC)    Metabolic encephalopathy    DIC (disseminated intravascular coagulation) ([facility] )    Anemia     Past Medical History  Past Medical History:   Diagnosis Date    Diabetes mellitus ([facility] )     Disease of thyroid gland     Hyperlipidemia     Multiple sclerosis (HCC)      Past Surgical History  Past Surgical History:   Procedure Laterality Date    ESOPHAGOGASTRODUODENOSCOPY N/A 2/12/2018    Procedure: ESOPHAGOGASTRODUODENOSCOPY (EGD); Surgeon: Tania Norris MD;  Location: [facility]; Service: Gastroenterology    GA LAP,DIAGNOSTIC ABDOMEN N/A 1/29/2018    Procedure: LAPAROSCOPY DIAGNOSTIC, with repair of perforated gastric ulcer;  Surgeon: Lauri Wilson MD;  Location: [facility] OR;  Service: General           02/15/18 1126   Note Type   Note type Re-eval;Progress   Restrictions/Precautions   Weight Bearing Precautions Per Order No   Other Precautions Cognitive; Bed Alarm; Fall Risk;Pain;Multiple lines;Telemetry   Pain Assessment   Pain Assessment No/denies pain   Pain Score No Pain   Home Living   Type of Home House   Home Layout Two level; Able to live on main level with bedroom/bathroom;1/2 bath on main level  (1st floor setup)   Bathroom Shower/Tub (sponge bathes)   Bathroom Toilet Standard   Bathroom Equipment Commode   [facility]; Wheelchair-manual   Additional Comments pt reports (-) alone   Prior Function   Level of [place] Independent with ADLs and functional mobility  (assist LB ADLs)   Lives With Spouse   Receives Help From Family   ADL Assistance Independent  (spouse assists LB ADLs)   IADLs Independent   Falls in the last 6 months 0   Vocational Retired   Comments pt reports mainly uses w/c for mobility   Lifestyle   Autonomy per pt independent w/ ADLs, IADLs, SPT to w/c w/ RW and w/c mobility   Reciprocal Relationships spouse   Service to Others retried    Intrinsic Gratification watching tv   ADL   Where Assessed Chair   Eating Assistance 3  Moderate Assistance   Grooming Assistance 3  Moderate Assistance   UB Bathing Assistance 2  Maximal Assistance   LB Bathing Assistance 2  Maximal Assistance   UB Dressing Assistance 2  Maximal Assistance   LB Dressing Assistance 2  Maximal [address]  1  Total Assistance   Bed Mobility   Rolling R 2  Maximal assistance   Additional items Assist x 2; Increased time required;Verbal cues;LE management; Bedrails;HOB elevated   Rolling L 2  Maximal assistance   Additional items Assist x 2; Increased time required;Verbal cues;LE management; Bedrails   Additional Comments pt w/ increased fatigue was oob w/ NSG via jong lift for 3 hrs in am   Transfers   Sit to Stand Unable to assess   Additional Comments not appropriate to complete   Activity Tolerance   Activity Tolerance Patient limited by fatigue;Patient limited by pain   Nurse Made Aware appropriate to see per RNRoyal Matam[place]   RUE Assessment   RUE Assessment X  (limited shoulder flexion, distal 3-/5)   LUE Assessment   LUE Assessment (limited shoulder flexion, distal 3-/5)   Hand Function   Gross Motor Coordination Functional   Fine Motor Coordination Functional   Sensation   Light Touch Partial deficits in the RUE;Partial deficits in the LUE;Partial deficits in the RLE;Partial deficits in the LLE  (unable to fully assess 2* cognition)   Vision-Basic Assessment   Current Vision Wears glasses only for reading   Vision - Complex Assessment   Ocular Range of Motion (unable to fully assess, 2* impaired ability to follow comman)   Additional Comments pt w/ impaired accuracy during visual tasks   Perception   Inattention/Neglect Cues to attend to left side of body   Spatial Orientation impaired   Cognition   Overall Cognitive Status Impaired   Arousal/Participation Responsive;Lethargic   Attention Difficulty attending to directions   Orientation Level Oriented to person;Disoriented to place; Disoriented to time;Disoriented to situation   Memory Decreased short term memory;Decreased recall of recent events;Decreased recall of precautions   Following Commands Follows one step commands with increased time or repetition   Comments pt w/ impaired orientation, impaired STM, decreased inisght and safety awareness, impaired problem solving   Assessment   Limitation Decreased UE ROM; Decreased Safe judgement during ADL;Decreased UE strength;Decreased ADL status; Decreased endurance;Decreased cognition;Decreased self-care trans;Decreased high-level ADLs; Decreased sensation   Prognosis Fair   Assessment Pt seen for OT re-evaluation due to new acute foci of ischemia involving R PCA, posterior R temporal and occipital lobes  Pt supine in bed at start of session  RN reported pt was hoyered OOB to chair for 3 hrs earlier in am  OT completed visual assessment w/ pt, which was difficult to complete due to impaired cognition  Pt w/ impaired b/l UE shoulder flexion  Pt w/ R UE w/ ataxia and impaired coordination  Pt w/ difficulty finger to nose opposition and dysdiadochokinesia on R UE  Pt w/ MAX assist x 2 for rolling in bed w/ bed rails  Pt w/ increased fatigue and decreased endurance unable to complete supine<>sit bed mobility  Pt w/ decreased orientation, only oriented to person   Pt continues to be limited due to impaired cognition, decreased endurance, impaired balance, impaired activity tolerance, impaired insight, impaired strength and endurance, impaired R/L discrimination, impaired body scheme  Goals   Patient Goals none expressed   LTG Time Frame 10-14   Long Term Goal please see below goals   Plan   Treatment Interventions ADL retraining;Functional transfer training;Visual perceptual retraining;UE strengthening/ROM; Endurance training;Cognitive reorientation;Patient/family training;Equipment evaluation/education; Compensatory technique education; Energy conservation; Activityengagement; Neuromuscular reeducation   Goal Expiration Date 02/25/18   Treatment Day 3   OT Frequency 3-5x/wk   Additional Treatment Session   Start Time 1116   End Time 1126   Treatment Assessment Pt seen for skilled OT session focused on b/l UE exercises and repositioning in bed  Pt completed AROM shoulder flexion b/l UEs at 2 sets x 10 reps and elbow flexion/extension at 2 sets x 10 reps  Pt required AAROM for forearm pronation/supination at 2 sets x 10 reps  Pt repositioned offloading L side and pillows positioned under b/l UEs and offloading heels  Pt w/ mod assist to wash face and increased time to complete and VCs to complete throughoughly      Additional Treatment Day 3   Recommendation   OT Discharge Recommendation Short Term Rehab   Barthel Index   Feeding 5   Bathing 0   Grooming Score 0   Dressing Score 0   Bladder Score 5   Bowels Score 10   Toilet Use Score 0   Transfers (Bed/Chair) Score 5   Mobility (Level Surface) Score 0   Stairs Score 0   Barthel Index Score 25   Modified [place] Scale   Modified [place] Scale 4     Occupational Therapy Goals to be met in 10-14 days:  1) Pt will improve activity tolerance to G for min 30 min txment sessions  2) Pt will complete ADLs/self care w/ setup and LB ADLS w/ MIN assist  3) Pt will complete toileting w/ supervision w/ G hygiene/thoroughness using DME PRN  4) Pt will improve functional transfers on/off all surfaces using DME PRN w/ G balance/safety including toileting w/ mod assist  5) Pt will engage in ongoing cognitive assessment w/ G participation to A w/ safe d/c planning/recommendations  6) Pt will demonstrate G carryover of pt/caregiver education and training as appropriate w/ mod I  w/ G tolerance  7) Pt will engage in depression screen/leisure interest checklist w/ G participation to monitor s/s depression and ID 3 positive coping strategies to A w/ emotional regulation and management  8) Pt will demonstrate 100% carryover of E C  techniques w/ mod I t/o fx'l I/ADL/leisure tasks w/o cues s/p skilled education  9) Pt will tolerate bed mobility and EOB seated tasks w/ mod A for 30 mins to engage in fx'l I/ADL/leisure tasks w/ min A w/ min cues  10) Pt will demonstrate 100% carryover of LHAE for LB ADLs/self care and leisure s/p skilled education w/ mod I and G participation  11) Pt will follow 100% simple 1 step commands and be A&Ox 4 t/o OT sessions  12) Pt will engage in ongoing  assessments, screens, and activities t/o functional tasks w/ good participation to assist w/ adaptation and accommodations or rule out visual perceptual impairments      Documentation completed by: Joy Shipley MS, OTR/L

## 2018-02-15 NOTE — PLAN OF CARE
Problem: PHYSICAL THERAPY ADULT  Goal: Performs mobility at highest level of function for planned discharge setting  See evaluation for individualized goals  Treatment/Interventions: LE strengthening/ROM, Therapeutic exercise, Endurance training, Patient/family training, Bed mobility, Continued evaluation, Spoke to nursing          See flowsheet documentation for full assessment, interventions and recommendations  Outcome: Progressing  Prognosis: Fair  Problem List: Decreased strength, Decreased range of motion, Decreased endurance, Impaired balance, Decreased mobility, Decreased safety awareness, Impaired judgement, Decreased cognition, Pain  Assessment: Pt initially presented at [facility] with peritonitis s/p diagnostic lap or abdomen which was performed on 1/29/2018  Pt initially presented with a Hgb of 3 2  Pt currently being followed by PT  A reevaluation was performed due to a recent MRI showing "Acute foci of ischemia involving the right PCA territory, including the posterior right temporal and occipital lobes"  Functional mobility was reassessed and mobility goals were reestablished  Pt presented with increased confusion this session and increased difficulty following directions  Pt was able to perform bed mobility with Max 2, however needs frequent VC to redirect attention  Transfers were unable to be performed this session secondary to increased fatigue and poor command following  apraxia noted during reeval as well as sensory neglect, increased confusion during reevaluation  Pt was assisted back into supine position at conclusion of PT session with all needs within reach  Pt denies any further questions at this time  PT will continue to follow during hospital stay  DC recommendation when medically cleared rehab due to decreased functional mobility at current time  PT treatment to follow to perform LE therex to address strength deficits noted during PT reevaluaion    Barriers to Discharge: Decreased caregiver support, Inaccessible home environment     Recommendation: Short-term skilled PT     PT - OK to Discharge: Yes (to rehab when medically cleared )    See flowsheet documentation for full assessment

## 2018-02-15 NOTE — PROGRESS NOTES
Progress Note - Neurology   Beni Bartholomew 67 y o  female MRN: [id number]  Unit/Bed#: ICU 03 Encounter: [id number]    Assessment:  1  Acute toxic metabolic encephalopathy improved, likely resolved  2   Acute right occipital and temporal lobe infarcts, appear end arterial/embolic  Unclear etiology at this time, consider transient hypercoagulability or atrial fibrillation in the setting of infection/volume depletion and severe blood loss anemia/DIC or other  3  Chronic bilateral parietal (posterior circulation) and left frontal infarcts( anterior circulation) suggesting embolic etiology  4  Extensive white matter disease but without of burden of lacunar infarcts  May be related to demyelinating disease, i e  multiple sclerosis as per history, or other chronic leukoencephalopathy  Does not fit typical profile associated with MELAS or CADASIL  5  Neuro-Cognitive deficits include fluent aphasia, apraxia, agnosia, right left confusion, sensory extinction on left, motor neglect on left, left homonymous hemianopsia, possible right inferior quadrant anopsia, inattention, memory impairment, mild emotional lability  Several of these are referable to the acute and chronic areas of stroke, others may represent subcortical dementia or cognitive impairment due to the white matter disease/MS, now with decompensation due to multiple recent insults (hypoglycemia, severe anemia, infection, geographically locations, other)  6  No evidence of central pontine myelinolysis on MRI  7  Pancytopenia, with persistent thrombocytopenia  Likely contribution from longstanding Imuran treatment  Risks of initiating anticoagulation or antiplatelet therapy likely outweigh benefits at this time  Given the cortical location and appearance of strokes is embolic, would favor anticoagulation for stroke prevention, but additional studies are pending  8  Further comments and recommendations per the attending neurologist  Plan:  1    CTA head and neck  2  Fasting lipid panel with direct LDL  3  Factor 5 Leiden, prothrombin gene mutation  Will defer remainder of hypercoagulable panel, inflammatory labs as well as rheumatology labs for several weeks, as results will be unreliable due to recent transfusions/infection  4  Physical therapy:  Cervical mobility  5  Consult Cardiology for inpatient RONALDO (if safe) and loop implantation (inpatient or outpatient)  6  Consult physiatry to oversee rehabilitation efforts  7  Consult Podiatry for albino's horn nails (will impaired gait and stance)  8  Telemetry to be maintained until discharge  9  Will need opinion from GI if/when anticoagulation or anti-platelet therapy could be started  10  Deferred to Primary service for management of diabetes and blood pressure  Would like to see titer blood glucose control      Subjective:   Blood glucoses progressively more elevated  Therapy notes reviewed, deficits noted  No events documented since last evaluation  Denies pain, headache, dizziness  Denies chest pain or palpitations  Reports (questionable reliability) negative history for clotting disorders, or similar symptoms  I reviewed MRI brain with ETHAN Oliveros MD     Records from private neurologist for year  2017 are received and reviewed, of limited value as they do not describe earlier course of disease, and why Imuran was treatment choice, duration of treatment, etc  Throughout that year she was stable, patient is doing all right  No signs of relapse    Some ataxia was persistent  In 12/17 patient is not too good  Left leg is back  The knee has been hurting  Lots of arthritis in tendinitis    At that time she received to left knee injections and 6 trigger point muscle injections  Medications included Imuran 50 mg, ibuprofen, and Tylenol No 3   3/2016  MRI brain report, see below        Current Facility-Administered Medications:  acetaminophen 650 mg Oral Q4H PRN Ellie Edge MD   atorvastatin 40 mg Oral Daily With Anderson-Avery, DO   calcium carbonate 500 mg Oral TID PRN Genna Peña MD   hydrALAZINE 5 mg Intravenous Q6H PRN Genna Peña MD   [START ON 2/16/2018] insulin glargine 6 Units Subcutaneous QAM Magdalene Delcid DO   insulin lispro 1-5 Units Subcutaneous TID AC Robert W Spatzer, CRNP   insulin lispro 1-5 Units Subcutaneous HS Ally Marie Spatzer, CRNP   iohexol 50 mL Oral Once in imaging Genna Peña MD   levothyroxine 200 mcg Oral Early Morning Magdalene Delcid DO   [START ON 2/16/2018] lisinopril 40 mg Oral Daily Magdalene Delcid DO   methocarbamol 500 mg Oral Q6H PRN Genna Peña MD   morphine injection 2 mg Intravenous Q2H PRN Rola Sanchez MD   ondansetron 4 mg Intravenous Q4H PRN PARRISH Peñaloza   pantoprazole 40 mg Oral BID AC Magdalene Delcid,    sucralfate 1,000 mg Oral BID AC Rola aSnchez MD       Vitals: Blood pressure 164/82, pulse 86, temperature 98 1 °F (36 7 °C), resp  rate 15, height 5' 7" (1 702 m), weight 76 8 kg (169 lb 5 oz), SpO2 95 %  ,Body mass index is 26 52 kg/m²  POC -324  Telemetry:  Sinus rhythm, PACs, PVCs d/c'd on transfer)  Physical Exam:   Gen:  Lying in bed, no distress, non diaphoretic  Head rotated to right, complains of midline posterior pain with passive range of motion to midline or to left of midline  Neuro    MSE awake, wandering attention/significant inattention to exam   Intermittent mild confabulation  Orientation: -place (rehab),-year/month (2002), + recall of [place]  Reason for admission:  "I had a seizure "  Denies history of prior seizures  Impaired deducted reasoning  No recall of place/year after 3 minutes  Repeats okay, but fluent aphasia is noted in spontaneous speech     No dysarthria  Reading:  Left Field cut evident, likely has inferior quadrant anopsia on right as well  Object identification:  09% from right side of picture board, no finger agnosia, no anosagnosia    Unable to attend adequately to the cookie jar picture (ie "woman doing laundry" corrected to "woman washing dishes," but patient is inattentive to over flowing sink; "children are fighting  + simultanagnosia" Too inattentive to attempt calculations  + left/right confusion  + motor apraxia    CN conjugate gaze with gaze crossing midline to left only with cues, left field cut redemonstrated,+/-right inferior quadrant anopsia  Mild right facial weakness  Tongue midline  Light touch preserved in both sides of face    Motor impersistence, decreased effort I can't "  Upper extremities fairly symmetric, improved shoulder strength bilaterally 3-4/5; hip flexors 3-4/5(R) 3" off bed, 2-3-/5(L) 1" off bed  + L neglect  Sens pinprick/light touch preserved throughout, but persistent extinction to double simultaneous stimulation on the left  Coord ataxic/impaired visual spatial perception right finger-to-nose, apraxic left finger to nose/hand    Apraxic right hand when asked to touch top of head     Planters:  Downgoing bilaterally            Lab, Imaging and other studies:   CBC:   Results from last 7 days  Lab Units 02/15/18  0455 02/14/18  0549 02/13/18  0612   WBC Thousand/uL 15 11* 17 14* 11 98*   RBC Million/uL 3 89 3 59* 3 47*   HEMOGLOBIN g/dL 11 7 10 8* 10 6*   HEMATOCRIT % 35 1 32 8* 31 5*   MCV fL 90 91 91   PLATELETS Thousands/uL 63* 52* 51*   , BMP/CMP:   Results from last 7 days  Lab Units 02/15/18  0455 02/14/18  0505 02/13/18  0612  02/10/18  0431   SODIUM mmol/L 139 137 137  < > 138   POTASSIUM mmol/L 3 7 5 2 4 2  < > 4 1   CHLORIDE mmol/L 106 108 107  < > 107   CO2 mmol/L 27 24 24  < > 25   ANION GAP mmol/L 6 5 6  < > 6   BUN mg/dL 8 8 9  < > 14   CREATININE mg/dL 0 54* 0 45* 0 53*  < > 0 64   GLUCOSE RANDOM mg/dL 208* 154* 192*  < > 137   CALCIUM mg/dL 8 3 8 0* 8 2*  < > 7 9*   AST U/L  --   --   --   --  20   ALT U/L  --   --   --   --  12   ALK PHOS U/L  --   --   --   --  47   TOTAL PROTEIN g/dL  --   --   --   -- 3 7*   BILIRUBIN TOTAL mg/dL  --   --   --   --  1 24*   EGFR ml/min/1 73sq m 95 100 95  < > 89   < > = values in this interval not displayed  Flow cytometry:  No evidence for increased blast population  However CD56 is apparently Co expressed in the subset of the maturing myeloid population  This finding is apparent and, while nonspecific, can be associated with mild dysplasia  There is no evidence for lymphoproliferative disorder or plasma cell neoplasm    MRI brain without: There is an area of restricted diffusion involving the posterior right occipital lobe compatible with acute ischemia  Another small focus of restricted diffusion is noted at the posterior right temporal lobe  No acute intracranial hemorrhage  There are extensive periventricular and subcortical foci of white matter T2 hyperintensity which is nonspecific and most likely related to chronic small vessel ischemic changes  There is a focus of encephalomalacia involving the right parietal lobe with gyriform T1 hyperintensity compatible with cortical laminar necrosis from a chronic infarct  There is a small old posterior left parietal lobe infarct  There is no discrete mass, mass effect or midline shift  The ventricles are normal in size and contour  03/2016 MRI brain ([address], interpreted by her private neurologist): This is an abnormal MRI of the brain  There is presence of again multiple nodular in confluent hyperintensities in the periventricular and subcortical region especially around the corpus callosum area  It may be slightly more prominent on the right side  These are suggestive of demyelinating disease like MS  The differential diagnosis should include demyelination, vasculitis, degeneration, etc   In addition, there is enlargement of the lateral ventricles and especially the occipital horn of the right lateral ventricle is somewhat larger than the left 1 along with  significant cerebral atrophy noted  There is no postcontrast enhancement indicating probably all of the lesions may be old and not any one of them is a new one "    Echocardiogram:  LEFT VENTRICLE:Systolic function was normal  Ejection fraction was estimated in the range of 55 % to 60 %  There were no regional wall motion abnormalities  Wall thickness was moderately to markedly increased  There was mild assymetrical hypertrophy of the septum  LEFT ATRIUM: The atrium was mildly dilated  MITRAL VALVE: There was mild regurgitation  AORTIC VALVE: There was moderate regurgitation  TRICUSPID VALVE: There was mild regurgitation  PULMONIC VALVE:There was mild regurgitation  Counseling / Coordination of Care  Total time spent today 95 minutes  Greater than 50% of total time was spent with the patient and / or family counseling and / or coordination of care   A description of the counseling / coordination of care: Extensive review with stroke director regarding MRI findings and ongoing stroke workup

## 2018-02-15 NOTE — SPEECH THERAPY NOTE
Speech Language/Pathology  Speech/Language Pathology  Assessment    Patient Name: Reagan Narayan  CNADL'G Date: 2/15/2018     Problem List  Patient Active Problem List   Diagnosis    [facility]    Type 2 diabetes mellitus ([facility]    Hyperglycemia due to type 2 diabetes mellitus ([facility]    Hypoalbuminemia due to protein-calorie malnutrition (HCC)    Gastric ulcer    Acute perforated gastric ulcer with hemorrhage ([facility]    Peritonitis (HCC)    Acute blood loss anemia    Hypernatremia    Hypothyroidism    Hyperlipidemia    Multiple sclerosis (HCC)    Metabolic encephalopathy    DIC (disseminated intravascular coagulation) ([facility] )    Anemia     Past Medical History  Past Medical History:   Diagnosis Date    Diabetes mellitus ([facility]     Disease of thyroid gland     Hyperlipidemia     Multiple sclerosis (HCC)      Past Surgical History  Past Surgical History:   Procedure Laterality Date    ESOPHAGOGASTRODUODENOSCOPY N/A 2/12/2018    Procedure: ESOPHAGOGASTRODUODENOSCOPY (EGD); Surgeon: Roger River MD;  Location: [facility]; Service: Gastroenterology    [place] LAP,DIAGNOSTIC ABDOMEN N/A 1/29/2018    Procedure: LAPAROSCOPY DIAGNOSTIC, with repair of perforated gastric ulcer;  Surgeon: Precious Carnes MD;  Location: [facility];  Service: General     HPI:  1/29  Reagan Narayan is a 67 y o  female who presents as a transfer from [facility] after presenting there for fatigue and found to have hemoglobin of 3 2  The patient states she has had chronic upper abdominal pain for about 1 month  She denies nausea or vomiting  She denies diarrhea or constipation   She denies dizziness, lightheadedness    1/29:  Preop Diagnosis:  Gastric ulcer [K25 9]  Pneumoperitoneum [K66 8]  Anemia [D64 9]     Post-Op Diagnosis Codes:     * Gastric ulcer [K25 9]     * Pneumoperitoneum [K66 8]     * Anemia [D64 9]     * Gastric ulcer with hemorrhage and perforation (HCC) [K25 6]     Procedure(s) (LRB):  LAPAROSCOPY DIAGNOSTIC, with repair of perforated gastric ulcer (N/A)    egd 2/12:  Findings:  -ulcer(s): 25 mm in size, located in the prepyloric region, with a clean base, not actively bleeding; Suture is seen within the ulcer base in one region  Pyloric opening does not seem to be involved  Gastritis, mild seen  bx not taken for h pylori given negative surgical specimen for the same  2 small clean base duodenal bulb ulcer seen  Esophagus normal     2/13:  Attestation signed by Irvin Bloom MD at 2/13/2018 6:24 PM   Attention/concentration impaired  She is euthymic  Oriented to self only and again states she's in rehab  She had some trouble with multistep commands where she would touch her face next to her nose when asking to touch her nose after touching her ear  She didn't exhibit right/left confusion  Unable to state the months in the year in reverse  Full strength ue bilat  She can barely lift her LE antigravity, possibly from positioning in bed and deconditioning      She has hx of both cortical and subcortical appearing chronic strokes and hx of severe weakness of entire left side in the 70s from the rt mca stroke which appears cortical and her recent baseline is of minimal left sided impairment that is mostly not noticeable  No known hx of afib  I would recommend 2-D echo and outpatient cardiac loop monitor to evaluate for paroxysmal afib        Her encephalopathy is mild currently, multifactorial as explained  Rest of plan as outlined      Total 31 min critical care time spent including >50% of time spent counseling family        Expand All Collapse All    []Hide copied text  Progress Note - Neurology   Donte Jaimes 67 y o  female MRN: [id number]  Unit/Bed#: ICU 03 Encounter: [id number]     Assessment/ Plan:  1   Toxic metabolic encephalopathy - resolving   Multifactorial in setting of sepsis 2/2 perforated gastric ulcer, acute left  DVT, severe anemia, hypoglycemia following prolonged hyperglycemia, intermittent  hypertension, medications (morphine), prior severe hyponatremia followed by severe hypernatremia, malnutrition                 -Continue to maximize medical and metabolic management per primary team      2  Hx of R MCA territory infarction and chronic b/l basal ganglia infarctions and acute L DVT              -Hold anticoagulation/ antiplatelet for now in setting of recent pancytopenia and unclear coagulopathy              -Recommend 2D ECHO               -MRI B pending      3   Hiistory of relapsing remitting multiple sclerosis (per chart)  -Imuron held at this point      4  Pancytopenia:               -Platelets now stable               -Awaiting bone marrow biopsy today          2/14 MRI   IMPRESSION:  Acute foci of ischemia involving the right PCA territory, including the posterior right temporal and occipital lobes  Extensive periventricular and subcortical foci of white matter T2 hyperintensity which is nonspecific and likely related to chronic small vessel ischemic changes  Other less likely etiologies include demyelinating disease and vasculitis  No acute intracranial hemorrhage  Small focus of encephalomalacia within the right parietal lobe which demonstrates gyriform T1 hyperintensity compatible with cortical laminar necrosis and a chronic infarct  Pt screened for now  No swallowing issues per nurse  Able to communicate basic needs w/ staff  Will f/u with speech/language eval as able

## 2018-02-15 NOTE — PHYSICAL THERAPY NOTE
PHYSICAL THERAPY Re evaluation           Patient Name: River ESCOBAR Date: 2/15/2018       02/15/18 1127   Note Type   Note type Re-eval   Pain Assessment   Pain Assessment No/denies pain   Home Living   Additional Comments see initial eval for home set up   Prior Function   Comments see initial evaluation for PLOF   Restrictions/Precautions   Other Precautions Cognitive; Bed Alarm; Chair Alarm;Telemetry;Multiple lines   Cognition   Overall Cognitive Status Impaired   Attention Difficulty attending to directions   Orientation Level Oriented to person   Memory Decreased short term memory   Following Commands Follows one step commands with increased time or repetition   RUE Assessment   RUE Assessment X  (limited AROM)   LUE Assessment   LUE Assessment X  (limited AROM)   RLE Assessment   RLE Assessment X  (limited AROM)   Strength RLE   RLE Overall Strength 3-/5   LLE Assessment   LLE Assessment X  (limited AROM)   Strength LLE   LLE Overall Strength 3-/5   Bed Mobility   Rolling R 2  Maximal assistance   Additional items Assist x 2; Increased time required;Verbal cues   Rolling L 2  Maximal assistance   Additional items Assist x 2; Increased time required;Verbal cues   Additional Comments decresaed activity tolerance 2* to fatigue   Pt OOB x 3 hours with nsg this am   Transfers   Sit to Stand Unable to assess  (poor activity tolerance and direction following at this time)   Ambulation/Elevation   Gait pattern Not tested   Balance   Static Sitting Fair   Static Standing Poor +   Ambulatory Poor   Endurance Deficit   Endurance Deficit Yes   Endurance Deficit Description fatigue and pain    Activity Tolerance   Activity Tolerance Patient limited by fatigue;Patient limited by pain   Nurse Made Aware pt able to be seen per Natalia Sommer RN   Assessment   Prognosis Fair   Problem List Decreased strength;Decreased range of motion;Decreased endurance; Impaired balance;Decreased mobility; Decreased safety awareness; Impaired judgement;Decreased cognition;Pain   Assessment Pt initially presented at [facility] with peritonitis s/p diagnostic lap or abdomen which was performed on 1/29/2018  Pt initially presented with a Hgb of 3 2  Pt currently being followed by PT  A reevaluation was performed due to a recent MRI showing "Acute foci of ischemia involving the right PCA territory, including the posterior right temporal and occipital lobes"  Functional mobility was reassessed and mobility goals were reestablished  Pt presented with increased confusion this session and increased difficulty following directions  Pt was able to perform bed mobility with Max 2, however needs frequent VC to redirect attention  Transfers were unable to be performed this session secondary to increased fatigue and poor command following  apraxia noted during reeval as well as sensory neglect, increased confusion during reevaluation  Pt was assisted back into supine position at conclusion of PT session with all needs within reach  Pt denies any further questions at this time  PT will continue to follow during hospital stay  DC recommendation when medically cleared rehab due to decreased functional mobility at current time  PT treatment to follow to perform LE therex to address strength deficits noted during PT reevaluaion  Barriers to Discharge Decreased caregiver support; Inaccessible home environment   Goals   Patient Goals none stated    STG Expiration Date 02/25/18   Short Term Goal #1 In 10 days pt will complete: 1) Bed mobility skills with min A  2) increased activity tolerance to > 30min  3) Improve balance grades to fair 4) Improve BLE strength by 1/2 grade  5) PT for ongoing pt and family education; DME needs and D/C planning to promote highest level of function in least restrictive environment  Transfers and ambulation to be assessed when appropriate  PT to see at this time     Plan Treatment/Interventions Functional transfer training;LE strengthening/ROM; Therapeutic exercise; Endurance training;Cognitive reorientation;Patient/family training;Bed mobility;Continued evaluation;Spoke to nursing;OT   PT Frequency 5x/wk   Recommendation   Recommendation Short-term skilled PT   Equipment Recommended Walker   PT - OK to Discharge Yes  (to rehab when medically cleared )   Modified Nilo Scale   Modified Nilo Scale 4   Barthel Index   Feeding 5   Bathing 0   Grooming Score 0   Dressing Score 0   Bladder Score 5   Bowels Score 10   Toilet Use Score 0   Transfers (Bed/Chair) Score 0   Mobility (Level Surface) Score 0   Stairs Score 0   Barthel Index Score 20   Time In:1112  Time RXT:0824  Total Time: 15      S:  Pt reports wanting to exercise LE in bed  O:  Pt performed supine heel slides, SAQ, and ankle pumps  All LE therex was performed 1 set or 10  A:  PT treatment to follow re evaluation due to strength deficits noted during re evaluation  Pt was able to perform LE therex in supine position this session, however pt continues to require frequent VC for proper form and to redirect attention during therex  Pt denies any new complaints with therex this session  Pt was assisted back into bed at conclusion of PT session with all needs within reach  Pt denies any further questions at this time  PT will continue to follow during hospital stay  DC recommendation when medically cleared is rehab due to decreased functional mobility compared to baseline, and increased A needed from caregiver at current time  P:  Continue with POC as outlined in pts evaluation      Lorie Guzmán, PT

## 2018-02-15 NOTE — PLAN OF CARE
Problem: OCCUPATIONAL THERAPY ADULT  Goal: Performs self-care activities at highest level of function for planned discharge setting  See evaluation for individualized goals  Treatment Interventions: ADL retraining, Functional transfer training, Endurance training, UE strengthening/ROM, Patient/family training, Compensatory technique education, Activityengagement, Energy conservation, Cognitive reorientation, Equipment evaluation/education          See flowsheet documentation for full assessment, interventions and recommendations  Limitation: Decreased UE ROM, Decreased Safe judgement during ADL, Decreased UE strength, Decreased ADL status, Decreased endurance, Decreased cognition, Decreased self-care trans, Decreased high-level ADLs, Decreased sensation  Prognosis: Fair  Assessment: Pt seen for OT re-evaluation due to new acute foci of ischemia involving R PCA, posterior R temporal and occipital lobes  Pt supine in bed at start of session  RN reported pt was hoyered OOB to chair for 3 hrs earlier in am  OT completed visual assessment w/ pt, which was difficult to complete due to impaired cognition  Pt w/ impaired b/l UE shoulder flexion  Pt w/ R UE w/ ataxia and impaired coordination  Pt w/ difficulty finger to nose opposition and dysdiadochokinesia on R UE  Pt w/ MAX assist x 2 for rolling in bed w/ bed rails  Pt w/ increased fatigue and decreased endurance unable to complete supine<>sit bed mobility  Pt w/ decreased orientation, only oriented to person  Pt continues to be limited due to impaired cognition, decreased endurance, impaired balance, impaired activity tolerance, impaired insight, impaired strength and endurance       OT Discharge Recommendation: Short Term Rehab  OT - OK to Discharge:  (when medically stable)      Comments: Leigha Prado MS, OTR/L

## 2018-02-16 ENCOUNTER — APPOINTMENT (INPATIENT)
Dept: CT IMAGING | Facility: HOSPITAL | Age: 73
DRG: 853 | End: 2018-02-16
Payer: MEDICARE

## 2018-02-16 LAB
ERYTHROCYTE [DISTWIDTH] IN BLOOD BY AUTOMATED COUNT: 15 % (ref 11.6–15.1)
GLUCOSE SERPL-MCNC: 216 MG/DL (ref 65–140)
GLUCOSE SERPL-MCNC: 226 MG/DL (ref 65–140)
GLUCOSE SERPL-MCNC: 242 MG/DL (ref 65–140)
GLUCOSE SERPL-MCNC: 248 MG/DL (ref 65–140)
HCT VFR BLD AUTO: 31.4 % (ref 34.8–46.1)
HCT VFR BLD AUTO: 31.4 % (ref 34.8–46.1)
HGB BLD-MCNC: 10.3 G/DL (ref 11.5–15.4)
HGB BLD-MCNC: 10.4 G/DL (ref 11.5–15.4)
MCH RBC QN AUTO: 29.9 PG (ref 26.8–34.3)
MCHC RBC AUTO-ENTMCNC: 32.8 G/DL (ref 31.4–37.4)
MCV RBC AUTO: 91 FL (ref 82–98)
PLATELET # BLD AUTO: 65 THOUSANDS/UL (ref 149–390)
PMV BLD AUTO: 11.8 FL (ref 8.9–12.7)
RBC # BLD AUTO: 3.45 MILLION/UL (ref 3.81–5.12)
WBC # BLD AUTO: 9.89 THOUSAND/UL (ref 4.31–10.16)

## 2018-02-16 PROCEDURE — 70496 CT ANGIOGRAPHY HEAD: CPT

## 2018-02-16 PROCEDURE — 99357 PR PROLONGED SERV,INPATIENT,EA ADD 30 MIN: CPT | Performed by: NURSE PRACTITIONER

## 2018-02-16 PROCEDURE — 97535 SELF CARE MNGMENT TRAINING: CPT

## 2018-02-16 PROCEDURE — 97110 THERAPEUTIC EXERCISES: CPT

## 2018-02-16 PROCEDURE — 82948 REAGENT STRIP/BLOOD GLUCOSE: CPT

## 2018-02-16 PROCEDURE — 85018 HEMOGLOBIN: CPT | Performed by: INTERNAL MEDICINE

## 2018-02-16 PROCEDURE — 99233 SBSQ HOSP IP/OBS HIGH 50: CPT | Performed by: NURSE PRACTITIONER

## 2018-02-16 PROCEDURE — 70498 CT ANGIOGRAPHY NECK: CPT

## 2018-02-16 PROCEDURE — 92523 SPEECH SOUND LANG COMPREHEN: CPT

## 2018-02-16 PROCEDURE — 99222 1ST HOSP IP/OBS MODERATE 55: CPT | Performed by: FAMILY MEDICINE

## 2018-02-16 PROCEDURE — 81241 F5 GENE: CPT | Performed by: NURSE PRACTITIONER

## 2018-02-16 PROCEDURE — 81240 F2 GENE: CPT | Performed by: NURSE PRACTITIONER

## 2018-02-16 PROCEDURE — 99232 SBSQ HOSP IP/OBS MODERATE 35: CPT | Performed by: INTERNAL MEDICINE

## 2018-02-16 PROCEDURE — C9113 INJ PANTOPRAZOLE SODIUM, VIA: HCPCS | Performed by: INTERNAL MEDICINE

## 2018-02-16 PROCEDURE — 85027 COMPLETE CBC AUTOMATED: CPT | Performed by: INTERNAL MEDICINE

## 2018-02-16 PROCEDURE — 99356 PR PROLONGED SVC I/P OR OBS SETTING 1ST HOUR: CPT | Performed by: NURSE PRACTITIONER

## 2018-02-16 PROCEDURE — 0HBRXZZ EXCISION OF TOE NAIL, EXTERNAL APPROACH: ICD-10-PCS | Performed by: PODIATRIST

## 2018-02-16 PROCEDURE — 85014 HEMATOCRIT: CPT | Performed by: INTERNAL MEDICINE

## 2018-02-16 PROCEDURE — 97530 THERAPEUTIC ACTIVITIES: CPT

## 2018-02-16 RX ORDER — PANTOPRAZOLE SODIUM 40 MG/1
40 INJECTION, POWDER, FOR SOLUTION INTRAVENOUS EVERY 12 HOURS SCHEDULED
Status: DISCONTINUED | OUTPATIENT
Start: 2018-02-16 | End: 2018-02-22

## 2018-02-16 RX ORDER — INSULIN GLARGINE 100 [IU]/ML
10 INJECTION, SOLUTION SUBCUTANEOUS EVERY MORNING
Status: DISCONTINUED | OUTPATIENT
Start: 2018-02-17 | End: 2018-02-17

## 2018-02-16 RX ADMIN — LEVOTHYROXINE SODIUM 200 MCG: 100 TABLET ORAL at 06:23

## 2018-02-16 RX ADMIN — PANTOPRAZOLE SODIUM 40 MG: 40 INJECTION, POWDER, FOR SOLUTION INTRAVENOUS at 14:29

## 2018-02-16 RX ADMIN — INSULIN LISPRO 2 UNITS: 100 INJECTION, SOLUTION INTRAVENOUS; SUBCUTANEOUS at 14:33

## 2018-02-16 RX ADMIN — ACETAMINOPHEN 650 MG: 325 TABLET, FILM COATED ORAL at 20:16

## 2018-02-16 RX ADMIN — IOHEXOL 85 ML: 350 INJECTION, SOLUTION INTRAVENOUS at 18:14

## 2018-02-16 RX ADMIN — INSULIN LISPRO 2 UNITS: 100 INJECTION, SOLUTION INTRAVENOUS; SUBCUTANEOUS at 21:03

## 2018-02-16 RX ADMIN — INSULIN GLARGINE 6 UNITS: 100 INJECTION, SOLUTION SUBCUTANEOUS at 08:44

## 2018-02-16 RX ADMIN — LISINOPRIL 40 MG: 20 TABLET ORAL at 08:43

## 2018-02-16 RX ADMIN — SUCRALFATE 1000 MG: 1 SUSPENSION ORAL at 06:23

## 2018-02-16 RX ADMIN — INSULIN LISPRO 2 UNITS: 100 INJECTION, SOLUTION INTRAVENOUS; SUBCUTANEOUS at 08:43

## 2018-02-16 RX ADMIN — PANTOPRAZOLE SODIUM 40 MG: 40 TABLET, DELAYED RELEASE ORAL at 06:23

## 2018-02-16 RX ADMIN — PANTOPRAZOLE SODIUM 40 MG: 40 INJECTION, POWDER, FOR SOLUTION INTRAVENOUS at 20:09

## 2018-02-16 RX ADMIN — INSULIN LISPRO 2 UNITS: 100 INJECTION, SOLUTION INTRAVENOUS; SUBCUTANEOUS at 18:25

## 2018-02-16 RX ADMIN — SUCRALFATE 1000 MG: 1 SUSPENSION ORAL at 18:24

## 2018-02-16 RX ADMIN — ATORVASTATIN CALCIUM 40 MG: 40 TABLET, FILM COATED ORAL at 18:24

## 2018-02-16 NOTE — PHYSICAL THERAPY NOTE
PHYSICAL THERAPY NOTE          Patient Name: River Moore  CWPRV'P Date: 2/16/2018 02/16/18 1205   Pain Assessment   Pain Assessment 0-10   Pain Score 3   Pain Type Chronic pain   Pain Location Buttocks   Pain Orientation Bilateral   Pain Descriptors Aching   Pain Frequency Constant/continuous   Pain Onset Ongoing   Clinical Progression Gradually improving   Effect of Pain on Daily Activities increased pain with activity    Patient's Stated Pain Goal No pain   Hospital Pain Intervention(s) Ambulation/increased activity;Repositioned   Response to Interventions tolerated    Pain Rating: FLACC (Rest) - Face 1   Pain Rating: FLACC (Rest) - Legs 1   Pain Rating: FLACC (Rest) - Activity 0   Pain Rating: FLACC (Rest) - Cry 0   Pain Rating: FLACC (Rest) - Consolability 0   Score: FLACC (Rest) 2   Pain Rating: FLACC (Activity) - Face 1   Pain Rating: FLACC (Activity) - Legs 1   Pain Rating: FLACC (Activity) - Activity 1   Pain Rating: FLACC (Activity) - Cry 1   Pain Rating: FLACC (Activity) - Consolability 1   Score: FLACC (Activity) 5   Restrictions/Precautions   Weight Bearing Precautions Per Order No   Other Precautions Cognitive; Bed Alarm;Pain; Fall Risk;Multiple lines   General   Chart Reviewed Yes   Response to Previous Treatment Patient with no complaints from previous session  Family/Caregiver Present Yes   Cognition   Overall Cognitive Status Impaired   Arousal/Participation Alert; Responsive   Attention Difficulty attending to directions   Orientation Level Oriented to person   Memory Decreased short term memory   Following Commands Follows one step commands with increased time or repetition   Subjective   Subjective " I'm having pain"   Bed Mobility   Rolling R 2  Maximal assistance   Additional items Assist x 2; Increased time required;LE management   Rolling L 2  Maximal assistance   Additional items Assist x 2; Increased time required;Verbal cues   Supine to Sit 2  Maximal assistance   Additional items Assist x 2; Increased time required;Verbal cues   Sit to Supine 2  Maximal assistance   Additional items Assist x 2; Increased time required;Verbal cues   Additional Comments increased lateral lean to R while sitting EOB   Transfers   Sit to Stand Unable to assess   Additional Comments poor sitting balance at curren time    Ambulation/Elevation   Gait pattern Not tested   Balance   Static Sitting Poor +   Dynamic Sitting Poor   Endurance Deficit   Endurance Deficit Yes   Endurance Deficit Description fatigue and pain    Activity Tolerance   Activity Tolerance Patient limited by fatigue;Patient limited by pain   Nurse Made Aware pt able to be seen per nsg    Exercises   Knee AROM Long Arc Quad Sitting;10 reps;AROM; Bilateral   Ankle Pumps Sitting;10 reps;AROM; Bilateral  (x 2 sets )   Marching Sitting;10 reps;AROM; Bilateral  (x 2 sets )   Assessment   Prognosis Fair   Problem List Decreased strength;Decreased range of motion;Decreased endurance; Impaired balance;Decreased mobility; Impaired judgement;Decreased cognition;Pain;Decreased skin integrity   Assessment Pt resting in bed at time of PT treatment  Pt was able to tolerate bed mobility this session, however was unable to attempt sit to stand transfers secondary to increased fatigue and pain reported by pt  Pt was educated on the importance of OOB activity while in the hospital however pt continues to refuse  Pt was able to perform bed mobility with less A required compared to previous session, however continues to require frequent VC to redirect attention and for hand placement  Pt was able to sit EOB x 20 min and perform LE therex without any increase in complaints  Significant R lateral lean was noted and pt requires frequent VC and TC to correct  Pt continues to require frequent verbal instruction for proper form and pacing with therex   Pt was assisted back into bed at conclusion of PT session with all needs within reach  Pt denies any further questions at this time  PT will continue to follow  DC recommendation when medically cleared is rehab due to decreased functional mobility at current time  Barriers to Discharge Decreased caregiver support; Inaccessible home environment   Goals   Patient Goals none stated    STG Expiration Date 02/25/18   Treatment Day 1   Plan   Treatment/Interventions Functional transfer training;LE strengthening/ROM; Therapeutic exercise;Cognitive reorientation;Patient/family training;Bed mobility;Continued evaluation;Spoke to nursing;OT   Progress Slow progress, decreased activity tolerance   PT Frequency 5x/wk   Recommendation   Recommendation Short-term skilled PT   Equipment Recommended Walker   PT - OK to Discharge Yes  (to rehab when medically cleared )   Ramandeep Garcia, PT

## 2018-02-16 NOTE — CONSULTS
Consultation - Palliative and Supportive Care   Cally Degroot 67 y o  female [de-identified]    Patient Active Problem List   Diagnosis    [facility] )    Type 2 diabetes mellitus ([facility] )    Hyperglycemia due to type 2 diabetes mellitus ([facility] )    Hypoalbuminemia due to protein-calorie malnutrition (HCC)    Gastric ulcer    Acute perforated gastric ulcer with hemorrhage ([facility] )    Peritonitis (HCC)    Acute blood loss anemia    Hypernatremia    Hypothyroidism    Hyperlipidemia    Multiple sclerosis (HCC)    Metabolic encephalopathy    DIC (disseminated intravascular coagulation) ([facility] )    Anemia    - 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)    Plan:  1  Symptom management - abd pain, stress   - Aggressive gastroprotection, sparing use of anticholinergics and opioids for gastritis   - NO NSAIDs   - Defer use of steroids to SLIM  At this time, pt's MS symptoms appear fairly well controlled; we would suggest that risks of steroids at this moment outweigh benefits     - Pt seems high risk from cognitive standpoint for opioids  However, APAP not likely effective for perforation/gastritis pain, and NSAIDs not appropriate  Will order low-dose oxyIR TID PRN    2  Goals - limited life-prolonging cares/rehab   - Pt's decisional apparatus appears fractured and unreliable  See below  - For now, family agrees to trial of rehab, with next steps in her care to follow  While we think she may be hospice appropriate, the family would seek additional cares from hospital for additional injuries as may occur    - As regards surgeries or advance limits, no one in decisional apparatus would set advance limits, aside from DNR/DNI     Code Status: DNR/DNI - Level 3   Decisional apparatus:  Patient is not competent on my exam today  If competence is lost, patient's substitute decision maker would default to  Imer Zav[place] by [place]  However, we have serious concerns about his competence, too    Please see below  Advance Directive / Living Will / POLST:  none     In the case of this lady with underlying MS and chronic poor functional health, the significant bowel perforation and hypotensive/shock episode might have led to a global ischemic event in the brain  It may be too early to assess for her recovery from such a state, but formal speech evaluation suggests that -- for now -- an important component of her capacity is dramatically injured  She lacks generative and integrative communication skills, and therefore she cannot be counted on reliably to identify, express, and conceive of her own wishes or decisions, at least at this time  In our discussion at the bedside, the pt's  appeared poorly oriented to the situation, unaware of current trends in her health, and perseverative on items in conversation that were clearly expressed multiple times  Outside the room, his daughter Merrick Almeida identifed the family's concern that he has a neurodegenerative disorder such as Alzheimer's or Parkinson's  We therefore suggest that -- for all important decisions having to do with medical procedures -- consent be obtained from at least one child (Nuria Salazar or Merrick Almeida), with the assent of pt or her   While we do not believe that Haydee Davenport nor Maura Walker have the ability to independently make decisions about complex medical issues, we do respect that they have some ability to express a rational preference  We appreciate the invitation to be involved in this patient's care  We will return on Monday  Please do not hesitate to reach our on call provider through our clinic answering service at  should you have acute symptom control concerns        IDENTIFICATION:  Consults  Physician Requesting Consult: Jb Wong DO  Reason for Consult / Principal Problem: goals  Hx and PE limited by: pt's deflective behavior, husnabds repetitive behavior    HISTORY OF PRESENT ILLNESS:       Roula Gtz is a 67 y o  female who presents with acute and profound anemia  This lady present to [facility] with fatigue, and was found on admission to have hgb on 3 2  This was associated with abd pain, epigastric, not assicatd with nausea nor vomting  She denied any changes to bowel habits  Further history was not taken, as her cares quickly trended towards the ICU for massive tfs protocol  Eventually, she was xferred to [facility] for more aggressive/intensive cares, and on the date of my consult, she is recovered well enough to go to stepdown on the medical floor  She is alert and conversant, but her history and insights seem impulsive and rote, rather than thoughtful  Pt's daughter and pt's  are at bedside today, and they are supportive of pt's desire to go home, but state that she will go to rehab first, given her profound weakness beyond her baseline  Carefully, I was able to extract Sara Cardozo from her mother and father, and converse about the pt's decisional apparatus  Pt has never filled out any papers re: AD, POA, living will  Pt specifically requests that her  make all decisions, consistent with [place] 169, should she need an alternate decider  Paul Alvarez then informed me outside the room that she and her brother Shelton Gutierres have noted multiple signs in their father consistent with Alzheimer's, including repetitive speech, forgetfulness, and poor abiltiy for abstract though  Neither Sara Cardozo nor Darren trust either of their parents to make decisions alone, and they do not at all think the pt and her  can support each other's decisions together  With [facility], we carefully expressed our concern that pt's current level of poor functional health might be her new normal, owing not so much to a reversible MS flare, but a more durable HIE following global ischemia  Sara Cardozo appreciates this, but also understands that neither her mother nor father are agreeable to LTC in a SNF at this time    She would be amenable to revisit this discussion in the future, when D/C from ARU is discussed  Review of Systems   Unable to perform ROS: mental status change       Past Medical History:   Diagnosis Date    [facility]     Disease of thyroid gland     Hyperlipidemia     Multiple sclerosis ([facility] )      Past Surgical History:   Procedure Laterality Date    ESOPHAGOGASTRODUODENOSCOPY N/A 2/12/2018    Procedure: ESOPHAGOGASTRODUODENOSCOPY (EGD); Surgeon: Natalee Martinez MD;  Location: [facility]; Service: Gastroenterology    [place] LAP,DIAGNOSTIC ABDOMEN N/A 1/29/2018    Procedure: LAPAROSCOPY DIAGNOSTIC, with repair of perforated gastric ulcer;  Surgeon: Gamal Steiner MD;  Location: [facility];  Service: General     Social History     Social History    Marital status: /Civil Union     Spouse name: N/A    Number of children: N/A    Years of education: N/A     Occupational History    Not on file  Social History Main Topics    Smoking status: Former Smoker     Types: Cigarettes     Quit date: 2007    Smokeless tobacco: Never Used    Alcohol use Yes      Comment: rarely    Drug use: No    Sexual activity: Not on file     Other Topics Concern    Not on file     Social History Narrative    No narrative on file     History reviewed  No pertinent family history      MEDICATIONS / ALLERGIES:    current meds:   Current Facility-Administered Medications   Medication Dose Route Frequency    acetaminophen (TYLENOL) tablet 650 mg  650 mg Oral Q4H PRN    atorvastatin (LIPITOR) tablet 40 mg  40 mg Oral Daily With Dinner    calcium carbonate (TUMS) chewable tablet 500 mg  500 mg Oral TID PRN    hydrALAZINE (APRESOLINE) injection 5 mg  5 mg Intravenous Q6H PRN    [START ON 2/17/2018] insulin glargine (LANTUS) subcutaneous injection 10 Units  10 Units Subcutaneous QAM    insulin lispro (HumaLOG) 100 units/mL subcutaneous injection 1-5 Units  1-5 Units Subcutaneous TID AC    insulin lispro (HumaLOG) 100 units/mL subcutaneous injection 1-5 Units  1-5 Units Subcutaneous HS    levothyroxine tablet 200 mcg  200 mcg Oral Early Morning    lisinopril (ZESTRIL) tablet 40 mg  40 mg Oral Daily    ondansetron (ZOFRAN) injection 4 mg  4 mg Intravenous Q4H PRN    pantoprazole (PROTONIX) injection 40 mg  40 mg Intravenous Q12H [address]    sucralfate (CARAFATE) oral suspension 1,000 mg  1,000 mg Oral BID AC       No Known Allergies    OBJECTIVE:    Physical Exam  Physical Exam   Constitutional: No distress  frail   HENT:   Head: Normocephalic  Right Ear: External ear normal    Left Ear: External ear normal    Mouth/Throat: Oropharyngeal exudate (mucous membranes tacky) present  Eyes: Conjunctivae and EOM are normal  Pupils are equal, round, and reactive to light  Right eye exhibits no discharge  Left eye exhibits no discharge  Neck: No tracheal deviation present  Cardiovascular:   tachycardic   Pulmonary/Chest: Effort normal  No stridor  No respiratory distress  Abdominal: She exhibits distension  There is tenderness (diffuse)  Musculoskeletal: She exhibits no edema  Neurological: She is alert  Orientation to situation limited  Speech clear, but with some odd turns of phrase  Speech seems telegraphic, impulsive, and heavily borrowing from niceties and parroting  Skin: Skin is warm and dry  No rash noted  She is not diaphoretic  No erythema  [place], jaundiced coloring   Psychiatric:   Mood - fine  Affect - distracted, with poor attention  Might be resopnding to internal stimuli  Lab Results:   I have personally reviewed pertinent labs  , CBC:   Lab Results   Component Value Date    WBC 9 89 02/16/2018    HGB 10 4 (L) 02/16/2018    HCT 31 4 (L) 02/16/2018    MCV 91 02/16/2018    PLT 65 (L) 02/16/2018    MCH 29 9 02/16/2018    MCHC 32 8 02/16/2018    RDW 15 0 02/16/2018    MPV 11 8 02/16/2018   , CMP: No results found for: NA, K, CL, CO2, ANIONGAP, BUN, CREATININE, GLUCOSE, CALCIUM, AST, ALT, ALKPHOS, PROT, ALBUMIN, BILITOT, EGFR   V low plts, hgb of 10 4 today    Imaging Studies: none pertinent today  EKG, Pathology, and Other Studies: none pertinent today    Counseling / Coordination of Care  Total floor / unit time spent today 60+ minutes  Greater than 50% of total time was spent with the patient and / or family counseling and / or coordination of care   A description of the counseling / coordination of care: review and assessment of decisional apparatus and capacity, applicable legal codes and extant documents; symptom pursuit at bedside, discussion of goals/means/limits

## 2018-02-16 NOTE — SPEECH THERAPY NOTE
Speech Language/Pathology  Speech/Language Pathology  Assessment    Patient Name: Dahiana Mason  FMPNS'Y Date: 2/16/2018     Problem List  Patient Active Problem List   Diagnosis    Pancytop[facility] )    Type 2 diabetes mellitus ([facility] )    Hyperglycemia due to type 2 diabetes mellitus ([facility] )    Hypoalbuminemia due to protein-calorie malnutrition (HCC)    Gastric ulcer    Acute perforated gastric ulcer with hemorrhage ([facility] )    Peritonitis (HCC)    Acute blood loss anemia    Hypernatremia    Hypothyroidism    Hyperlipidemia    Multiple sclerosis (HCC)    Metabolic encephalopathy    DIC (disseminated intravascular coagulation) ([facility]    Anemia     Past Medical History  Past Medical History:   Diagnosis Date    Diabetes mellitus ([facility] )     Disease of thyroid gland     Hyperlipidemia     Multiple sclerosis (HCC)      Past Surgical History  Past Surgical History:   Procedure Laterality Date    ESOPHAGOGASTRODUODENOSCOPY N/A 2/12/2018    Procedure: ESOPHAGOGASTRODUODENOSCOPY (EGD); Surgeon: Diane Reyes MD;  Location: [facility]; Service: Gastroenterology    [place] LAP,DIAGNOSTIC ABDOMEN N/A 1/29/2018    Procedure: LAPAROSCOPY DIAGNOSTIC, with repair of perforated gastric ulcer;  Surgeon: Tatyana Aguirre MD;  [facility];  Service: General      02/16/18 2721   Patient Information   Current Medical Pt is a 66 y/o female admitted to [facility] on 1/28/2018 with pancytopenia  There was findings of acute CVA  Neurology requested speech/languag evaluation to assist with their diagnostic assessments and to determine need for intervention  Special Studies MRI Brain: Acute foci of ischemia involving the right PCA territory, including the posterior right temporal and occipital lobes  Small focus of encephalomalacia within the right parietal lobe which demonstrates gyriform T1 hyperintensity compatible with cortical laminar necrosis and a chronic infarct    Extensive periventricular and subcortical foci of white matter T2 hyperintensity which is nonspecific and likely related to chronic small vessel ischemic changes  Other less likely etiologies include demyelinating disease and vasculitis  CXR: no active disease   Social/Educational/Vocational Hx Home   Cognition   Overall Cognitive Status Impaired   Arousal/Participation Alert; Responsive   Attention Difficulty dividing attention   Orientation Level Oriented to person   Memory Decreased short term memory   Following Commands Follows one step commands without difficulty   Speech/Swallow Mechanism Exam   Labial Symmetry WFL   Labial Strength WFL   Labial ROM WFL   Facial Symmetry WFL   Facial Strength WFL   Facial ROM WFL   Lingual Symmetry WFL   Lingual Strength WFL   Lingual ROM WFL   Velum WFL   Mandible WFL   Dentition Adequate   Volitional Cough Strong   Vocal Quality (Strong)   Volitional Swallow WFL   Respratory Status Room air   Motor Speech Evaluation   Respiration/Phonation WFL   Vocal Quality WFL   Dysarthria No   Intelligibility Intelligible   Apraxia None present   Auditory Comprehension   Word Level Comprehension X   Objects Named (2/6; 33% accuracy independently)   Body Parts Named (4/5; 80% body part ID, 2/5; 40% L/R discrimination)   Yes/No Questions X   Simple Questions Minimal impaired  (80%)   Complex Questions Minimal impaired  (80%)   Commands X   One Step Basic Commands Minimal  (100%)   Two Step Basic Commands Minimal  (100%)   Complex/Abstract Commands Moderate  (30% independently)   Comphrehends Conversation Simple   Interfering Components Attention - selective   EffectiveTechniques Slowed speech   Reading Comprehension   Reading Status X   Scanning/Tracking Impairment Severity Moderate   Letters Minimal   Words Impairment Severity Minimal  (100% with min verbal cues to look at correct side of page)   Sentence Impairment Severity Maximal  (unable to orally read phrases/sentences, neglect to L side)   Interfering Components Visual perceptual Effective Techniques Tactile/visual cueing   Expression   Primary Mode of Expression Verbal   Verbal Expression   Repetition Words;Phrases; Sentences; No impairment   Automatic Speech WFL   Naming X   Confrontation Moderate  (70% with object naming in room)   Divergent Moderate  (Responsive naming 9/10; 90%, sentence completion 5/5; 100%, unable to complete generative naming tasks despite cues)   Narrative Speech X   Conversational Speech Impairment  (slow to respond, short sentences)   Error Types Tangential   Pragmatics [facility]   Written Expression   Dominant Hand (Unable to assess)   Cognitive/Language and Holistic Reasoning  (Informally assessed- Cognition to be addressed by OT)   Problem Solving X   Complex Functional Tasks (Unable to complete basic money management task  Immediate recall of list of 5 words was [facility], unable to recall any words in the list after 1 minute delay )   Summary   Speech/Language Summary Pt presents with symptoms of mild expressive and receptive phasia characterized by moderate difficulty with body part ID, L/R discrimination, Object ID, reading, confrontation naming, and following complex directions  Mild deficits are also noted in answering yes/no questions, narrative speech, and responsive naming tasks  It is difficult to determine if these symotoms are related to new aphasia (No clear paraphasic errors) versus cognitive deficits as participation was impacted by difficulty understanding tasks at times  SLP will work with pt on language related deficits however OT is following for cognition currently  Recommendations   Recommendations Inpatient speech therapy   Goals   Goal 1 1) Pt will name common objects/pictures with 90% accuracy given min verbal prompts  2) Pt will complete generative naming tasks by naming 5 words in a category in 1 minute  Goal 2 3) Pt will complete Object ID tasks with 80% accuracy given min verbal prompts as needed    4) Pt will follow complex commands with 75% accuracy  5) Pt will read short sentences with 80% accuracy  SLP screened for swallowing safety yesterday  She passed as per SLP yesterday and evaluation was requested for language only  Pt currently has level 2 diet and thin liquids ordered by MD which Dr Nicole Torres reports is related to esophageal/gastric ulcers and not for swallowing  She does not feel swallowing evaluation is indicated at this time  Pt was drinking thin water during session which she tolerated without s/s of aspiration  There is no noted facial weakness or asymmetry on my assessment today  Please consult for swallowing if any concerns arise with diet tolerance

## 2018-02-16 NOTE — PROGRESS NOTES
Consult Routine Foot Care- Podiatry   Anselmo Saha 67 y o  female MRN: [id number]  Unit/Bed#: E4 -01 Encounter: [id number]    Assessment/Plan     Assessment:  1  Onychomycosis x10  2  DM, type 2: A1c - 5 0%  3  Acute CVA     Plan:  - Painful mycotic toenails debrided x10 without incidence utilizing a sharp nail nipper; they were not debrided fully as patient was screaming that its too painful and she cannot breath, she yelled for me to just leave them alone   - encourage prevalon boots to offload heels while in bed   - All questions and concerns addressed; patient and  instructed to follow up with podiatry for continual diabetic foot care   - no weightbearing restrictions    > medical management per primary     Thank you for the consult  Will discuss this plan with my attending  Podiatry to sign off at this time  History of Present Illness     HPI:  Anselmo Saha is a 67 y o  female who presents with elongated toenails  She states she didn't even know her nails were so long  She denies any other pedal complaints  She has never seen a podiatrist   Unable to obtain adequate history as patient had recent CVA  She states she was admitted due to a cat scratch from her cats at home  She states that she currently doesn't walk since she was admitted to the hospital, but prior had no complications walking  She lives with her   Inpatient consult to Podiatry     Date/Time 2/16/2018 3:55 PM     Performed by  Ramya Friday     Authorized by Bert Rincon            Review of Systems   Constitutional: Negative  HENT: Negative  Eyes: Negative  Respiratory: Negative  Cardiovascular: Negative  Gastrointestinal: Negative  Musculoskeletal: limited 2/2 CVA    Skin: elongated thickened toenails  Neurological: Negative          Historical Information   Past Medical History:   Diagnosis Date    Diabetes mellitus ([facility] [address] )     Disease of thyroid gland     Hyperlipidemia     Multiple [facility])      Past Surgical History:   Procedure Laterality Date    ESOPHAGOGASTRODUODENOSCOPY N/A 2018    Procedure: ESOPHAGOGASTRODUODENOSCOPY (EGD); Surgeon: Becky Prabhakar MD;  Location: [facility]; Service: Gastroenterology    OH LAP,DIAGNOSTIC ABDOMEN N/A 2018    Procedure: LAPAROSCOPY DIAGNOSTIC, with repair of perforated gastric ulcer;  Surgeon: Renee Rush MD;  Location: [facility];  Service: General     Social History   History   Alcohol Use    Yes     Comment: rarely     History   Drug Use No     History   Smoking Status    Former Smoker    Types: Cigarettes    Quit date:    Smokeless Tobacco    Never Used     Family History: History reviewed  No pertinent family history      Meds/Allergies   Prescriptions Prior to Admission   Medication    acetaminophen-codeine (TYLENOL #3) 300-30 mg per tablet    alendronate (FOSAMAX) 70 mg tablet    [] amoxicillin-clavulanate (AUGMENTIN) 875-125 mg per tablet    atorvastatin (LIPITOR) 40 mg tablet    azaTHIOprine (IMURAN) 50 mg tablet    cholecalciferol (VITAMIN D3) 1,000 units tablet    ergocalciferol (VITAMIN D2) 50,000 units    glipiZIDE (GLUCOTROL) 10 mg tablet    ibuprofen (MOTRIN) 800 mg tablet    levothyroxine 200 mcg tablet    metFORMIN (GLUCOPHAGE) 1000 MG tablet    methylprednisolone (MEDROL) 4 mg tablet    naproxen (NAPROSYN) 500 mg tablet     No Known Allergies    Objective   First Vitals:   Blood Pressure: (!) 176/60 (18 0000)  Pulse: 104 (18 0000)  Temperature: 99 6 °F (37 6 °C) (18)  Temp Source: Temporal (18)  Respirations: 19 (18)  Height: 5' 7" (170 2 cm) (18)  Weight - Scale: 73 3 kg (161 lb 9 6 oz) (18)  SpO2: 100 % (18)    Current Vitals:   Blood Pressure: 157/73 (18 0718)  Pulse: 86 (18 0718)  Temperature: 98 7 °F (37 1 °C) (18)  Temp Source: Temporal (02/16/18 0976)  Respirations: 18 (18 6098)  Height: 5' 6" (167 6 cm) (02/15/18 2100)  Weight - Scale: 76 2 kg (167 lb 15 9 oz) (02/15/18 2100)  SpO2: 97 % (02/16/18 0718)        /73 (BP Location: Right arm)   Pulse 86   Temp 98 7 °F (37 1 °C) (Temporal)   Resp 18   Ht 5' 6" (1 676 m)   Wt 76 2 kg (167 lb 15 9 oz)   SpO2 97%   BMI 27 11 kg/m²     General Appearance:    Alert, cooperative, no distress   Head:    Normocephalic, without obvious abnormality, atraumatic   Eyes:    PERRL, conjunctiva/corneas clear, EOM's intact        Nose:   Oropharyngeal exudate   Throat:   No tenderness, no exudates   Neck:   Supple, symmetrical, trachea midline, no JVD   Back:     Symmetric, no CVA tenderness   Lungs:     Clear to auscultation bilaterally, respirations unlabored   Chest wall:    No tenderness or deformity   Heart:    Regular rate and rhythm, S1 and S2 normal, no murmur, rub   or gallop   Abdomen:     Soft, +tender, bowel sounds active all four quadrants,     no masses, no organomegaly           Extremities:   MMT is limted to all compartments of B/L LE , +4/4 pitting edema B/L   Pulses:   R DP is +2/4, R PT is +2/4, L DP is +2/4, L PT is +2/4, CFT< 3sec to all digits, skin temp WNL,    Skin:   Nails are thickened, elongated, TTP with notable subungual debris  No open lesions  Skin of the LE is normal texture, turgor  There is skin irritation due to pressure and rubbing however without breakdown of the heels    Neurologic:   Gross sensation is diminished  Protective sensation is absent  Lab Results:   Admission on 01/28/2018   No results displayed because visit has over 200 results  Imaging: I have personally reviewed pertinent films in PACS  EKG, Pathology, and Other Studies: I have personally reviewed pertinent reports        Code Status: Level 3 - DNAR and DNI  Advance Directive and Living Will:      Power of :    POLST:

## 2018-02-16 NOTE — WOUND OSTOMY CARE
Attempted to see patient for wound care follow-up  Patient reporting severe abdominal pain  Gripping side rails, eyes closed  Requesting to be seen at a later time--patient states she has been "rolled" several times in the last hour and is exhausted and in pain  Will see patient on Monday 2/19/2018--primary RN, Milagro Kim made aware of patient's report of pain and wound care follow-up on Monday  Per nursing team, coccyx area is now an open wound--applying calazime paste      Tanvir JIMENESN, RN, CCRN ()

## 2018-02-16 NOTE — PLAN OF CARE
Problem: OCCUPATIONAL THERAPY ADULT  Goal: Performs self-care activities at highest level of function for planned discharge setting  See evaluation for individualized goals  Treatment Interventions: ADL retraining, Functional transfer training, Endurance training, UE strengthening/ROM, Patient/family training, Compensatory technique education, Activityengagement, Energy conservation, Cognitive reorientation, Equipment evaluation/education          See flowsheet documentation for full assessment, interventions and recommendations  Outcome: Progressing  Limitation: Decreased UE ROM, Decreased Safe judgement during ADL, Decreased UE strength, Decreased ADL status, Decreased endurance, Decreased cognition, Decreased self-care trans, Decreased high-level ADLs, Decreased sensation  Prognosis: Fair  Assessment: Pt seem for skilled OT session focused on ADLs, b/l UE exercises, bed mobility  Pt completed grooming task of combing hair w/ R UE w/ MOD assist and cues to tend to L side of head  Pt completed b/l UE exercises seen above to increased strength and endurance for ADLs and functional transfers  Pt completed bed mobility w/ MAX assist x 2 supine>sit bed mobility  Pt w/ MOD assist support while seated on EOB w/ lateral lean to R side  Pt seated EOB for 15 minutes w/ MOD support and encouragement  Pt required MAX assist x2 sit>supine  Pt required MAX assist to reposition in bed  PT was found incontinent of bowel at end of session  Pt required total assist and NSG assisting w/ cleaning/hygiene  NSG assisting w/ care  PT continues to be limited due to impaired balance, impaired cognition, impaired safety awareness, visual deficits, increased processing time, impaired balance, impaired strength and endurance  Recommend jong for OOB  Recommend STR, will continue to follow       OT Discharge Recommendation: Short Term Rehab  OT - OK to Discharge:  (when medically stable)      Comments: Alex Covarrubias MS, OTR/L

## 2018-02-16 NOTE — CASE MANAGEMENT
CM attempted to meet with family however they had just left  CM spoke briefly to patient at bedside  Patient states she is agreeable to rehab with an ultimate goal going going home, "to be with my thiago " CM asked patient if she knew of where she would like to go, but deferred this to her  who was not in room at time of encounter  CM reviewed Dr Lloyd Lozoya note which indicates that competency of  is questionable secondary to children identifying , Freeman Bro has having a neurodegenerative disorder  CM met with SHARAN Slade, bedside nurse who feels patient will most likely be here over the weekend, patient still has COREY drains intact  CM will meet with family on Monday to discuss discharge planning     Ivis Pace RN  [phone]

## 2018-02-16 NOTE — OCCUPATIONAL THERAPY NOTE
[address] Progress Note     Patient Name: Audrey Metcalf  OWBUN'Z Date: 2/16/2018  Problem List  Patient Active Problem List   Diagnosis    Pancytopenia ([place] [address] )    Type 2 diabetes mellitus ([place] [address] )    Hyperglycemia due to type 2 diabetes mellitus ([place] [address] )    Hypoalbuminemia due to protein-calorie malnutrition (HCC)    Gastric ulcer    Acute perforated gastric ulcer with hemorrhage ([place] [address] )    Peritonitis ([place] [address] )    Acute blood loss anemia    Hypernatremia    Hypothyroidism    Hyperlipidemia    Multiple sclerosis (HCC)    Metabolic encephalopathy    DIC (disseminated intravascular coagulation) ([place] [address] )    Anemia         02/16/18 1206   Restrictions/Precautions   Weight Bearing Precautions Per Order No   Other Precautions Cognitive; Bed Alarm;Pain; Fall Risk;Multiple lines  (COREY drains)   Pain Assessment   Pain Assessment 0-10   Pain Score 3   Pain Type Chronic pain   Pain Location [facility] Pain Intervention(s) Ambulation/increased activity;Repositioned   Response to Interventions tolerated   Pain Rating: FLACC (Rest) - Face 1   Pain Rating: FLACC (Rest) - Legs 1   Pain Rating: FLACC (Rest) - Activity 0   Pain Rating: FLACC (Rest) - Cry 0   Pain Rating: FLACC (Rest) - Consolability 0   Score: FLACC (Rest) 2   Pain Rating: FLACC (Activity) - Face 1   Pain Rating: FLACC (Activity) - Legs 1   Pain Rating: FLACC (Activity) - Activity 1   Pain Rating: FLACC (Activity) - Cry 1   Pain Rating: FLACC (Activity) - Consolability 1   Score: FLACC (Activity) 5   ADL   Where Assessed Chair   Grooming Assistance 3  Moderate Assistance   Grooming Deficit Setup;Steadying;Supervision/safety; Increased time to complete   Grooming Comments increased time to complete and combing hair   UB Dressing Assistance 2  Maximal Assistance   UB Dressing Deficit Setup; Thread RUE;Pull over head; Thread LUE   LB Dressing Assistance 1  Total Assistance   LB Dressing Deficit Don/doff R sock; Don/doff L sock   Toileting Assistance  1  Total Assistance   Toileting Deficit Setup;Supervison/safety; Increased time to complete   Toileting Comments assist to cleanup and perihygiene   Bed Mobility   Rolling R 2  Maximal assistance   Additional items Assist x 2; Increased time required;Verbal cues;LE management; Bedrails;HOB elevated   Rolling L 2  Maximal assistance   Additional items Assist x 2; Increased time required;Verbal cues; Bedrails   Supine to Sit 2  Maximal assistance   Additional items Assist x 2; Increased time required;Verbal cues;LE management; Bedrails;HOB elevated   Sit to Supine 2  Maximal assistance   Additional items Assist x 2; Increased time required;Verbal cues;LE management; Bedrails   Additional Comments increased time to complete laterally leaning to R while EOB w/ MOD support   Transfers   Sit to Stand Unable to assess   Therapeutic Exercise - ROM   UE-ROM Yes   ROM- Right Upper Extremities   R Shoulder AROM; Flexion; Extension   R Elbow AROM;Elbow flexion;Elbow extension   R Weight/Reps/Sets 2 sets x 10 reps   RUE ROM Comment tolerated well w/ increased time   ROM - Left Upper Extremities    L Shoulder AROM; Flexion; Extension   L Elbow AROM;Elbow flexion;Elbow extension   L Weight/Reps/Sets 2 sets x 10 reps   LUE ROM Comment tolerated well, increased time to complete   Cognition   Overall Cognitive Status Impaired   Arousal/Participation Alert; Responsive   Attention Difficulty attending to directions   Orientation Level Oriented to person;Disoriented to place; Disoriented to time;Disoriented to situation   Memory Decreased short term memory;Decreased recall of recent events;Decreased recall of precautions   Following Commands Follows one step commands with increased time or repetition   Comments pt w/ impaired insight and safety awareness, impaired processing, anxiety   Vision   Vision Comments pt reported there were 10 balloons in her room when there were only 2   Perception   Spatial Orientation impaired   Activity Tolerance   Activity Tolerance Patient limited by fatigue;Patient limited by pain;Treatment limited secondary to medical complications (Comment)   Medical Staff Made Aware appropriate to see per RNRamya   Assessment   Assessment Pt seem for skilled OT session focused on ADLs, b/l UE exercises, bed mobility  Pt completed grooming task of combing hair w/ R UE w/ MOD assist and cues to tend to L side of head  Pt completed b/l UE exercises seen above to increased strength and endurance for ADLs and functional transfers  Pt completed bed mobility w/ MAX assist x 2 supine>sit bed mobility  Pt w/ MOD assist support while seated on EOB w/ lateral lean to R side  Pt seated EOB for 15 minutes w/ MOD support and encouragement  Pt required MAX assist x2 sit>supine  Pt required MAX assist to reposition in bed  PT was found incontinent of bowel at end of session  Pt required total assist and NSG assisting w/ cleaning/hygiene  NSG assisting w/ care  PT continues to be limited due to impaired balance, impaired cognition, impaired safety awareness, visual deficits, increased processing time, impaired balance, impaired strength and endurance  Recommend jong for OOB  Recommend STR, will continue to follow  Plan   Treatment Interventions ADL retraining;Functional transfer training;UE strengthening/ROM; Endurance training;Patient/family training; Compensatory technique education; Energy conservation; Activityengagement;Cognitive reorientation   Goal Expiration Date 02/25/18   Treatment Day 4   OT Frequency 3-5x/wk   Recommendation   OT Discharge Recommendation Short Term Rehab   Barthel Index   Feeding 5   Bathing 0   Grooming Score 0   Dressing Score 0   Bladder Score 5   Bowels Score 5   Toilet Use Score 0   Transfers (Bed/Chair) Score 0   Mobility (Level Surface) Score 0   Stairs Score 5   Barthel Index Score 20   Modified [place] Scale   Modified [place] Scale 4     Documentation completed by: Nate Becker MS, OTR/L

## 2018-02-16 NOTE — PLAN OF CARE
Problem: SLP ADULT - SWALLOWING, IMPAIRED  Goal: Initial SLP swallow eval performed  Outcome: Completed Date Met: 02/16/18

## 2018-02-16 NOTE — PLAN OF CARE
Problem: PHYSICAL THERAPY ADULT  Goal: Performs mobility at highest level of function for planned discharge setting  See evaluation for individualized goals  Treatment/Interventions: LE strengthening/ROM, Therapeutic exercise, Endurance training, Patient/family training, Bed mobility, Continued evaluation, Spoke to nursing          See flowsheet documentation for full assessment, interventions and recommendations  Outcome: Progressing  Prognosis: Fair  Problem List: Decreased strength, Decreased range of motion, Decreased endurance, Impaired balance, Decreased mobility, Impaired judgement, Decreased cognition, Pain, Decreased skin integrity  Assessment: Pt resting in bed at time of PT treatment  Pt was able to tolerate bed mobility this session, however was unable to attempt sit to stand transfers secondary to increased fatigue and pain reported by pt  Pt was educated on the importance of OOB activity while in the hospital however pt continues to refuse  Pt was able to perform bed mobility with less A required compared to previous session, however continues to require frequent VC to redirect attention and for hand placement  Pt was able to sit EOB x 20 min and perform LE therex without any increase in complaints  Significant R lateral lean was noted and pt requires frequent VC and TC to correct  Pt continues to require frequent verbal instruction for proper form and pacing with therex  Pt was assisted back into bed at conclusion of PT session with all needs within reach  Pt denies any further questions at this time  PT will continue to follow  DC recommendation when medically cleared is rehab due to decreased functional mobility at current time  Barriers to Discharge: Decreased caregiver support, Inaccessible home environment     Recommendation: Short-term skilled PT     PT - OK to Discharge: Yes (to rehab when medically cleared )    See flowsheet documentation for full assessment

## 2018-02-16 NOTE — PROGRESS NOTES
[facility] Progress Note  Patient: Tatiana Watt 67 y o  female   MRN: [id number]  PCP: Martir Del Castillo DO  Unit/Bed#: E4 -01 Encounter: [id number]  Date Of Visit: 02/16/18      Assessment/plan  Principal problem  1-sepsis:  Present on admission:  secondary to peritonitis due to a perforated gastric ulcer:  The patient underwent surgical repair   She completed 7 days of antibiotics   Her sepsis had resolved  However pt spiked a fever of 102 on 2/8, she was also noted to be leukopenic and borderline neutropenic with an [address] of 700  she was started by ID on cefepime and Flagyl  CT abdomen/pelvis without evidence of focal infection   Had fluid collection the lesser curvature, however has been stable since previous CAT scan of February 2nd   There was discussion about IR to drain this fluid  It is recommended to monitor pt since collection has been stable  ID d/jesus antibiotics to see if infection declares itself  Pt has been afebrile  If pt does have a fever will need to rediscuss with IR about drainage of fluid collection               -appreciate surgery re eval  Possible removal of amilcar drains soon     2-perforated gastric ulcer:  she was orginally in the ICU and then transferred to surgery service after silvia patch repair  She was then switched to internal medicine service as she is pod 15  S/p EGD by GI showed 2 small healing duodenal bulb ulcers and a large gastric ulcer  She is to continue ppi bid and carafate  she was changed from IV ppi to po bid as she was tolerating her diet  Pt had an episode of melena today  She does have a decreased hemoglobin from 11 to 10 3  Will repeat h/h at 2pm  Will change pt back to IV ppi  May need to have GI reeval pt  Again please see principal problem number 1 about AMILCAR drain  She will need repeat egd in 2 months     3  Acute/chronic blood loss anemia and pancytopenia- pt had acute blood loss due to ulcer  She has chronic anemia due to pancytopenia   She was transfused approx 7 units of prbc  Please see principal problem number 2  Repeat h/h at 2pm     4  Acute cva- unfortunately can not anticoagulate due to thrombocytopenia  Will continue statin  Appreciate neurology recommendations  Continue physical therapy and speech therapy     Active problems  1  -diabetes type 2:  continue to hold po antidiabetic medications  Continue insulin sliding scale  Increased lantus to 10 units  Continue insulin sliding scale       2-hypernatremia:resolved     3-pancytopenia: appreciate hematology recommendations  S/p granix  S/p bone marrow biopsy  Awaiting results  Thrombocytopenia is stable       4-hypothyroidism:  Continue Synthroid     5-hyperlipidemia:  Statin on hold     6-multiple sclerosis:  Imuran on hold  appreciate Neurology consultation and recommendations  Unfortunately unable to restart imuran due to pancytopenia  No steroids due to recent infection and steroids  Also pt will likely not respond to steroids as she was on these chronically outpt  Likely slow recovery  Neurology awaiting records of type of ms       7-left hand cat bite:  No evidence of cellulitis   Status post 7 days of antibiotics      8-acute metabolic encephalopathy: Most likely multifactorial, secondary to sedating medications, acute cva, ms, likely underlying dementia, possible hypoxic injury from anemia  Pt improved today  Will limit sedating medications                    9-intermittent elevated blood presure:  Without prior h/o HTN   pts sbp is 120-160  Continue lisinopril       10 left upper extremity DVT:  Secondary to PICC line   Have removed PICC line   Unfortunately patient may not be on anticoagulation due to pancytopenia     11-decubitus ulcers:  Appreciate wound Care team's evaluation recommendations      12- deconditioning- 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  Pt will need STR  May be difficult to get pt to participate in physical therapy     dispo- Pt will need str   Tried to call daughter and son for update but only got vm     Subjective:   Pt seen and examined  Pt had melena today  No other problems reported  She was being cleaned up after melena  Objective:     Vitals: Blood pressure 157/73, pulse 86, temperature 98 7 °F (37 1 °C), temperature source Temporal, resp  rate 18, height 5' 6" (1 676 m), weight 76 2 kg (167 lb 15 9 oz), SpO2 97 %  ,Body mass index is 27 11 kg/m²  Lab, Imaging and other studies:    Results from last 7 days  Lab Units 02/16/18  0608  02/12/18  0442   WBC Thousand/uL 9 89  < > 9 24   HEMOGLOBIN g/dL 10 3*  < > 11 1*   HEMATOCRIT % 31 4*  < > 32 7*   PLATELETS Thousands/uL 65*  < > 43*   INR   --   --  1 05   < > = values in this interval not displayed  Results from last 7 days  Lab Units 02/15/18  0455  02/10/18  0431   SODIUM mmol/L 139  < > 138   POTASSIUM mmol/L 3 7  < > 4 1   CHLORIDE mmol/L 106  < > 107   CO2 mmol/L 27  < > 25   BUN mg/dL 8  < > 14   CREATININE mg/dL 0 54*  < > 0 64   CALCIUM mg/dL 8 3  < > 7 9*   TOTAL PROTEIN g/dL  --   --  3 7*   BILIRUBIN TOTAL mg/dL  --   --  1 24*   ALK PHOS U/L  --   --  47   ALT U/L  --   --  12   AST U/L  --   --  20   GLUCOSE RANDOM mg/dL 208*  < > 137   < > = values in this interval not displayed  Lab Results   Component Value Date    BLOODCX No Growth After 5 Days  02/08/2018    BLOODCX No Growth After 5 Days  02/08/2018    BLOODCX No Growth After 5 Days  01/31/2018    BLOODCX No Growth After 5 Days   01/31/2018     Scheduled Meds:   Current Facility-Administered Medications:  acetaminophen 650 mg Oral Q4H PRN Rakesh Hernandez MD   atorvastatin 40 mg Oral Daily With Jasper-Avery, DO   calcium carbonate 500 mg Oral TID PRN Rakesh Hernandez MD   hydrALAZINE 5 mg Intravenous Q6H PRN Rakesh Hernandez MD   [START ON 2/17/2018] insulin glargine 10 Units Subcutaneous JUWAN Delcid, DO   insulin lispro 1-5 Units Subcutaneous TID AC Robert W Spatzer, CRNP   insulin lispro 1-5 Units Subcutaneous HS Ruthe Pouch Spatzer, CRNP   levothyroxine 200 mcg Oral Early Morning Magdalene Delcid, DO   lisinopril 40 mg Oral Daily Magdalene Delcid DO   ondansetron 4 mg Intravenous Q4H PRN PARRISH Martinez   pantoprazole 40 mg Intravenous Q12H [address] Magdalene Farhana, DO   sucralfate 1,000 mg Oral BID AC Jc Lorenz MD     Continuous Infusions:    PRN Meds:   acetaminophen    calcium carbonate    hydrALAZINE    ondansetron      Physical exam:  Physical Exam  General appearance: alert and oriented, in no acute distress  Head: Normocephalic, without obvious abnormality, atraumatic  Eyes: conjunctivae/corneas clear  PERRL, EOM's intact  Fundi benign  Neck: no adenopathy, no carotid bruit, no JVD, supple, symmetrical, trachea midline and thyroid not enlarged, symmetric, no tenderness/mass/nodules  Lungs: listened anteriorly but clear  Heart: regular rate and rhythm, S1, S2 normal, no murmur, click, rub or gallop  Abdomen: soft, non-tender; bowel sounds normal; no masses,  no organomegaly  Extremities: extremities normal, warm and well-perfused; no cyanosis, clubbing, or edema  Pulses: 2+ and symmetric  Skin: Skin color, texture, turgor normal  No rashes or lesions  Neurologic: Mental status: awake alert oriented x1 person but she did know she was in a facility   she thought she was in rehab      VTE Pharmacologic Prophylaxis: Reason for no pharmacologic prophylaxis thrombocytopenia  VTE Mechanical Prophylaxis: sequential compression device    Counseling / Coordination of Care  Total floor / unit time spent today 20 minutes     Current Length of Stay: 19 day(s)    Current Patient Status: Inpatient       Code Status: Level 3 - DNAR and DNI

## 2018-02-16 NOTE — PLAN OF CARE
Problem: Potential for Falls  Goal: Patient will remain free of falls  INTERVENTIONS:  - Assess patient frequently for physical needs  -  Identify cognitive and physical deficits and behaviors that affect risk of falls    -  [place] fall precautions as indicated by assessment   - Educate patient/family on patient safety including physical limitations  - Instruct patient to call for assistance with activity based on assessment  - Modify environment to reduce risk of injury  - Consider OT/PT consult to assist with strengthening/mobility   Outcome: Progressing      Problem: Prexisting or High Potential for Compromised Skin Integrity  Goal: Skin integrity is maintained or improved  INTERVENTIONS:  - Identify patients at risk for skin breakdown  - Assess and monitor skin integrity  - Assess and monitor nutrition and hydration status  - Monitor labs (i e  albumin)  - Assess for incontinence   - Turn and reposition patient  - Assist with mobility/ambulation  - Relieve pressure over bony prominences  - Avoid friction and shearing  - Provide appropriate hygiene as needed including keeping skin clean and dry  - Evaluate need for skin moisturizer/barrier cream  - Collaborate with interdisciplinary team (i e  Nutrition, Rehabilitation, etc )   - Patient/family teaching   Outcome: Progressing      Problem: PAIN - ADULT  Goal: Verbalizes/displays adequate comfort level or baseline comfort level  Interventions:  - Encourage patient to monitor pain and request assistance  - Assess pain using appropriate pain scale  - Administer analgesics based on type and severity of pain and evaluate response  - Implement non-pharmacological measures as appropriate and evaluate response  - Consider cultural and social influences on pain and pain management  - Notify physician/advanced practitioner if interventions unsuccessful or patient reports new pain   Outcome: Progressing      Problem: INFECTION - ADULT  Goal: Absence or prevention of progression during hospitalization  INTERVENTIONS:  - Assess and monitor for signs and symptoms of infection  - Monitor lab/diagnostic results  - Monitor all insertion sites, i e  indwelling lines, tubes, and drains  - Monitor endotracheal (as able) and nasal secretions for changes in amount and color  - [place] appropriate cooling/warming therapies per order  - Administer medications as ordered  - Instruct and encourage patient and family to use good hand hygiene technique  - Identify and instruct in appropriate isolation precautions for identified infection/condition   Outcome: Progressing      Problem: SAFETY ADULT  Goal: Maintain or return to baseline ADL function  INTERVENTIONS:  -  Assess patient's ability to carry out ADLs; assess patient's baseline for ADL function and identify physical deficits which impact ability to perform ADLs (bathing, care of mouth/teeth, toileting, grooming, dressing, etc )  - Assess/evaluate cause of self-care deficits   - Assess range of motion  - Assess patient's mobility; develop plan if impaired  - Assess patient's need for assistive devices and provide as appropriate  - Encourage maximum independence but intervene and supervise when necessary  ¯ Involve family in performance of ADLs  ¯ Assess for home care needs following discharge   ¯ Request OT consult to assist with ADL evaluation and planning for discharge  ¯ Provide patient education as appropriate   Outcome: Progressing    Goal: Maintain or return mobility status to optimal level  INTERVENTIONS:  - Assess patient's baseline mobility status (ambulation, transfers, stairs, etc )    - Identify cognitive and physical deficits and behaviors that affect mobility  - Identify mobility aids required to assist with transfers and/or ambulation (gait belt, sit-to-stand, lift, walker, cane, etc )  - [place] fall precautions as indicated by assessment  - Record patient progress and toleration of activity level on Mobility SBAR; progress patient to next Phase/Stage  - Instruct patient to call for assistance with activity based on assessment  - Request Rehabilitation consult to assist with strengthening/weightbearing, etc    Outcome: Progressing      Problem: DISCHARGE PLANNING  Goal: Discharge to home or other facility with appropriate resources  INTERVENTIONS:  - Identify barriers to discharge w/patient and caregiver  - Arrange for needed discharge resources and transportation as appropriate  - Identify discharge learning needs (meds, wound care, etc )  - Arrange for interpretive services to assist at discharge as needed  - Refer to Case Management Department for coordinating discharge planning if the patient needs post-hospital services based on physician/advanced practitioner order or complex needs related to functional status, cognitive ability, or social support system   Outcome: Progressing      Problem: Knowledge Deficit  Goal: Patient/family/caregiver demonstrates understanding of disease process, treatment plan, medications, and discharge instructions  Complete learning assessment and assess knowledge base  Interventions:  - Provide teaching at level of understanding  - Provide teaching via preferred learning methods   Outcome: Progressing      Problem: Nutrition/Hydration-ADULT  Goal: Nutrient/Hydration intake appropriate for improving, restoring or maintaining nutritional needs  Monitor and assess patient's nutrition/hydration status for malnutrition (ex- brittle hair, bruises, dry skin, pale skin and conjunctiva, muscle wasting, smooth red tongue, and disorientation)  Collaborate with interdisciplinary team and initiate plan and interventions as ordered  Monitor patient's weight and dietary intake as ordered or per policy  Utilize nutrition screening tool and intervene per policy  Determine patient's food preferences and provide high-protein, high-caloric foods as appropriate       INTERVENTIONS:  - Monitor oral intake, urinary output, labs, and treatment plans  - Assess nutrition and hydration status and recommend course of action  - Evaluate amount of meals eaten  - Assist patient with eating if necessary   - Allow adequate time for meals  - Recommend/ encourage appropriate diets, oral nutritional supplements, and vitamin/mineral supplements  - Order, calculate, and assess calorie counts as needed  - Recommend, monitor, and adjust tube feedings and TPN/PPN based on assessed needs  - Assess need for intravenous fluids  - Provide specific nutrition/hydration education as appropriate  - Include patient/family/caregiver in decisions related to nutrition   Outcome: Progressing      Problem: DISCHARGE PLANNING - CARE MANAGEMENT  Goal: Discharge to post-acute care or home with appropriate resources  INTERVENTIONS:  - Conduct assessment to determine patient/family and health care team treatment goals, and need for post-acute services based on payer coverage, community resources, and patient preferences, and barriers to discharge  - Address psychosocial, clinical, and financial barriers to discharge as identified in assessment in conjunction with the patient/family and health care team  - Arrange appropriate level of post-acute services according to patient's   needs and preference and payer coverage in collaboration with the physician and health care team  - Communicate with and update the patient/family, physician, and health care team regarding progress on the discharge plan  - Arrange appropriate transportation to post-acute venues    Outcome: Progressing      Problem: Neurological Deficit  Goal: Neurological status is stable or improving  Interventions:  - Monitor and assess patient's level of consciousness, motor function, sensory function, and level of assistance needed for ADLs  - Monitor and report changes from baseline  Collaborate with interdisciplinary team to initiate plan and implement interventions as ordered     - Provide and maintain a safe environment  - Utilize seizure precautions  - Utilize fall precautions  - Utilize aspiration precautions  - Utilize bleeding precautions  Outcome: Progressing      Problem: Activity Intolerance/Impaired Mobility  Goal: Mobility/activity is maintained at optimum level for patient  Interventions:  - Assess and monitor patient  barriers to mobility and need for assistive/adaptive devices  - Assess patient's emotional response to limitations  - Collaborate with interdisciplinary team and initiate plans and interventions as ordered  - Encourage independent activity per ability   - Maintain proper body alignment  - Perform active/passive rom as tolerated/ordered  - Plan activities to conserve energy   - Turn patient   Outcome: Progressing      Problem: Communication Impairment  Goal: Ability to express needs and understand communication  Assess patient's communication skills and ability to understand information  Patient will demonstrate use of effective communication techniques, alternative methods of communication and understanding even if not able to speak  - Encourage communication and provide alternate methods of communication as needed  - Collaborate with case management/ for discharge needs  - Include patient/family/caregiver in decisions related to communication  Outcome: Progressing      Problem: Potential for Aspiration  Goal: Non-ventilated patient's risk of aspiration is minimized  Assess and monitor vital signs, respiratory status, and labs (WBC)  Monitor for signs of aspiration (tachypnea, cough, rales, wheezing, cyanosis, fever)  - Assess and monitor patient's ability to swallow  - Place patient up in chair to eat if possible  - HOB up at 90 degrees to eat if unable to get patient up into chair   - Supervise patient during oral intake  - Instruct patient to take small bites  - Instruct patient to take small single sips when taking liquids    - Follow patient-specific strategies generated by speech pathologist    Outcome: Progressing      Problem: Nutrition  Goal: Nutrition/Hydration status is improving  Monitor and assess patient's nutrition/hydration status for malnutrition (ex- brittle hair, bruises, dry skin, pale skin and conjunctiva, muscle wasting, smooth red tongue, and disorientation)  Collaborate with interdisciplinary team and initiate plan and interventions as ordered  Monitor patient's weight and dietary intake as ordered or per policy  Utilize nutrition screening tool and intervene per policy  Determine patient's food preferences and provide high-protein, high-caloric foods as appropriate  - Assist patient with eating   - Allow adequate time for meals   - Encourage patient to take dietary supplement as ordered  - Collaborate with clinical nutritionist   - Include patient/family/caregiver in decisions related to nutrition  Outcome: Progressing      Problem: NEUROSENSORY - ADULT  Goal: Achieves stable or improved neurological status  INTERVENTIONS  - Monitor and report changes in neurological status  - Initiate measures to prevent increased intracranial pressure  - Maintain blood pressure and fluid volume within ordered parameters to optimize cerebral perfusion  - Monitor temperature, glucose, and sodium or any other associated labs   Initiate appropriate interventions as ordered  - Monitor for seizure activity   - Administer anti-seizure medications as ordered   Outcome: Progressing      Problem: METABOLIC, FLUID AND ELECTROLYTES - ADULT  Goal: Glucose maintained within target range  INTERVENTIONS:  - Monitor Blood Glucose as ordered  - Assess for signs and symptoms of hyperglycemia and hypoglycemia  - Administer ordered medications to maintain glucose within target range  - Assess nutritional intake and initiate nutrition service referral as needed   Outcome: Progressing      Problem: SKIN/TISSUE INTEGRITY - ADULT  Goal: Skin integrity remains intact  INTERVENTIONS  - Identify patients at risk for skin breakdown  - Assess and monitor skin integrity  - Assess and monitor nutrition and hydration status  - Monitor labs (i e  albumin)  - Assess for incontinence   - Turn and reposition patient  - Assist with mobility/ambulation  - Relieve pressure over bony prominences  - Avoid friction and shearing  - Provide appropriate hygiene as needed including keeping skin clean and dry  - Evaluate need for skin moisturizer/barrier cream  - Collaborate with interdisciplinary team (i e  Nutrition, Rehabilitation, etc )   - Patient/family teaching   Outcome: Progressing    Goal: Incision(s), wounds(s) or drain site(s) healing without S/S of infection  INTERVENTIONS  - Assess and document risk factors for skin impairment   - Assess and document dressing, incision, wound bed, drain sites and surrounding tissue  - Initiate Nutrition services consult and/or wound management as needed   Outcome: Progressing      Problem: HEMATOLOGIC - ADULT  Goal: Maintains hematologic stability  INTERVENTIONS  - Assess for signs and symptoms of bleeding or hemorrhage  - Monitor labs  - Administer supportive blood products/factors as ordered and appropriate   Outcome: Progressing      Problem: MUSCULOSKELETAL - ADULT  Goal: Maintain or return mobility to safest level of function  INTERVENTIONS:  - Assess patient's ability to carry out ADLs; assess patient's baseline for ADL function and identify physical deficits which impact ability to perform ADLs (bathing, care of mouth/teeth, toileting, grooming, dressing, etc )  - Assess/evaluate cause of self-care deficits   - Assess range of motion  - Assess patient's mobility; develop plan if impaired  - Assess patient's need for assistive devices and provide as appropriate  - Encourage maximum independence but intervene and supervise when necessary  - Involve family in performance of ADLs  - Assess for home care needs following discharge   - Request OT consult to assist with ADL evaluation and planning for discharge  - Provide patient education as appropriate   Outcome: Progressing

## 2018-02-17 LAB
ANION GAP SERPL CALCULATED.3IONS-SCNC: 7 MMOL/L (ref 4–13)
BUN SERPL-MCNC: 8 MG/DL (ref 5–25)
CALCIUM SERPL-MCNC: 8.1 MG/DL (ref 8.3–10.1)
CHLORIDE SERPL-SCNC: 108 MMOL/L (ref 100–108)
CHOLEST SERPL-MCNC: 98 MG/DL (ref 50–200)
CO2 SERPL-SCNC: 26 MMOL/L (ref 21–32)
CREAT SERPL-MCNC: 0.45 MG/DL (ref 0.6–1.3)
ERYTHROCYTE [DISTWIDTH] IN BLOOD BY AUTOMATED COUNT: 15 % (ref 11.6–15.1)
GFR SERPL CREATININE-BSD FRML MDRD: 100 ML/MIN/1.73SQ M
GLUCOSE SERPL-MCNC: 170 MG/DL (ref 65–140)
GLUCOSE SERPL-MCNC: 201 MG/DL (ref 65–140)
GLUCOSE SERPL-MCNC: 285 MG/DL (ref 65–140)
GLUCOSE SERPL-MCNC: 286 MG/DL (ref 65–140)
GLUCOSE SERPL-MCNC: 379 MG/DL (ref 65–140)
HCT VFR BLD AUTO: 30.2 % (ref 34.8–46.1)
HDLC SERPL-MCNC: 26 MG/DL (ref 40–60)
HGB BLD-MCNC: 9.8 G/DL (ref 11.5–15.4)
LDLC SERPL CALC-MCNC: 48 MG/DL (ref 0–100)
MCH RBC QN AUTO: 29.9 PG (ref 26.8–34.3)
MCHC RBC AUTO-ENTMCNC: 32.5 G/DL (ref 31.4–37.4)
MCV RBC AUTO: 92 FL (ref 82–98)
PLATELET # BLD AUTO: 63 THOUSANDS/UL (ref 149–390)
PMV BLD AUTO: 12.1 FL (ref 8.9–12.7)
POTASSIUM SERPL-SCNC: 3.5 MMOL/L (ref 3.5–5.3)
RBC # BLD AUTO: 3.28 MILLION/UL (ref 3.81–5.12)
SODIUM SERPL-SCNC: 141 MMOL/L (ref 136–145)
TRIGL SERPL-MCNC: 122 MG/DL
WBC # BLD AUTO: 8.22 THOUSAND/UL (ref 4.31–10.16)

## 2018-02-17 PROCEDURE — 87338 HPYLORI STOOL AG IA: CPT | Performed by: INTERNAL MEDICINE

## 2018-02-17 PROCEDURE — 99232 SBSQ HOSP IP/OBS MODERATE 35: CPT | Performed by: INTERNAL MEDICINE

## 2018-02-17 PROCEDURE — 80048 BASIC METABOLIC PNL TOTAL CA: CPT | Performed by: INTERNAL MEDICINE

## 2018-02-17 PROCEDURE — 85027 COMPLETE CBC AUTOMATED: CPT | Performed by: INTERNAL MEDICINE

## 2018-02-17 PROCEDURE — 82948 REAGENT STRIP/BLOOD GLUCOSE: CPT

## 2018-02-17 PROCEDURE — 80061 LIPID PANEL: CPT | Performed by: NURSE PRACTITIONER

## 2018-02-17 PROCEDURE — C9113 INJ PANTOPRAZOLE SODIUM, VIA: HCPCS | Performed by: INTERNAL MEDICINE

## 2018-02-17 RX ORDER — SUCRALFATE ORAL 1 G/10ML
1000 SUSPENSION ORAL
Status: DISCONTINUED | OUTPATIENT
Start: 2018-02-17 | End: 2018-02-26 | Stop reason: HOSPADM

## 2018-02-17 RX ORDER — INSULIN GLARGINE 100 [IU]/ML
12 INJECTION, SOLUTION SUBCUTANEOUS EVERY MORNING
Status: DISCONTINUED | OUTPATIENT
Start: 2018-02-18 | End: 2018-02-18

## 2018-02-17 RX ORDER — OXYCODONE HYDROCHLORIDE 5 MG/1
2.5 TABLET ORAL EVERY 8 HOURS PRN
Status: DISCONTINUED | OUTPATIENT
Start: 2018-02-17 | End: 2018-02-18

## 2018-02-17 RX ADMIN — INSULIN LISPRO 3 UNITS: 100 INJECTION, SOLUTION INTRAVENOUS; SUBCUTANEOUS at 13:00

## 2018-02-17 RX ADMIN — INSULIN LISPRO 3 UNITS: 100 INJECTION, SOLUTION INTRAVENOUS; SUBCUTANEOUS at 22:46

## 2018-02-17 RX ADMIN — PANTOPRAZOLE SODIUM 40 MG: 40 INJECTION, POWDER, FOR SOLUTION INTRAVENOUS at 09:37

## 2018-02-17 RX ADMIN — INSULIN LISPRO 3 UNITS: 100 INJECTION, SOLUTION INTRAVENOUS; SUBCUTANEOUS at 17:27

## 2018-02-17 RX ADMIN — SUCRALFATE 1000 MG: 1 SUSPENSION ORAL at 17:34

## 2018-02-17 RX ADMIN — LEVOTHYROXINE SODIUM 200 MCG: 100 TABLET ORAL at 05:22

## 2018-02-17 RX ADMIN — INSULIN LISPRO 1 UNITS: 100 INJECTION, SOLUTION INTRAVENOUS; SUBCUTANEOUS at 09:38

## 2018-02-17 RX ADMIN — ACETAMINOPHEN 650 MG: 325 TABLET, FILM COATED ORAL at 12:07

## 2018-02-17 RX ADMIN — PANTOPRAZOLE SODIUM 40 MG: 40 INJECTION, POWDER, FOR SOLUTION INTRAVENOUS at 22:42

## 2018-02-17 RX ADMIN — LISINOPRIL 40 MG: 20 TABLET ORAL at 09:37

## 2018-02-17 RX ADMIN — SUCRALFATE 1000 MG: 1 SUSPENSION ORAL at 22:43

## 2018-02-17 RX ADMIN — SUCRALFATE 1000 MG: 1 SUSPENSION ORAL at 05:22

## 2018-02-17 RX ADMIN — INSULIN LISPRO 2 UNITS: 100 INJECTION, SOLUTION INTRAVENOUS; SUBCUTANEOUS at 17:27

## 2018-02-17 RX ADMIN — INSULIN LISPRO 2 UNITS: 100 INJECTION, SOLUTION INTRAVENOUS; SUBCUTANEOUS at 12:30

## 2018-02-17 RX ADMIN — ATORVASTATIN CALCIUM 40 MG: 40 TABLET, FILM COATED ORAL at 17:31

## 2018-02-17 RX ADMIN — OXYCODONE HYDROCHLORIDE 2.5 MG: 5 TABLET ORAL at 17:27

## 2018-02-17 NOTE — PROGRESS NOTES
[facility] Progress Note  Patient: River Moore 67 y o  female   MRN: [id number]  PCP: Favio Gage DO  Unit/Bed#: E4 -01 Encounter: [id number]  Date Of Visit: 02/17/18      Assessment/plan  Principal problem  1-sepsis:  Present on admission:  secondary to peritonitis due to a perforated gastric ulcer:  The patient underwent surgical repair   She completed 7 days of antibiotics   no further spikes in temp  Continue to monitor off of antibiotics   If pt spikes temp will need to discuss with IR about drainage of small fluid collection seen in abd                -still awaiting surgery follow up for amilcar eval and removal  If they do not see over weekend will need to call them to see her to remove on Monday       2-perforated gastric ulcer:  she was orginally in the ICU and then transferred to surgery service after silvia patch repair  She was then switched to internal medicine service  S/p EGD by GI showed 2 small healing duodenal bulb ulcers and a large gastric ulcer  She is to continue ppi bid and carafate  she was changed from IV ppi to po bid as she was tolerating her diet  But she had an episode of melena yesterday  She does have a decreased hemoglobin from 11 to 9 8  Pt was changed back to IV ppi  If her h/h remains stable and no further melena will try po ppi again  - pt will ultimately need repeat egd in 2 months to confirm healing   - will check stool for h pylori     3  Acute/chronic blood loss anemia and pancytopenia- pt had acute blood loss due to ulcer  She has chronic anemia due to pancytopenia  She was transfused approx 7 units of prbc  Please see principal problem number 2  Hemoglobin dropped to 9 8      4  Acute cva- please see principal problem 3 about ac and platelets  Cta showed 5mm segment of occlusive critical stenosis of right p1  Likely cause of the cva  Factor 5 leiden pending  Prothrombin pending   Likely due not need tommy and loop recorder however will defer to neurology for final say     -pt is high risk for ac and antiplatelet at this point  Spoke with GI  She will need a repeat EGD in 2 months to confirm healing  No ac or antiplatelets prior to that  - will also need to wait to start antiplatelets until full etiology of pancytopenia has been obtained from bone marrow biopsy    Active problems  1  -diabetes type 2:  continue to hold po antidiabetic medications  Continue insulin sliding scale  Pt has had variable po intake but increase lantus to 12 units  Will add 3 units of lispro with meals  Continue insulin sliding scale       2-hypernatremia:resolved     3-pancytopenia: appreciate hematology recommendations  Could be from imuran but due to the degree r/o other etiologies  S/p granix  S/p bone marrow biopsy  Awaiting results  Thrombocytopenia is stable       4-hypothyroidism:  Continue Synthroid     5-hyperlipidemia:  Statin on hold     6-multiple sclerosis:  Imuran on hold  appreciate Neurology consultation and recommendations  Unfortunately unable to restart imuran due to pancytopenia  No steroids due to recent infection and steroids  Also pt will likely not respond to steroids as she was on these chronically outpt   Likely slow recovery  Neurology awaiting records of type of ms       7-left hand cat bite:  No evidence of cellulitis   Status post 7 days of antibiotics      8-acute metabolic encephalopathy: Most likely multifactorial, secondary to sedating medications, acute cva, ms, likely underlying dementia, possible hypoxic injury from anemia  Pt improved further again today                 9-intermittent elevated blood presure:  Without prior h/o HTN   sbp improved this am in the 130s   Continue lisinopril and monitor       10 left upper extremity DVT:  Secondary to PICC line   Have removed PICC line   Unfortunately patient may not be on anticoagulation due to pancytopenia     11-decubitus ulcers:  Appreciate wound Care team's evaluation recommendations      12- deconditioning- 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  Pt will need STR  May be difficult to get pt to participate in physical therapy     dispo- Pt will need str  Spoke to her son on the phone and updated him  Appreciate palliative care recommendations  Pts family wishes to pursue physical therapy    Subjective:   Pt seen and examined  Pt is more alert today  She is able to follow conversation  She was not as easily distracted  She held eye contact today  She ate 80% of her breakfast this am per nursing  She has not had any further melena  She was talking about her drains today and is hoping they will be removed soon  No other problems    Objective:     Vitals: Blood pressure 134/78, pulse 83, temperature 98 °F (36 7 °C), temperature source Tympanic, resp  rate 18, height 5' 6" (1 676 m), weight 76 2 kg (167 lb 15 9 oz), SpO2 99 %  ,Body mass index is 27 11 kg/m²  Lab, Imaging and other studies:    Results from last 7 days  Lab Units 02/17/18  0521  02/12/18  0442   WBC Thousand/uL 8 22  < > 9 24   HEMOGLOBIN g/dL 9 8*  < > 11 1*   HEMATOCRIT % 30 2*  < > 32 7*   PLATELETS Thousands/uL 63*  < > 43*   INR   --   --  1 05   < > = values in this interval not displayed  Results from last 7 days  Lab Units 02/17/18  0521   SODIUM mmol/L 141   POTASSIUM mmol/L 3 5   CHLORIDE mmol/L 108   CO2 mmol/L 26   BUN mg/dL 8   CREATININE mg/dL 0 45*   CALCIUM mg/dL 8 1*   GLUCOSE RANDOM mg/dL 170*         Lab Results   Component Value Date    BLOODCX No Growth After 5 Days  02/08/2018    BLOODCX No Growth After 5 Days  02/08/2018    BLOODCX No Growth After 5 Days  01/31/2018    BLOODCX No Growth After 5 Days   01/31/2018       Scheduled Meds:   Current Facility-Administered Medications:  acetaminophen 650 mg Oral Q4H PRN Keyshawn Bethea MD   atorvastatin 40 mg Oral Daily With [place], DO   calcium carbonate 500 mg Oral TID PRN Keyshawn Bethea MD   hydrALAZINE 5 mg Intravenous Q6H PRN Macey Alonso MD   [START ON 2/18/2018] insulin glargine 12 Units Subcutaneous QAM Magdalene Delcid DO   insulin lispro 1-5 Units Subcutaneous TID AC Robert W Spatzer, CRNP   insulin lispro 1-5 Units Subcutaneous HS Radha Stabile Spatzer, CRNP   levothyroxine 200 mcg Oral Early Morning Magdalenecharla Delcid, DO   lisinopril 40 mg Oral Daily Magdalenecharla Delcid, DO   ondansetron 4 mg Intravenous Q4H PRN Ace PARRISH Zamorano   pantoprazole 40 mg Intravenous Q12H [address] Magdalene Farhana, DO   sucralfate 1,000 mg Oral BID AC Darcie Morris MD     Continuous Infusions:    PRN Meds:   acetaminophen    calcium carbonate    hydrALAZINE    ondansetron      Physical exam:  Physical Exam  General appearance: awake alert oriented x2 person, place  Head: Normocephalic, without obvious abnormality, atraumatic  Eyes: conjunctivae/corneas clear  PERRL, EOM's intact  Fundi benign  Neck: no adenopathy, no carotid bruit, no JVD, supple, symmetrical, trachea midline and thyroid not enlarged, symmetric, no tenderness/mass/nodules  Lungs: clear to auscultation bilaterally  Heart: regular rate and rhythm, S1, S2 normal, no murmur, click, rub or gallop  Abdomen: soft, non-tender; bowel sounds normal; no masses,  no organomegaly  Extremities: extremities normal, warm and well-perfused; no cyanosis, clubbing, or edema  Pulses: 2+ and symmetric  Skin: Skin color, texture, turgor normal  No rashes or lesions  Neurologic: Mental status: awake alert oriented x2 person, place   Pt follows commands better today but still having proximal weakness and more difficulty with right side      VTE Pharmacologic Prophylaxis: Reason for no pharmacologic prophylaxis anemia and thromboctyopenia  VTE Mechanical Prophylaxis: sequential compression device    Counseling / Coordination of Care  Total floor / unit time spent today 20 minutes   Current Length of Stay: 20 day(s)    Current Patient Status: Inpatient       Code Status: Level 3 - DNAR and DNI

## 2018-02-18 ENCOUNTER — APPOINTMENT (INPATIENT)
Dept: CT IMAGING | Facility: HOSPITAL | Age: 73
DRG: 853 | End: 2018-02-18
Payer: MEDICARE

## 2018-02-18 LAB
ANION GAP SERPL CALCULATED.3IONS-SCNC: 7 MMOL/L (ref 4–13)
BUN SERPL-MCNC: 11 MG/DL (ref 5–25)
CALCIUM SERPL-MCNC: 8.2 MG/DL (ref 8.3–10.1)
CHLORIDE SERPL-SCNC: 105 MMOL/L (ref 100–108)
CO2 SERPL-SCNC: 26 MMOL/L (ref 21–32)
CREAT SERPL-MCNC: 0.45 MG/DL (ref 0.6–1.3)
ERYTHROCYTE [DISTWIDTH] IN BLOOD BY AUTOMATED COUNT: 15 % (ref 11.6–15.1)
GFR SERPL CREATININE-BSD FRML MDRD: 100 ML/MIN/1.73SQ M
GLUCOSE SERPL-MCNC: 141 MG/DL (ref 65–140)
GLUCOSE SERPL-MCNC: 196 MG/DL (ref 65–140)
GLUCOSE SERPL-MCNC: 214 MG/DL (ref 65–140)
GLUCOSE SERPL-MCNC: 224 MG/DL (ref 65–140)
GLUCOSE SERPL-MCNC: 244 MG/DL (ref 65–140)
GLUCOSE SERPL-MCNC: 299 MG/DL (ref 65–140)
H PYLORI AG STL QL IA: NEGATIVE
HCT VFR BLD AUTO: 31 % (ref 34.8–46.1)
HGB BLD-MCNC: 10.2 G/DL (ref 11.5–15.4)
MCH RBC QN AUTO: 29.9 PG (ref 26.8–34.3)
MCHC RBC AUTO-ENTMCNC: 32.9 G/DL (ref 31.4–37.4)
MCV RBC AUTO: 91 FL (ref 82–98)
PLATELET # BLD AUTO: 28 THOUSANDS/UL (ref 149–390)
PMV BLD AUTO: 10.8 FL (ref 8.9–12.7)
POTASSIUM SERPL-SCNC: 4.3 MMOL/L (ref 3.5–5.3)
RBC # BLD AUTO: 3.41 MILLION/UL (ref 3.81–5.12)
SODIUM SERPL-SCNC: 138 MMOL/L (ref 136–145)
WBC # BLD AUTO: 9.97 THOUSAND/UL (ref 4.31–10.16)

## 2018-02-18 PROCEDURE — 85027 COMPLETE CBC AUTOMATED: CPT | Performed by: INTERNAL MEDICINE

## 2018-02-18 PROCEDURE — 80048 BASIC METABOLIC PNL TOTAL CA: CPT | Performed by: INTERNAL MEDICINE

## 2018-02-18 PROCEDURE — 74177 CT ABD & PELVIS W/CONTRAST: CPT

## 2018-02-18 PROCEDURE — P9037 PLATE PHERES LEUKOREDU IRRAD: HCPCS

## 2018-02-18 PROCEDURE — 99233 SBSQ HOSP IP/OBS HIGH 50: CPT | Performed by: INTERNAL MEDICINE

## 2018-02-18 PROCEDURE — 82948 REAGENT STRIP/BLOOD GLUCOSE: CPT

## 2018-02-18 PROCEDURE — 86644 CMV ANTIBODY: CPT

## 2018-02-18 PROCEDURE — 99232 SBSQ HOSP IP/OBS MODERATE 35: CPT | Performed by: INTERNAL MEDICINE

## 2018-02-18 PROCEDURE — C9113 INJ PANTOPRAZOLE SODIUM, VIA: HCPCS | Performed by: INTERNAL MEDICINE

## 2018-02-18 PROCEDURE — 87040 BLOOD CULTURE FOR BACTERIA: CPT | Performed by: INTERNAL MEDICINE

## 2018-02-18 RX ORDER — OXYCODONE HYDROCHLORIDE 5 MG/1
2.5 TABLET ORAL EVERY 4 HOURS PRN
Status: DISCONTINUED | OUTPATIENT
Start: 2018-02-18 | End: 2018-02-26 | Stop reason: HOSPADM

## 2018-02-18 RX ORDER — MORPHINE SULFATE 2 MG/ML
1 INJECTION, SOLUTION INTRAMUSCULAR; INTRAVENOUS ONCE
Status: COMPLETED | OUTPATIENT
Start: 2018-02-18 | End: 2018-02-18

## 2018-02-18 RX ORDER — INSULIN GLARGINE 100 [IU]/ML
6 INJECTION, SOLUTION SUBCUTANEOUS EVERY MORNING
Status: DISCONTINUED | OUTPATIENT
Start: 2018-02-19 | End: 2018-02-26 | Stop reason: HOSPADM

## 2018-02-18 RX ADMIN — CEFEPIME HYDROCHLORIDE 2000 MG: 2 INJECTION, POWDER, FOR SOLUTION INTRAVENOUS at 18:46

## 2018-02-18 RX ADMIN — INSULIN LISPRO 2 UNITS: 100 INJECTION, SOLUTION INTRAVENOUS; SUBCUTANEOUS at 09:33

## 2018-02-18 RX ADMIN — PANTOPRAZOLE SODIUM 40 MG: 40 INJECTION, POWDER, FOR SOLUTION INTRAVENOUS at 09:36

## 2018-02-18 RX ADMIN — INSULIN LISPRO 3 UNITS: 100 INJECTION, SOLUTION INTRAVENOUS; SUBCUTANEOUS at 09:32

## 2018-02-18 RX ADMIN — SUCRALFATE 1000 MG: 1 SUSPENSION ORAL at 21:56

## 2018-02-18 RX ADMIN — IOHEXOL 100 ML: 350 INJECTION, SOLUTION INTRAVENOUS at 17:13

## 2018-02-18 RX ADMIN — INSULIN LISPRO 1 UNITS: 100 INJECTION, SOLUTION INTRAVENOUS; SUBCUTANEOUS at 17:50

## 2018-02-18 RX ADMIN — INSULIN GLARGINE 12 UNITS: 100 INJECTION, SOLUTION SUBCUTANEOUS at 11:43

## 2018-02-18 RX ADMIN — SUCRALFATE 1000 MG: 1 SUSPENSION ORAL at 11:47

## 2018-02-18 RX ADMIN — INSULIN LISPRO 3 UNITS: 100 INJECTION, SOLUTION INTRAVENOUS; SUBCUTANEOUS at 11:43

## 2018-02-18 RX ADMIN — IOHEXOL 50 ML: 240 INJECTION, SOLUTION INTRATHECAL; INTRAVASCULAR; INTRAVENOUS; ORAL at 17:13

## 2018-02-18 RX ADMIN — ATORVASTATIN CALCIUM 40 MG: 40 TABLET, FILM COATED ORAL at 17:53

## 2018-02-18 RX ADMIN — LISINOPRIL 40 MG: 20 TABLET ORAL at 09:36

## 2018-02-18 RX ADMIN — SUCRALFATE 1000 MG: 1 SUSPENSION ORAL at 18:47

## 2018-02-18 RX ADMIN — METRONIDAZOLE 500 MG: 500 INJECTION, SOLUTION INTRAVENOUS at 17:40

## 2018-02-18 RX ADMIN — MORPHINE SULFATE 1 MG: 2 INJECTION, SOLUTION INTRAMUSCULAR; INTRAVENOUS at 14:36

## 2018-02-18 RX ADMIN — METRONIDAZOLE 500 MG: 500 INJECTION, SOLUTION INTRAVENOUS at 21:56

## 2018-02-18 RX ADMIN — SUCRALFATE 1000 MG: 1 SUSPENSION ORAL at 09:36

## 2018-02-18 RX ADMIN — LEVOTHYROXINE SODIUM 200 MCG: 100 TABLET ORAL at 05:42

## 2018-02-18 RX ADMIN — PANTOPRAZOLE SODIUM 40 MG: 40 INJECTION, POWDER, FOR SOLUTION INTRAVENOUS at 21:56

## 2018-02-18 NOTE — PROGRESS NOTES
[facility] Progress Note  Patient: Dahiana Mason 67 y o  female   MRN: [id number]  PCP: Bradley Goodson DO  Unit/Bed#: E4 -01 Encounter: [id number]  Date Of Visit: 02/18/18   Addendum: family alerted me of pt shaking and chills  She is also in extreme pain  Will obtain ct abd/pelvis now  Will obtain blood cultures now  If pt drops pressure, spikes temp will start cefepime and flagyl  If fluid collection still present will get IR to drain fluid  Assessment/plan  Principal problem  1-sepsis:  Present on admission:  secondary to peritonitis due to a perforated gastric ulcer:  The patient underwent surgical repair   She completed 7 days of antibiotics   no further spikes in temp  Continue to monitor off of antibiotics   If pt spikes temp will need to discuss with IR about drainage of small fluid collection seen in abd                -still awaiting surgery follow up for amilcar eval and removal       2-perforated gastric ulcer:  she was orginally in the ICU and then transferred to surgery service after silvia patch repair  S/p EGD by GI showed 2 small healing duodenal bulb ulcers and a large gastric ulcer  She is to continue ppi bid and carafate  she was changed from IV ppi to po bid as she was tolerating her diet  But she had an episode of melena x2  She did have a decreased hemoglobin from 11 to 9 8  Hemoglobin is back to 10 today  Pt was changed back to IV ppi  Appreciate GI follow up  If pt drops hemoglobin again she will need repeat EGD sooner  Will make pt npo after midnight just incase  Also she has increasing pain in abd  Will repeat CT abd/pelvis with contrast  Check lipase and amylase  - pt will ultimately need repeat egd in 2 months to confirm healing            3  Acute/chronic blood loss anemia and pancytopenia- pt had acute blood loss due to ulcer  She has chronic anemia due to pancytopenia  She was transfused approx 7 units of prbc   Please see principal problem number 2  Hemoglobin is 10                  4  Acute cva- please see principal problem 3 about ac and platelets  Cta showed 5mm segment of occlusive critical stenosis of right p1  Likely cause of the cva  Factor 5 leiden pending  Prothrombin pending  Likely due not need tommy and loop recorder however will defer to neurology for final say                -pt is high risk for ac and antiplatelet at this point  Spoke with GI  She will need a repeat EGD in 2 months to confirm healing  No ac or antiplatelets prior to that  - will also need to wait to start antiplatelets until full etiology of pancytopenia has been obtained from bone marrow biopsy     Active problems  1  -diabetes type 2:  continue to hold po antidiabetic medications  Continue insulin sliding scale  Pt has had variable po intake but increase lantus to 12 units  Will add 3 units of lispro with meals  Continue insulin sliding scale       2-hypernatremia:resolved     3-pancytopenia: appreciate hematology recommendations  Could be from imuran but due to the degree r/o other etiologies  S/p granix  S/p bone marrow biopsy  Awaiting results  Thrombocytopenia worsened today  Will transfused 2 units of platelets       4-hypothyroidism:  Continue Synthroid     5-hyperlipidemia:  Statin on hold     6-multiple sclerosis:  Imuran on hold  appreciate Neurology consultation and recommendations  Unfortunately unable to restart imuran due to pancytopenia  No steroids due to recent infection and steroids  Also pt will likely not respond to steroids as she was on these chronically outpt   Likely slow recovery  Neurology awaiting records of type of ms       7-left hand cat bite:  No evidence of cellulitis   Status post 7 days of antibiotics      8-acute metabolic encephalopathy: Most likely multifactorial, secondary to sedating medications, acute cva, ms, likely underlying dementia, possible hypoxic injury from anemia   Pt improved today                 9-intermittent elevated blood presure:  Without prior h/o HTN   sbp improved this am in the 130s  Continue lisinopril and monitor       10 left upper extremity DVT:  Secondary to PICC line   Have removed PICC line   Unfortunately patient may not be on anticoagulation due to pancytopenia     11-decubitus ulcers:  Appreciate wound Care team's evaluation recommendations      12- deconditioning- 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  Pt will need STR  May be difficult to get pt to participate in physical therapy     dispo- Pt will need str  Updated family at bedside    Subjective:   Pt seen and examined  Pt is alert today and is c/o of abd pain  It is all located in upper abd  Pain medications are not helping  She has not had any further melena  No f/c no cp no sob no n/v/d  Objective:     Vitals: Blood pressure 134/57, pulse 91, temperature (!) 96 9 °F (36 1 °C), resp  rate 20, height 5' 6" (1 676 m), weight 76 2 kg (167 lb 15 9 oz), SpO2 97 %  ,Body mass index is 27 11 kg/m²  Lab, Imaging and other studies:    Results from last 7 days  Lab Units 02/18/18  0637  02/12/18  0442   WBC Thousand/uL 9 97  < > 9 24   HEMOGLOBIN g/dL 10 2*  < > 11 1*   HEMATOCRIT % 31 0*  < > 32 7*   PLATELETS Thousands/uL 28*  < > 43*   INR   --   --  1 05   < > = values in this interval not displayed  Results from last 7 days  Lab Units 02/18/18  0524   SODIUM mmol/L 138   POTASSIUM mmol/L 4 3   CHLORIDE mmol/L 105   CO2 mmol/L 26   BUN mg/dL 11   CREATININE mg/dL 0 45*   CALCIUM mg/dL 8 2*   GLUCOSE RANDOM mg/dL 214*         Lab Results   Component Value Date    BLOODCX No Growth After 5 Days  02/08/2018    BLOODCX No Growth After 5 Days  02/08/2018    BLOODCX No Growth After 5 Days  01/31/2018    BLOODCX No Growth After 5 Days   01/31/2018       Scheduled Meds:   Current Facility-Administered Medications:  acetaminophen 650 mg Oral Q4H PRN Rakesh Hernandez MD   atorvastatin 40 mg Oral Daily With [employer] Magdalene Stewartron, DO   calcium carbonate 500 mg Oral TID PRN Jannie Maher MD   hydrALAZINE 5 mg Intravenous Q6H PRN Jannie Maher MD   insulin glargine 12 Units Subcutaneous QAM Magdalene Stewartron, DO   insulin lispro 1-5 Units Subcutaneous TID AC Robert W Spatzer, CRNP   insulin lispro 1-5 Units Subcutaneous HS  [place] Spatzer, CRNP   levothyroxine 200 mcg Oral Early Morning Magdalene Stewartron, DO   lisinopril 40 mg Oral Daily Magdalene Patron, DO   ondansetron 4 mg Intravenous Q4H PRN PARRISH Burnham   oxyCODONE 2 5 mg Oral Q8H PRN Magdalene Patron, DO   pantoprazole 40 mg Intravenous Q12H [address] Magdalene [place], DO   sucralfate 1,000 mg Oral 4x Daily (AC & HS) Magdalene Farhana, DO     Continuous Infusions:    PRN Meds:   acetaminophen    calcium carbonate    hydrALAZINE    ondansetron    oxyCODONE      Physical exam:  Physical Exam  General appearance: alert and oriented, in no acute distress  Head: Normocephalic, without obvious abnormality, atraumatic  Eyes: conjunctivae/corneas clear  PERRL, EOM's intact  Fundi benign  Neck: no adenopathy, no carotid bruit, no JVD, supple, symmetrical, trachea midline and thyroid not enlarged, symmetric, no tenderness/mass/nodules  Lungs: clear to auscultation bilaterally  Heart: regular rate and rhythm, S1, S2 normal, no murmur, click, rub or gallop  Abdomen: soft ttp upper abd  no rebound +guarding +bs  Extremities: edema +1 edema bilateral  Pulses: 2+ and symmetric  Skin: Skin color, texture, turgor normal  No rashes or lesions  Neurologic: Mental status: awake alert oriented x2 person, place   follows conversation well today again      VTE Pharmacologic Prophylaxis: Reason for no pharmacologic prophylaxis thrombocytopenia  VTE Mechanical Prophylaxis: sequential compression device    Counseling / Coordination of Care  Total floor / unit time spent today 20 minutes     Current Length of Stay: 21 day(s)    Current Patient Status: Inpatient       Code Status: Level 3 - DNAR and DNI

## 2018-02-18 NOTE — PROGRESS NOTES
Progress Note - Infectious Disease   Lani Mcclellan 67 y o  female MRN: [id number]  Unit/Bed#: E4 -01 Encounter: [id number]      Impression/Plan:  1  Probable sepsis-fever and tachycardia  Concerned about the possibility of a recurrent intra-abdominal process as the patient has significant abdominal tenderness  Consideration for the possibility of bacteremia  No other localizing findings are noted   -agree with plan to begin cefepime and Flagyl  -blood cultures x2 sets  -check CT of the abdomen and pelvis  -is still with notable postoperative collection, recommend IR drainage of the collection and send the fluid for culture    2   Febrile neutropenia-Etiology of neutropenia unclear as below  Repeat blood cultures negative  The patient is no longer neutropenic   -antibiotics as above              -follow up repeat blood cultures  -follow temperatures closely  -await bone marrow biopsy results     3   Peritonitis-secondary to perforation of a gastric ulceration with contamination of the peritoneum with gastric contacts   Status post laparoscopic repair with washout with drains left in place   Consider MDROs and yeast as the patient had been Augmentin at the time of the perforation   Fortunately the patient has now undergone adequate source control   She has completed 1 week of antibiotics postop   The patient has increased abdominal pain now  -close surgical follow-up  -drain management  -serial abdominal exams  -await CT of the abdomen and pelvis     4   Pancytopenia-Unclear etiology as worsening despite D/C of Imuran and antibiotics   Oncology considering possible lymphoproliferative disorder   The white cell count has recovered with G-CSF   -hematology oncology follow-up  -no additional G-CSF  -transfusion as needed  -follow-up bone marrow biopsy result     5   Diabetes mellitus-type 2 with hyperglycemia     6   Multiple sclerosis-apparently on Imuran for a while     -would hold on Imuran for now     7   Cat bite-left hand   No overt cellulitis at this time   Which should be well covered by the antibiotics as above   The cat is being watched at home without any behavioral abnormalities   The hand is significantly improved with decreased edema   -serial exams  -continue monitoring of the cat at home  -no additional antibiotics needed     8   Acute encephalopathy - likely toxic metabolic due to infection, electrolyte abnormalities superimposed on MS and chronic/severe SVID as suggested by CT  Newly diagnosed acute CVA may be playing a role   Doubt CNS infection                -follow MS closely              -no additional ID workup  -neurology follow-up     Discussed in detail with Dr Floyd Sabillon  Discussed in detail with the patient and her family    Antibiotics:  None    Subjective:  Asked to reassess the patient because she has now developed worsening abdominal pain and fever  She is not having any cough or shortness of breath, denies dysuria or hematuria  She is not having any nausea vomiting or diarrhea  She had a bout of shaking chills earlier and subsequently spiked a temperature up to 102  Objective:  Vitals:  HR:  [87-99] 98  Resp:  [18-20] 20  BP: (134-166)/(57-88) 142/86  SpO2:  [95 %-97 %] 97 %  Temp (24hrs), Av 7 °F (37 1 °C), Min:96 4 °F (35 8 °C), Max:102 1 °F (38 9 °C)  Current: Temperature: (!) 102 1 °F (38 9 °C)    Physical Exam:   General Appearance:  Alert, interactive, nontoxic, no acute distress  Throat: Oropharynx moist without lesions  Lungs:   Clear to auscultation bilaterally; no wheezes, rhonchi or rales; respirations unlabored   Heart:  RRR; no murmur, rub or gallop   Abdomen:   Soft, tender to palpation, non-distended, positive bowel sounds  Two COREY drains in place   Extremities: No clubbing, cyanosis or edema   Skin: No new rashes or lesions  No draining wounds noted         Labs, Imaging, & Other studies:   All pertinent labs and imaging studies were personally reviewed    Results from last 7 days  Lab Units 02/18/18  0637 02/17/18  0521 02/16/18  1258 02/16/18  0608   WBC Thousand/uL 9 97 8 22  --  9 89   HEMOGLOBIN g/dL 10 2* 9 8* 10 4* 10 3*   PLATELETS Thousands/uL 28* 63*  --  65*       Results from last 7 days  Lab Units 02/18/18  0524 02/17/18  0521 02/15/18  0455   SODIUM mmol/L 138 141 139   POTASSIUM mmol/L 4 3 3 5 3 7   CHLORIDE mmol/L 105 108 106   CO2 mmol/L 26 26 27   ANION GAP mmol/L 7 7 6   BUN mg/dL 11 8 8   CREATININE mg/dL 0 45* 0 45* 0 54*   EGFR ml/min/1 73sq m 100 100 95   GLUCOSE RANDOM mg/dL 214* 170* 208*   CALCIUM mg/dL 8 2* 8 1* 8 3

## 2018-02-18 NOTE — CASE MANAGEMENT
Initial Clinical Review    Admission: Date/Time/Statement: 1/28/18 @ 1928     Orders Placed This Encounter   Procedures    Inpatient Admission (expected length of stay for this patient is greater than two midnights)     Standing Status:   Standing     Number of Occurrences:   1     Order Specific Question:   Admitting Physician     Answer:   Meghan Navarro [[id number]]     Order Specific Question:   Level of Care     Answer:   Level 1 Stepdown [13]     Order Specific Question:   Estimated length of stay     Answer:   More than 2 Midnights     Order Specific Question:   Certification     Answer:   I certify that inpatient services are medically necessary for this patient for a duration of greater than two midnights  See H&P and MD Progress Notes for additional information about the patient's course of treatment  ED: Date/Time/Mode of Arrival:   ED Arrival Information     Expected Arrival Acuity Means of Arrival Escorted By Service Admission Type    - 1/28/2018 16:20 Urgent Wheelchair Family Member Critical Care/ICU Urgent    Arrival Complaint    WEAKNESS          Chief Complaint:   Chief Complaint   Patient presents with    Weakness - Generalized     Sade Wood was bit by a cat last Sunday, was put on augmentin and naproxen  Since then, patient has been getting more weak  History of Illness: 70-year-old female patient presents emergency department for evaluation fatigue  The patient states that she had been bitten by a cat last week, was treated for probable pasteurella infection and cellulitis doing better but then has become increasingly more weak  The patient does have a history of relapsing and remitting multiple sclerosis  Current, the patient is lying in bed, pale, alert and oriented but ill-appearing  The patient has no chest pains, shortness of breath, nausea, vomiting  Patient does have adventitious breath sounds bilaterally most consistent with crackles suggestive of congestive heart failure  Patient has positive hepatic jugular reflex  The patient does have bilateral 4+ pitting edema which he states is not normal for her  She has no history of renal dysfunction receive a history of congestive heart failure  The patient will be evaluated with a differential diagnosis to include but not be limited to congestive heart failure, non ST segment elevation myocardial infarction, renal failure, relapsing remitting Ms  Exam:  Musculoskeletal: Normal range of motion  She exhibits edema  She exhibits no tenderness or deformity  ED Vital Signs:   ED Triage Vitals   Temperature Pulse Respirations Blood Pressure SpO2   01/28/18 1655 01/28/18 1655 01/28/18 1655 01/28/18 1700 01/28/18 1655   98 9 °F (37 2 °C) 87 18 131/55 100 %      Temp Source Heart Rate Source Patient Position - Orthostatic VS BP Location FiO2 (%)   01/28/18 1655 01/28/18 1655 01/28/18 1655 01/28/18 1655 --   Temporal Monitor Sitting Left arm       Pain Score       01/28/18 1655       No Pain        Wt Readings from Last 1 Encounters:   02/15/18 76 2 kg (167 lb 15 9 oz)       Abnormal Labs/Diagnostic Test Results:    WBC 5 56 Thousand/uL          RBC 0 88 (L) Million/uL         Hemoglobin 3 2 (LL) g/dL         Hematocrit 10 0 (L) %          (H) fL         MCH 36 4 (H) pg         MCHC 32 0 g/dL         RDW 16 2 (H) %         MPV 10 6 fL         Platelets 59 (L) Thousands/uL        Collected:  01/28/18 1857      Lab Status:  Final result Specimen:  Urine from Urine, Clean Catch Updated:  01/28/18 1910       Color, UA Yellow       Clarity, UA Clear       Specific Gravity, UA 1 010       pH, UA 5 5       Leukocytes, UA Elevated glucose may cause decreased leukocyte values   See urine microscopic for [facility] result/ (A)       Nitrite, UA Negative       Protein, UA Negative mg/dl         Glucose, UA >=1000 (1%) (A) mg/dl         Ketones, UA Negative mg/dl         Urobilinogen, UA 0 2 E U /dl         Bilirubin, UA Negative       Blood, UA Trace-Intact      Acetone, Bld Negative      Blood gas, venous [[id number]]  (Abnormal) Collected:  01/28/18 1837     Lab Status:  Final result Specimen:  Blood from Arm, Left Updated:  01/28/18 1852       pH, Gómez 7 370       pCO2, Gómez 32 1 (L) mm Hg         pO2, Gómez 54 1 (H) mm Hg         HCO3, Gómez 18 1 (L) mmol/L         Base Excess, Gómez -6 7 mmol/L         O2 Content, Gómez 4 2 ml/dL         O2 HGB, VENOUS 81 2 (H) %       Troponin I [[id number]]  (Abnormal) Collected:  01/28/18 1745     Lab Status:  Final result Specimen:  Blood from Arm, Right Updated:  01/28/18 1830       Troponin I 0 15 (HH) ng/mL               Final result Specimen:  Blood from Arm, Right Updated:  01/28/18 1825        Sodium 133 (L) mmol/L         Potassium 5 6 (H) mmol/L         Chloride 99 (L) mmol/L         CO2 18 (L) mmol/L         Anion Gap 16 (H) mmol/L         BUN 43 (H) mg/dL         Creatinine 1 31 (H) mg/dL         Glucose 586 (HH) mg/dL         Calcium 8 6 mg/dL         AST 30 U/L         ALT 23 U/L         Alkaline Phosphatase 76 U/L         Total Protein 5 4 (L) g/dL         Albumin 2 3 (L) g/dL         Total Bilirubin 1 30 (H) mg/dL         eGFR 41 ml/min/1 73sq m       Status:  Final result Specimen:  Blood from Arm, Right Updated:  01/28/18 1822        NT-proBNP 4,178 (H) pg/mL       D-dimer, quantitative [[id number]]  (Abnormal) Collected:  01/28/18 1745     Lab Status:  Final result Specimen:  Blood from Arm, Right Updated:  01/28/18 1818       D-Dimer, Som Vasquez 2,742 (H) ng/ml (FEU)       Protime-INR [[id number]]  (Abnormal) Collected:  01/28/18 1745     Lab Status:  Final result Specimen:  Blood from Arm, Right Updated:  01/28/18 1818       Protime 17 9 (H) seconds         INR 1 48 (H)           ED Treatment:   Medication Administration from 01/28/2018 1620 to 02/17/2018 1940     None      1 unit PRBC's txd --    Past Medical/Surgical History:    Active Ambulatory Problems     Diagnosis Date Noted    Pancytopenia ([facility] [address] ) 01/28/2018    Type 2 diabetes mellitus ([facility] ) 01/28/2018    Hyperglycemia due to type 2 diabetes mellitus ([place] [address] ) 01/28/2018    Hypoalbuminemia due to protein-calorie malnutrition ([place] [address] ) 01/29/2018    Gastric ulcer 01/28/2018    Acute perforated gastric ulcer with hemorrhage ([facility] ) 02/05/2018    Peritonitis ([place] [address] ) 02/05/2018    Acute blood loss anemia 02/05/2018    Hypernatremia 02/05/2018    Hypothyroidism 02/05/2018    Hyperlipidemia 02/05/2018    Multiple sclerosis ([place] [address] ) 76/18/2198    Metabolic encephalopathy 50/71/6112    DIC (disseminated intravascular coagulation) ([place] [address] ) 02/05/2018    Anemia 01/28/2018     Resolved Ambulatory Problems     Diagnosis Date Noted    BEVERLY (acute kidney injury) ([place] [address] ) 01/28/2018    Pneumoperitoneum 01/28/2018    Sepsis ([place] [address] ) 02/05/2018     Past Medical History:   Diagnosis Date    Diabetes mellitus ([place] [address] )     Disease of thyroid gland     Hyperlipidemia     Multiple sclerosis ([place] [address] )        Admitting Diagnosis: Weakness [R53 1]    Age/Sex: 67 y o  female    Assessment/Plan: Not seen by an attending physician at [facility]  Tx'd to [address] [place] [address] W      Admission Orders:  Scheduled Meds:   Facility-Administered Medications Ordered in Other Encounters:  acetaminophen 650 mg Oral Q4H PRN Ellie Edge MD   atorvastatin 40 mg Oral Daily With [place] AG-Avery, DO   calcium carbonate 500 mg Oral TID PRN Ellie Edge MD   hydrALAZINE 5 mg Intravenous Q6H PRN Ellie Edge MD   [START ON 2/18/2018] insulin glargine 12 Units Subcutaneous QAM Magdalene Delcid, DO   insulin lispro 1-5 Units Subcutaneous TID AC Robert W Spatzer, CRNP   insulin lispro 1-5 Units Subcutaneous HS Mora Luo Spatzer, CRNP   insulin lispro 3 Units Subcutaneous TID With Meals Magdalene Ambron, DO   levothyroxine 200 mcg Oral Early Morning Magdalene Ambron, DO   lisinopril 40 mg Oral Daily Magdalene Ambron, DO   ondansetron 4 mg Intravenous Q4H PRN PARRISH Marte   oxyCODONE 2 5 mg Oral Q8H PRN Shyanne Byrd DO pantoprazole 40 mg Intravenous Q12H [facility] Magdalene Delcid DO   sucralfate 1,000 mg Oral 4x Daily (AC & HS) Magdalene Delcid DO     Continuous Infusions:   No current facility-administered medications for this encounter     PRN Meds:

## 2018-02-18 NOTE — PROGRESS NOTES
Patient Name: Lianne Bailey  Patient MRN: [de-identified]  Date: 02/18/18  Service: [facility]    Subjective   Lianne Bailey is a 67 y o  female who was admitted with perforated gastric ulcer  We are asked to re-see the patient due to melenic stool and hemoglobin drop  She was admitted on 1/28  A CT scan showed pneumoperitoneum and she went to the operating room on 01/29 for a patch repair of a perforated gastric ulcer  She eventually underwent upper endoscopy on 02/12 that showed 2 small healing duodenal ulcers and a 2 5 cm gastric ulcer  Her hemoglobin has been hovering between 10 and 11  She reportedly had melenic stool yesterday and her hemoglobin was 9 8  Her platelet count this morning was 28,000  She currently denies any abdominal pain, heartburn or nausea  Patient  Denies:  Chest pain, heartburn, abdominal pain, nausea  All others negative except as noted in HPI  Objective     Vitals  /68 (BP Location: Right arm)   Pulse 88   Temp (!) 9 2 °F (-12 7 °C) (Temporal)   Resp 18   Ht 5' 6" (1 676 m)   Wt 76 2 kg (167 lb 15 9 oz)   SpO2 96%   BMI 27 11 kg/m²   General: Alert, no apparent distress  Eyes:  No scleral icterus  ENT:  Mucous membranes moist  Card:  Regular rhythm  Lungs: Clear to ascultation b/l  No wheezes, rales, rhonchi  Abdomen:  Soft  Bowel sounds are present  Slightly distended    Skin:  No jaundice  Extremities:  No edema  Neuro: Alert     Laboratory Studies  Lab Results   Component Value Date    CREATININE 0 45 (L) 02/18/2018    BUN 11 02/18/2018     02/18/2018    K 4 3 02/18/2018     02/18/2018    CO2 26 02/18/2018    GLUCOSE 214 (H) 02/18/2018    CALCIUM 8 2 (L) 02/18/2018    PROT 3 7 (L) 02/10/2018    ALKPHOS 47 02/10/2018    BILITOT 1 24 (H) 02/10/2018    AST 20 02/10/2018    ALT 12 02/10/2018     Lab Results   Component Value Date    WBC 9 97 02/18/2018    HGB 10 2 (L) 02/18/2018    HCT 31 0 (L) 02/18/2018    PLT 28 (LL) 02/18/2018    MCV 91 02/18/2018     Lab Results   Component Value Date    HGB 10 2 (L) 02/18/2018    HGB 9 8 (L) 02/17/2018    HGB 10 4 (L) 02/16/2018       Lab Results   Component Value Date    PROTIME 13 7 02/12/2018    INR 1 05 02/12/2018     Inhouse Medications       Current Facility-Administered Medications:     acetaminophen (TYLENOL) tablet 650 mg, 650 mg, Oral, Q4H PRN, 650 mg at 02/17/18 1207    atorvastatin (LIPITOR) tablet 40 mg, 40 mg, Oral, Daily With Dinner, 40 mg at 02/17/18 1731    calcium carbonate (TUMS) chewable tablet 500 mg, 500 mg, Oral, TID PRN    hydrALAZINE (APRESOLINE) injection 5 mg, 5 mg, Intravenous, Q6H PRN    insulin glargine (LANTUS) subcutaneous injection 12 Units, 12 Units, Subcutaneous, QAM    insulin lispro (HumaLOG) 100 units/mL subcutaneous injection 1-5 Units, 1-5 Units, Subcutaneous, TID AC, 2 Units at 02/18/18 0933 **AND** Fingerstick Glucose (POCT), , , TID AC    insulin lispro (HumaLOG) 100 units/mL subcutaneous injection 1-5 Units, 1-5 Units, Subcutaneous, HS, 3 Units at 02/17/18 2246    levothyroxine tablet 200 mcg, 200 mcg, Oral, Early Morning, 200 mcg at 02/18/18 0542    lisinopril (ZESTRIL) tablet 40 mg, 40 mg, Oral, Daily, 40 mg at 02/18/18 0936    ondansetron (ZOFRAN) injection 4 mg, 4 mg, Intravenous, Q4H PRN, 4 mg at 02/02/18 1134    oxyCODONE (ROXICODONE) IR tablet 2 5 mg, 2 5 mg, Oral, Q8H PRN, 2 5 mg at 02/17/18 1727    pantoprazole (PROTONIX) injection 40 mg, 40 mg, Intravenous, Q12H [address], 40 mg at 02/18/18 0936    sucralfate (CARAFATE) oral suspension 1,000 mg, 1,000 mg, Oral, 4x Daily (AC & HS), 1,000 mg at 02/18/18 0936      Assessment/Plan:  1  Upper GI bleed with profound anemia secondary to large gastric ulcer seen on EGD 2/12  Patient reportedly had melena x2 days  Hemoglobin dropped slightly  Continue pantoprazole 40 mg twice daily and Carafate  2   Pneumoperitoneum secondary to perforated gastric ulcer, status post laparoscopic patch repair 1/29    3  Multiple sclerosis, with history of Imuran  4   Thrombocytopenia  Status post bone marrow aspirate 2/13, results pending    She will need a Re EGD in 2 months to ensure healing of the large gastric ulcer  If her hemoglobin continues to drop and she continues with melena, it may need to be repeated sooner      Active Problems:    Pancytopenia ([address] )    Type 2 diabetes mellitus (HCC)    Hyperglycemia due to type 2 diabetes mellitus (HCC)    Hypoalbuminemia due to protein-calorie malnutrition (HCC)    Gastric ulcer    Acute perforated gastric ulcer with hemorrhage (HCC)    Peritonitis (HCC)    Acute blood loss anemia    Hypernatremia    Hypothyroidism    Hyperlipidemia    Multiple sclerosis (HCC)    Metabolic encephalopathy    DIC (disseminated intravascular coagulation) (HCC)    Anemia    Jeanette Baird PA-C

## 2018-02-19 LAB
ABO GROUP BLD BPU: NORMAL
ANION GAP SERPL CALCULATED.3IONS-SCNC: 5 MMOL/L (ref 4–13)
BPU ID: NORMAL
BUN SERPL-MCNC: 9 MG/DL (ref 5–25)
CALCIUM SERPL-MCNC: 8.3 MG/DL (ref 8.3–10.1)
CHLORIDE SERPL-SCNC: 104 MMOL/L (ref 100–108)
CO2 SERPL-SCNC: 28 MMOL/L (ref 21–32)
CREAT SERPL-MCNC: 0.45 MG/DL (ref 0.6–1.3)
ERYTHROCYTE [DISTWIDTH] IN BLOOD BY AUTOMATED COUNT: 15.1 % (ref 11.6–15.1)
GFR SERPL CREATININE-BSD FRML MDRD: 100 ML/MIN/1.73SQ M
GLUCOSE SERPL-MCNC: 110 MG/DL (ref 65–140)
GLUCOSE SERPL-MCNC: 123 MG/DL (ref 65–140)
GLUCOSE SERPL-MCNC: 157 MG/DL (ref 65–140)
GLUCOSE SERPL-MCNC: 163 MG/DL (ref 65–140)
GLUCOSE SERPL-MCNC: 197 MG/DL (ref 65–140)
HCT VFR BLD AUTO: 28.3 % (ref 34.8–46.1)
HGB BLD-MCNC: 9.3 G/DL (ref 11.5–15.4)
MCH RBC QN AUTO: 30 PG (ref 26.8–34.3)
MCHC RBC AUTO-ENTMCNC: 32.9 G/DL (ref 31.4–37.4)
MCV RBC AUTO: 91 FL (ref 82–98)
PLATELET # BLD AUTO: 63 THOUSANDS/UL (ref 149–390)
PMV BLD AUTO: 11.5 FL (ref 8.9–12.7)
POTASSIUM SERPL-SCNC: 4 MMOL/L (ref 3.5–5.3)
RBC # BLD AUTO: 3.1 MILLION/UL (ref 3.81–5.12)
SODIUM SERPL-SCNC: 137 MMOL/L (ref 136–145)
UNIT DISPENSE STATUS: NORMAL
UNIT PRODUCT CODE: NORMAL
UNIT RH: NORMAL
WBC # BLD AUTO: 5.63 THOUSAND/UL (ref 4.31–10.16)

## 2018-02-19 PROCEDURE — 82948 REAGENT STRIP/BLOOD GLUCOSE: CPT

## 2018-02-19 PROCEDURE — 99221 1ST HOSP IP/OBS SF/LOW 40: CPT | Performed by: PHYSICAL MEDICINE & REHABILITATION

## 2018-02-19 PROCEDURE — 88373 M/PHMTRC ALYS ISHQUANT/SEMIQ: CPT | Performed by: INTERNAL MEDICINE

## 2018-02-19 PROCEDURE — 99232 SBSQ HOSP IP/OBS MODERATE 35: CPT | Performed by: INTERNAL MEDICINE

## 2018-02-19 PROCEDURE — 87798 DETECT AGENT NOS DNA AMP: CPT | Performed by: INTERNAL MEDICINE

## 2018-02-19 PROCEDURE — 99233 SBSQ HOSP IP/OBS HIGH 50: CPT | Performed by: INTERNAL MEDICINE

## 2018-02-19 PROCEDURE — 99233 SBSQ HOSP IP/OBS HIGH 50: CPT | Performed by: NURSE PRACTITIONER

## 2018-02-19 PROCEDURE — C9113 INJ PANTOPRAZOLE SODIUM, VIA: HCPCS | Performed by: INTERNAL MEDICINE

## 2018-02-19 PROCEDURE — 88374 M/PHMTRC ALYS ISHQUANT/SEMIQ: CPT | Performed by: INTERNAL MEDICINE

## 2018-02-19 PROCEDURE — 88367 INSITU HYBRIDIZATION AUTO: CPT | Performed by: INTERNAL MEDICINE

## 2018-02-19 PROCEDURE — 92507 TX SP LANG VOICE COMM INDIV: CPT

## 2018-02-19 PROCEDURE — 99232 SBSQ HOSP IP/OBS MODERATE 35: CPT | Performed by: FAMILY MEDICINE

## 2018-02-19 PROCEDURE — 85027 COMPLETE CBC AUTOMATED: CPT | Performed by: INTERNAL MEDICINE

## 2018-02-19 PROCEDURE — 80048 BASIC METABOLIC PNL TOTAL CA: CPT | Performed by: INTERNAL MEDICINE

## 2018-02-19 RX ORDER — DULOXETIN HYDROCHLORIDE 20 MG/1
20 CAPSULE, DELAYED RELEASE ORAL DAILY
Status: DISCONTINUED | OUTPATIENT
Start: 2018-02-20 | End: 2018-02-26 | Stop reason: HOSPADM

## 2018-02-19 RX ADMIN — PANTOPRAZOLE SODIUM 40 MG: 40 INJECTION, POWDER, FOR SOLUTION INTRAVENOUS at 21:59

## 2018-02-19 RX ADMIN — LISINOPRIL 40 MG: 20 TABLET ORAL at 09:56

## 2018-02-19 RX ADMIN — METRONIDAZOLE 500 MG: 500 INJECTION, SOLUTION INTRAVENOUS at 21:56

## 2018-02-19 RX ADMIN — INSULIN GLARGINE 6 UNITS: 100 INJECTION, SOLUTION SUBCUTANEOUS at 09:56

## 2018-02-19 RX ADMIN — COLLAGENASE SANTYL: 250 OINTMENT TOPICAL at 18:56

## 2018-02-19 RX ADMIN — CEFEPIME HYDROCHLORIDE 2000 MG: 2 INJECTION, POWDER, FOR SOLUTION INTRAVENOUS at 06:41

## 2018-02-19 RX ADMIN — SUCRALFATE 1000 MG: 1 SUSPENSION ORAL at 21:59

## 2018-02-19 RX ADMIN — CEFEPIME HYDROCHLORIDE 2000 MG: 2 INJECTION, POWDER, FOR SOLUTION INTRAVENOUS at 19:42

## 2018-02-19 RX ADMIN — METRONIDAZOLE 500 MG: 500 INJECTION, SOLUTION INTRAVENOUS at 05:53

## 2018-02-19 RX ADMIN — ATORVASTATIN CALCIUM 40 MG: 40 TABLET, FILM COATED ORAL at 16:24

## 2018-02-19 RX ADMIN — METRONIDAZOLE 500 MG: 500 INJECTION, SOLUTION INTRAVENOUS at 16:25

## 2018-02-19 RX ADMIN — LEVOTHYROXINE SODIUM 200 MCG: 100 TABLET ORAL at 09:56

## 2018-02-19 NOTE — PROGRESS NOTES
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( anterior circulation) suggesting embolic etiology  4  Extensive white matter disease but without of burden of lacunar infarcts  May be related to demyelinating disease, i e  multiple sclerosis as per history, or other chronic leukoencephalopathy  Does not fit typical profile associated with MELAS or CADASIL  5  Neuro-Cognitive deficits include fluent aphasia, apraxia, agnosia, right left confusion, sensory extinction on left, motor neglect on left, left homonymous hemianopsia, possible right inferior quadrant anopsia, inattention, memory impairment, mild emotional lability  Several of these are referable to the acute and chronic areas of stroke, others may represent subcortical dementia or cognitive impairment due to the white matter disease/MS, now with decompensation due to multiple recent insults (hypoglycemia, severe anemia, infection, geographically locations, other)  6  No evidence of central pontine myelinolysis on MRI  7  Pancytopenia, with persistent thrombocytopenia  Likely contribution from longstanding Imuran treatment  Risks of initiating anticoagulation or antiplatelet therapy likely outweigh benefits at this time  Given the cortical location and appearance of strokes is embolic, would favor anticoagulation, when/if safe,  for stroke prevention, but additional studies are pending  8    Further comments and recommendations per the attending neurologist    Plan:  1  Remains off anti thrombotic, anticoagulation  No plans to start at this time  2  Continue therapies, which are not well tolerated due to pain with any movement  3  Will try Cymbalta to help blunt neuropathic pain  4  Not a candidate for any immuno modulating or Imuran  5  Continue supportive care    Subjective: Interval chart reviewed  Speech language assessment noted  Hemoglobin variable by 1 g, platelets dropped below 30 over the weekend  Currently not a candidate for anticoagulation or anti-platelet  therapy  Temperature to 102 1 with rigors 02/18 associated with abdominal pain, cefepime and Flagyl restarted  COREY Drains out today, for IR drainage of fluid collection anterior to the stomach          Current Facility-Administered Medications:  acetaminophen 650 mg Oral Q4H PRN Kelechi Way MD    atorvastatin 40 mg Oral Daily With [place], DO    calcium carbonate 500 mg Oral TID PRN Kelechi Way MD    cefepime 2,000 mg Intravenous Q12H Magdalene Ambron, DO Last Rate: 2,000 mg (02/19/18 0641)   hydrALAZINE 5 mg Intravenous Q6H PRN Kelechi Way MD    insulin glargine 6 Units Subcutaneous QAM Magdalene Ambron, DO    insulin lispro 1-5 Units Subcutaneous TID AC Robert W Spatzer, CRNP    insulin lispro 1-5 Units Subcutaneous HS Ilona Seitz Spatzer, CRNP    levothyroxine 200 mcg Oral Early Morning Magdalene Ambron, DO    lisinopril 40 mg Oral Daily Magdalene Ambron, DO    metroNIDAZOLE 500 mg Intravenous Q8H Magdalene Ambron, DO Last Rate: 500 mg (02/19/18 0553)   ondansetron 4 mg Intravenous Q4H PRN PARRISH Martel    oxyCODONE 2 5 mg Oral Q4H PRN Magdalene Ambron, DO    pantoprazole 40 mg Intravenous Q12H [address] Magdalene Ambron, DO    sucralfate 1,000 mg Oral 4x Daily (AC & HS) Magdalene Ambron, DO        Vitals: Blood pressure 143/60, pulse 88, temperature 98 4 °F (36 9 °C), temperature source Temporal, resp   rate 18, height 5' 6" (1 676 m), weight 76 2 kg (167 lb 15 9 oz), SpO2 98 %  ,Body mass index is 27 11 kg/m²  Point of care blood glucose:  140-190  Physical Exam:   Gen:  Lying in bed, intolerant of any repositioning or passive range of motion  Neuro    MSE persistent aphasia, expressive greater than receptive  CN conjugate gaze, better lateral eye movements to the left    Motor unable to test, cries out with any passive movement  Resists/refuses active movement  Impersistent placement of right upper extremity antigravity, left currently not testable, bilateral lower extremities not testable     Sensory:  Hyperesthetic/hyperpathic in lower extremities greater than upper extremities    Lab, Imaging and other studies:   CBC:   Results from last 7 days  Lab Units 02/19/18  0618 02/18/18  0637 02/17/18  0521   WBC Thousand/uL 5 63 9 97 8 22   RBC Million/uL 3 10* 3 41* 3 28*   HEMOGLOBIN g/dL 9 3* 10 2* 9 8*   HEMATOCRIT % 28 3* 31 0* 30 2*   MCV fL 91 91 92   PLATELETS Thousands/uL 63* 28* 63*   , BMP/CMP:   Results from last 7 days  Lab Units 02/19/18  0618 02/18/18  0524 02/17/18  0521   SODIUM mmol/L 137 138 141   POTASSIUM mmol/L 4 0 4 3 3 5   CHLORIDE mmol/L 104 105 108   CO2 mmol/L 28 26 26   ANION GAP mmol/L 5 7 7   BUN mg/dL 9 11 8   CREATININE mg/dL 0 45* 0 45* 0 45*   GLUCOSE RANDOM mg/dL 163* 214* 170*   CALCIUM mg/dL 8 3 8 2* 8 1*   EGFR ml/min/1 73sq m 100 100 100   , Lipid Profile:   Results from last 7 days  Lab Units 02/17/18  0521   HDL mg/dL 26*   CHOLESTEROL mg/dL 98   LDL CALC mg/dL 48   TRIGLYCERIDES mg/dL 122    Factor 5 Leiden, prothrombin gene mutation pending  Influenza panel pending  2/18 blood cultures pending    CTA head and neck (reports reviewed):  CERVICAL VASCULATURE  AORTIC ARCH AND GREAT VESSELS:  Moderate ahteroschlerotic disease of the arch and great vessels  RIGHT VERTEBRAL ARTERY CERVICAL SEGMENT:  Normal origin  The vessel is normal in caliber throughout the neck    LEFT VERTEBRAL ARTERY CERVICAL SEGMENT: Normal origin  The vessel is normal in caliber throughout the neck  RIGHT EXTRACRANIAL CAROTID SEGMENT:  Moderate atherosclerotic disease of the bifurcation  LEFT EXTRACRANIAL CAROTID SEGMENT:  Moderate atherosclerotic disease of the bifurcation        INTRACRANIAL VASCULATURE   INTERNAL CAROTID ARTERIES:  Normal enhancement of the intracranial portions of the internal carotid arteries  Normal ophthalmic artery origins  Normal ICA terminus  ANTERIOR CIRCULATION:  Symmetric A1 segments and anterior cerebral arteries with normal enhancement  Normal anterior communicating artery  MIDDLE CEREBRAL ARTERY CIRCULATION:  M1 segment and middle cerebral artery branches demonstrate normal enhancement bilaterally  DISTAL VERTEBRAL ARTERIES:  Normal distal vertebral arteries  Posterior inferior cerebellar artery origins are normal  Normal vertebral basilar junction  BASILAR ARTERY:  Basilar artery is normal in caliber  Normal superior cerebellar arteries  POSTERIOR CEREBRAL ARTERIES: There is 5 mm segment occlusion/critical stenosis of the right P1 segment with distal reconstitution  Left posterior artery is unremarkable  DURAL VENOUS SINUSES:  Normal     BRAIN:  Right occipital infarct Re identified  Severe bilateral microangiopathy  Hyper attenuation along the right posterior temporoparietal-occipital cortex likely related to petechial hemorrhage related to infarct process  No evidence of intraparenchymal hemorrhage    2/18 CT abdomen and pelvis: No intraperitoneal collection or fluid  Wall thickening throughout the stomach, suggesting gastritis  Limited evaluation absence of contrast distention  No evidence of bowel obstruction, colitis or diverticulitis  Minimal residual gas in the intra-abdominal wall  No collection or hematoma  Counseling / Coordination of Care  Total time spent today 55 minutes   Greater than 50% of total time was spent with the patient and / or family counseling and / or coordination of care   A description of the counseling / coordination of care: n/a

## 2018-02-19 NOTE — PROGRESS NOTES
[facility] Progress Note  Patient: Lucía García 67 y o  female   MRN: [id number]  PCP: Trae Mojica DO  Unit/Bed#: E4 -01 Encounter: [id number]  Date Of Visit: 02/19/18    Assessment and plan:    Principal Problem:    Sepsis ([facility] )- due to suspected intra abdominal infection/fluid collection  Discussed with Dr Ly Marie earlier today  She has a fluid collection in between the stomach and the liver  IR consulted for drainage  Continue empiric cefepime and Flagyl  Active Problems:    Pancytopenia (HCC)-status post bone marrow biopsy 2/13/18  Result still pending    Acute blood loss anemia-secondary to perforated ulcer  Status post 7 units PRBC    Type 2 diabetes mellitus (HCC)-with hyperglycemia  Sugars trending down  Continue current dose of Lantus  Watch for hypoglycemia    Acute CVA-not candidate for anticoagulation or antiplatelets secondary to recent GI bleed and perforated ulcer  Hypoalbuminemia due to protein-calorie malnutrition ([facility] )    Acute perforated gastric ulcer with hemorrhage ([facility] )- status post Allison Rakers patch  Peritonitis (HCC)-secondary to perforated gastric ulcer  Antibiotics on going    Hypothyroidism-levothyroxine daily    Hyperlipidemia-continue Lipitor daily    Multiple sclerosis (HCC)-off Imuran    Metabolic encephalopathy-resolved    Left upper extremity DVT-secondary to PICC line  Again not a candidate for anticoagulation due to pancytopenia and recent GI bleed  VTE Pharmacologic Prophylaxis:   Pharmacologic: None  Mechanical VTE Prophylaxis in Place: No    Discussions with Specialists or Other Care Team Provider:  Discussed with surgery  Education and Discussions with Family / Patient:  Spoke with   Time Spent for Care: 30 minutes  More than 50% of total time spent on counseling and coordination of care as described above  Current Length of Stay: 22 day(s)    Current Patient Status: Inpatient   Certification Statement:  The patient will continue to require additional inpatient hospital stay due to Sepsis    Discharge Plan: To be determined    Code Status: Level 3 - DNAR and DNI      Subjective:   Patient complained of back pain when moved  She denies abdominal pain  She denies shortness of breath  No fever today  Objective:     Vitals:   Temp (24hrs), Av 7 °F (37 1 °C), Min:97 8 °F (36 6 °C), Max:99 8 °F (37 7 °C)    HR:  [] 79  Resp:  [18] 18  BP: (143-163)/(60-71) 151/60  SpO2:  [97 %-99 %] 99 %  Body mass index is 27 11 kg/m²  Input and Output Summary (last 24 hours): Intake/Output Summary (Last 24 hours) at 18 1727  Last data filed at 18 0901   Gross per 24 hour   Intake                0 ml   Output              385 ml   Net             -385 ml       Physical Exam:     Physical Exam   Constitutional: No distress  HENT:   Mouth/Throat: No oropharyngeal exudate  Eyes: No scleral icterus  Cardiovascular: Regular rhythm  Pulmonary/Chest: Effort normal  No stridor  No respiratory distress  She has no wheezes  Abdominal: Soft  Bowel sounds are normal  She exhibits no distension  Musculoskeletal: She exhibits no edema  Skin: Skin is warm  She is not diaphoretic  Large sacral ulcer   Psychiatric: She has a normal mood and affect  Additional Data:     Labs:      Results from last 7 days  Lab Units 18  0618   WBC Thousand/uL 5 63   HEMOGLOBIN g/dL 9 3*   HEMATOCRIT % 28 3*   PLATELETS Thousands/uL 63*       Results from last 7 days  Lab Units 18  0618   SODIUM mmol/L 137   POTASSIUM mmol/L 4 0   CHLORIDE mmol/L 104   CO2 mmol/L 28   BUN mg/dL 9   CREATININE mg/dL 0 45*   CALCIUM mg/dL 8 3   GLUCOSE RANDOM mg/dL 163*           * I Have Reviewed All Lab Data Listed Above  * Additional Pertinent Lab Tests Reviewed:  All Labs Within Last 24 Hours Reviewed    Imaging:    Imaging Reports Reviewed Today Include:  CT abdomen and pelvis      Recent Cultures (last 7 days): Last 24 Hours Medication List:     Current Facility-Administered Medications:  acetaminophen 650 mg Oral Q4H PRN Barby Kirby MD    atorvastatin 40 mg Oral Daily With Yesy-Avery, DO    calcium carbonate 500 mg Oral TID PRN Barby Kirby MD    cefepime 2,000 mg Intravenous Q12H Magdalene Ambron, DO Last Rate: 2,000 mg (02/19/18 4308)   collagenase  Topical Daily Suzanna Bustillos MD    [START ON 2/20/2018] DULoxetine 20 mg Oral Daily PARRISH Valladares    hydrALAZINE 5 mg Intravenous Q6H PRN Barby Kirby MD    insulin glargine 6 Units Subcutaneous QAM Magdalene Delcid, DO    insulin lispro 1-5 Units Subcutaneous TID AC Robert W Spatzer, CRNP    insulin lispro 1-5 Units Subcutaneous HS Alberta Presume Spatzer, CRNP    levothyroxine 200 mcg Oral Early Morning Magdalene Ambron, DO    lisinopril 40 mg Oral Daily Magdalene Ambron, DO    metroNIDAZOLE 500 mg Intravenous Q8H Magdalene Ambron, DO Last Rate: 500 mg (02/19/18 1625)   ondansetron 4 mg Intravenous Q4H PRN PARRISH Juarez    oxyCODONE 2 5 mg Oral Q4H PRN Magdalene Patron, DO    pantoprazole 40 mg Intravenous Q12H [address] Magdalene Ambron, DO    sucralfate 1,000 mg Oral 4x Daily (AC & HS) Jet Santos DO         Today, Patient Was Seen By: Suzanna Bustillos MD    ** Please Note: Dragon 360 Dictation voice to text software may have been used in the creation of this document   **

## 2018-02-19 NOTE — CONSULTS
PHYSICAL MEDICINE AND REHABILITATION CONSULT NOTE  Anselmo Saha 67 y o  female MRN: [id number]  Unit/Bed#: E4 -01 Encounter: [id number]    Requested by (Physician/Service): Shante French MD  Reason for Consultation:  Assessment of rehabilitation needs    Chief complaint: Abdominal pain, buttock pain     HPI: Anselmo Saha is a 67 y o  female who presented as a transfer from [address] [place] [address], found to have very low H/h (3 2/10) on admission associated with abdominal pain  She was transferred to [facility] for further management, whereupon she was found to have peritonitis and sepsis  Has had a complicated hospital stay since then, including: perforated gastric ulcer, DIC, multiple electrolyte imbalances, encephalopathy, acute right occipital/temporal lobe infarctions (believed to be embolic), DVT  Palliative care was consulted at which point hospice care was considered appropriate, however family deferring at this time       Subjective: Patient seen and examined at bedside  States she has no complaints until ROS is performed at which point she endorses abdominal pain and buttock pain       Review of Systems: A 10-point review of systems was performed  Negative except as listed above      Assessment:   - Stroke  - Sepsis  - Gastric ulcer  - ABLA  - Pancytopenia  - MS     PT OT SLP   Bed Mobility   Rolling R 2  Maximal assistance   Additional items Assist x 2; Increased time required;LE management   Rolling L 2  Maximal assistance   Additional items Assist x 2; Increased time required;Verbal cues   Supine to Sit 2  Maximal assistance   Additional items Assist x 2; Increased time required;Verbal cues   Sit to Supine 2  Maximal assistance   Additional items Assist x 2; Increased time required;Verbal cues   Additional Comments increased lateral lean to R while sitting EOB   Transfers   Sit to Stand Unable to assess   Additional Comments poor sitting balance at curren time    Ambulation/Elevation   Gait pattern Not tested ADL   Where Assessed Chair   Grooming Assistance 3  Moderate Assistance   Grooming Deficit Setup;Steadying;Supervision/safety; Increased time to complete   Grooming Comments increased time to complete and combing hair   UB Dressing Assistance 2  Maximal Assistance   UB Dressing Deficit Setup; Thread RUE;Pull over head; Thread LUE   LB Dressing Assistance 1  Total Assistance   LB Dressing Deficit Don/doff R sock; Don/doff L sock   Toileting Assistance  1  Total Assistance   Toileting Deficit Setup;Supervison/safety; Increased time to complete   Toileting Comments assist to cleanup and perihygiene   Bed Mobility   Rolling R 2  Maximal assistance   Additional items Assist x 2; Increased time required;Verbal cues;LE management; Bedrails;HOB elevated   Rolling L 2  Maximal assistance   Additional items Assist x 2; Increased time required;Verbal cues; Bedrails   Supine to Sit 2  Maximal assistance   Additional items Assist x 2; Increased time required;Verbal cues;LE management; Bedrails;HOB elevated   Sit to Supine 2  Maximal assistance   Additional items Assist x 2; Increased time required;Verbal cues;LE management; Bedrails   Additional Comments increased time to complete laterally leaning to R while EOB w/ MOD support      Pt presents with symptoms of mild expressive and receptive phasia characterized by moderate difficulty with body part ID, L/R discrimination, Object ID, reading, confrontation naming, and following complex directions  Mild deficits are also noted in answering yes/no questions, narrative speech, and responsive naming tasks  It is difficult to determine if these symotoms are related to new aphasia (No clear paraphasic errors) versus cognitive deficits as participation was impacted by difficulty understanding tasks at times  SLP will work with pt on language related deficits however OT is following for cognition currently        Plan:    - Chart reviewed  - Labs reviewed  - Imaging reviewed  - Continue PT/OT while in hospital  - Recommend SNF once medically appropriate  Thank you for allowing the PM&R service to participate in the care of this patient  Please do not hesitate to call with questions or concerns    Physical Exam:  General: alert and appears comfortable  Head: Normal, normocephalic, atraumatic  Eye: Normal external eye, conjunctiva, lids   Ears: Normal external ears  Nose: Normal external nose  Pulmonary: No abnormal respiratory pattern, no retractions noted  Abdomen: TTP diffusely, drains in place  Skin/Extremity:Vascular changes to bilateral LEs, 1+ pedal edema  Neurologic: awake alert and oriented to person and situation (states she had a stroke, does not know date or where she is), inconsistent following of commands  Repetition intact  Right sided neglect  Musculoskeletal - Strength:   Right  Left  Site  Right  Left  Site    4 4 S Ab: Shoulder Abductors  1 1 HF: Hip Flexors    4 4  EF: Elbow Flexors  1 1 KF: Knee Flexors    4  4 EE: Elbow Extensors  1 1 KE: Knee Extensors    4  4  WE: Wrist Extensors  4 4  DR: Dorsi Flexors    5  5  FF: Finger Flexors  4  4 PF: Plantar Flexors    5  5  HI: Hand Intrinsics  NT  NT EHL: Extensor Hallucis Longus   *Note: difficult to assess MSK due to inconsistent following of commands by patient  Note: below is per responses patient  I do not know how reliable this information is, as when asked who will be there during the day to help her, she reports "my "; however on further questioning, patient states he works during the day  She continues to state "my " despite my asking if  works, how would he be able to present during the day? Dahiana Mason Lives with: lives with their spouse  She lives in [facility]) single family home  The living area: can live on one level  Equipment in home: None  There 1 step to enter the home  Patient/family's goals: Return to previous home/apartment    The patient will have 24 hour ARC Supervision/physical assistance: supervision/physical assistance available upon discharge      Allergies:   No Known Allergies     Past Medical History:   Past Surgical History:   Family History:   Social history:   Past Medical History:   Diagnosis Date    Diabetes mellitus ([facility] )     Disease of thyroid gland     Hyperlipidemia     Multiple sclerosis ([facility] )     Past Surgical History:   Procedure Laterality Date    ESOPHAGOGASTRODUODENOSCOPY N/A 2/12/2018    Procedure: ESOPHAGOGASTRODUODENOSCOPY (EGD); Surgeon: Fabián Flores MD;  [facility]: [facility]; Service: Gastroenterology    VT LAP,DIAGNOSTIC ABDOMEN N/A 1/29/2018    Procedure: LAPAROSCOPY DIAGNOSTIC, with repair of perforated gastric ulcer;  Surgeon: Gustavo Prescott MD;  [facility];  Service: General     History reviewed  No pertinent family history     Social History     Social History    Marital status: /Civil Union     Spouse name: N/A    Number of children: N/A    Years of education: N/A     Social History Main Topics    Smoking status: Former Smoker     Types: Cigarettes     Quit date: 2007    Smokeless tobacco: Never Used    Alcohol use Yes      Comment: rarely    Drug use: No    Sexual activity: Not Asked     Other Topics Concern    None     Social History Narrative    None          Vital Signs: Reviewed    Temp:  [96 4 °F (35 8 °C)-102 1 °F (38 9 °C)] 98 4 °F (36 9 °C)  HR:  [] 88  Resp:  [18-20] 18  BP: (134-163)/(57-86) 143/60   Intake/Output Summary (Last 24 hours) at 02/19/18 0933  Last data filed at 02/19/18 0601   Gross per 24 hour   Intake              720 ml   Output             1072 ml   Net             -352 ml        Laboratory: Reviewed    Lab Results   Component Value Date    HGB 9 3 (L) 02/19/2018    HCT 28 3 (L) 02/19/2018    WBC 5 63 02/19/2018     Lab Results   Component Value Date    BUN 9 02/19/2018     02/19/2018    K 4 0 02/19/2018     02/19/2018    GLUCOSE 163 (H) 02/19/2018    GLUCOSE 328 (H) 01/29/2018    CREATININE 0 45 (L) 02/19/2018     Lab Results   Component Value Date    PROTIME 13 7 02/12/2018    INR 1 05 02/12/2018        Imaging: Reviewed  Xr Chest 2 Views    Result Date: 1/29/2018  Impression: No active pulmonary disease    Workstation performed: [id number]     Xr Abdomen Obstruction Series    Result Date: 2/1/2018  Impression: 1  No layering free contrast to suggest large leak  However, the study was limited for smaller or contained leaks due to retained contrast in the stomach and small bowel  This limited study was performed due to the patient's inability to undergo diagnostic upper GI  If there is continued clinical concern for leak, a follow-up upper GI can be attempted when the patient is able  Alternatively, an additional abdomen x-ray in the a m  can be obtained to allow for passage of contrast into the colon  and better visualization of the underlying abdominal cavity  CT would also better evaluate for contained leak  2   Dilated small bowel without evidence of obstruction, likely adynamic ileus  Workstation performed: [id number]     Ct Abdomen Pelvis W Contrast    Result Date: 2/2/2018  Impression: There is a large amount of air within the subcutaneous fat of the abdominal wall extending inferiorly into the pelvic wall  Small amount of air present in between the muscles of the right superior lateral abdominal wall  This may be iatrogenic from recent catheter insertion x2  Posterior basilar atelectatic change  Oral contrast extends to the rectum with no evidence of obstruction  Mild small and large bowel distention  Stable hypodensities within the spleen suggestive of infarctions  Workstation performed: [id number]     Ct Abdomen Pelvis W Contrast    Result Date: 1/29/2018  Impression: Moderate pneumoperitoneum suggesting bowel perforation  Although the origin is uncertain, possibility of perforated gastric ulcer may be considered  Small amount of abdominal and pelvic ascites   No evidence for organized abscess  Mild small bowel dilatation may reflect reactive ileus  Cholelithiasis    I personally discussed this study with Guillermo Rodriguez on 1/29/2018 1:35 PM  Workstation performed: [id number]       Current Medications:     Current Facility-Administered Medications:     acetaminophen (TYLENOL) tablet 650 mg, 650 mg, Oral, Q4H PRN, Pavel Marcano MD, 650 mg at 02/17/18 1207    atorvastatin (LIPITOR) tablet 40 mg, 40 mg, Oral, Daily With Dinner, Magdalene Delcid DO, 40 mg at 02/18/18 1753    calcium carbonate (TUMS) chewable tablet 500 mg, 500 mg, Oral, TID PRN, Pavel Marcano MD    cefepime (MAXIPIME) 2,000 mg in dextrose 5 % 50 mL IVPB, 2,000 mg, Intravenous, Q12H, Magdalene Delcid DO, Last Rate: 100 mL/hr at 02/19/18 0641, 2,000 mg at 02/19/18 0641    hydrALAZINE (APRESOLINE) injection 5 mg, 5 mg, Intravenous, Q6H PRN, Pavel Marcano MD    insulin glargine (LANTUS) subcutaneous injection 6 Units, 6 Units, Subcutaneous, QAM, Magdalene Delcid DO    insulin lispro (HumaLOG) 100 units/mL subcutaneous injection 1-5 Units, 1-5 Units, Subcutaneous, TID AC, 1 Units at 02/18/18 1750 **AND** Fingerstick Glucose (POCT), , , TID AC, Arlyce Bowers Spatzer, CRNP    insulin lispro (HumaLOG) 100 units/mL subcutaneous injection 1-5 Units, 1-5 Units, Subcutaneous, HS, Arlyce Bowers Spatzer, CRNP, 3 Units at 02/17/18 2246    levothyroxine tablet 200 mcg, 200 mcg, Oral, Early Morning, Magdalene Delcid DO, 200 mcg at 02/18/18 0542    lisinopril (ZESTRIL) tablet 40 mg, 40 mg, Oral, Daily, Magdalene Delcid DO, 40 mg at 02/18/18 0936    metroNIDAZOLE (FLAGYL) IVPB (premix) 500 mg, 500 mg, Intravenous, Q8H, Magdalene Delcid DO, Last Rate: 200 mL/hr at 02/19/18 0553, 500 mg at 02/19/18 0553    ondansetron (ZOFRAN) injection 4 mg, 4 mg, Intravenous, Q4H PRN, PARRISH Reyes, 4 mg at 02/02/18 1134    oxyCODONE (ROXICODONE) IR tablet 2 5 mg, 2 5 mg, Oral, Q4H PRN, Magdalene Delcid DO    pantoprazole (PROTONIX) injection 40 mg, 40 mg, Intravenous, Q12H [address], Magdalene Delcid DO, 40 mg at 02/18/18 2156    sucralfate (CARAFATE) oral suspension 1,000 mg, 1,000 mg, Oral, 4x Daily (AC & HS), Magdalene StewartronDO, 1,000 mg at 02/18/18 2156
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
## 2018-02-19 NOTE — PROGRESS NOTES
Progress Note - Chrystine Postal 67 y o  female MRN: [id number]    Unit/Bed#: E4 -01 Encounter: [id number]    Subjective:     Patient seen and examined  Family present at bedside  She denies abdominal pain  She is having BMs but is unsure of the color  Objective:     Vitals: Blood pressure 143/60, pulse 88, temperature 98 4 °F (36 9 °C), temperature source Temporal, resp  rate 18, height 5' 6" (1 676 m), weight 76 2 kg (167 lb 15 9 oz), SpO2 98 %  ,Body mass index is 27 11 kg/m²  Intake/Output Summary (Last 24 hours) at 02/19/18 1549  Last data filed at 02/19/18 0901   Gross per 24 hour   Intake                0 ml   Output              460 ml   Net             -460 ml       Physical Exam:     General Appearance: Alert, elderly female, appears stated age and cooperative  Lungs: Clear to auscultation bilaterally, no rales or rhonchi, no labored breathing/accessory muscle use  Heart: Regular rate and rhythm, S1, S2 normal, no murmur, click, rub or gallop  Abdomen: Non-distended  Normal bowel sounds  Diffuse tenderness to palpation  No rebound or guarding  Serous drainage per COREY drain  Extremities: No cyanosis, edema    Invasive Devices     Drain            Closed/Suction Drain Left Abdomen Bulb 15 Fr  20 days    Closed/Suction Drain Right;Other (Comment) Abdomen Bulb 15 Fr  20 days                Lab Results:    Results from last 7 days  Lab Units 02/19/18  0618   WBC Thousand/uL 5 63   HEMOGLOBIN g/dL 9 3*   HEMATOCRIT % 28 3*   PLATELETS Thousands/uL 63*       Results from last 7 days  Lab Units 02/19/18  0618   SODIUM mmol/L 137   POTASSIUM mmol/L 4 0   CHLORIDE mmol/L 104   CO2 mmol/L 28   BUN mg/dL 9   CREATININE mg/dL 0 45*   CALCIUM mg/dL 8 3   GLUCOSE RANDOM mg/dL 163*               Imaging Studies: I have personally reviewed pertinent imaging studies  Xr Chest 2 Views    Result Date: 1/29/2018  Impression: No active pulmonary disease        Workstation performed: [id number]     Xr Abdomen Obstruction Series    Result Date: 2/1/2018  Impression: 1  No layering free contrast to suggest large leak  However, the study was limited for smaller or contained leaks due to retained contrast in the stomach and small bowel  This limited study was performed due to the patient's inability to undergo diagnostic upper GI  If there is continued clinical concern for leak, a follow-up upper GI can be attempted when the patient is able  Alternatively, an additional abdomen x-ray in the a m  can be obtained to allow for passage of contrast into the colon  and better visualization of the underlying abdominal cavity  CT would also better evaluate for contained leak  2   Dilated small bowel without evidence of obstruction, likely adynamic ileus  Workstation performed: [id number]     Ct Abdomen Pelvis W Contrast    Result Date: 2/2/2018  Impression: There is a large amount of air within the subcutaneous fat of the abdominal wall extending inferiorly into the pelvic wall  Small amount of air present in between the muscles of the right superior lateral abdominal wall  This may be iatrogenic from recent catheter insertion x2  Posterior basilar atelectatic change  Oral contrast extends to the rectum with no evidence of obstruction  Mild small and large bowel distention  Stable hypodensities within the spleen suggestive of infarctions  Workstation performed: [id number]     Ct Abdomen Pelvis W Contrast    Result Date: 1/29/2018  Impression: Moderate pneumoperitoneum suggesting bowel perforation  Although the origin is uncertain, possibility of perforated gastric ulcer may be considered  Small amount of abdominal and pelvic ascites  No evidence for organized abscess  Mild small bowel dilatation may reflect reactive ileus  Cholelithiasis    I personally discussed this study with Key Sanchez on 1/29/2018 1:35 PM  Workstation performed: [id number]       Assessment and Plan:    1) Acute blood loss anemia 2/2 gastric and duodenal ulcer: EGD from 2/12/18 revealed 25 mm clean based gastric ulcer, mild gastritis, and 2 small clean based duodenal bulb ulcers  There was no evidence of active bleeding at that time  Biopsies for H  Pylori not taken due to negative surgical specimen  Hemoglobin is relatively stable at 9 3      -We will hold off on endoscopic evaluation   -Monitor H&H   -Monitor stool color  -Continue PPI twice daily  [facility]    2) Acute perforated gastric ulcer: status post Hao Isai patch repair     -Care as per General Surgery    3) Fever with CT findings of fluid collection: CT abdomen/pelvis shows persistent fluid collection anterior to stomach and posterior to left lobe of the liver  T max 102 1 yesterday      -IR consult for drainage

## 2018-02-19 NOTE — PLAN OF CARE
Problem: SLP ADULT - SWALLOWING, IMPAIRED  Goal: Advance to least restrictive diet without signs or symptoms of aspiration for planned discharge setting  See evaluation for individualized goals         Outcome: Not Progressing

## 2018-02-19 NOTE — PLAN OF CARE
Problem: Potential for Falls  Goal: Patient will remain free of falls  INTERVENTIONS:  - Assess patient frequently for physical needs  -  Identify cognitive and physical deficits and behaviors that affect risk of falls    -  [place] fall precautions as indicated by assessment   - Educate patient/family on patient safety including physical limitations  - Instruct patient to call for assistance with activity based on assessment  - Modify environment to reduce risk of injury  - Consider OT/PT consult to assist with strengthening/mobility   Outcome: Progressing      Problem: Prexisting or High Potential for Compromised Skin Integrity  Goal: Skin integrity is maintained or improved  INTERVENTIONS:  - Identify patients at risk for skin breakdown  - Assess and monitor skin integrity  - Assess and monitor nutrition and hydration status  - Monitor labs (i e  albumin)  - Assess for incontinence   - Turn and reposition patient  - Assist with mobility/ambulation  - Relieve pressure over bony prominences  - Avoid friction and shearing  - Provide appropriate hygiene as needed including keeping skin clean and dry  - Evaluate need for skin moisturizer/barrier cream  - Collaborate with interdisciplinary team (i e  Nutrition, Rehabilitation, etc )   - Patient/family teaching   Outcome: Progressing      Problem: PAIN - ADULT  Goal: Verbalizes/displays adequate comfort level or baseline comfort level  Interventions:  - Encourage patient to monitor pain and request assistance  - Assess pain using appropriate pain scale  - Administer analgesics based on type and severity of pain and evaluate response  - Implement non-pharmacological measures as appropriate and evaluate response  - Consider cultural and social influences on pain and pain management  - Notify physician/advanced practitioner if interventions unsuccessful or patient reports new pain   Outcome: Progressing      Problem: INFECTION - ADULT  Goal: Absence or prevention of progression during hospitalization  INTERVENTIONS:  - Assess and monitor for signs and symptoms of infection  - Monitor lab/diagnostic results  - Monitor all insertion sites, i e  indwelling lines, tubes, and drains  - Monitor endotracheal (as able) and nasal secretions for changes in amount and color  - [place] appropriate cooling/warming therapies per order  - Administer medications as ordered  - Instruct and encourage patient and family to use good hand hygiene technique  - Identify and instruct in appropriate isolation precautions for identified infection/condition   Outcome: Progressing      Problem: SAFETY ADULT  Goal: Maintain or return to baseline ADL function  INTERVENTIONS:  -  Assess patient's ability to carry out ADLs; assess patient's baseline for ADL function and identify physical deficits which impact ability to perform ADLs (bathing, care of mouth/teeth, toileting, grooming, dressing, etc )  - Assess/evaluate cause of self-care deficits   - Assess range of motion  - Assess patient's mobility; develop plan if impaired  - Assess patient's need for assistive devices and provide as appropriate  - Encourage maximum independence but intervene and supervise when necessary  ¯ Involve family in performance of ADLs  ¯ Assess for home care needs following discharge   ¯ Request OT consult to assist with ADL evaluation and planning for discharge  ¯ Provide patient education as appropriate   Outcome: Progressing    Goal: Maintain or return mobility status to optimal level  INTERVENTIONS:  - Assess patient's baseline mobility status (ambulation, transfers, stairs, etc )    - Identify cognitive and physical deficits and behaviors that affect mobility  - Identify mobility aids required to assist with transfers and/or ambulation (gait belt, sit-to-stand, lift, walker, cane, etc )  - [place] fall precautions as indicated by assessment  - Record patient progress and toleration of activity level on Mobility SBAR; progress patient to next Phase/Stage  - Instruct patient to call for assistance with activity based on assessment  - Request Rehabilitation consult to assist with strengthening/weightbearing, etc    Outcome: Progressing      Problem: DISCHARGE PLANNING  Goal: Discharge to home or other facility with appropriate resources  INTERVENTIONS:  - Identify barriers to discharge w/patient and caregiver  - Arrange for needed discharge resources and transportation as appropriate  - Identify discharge learning needs (meds, wound care, etc )  - Arrange for interpretive services to assist at discharge as needed  - Refer to Case Management Department for coordinating discharge planning if the patient needs post-hospital services based on physician/advanced practitioner order or complex needs related to functional status, cognitive ability, or social support system   Outcome: Progressing      Problem: Knowledge Deficit  Goal: Patient/family/caregiver demonstrates understanding of disease process, treatment plan, medications, and discharge instructions  Complete learning assessment and assess knowledge base  Interventions:  - Provide teaching at level of understanding  - Provide teaching via preferred learning methods   Outcome: Progressing      Problem: Nutrition/Hydration-ADULT  Goal: Nutrient/Hydration intake appropriate for improving, restoring or maintaining nutritional needs  Monitor and assess patient's nutrition/hydration status for malnutrition (ex- brittle hair, bruises, dry skin, pale skin and conjunctiva, muscle wasting, smooth red tongue, and disorientation)  Collaborate with interdisciplinary team and initiate plan and interventions as ordered  Monitor patient's weight and dietary intake as ordered or per policy  Utilize nutrition screening tool and intervene per policy  Determine patient's food preferences and provide high-protein, high-caloric foods as appropriate       INTERVENTIONS:  - Monitor oral intake, urinary output, labs, and treatment plans  - Assess nutrition and hydration status and recommend course of action  - Evaluate amount of meals eaten  - Assist patient with eating if necessary   - Allow adequate time for meals  - Recommend/ encourage appropriate diets, oral nutritional supplements, and vitamin/mineral supplements  - Order, calculate, and assess calorie counts as needed  - Recommend, monitor, and adjust tube feedings and TPN/PPN based on assessed needs  - Assess need for intravenous fluids  - Provide specific nutrition/hydration education as appropriate  - Include patient/family/caregiver in decisions related to nutrition   Outcome: Progressing      Problem: DISCHARGE PLANNING - CARE MANAGEMENT  Goal: Discharge to post-acute care or home with appropriate resources  INTERVENTIONS:  - Conduct assessment to determine patient/family and health care team treatment goals, and need for post-acute services based on payer coverage, community resources, and patient preferences, and barriers to discharge  - Address psychosocial, clinical, and financial barriers to discharge as identified in assessment in conjunction with the patient/family and health care team  - Arrange appropriate level of post-acute services according to patient's   needs and preference and payer coverage in collaboration with the physician and health care team  - Communicate with and update the patient/family, physician, and health care team regarding progress on the discharge plan  - Arrange appropriate transportation to post-acute venues    Outcome: Progressing      Problem: Neurological Deficit  Goal: Neurological status is stable or improving  Interventions:  - Monitor and assess patient's level of consciousness, motor function, sensory function, and level of assistance needed for ADLs  - Monitor and report changes from baseline  Collaborate with interdisciplinary team to initiate plan and implement interventions as ordered     - Provide and maintain a safe environment  - Utilize seizure precautions  - Utilize fall precautions  - Utilize aspiration precautions  - Utilize bleeding precautions  Outcome: Progressing      Problem: Activity Intolerance/Impaired Mobility  Goal: Mobility/activity is maintained at optimum level for patient  Interventions:  - Assess and monitor patient  barriers to mobility and need for assistive/adaptive devices  - Assess patient's emotional response to limitations  - Collaborate with interdisciplinary team and initiate plans and interventions as ordered  - Encourage independent activity per ability   - Maintain proper body alignment  - Perform active/passive rom as tolerated/ordered  - Plan activities to conserve energy   - Turn patient   Outcome: Progressing      Problem: Communication Impairment  Goal: Ability to express needs and understand communication  Assess patient's communication skills and ability to understand information  Patient will demonstrate use of effective communication techniques, alternative methods of communication and understanding even if not able to speak  - Encourage communication and provide alternate methods of communication as needed  - Collaborate with case management/ for discharge needs  - Include patient/family/caregiver in decisions related to communication  Outcome: Progressing      Problem: Potential for Aspiration  Goal: Non-ventilated patient's risk of aspiration is minimized  Assess and monitor vital signs, respiratory status, and labs (WBC)  Monitor for signs of aspiration (tachypnea, cough, rales, wheezing, cyanosis, fever)  - Assess and monitor patient's ability to swallow  - Place patient up in chair to eat if possible  - HOB up at 90 degrees to eat if unable to get patient up into chair   - Supervise patient during oral intake  - Instruct patient to take small bites  - Instruct patient to take small single sips when taking liquids    - Follow patient-specific strategies generated by speech pathologist    Outcome: Progressing      Problem: Nutrition  Goal: Nutrition/Hydration status is improving  Monitor and assess patient's nutrition/hydration status for malnutrition (ex- brittle hair, bruises, dry skin, pale skin and conjunctiva, muscle wasting, smooth red tongue, and disorientation)  Collaborate with interdisciplinary team and initiate plan and interventions as ordered  Monitor patient's weight and dietary intake as ordered or per policy  Utilize nutrition screening tool and intervene per policy  Determine patient's food preferences and provide high-protein, high-caloric foods as appropriate  - Assist patient with eating   - Allow adequate time for meals   - Encourage patient to take dietary supplement as ordered  - Collaborate with clinical nutritionist   - Include patient/family/caregiver in decisions related to nutrition  Outcome: Progressing      Problem: NEUROSENSORY - ADULT  Goal: Achieves stable or improved neurological status  INTERVENTIONS  - Monitor and report changes in neurological status  - Initiate measures to prevent increased intracranial pressure  - Maintain blood pressure and fluid volume within ordered parameters to optimize cerebral perfusion  - Monitor temperature, glucose, and sodium or any other associated labs   Initiate appropriate interventions as ordered  - Monitor for seizure activity   - Administer anti-seizure medications as ordered   Outcome: Progressing      Problem: METABOLIC, FLUID AND ELECTROLYTES - ADULT  Goal: Glucose maintained within target range  INTERVENTIONS:  - Monitor Blood Glucose as ordered  - Assess for signs and symptoms of hyperglycemia and hypoglycemia  - Administer ordered medications to maintain glucose within target range  - Assess nutritional intake and initiate nutrition service referral as needed   Outcome: Progressing      Problem: SKIN/TISSUE INTEGRITY - ADULT  Goal: Skin integrity remains intact  INTERVENTIONS  - Identify patients at risk for skin breakdown  - Assess and monitor skin integrity  - Assess and monitor nutrition and hydration status  - Monitor labs (i e  albumin)  - Assess for incontinence   - Turn and reposition patient  - Assist with mobility/ambulation  - Relieve pressure over bony prominences  - Avoid friction and shearing  - Provide appropriate hygiene as needed including keeping skin clean and dry  - Evaluate need for skin moisturizer/barrier cream  - Collaborate with interdisciplinary team (i e  Nutrition, Rehabilitation, etc )   - Patient/family teaching   Outcome: Progressing

## 2018-02-19 NOTE — CONSULTS
Progress Note - Wound   Mere Jessie 67 y o  female MRN: [id number]  Unit/Bed#: E4 -01 Encounter: [id number]      Assessment: Patient is seen for wound care consult and weekly visit   She is seen while in the bed for the assessment with her family at the bedside   The patient is very reluctant to turn for the assessment   Bilateral feet and ankles continue to have tan scaling   The right heel is now blanchable   Bilateral heels are dry blanchable and intact   Will continue with the hydraguard to the heels  The bilateral lower legs have scaly areas and dry intact skin   Continue with the skin moisturizing to the legs   Sacral / left buttocks and right is a fully evolved DTI area   The left region of the sacral has yellow / light brown tissue in the wound bed with the right buttock having pink tissue   Discussed the findings with Dr Alicia Ghosh and the primary RN   Will change the treatment to the santyl of the sacral / left and calazime on the right sacral   Cover with DSD and change daily or with soiling   Will place on a P-500 mattress   Discussed plan of care with the  , daughter and patient all present at the bedside   Discussed the importance of off loading the area of the sacral          Plan:   1  Turn and reposition every 2 hours and to re-distribute the skin   2  Moisturize the skin daily with skin nourishing cream   3  Apply hydraguard to heels tid and prn   4  Elevate heels off of the bed with pillows   5  P-500 Mattress   6  Soft care cushion when OOB in the chair   7  Apply calazime paste tid and prn  to right buttocks and alphonse-anal care   8  Left sacral / buttocks - cleanse with NSS then apply santyl nickel thickness to the wound bed then top with DSD change daily and prn when soiled           Objective:    Vitals: Blood pressure 143/60, pulse 88, temperature 98 4 °F (36 9 °C), temperature source Temporal, resp   rate 18, height 5' 6" (1 676 m), weight 76 2 kg (167 lb 15 9 oz), SpO2 98 %  ,Body mass index is 27 11 kg/m²  Pressure Ulcer 02/02/18 Coccyx Mid;Left;Right Sacrum Deep tissue injury--non-blanchable deep purple, skin intact  Assessed 2/19/18 evolving DTI Left and right buttocks  (Active)   Staging Deep Tissue Injury 2/19/2018  3:00 PM   Wound Description Beefy red;Brown;Fragile;Slough 2/19/2018  3:00 PM   [place]-wound Assessment Clean;Dry; Intact 2/19/2018  3:00 PM   Shape measuring right and left areas together  2/19/2018  3:00 PM   Wound Length (cm) 6 cm 2/19/2018  3:00 PM   Wound Width (cm) 4 5 cm 2/19/2018  3:00 PM   Wound Depth (cm) 0 2/9/2018  3:40 PM   Calculated Wound Area (cm^2) 27 cm^2 2/19/2018  3:00 PM   Calculated Wound Volume (cm^3) 0 cm^3 2/9/2018  3:40 PM   Drainage Amount Small 2/19/2018  3:00 PM   Drainage Description Serosanguineous 2/19/2018  3:00 PM   Treatment Cleansed; Offload; Turn & reposition 2/19/2018  3:00 PM   Dressing Protective barrier 2/19/2018  9:00 AM   Patient Tolerance Tolerated poorly 2/19/2018  3:00 PM   Dressing Status Open to air 2/19/2018  9:00 AM         Wound care will follow weekly - call with questions     Camila Weeks RN BSN CWOCN

## 2018-02-19 NOTE — CASE MANAGEMENT
Continued Stay Review    Date: 2/17    Vital Signs: Blood pressure 134/78, pulse 83, temperature 98 °F (36 7 °C), temperature source Tympanic, resp  rate 18, height 5' 6" (1 676 m), weight 76 2 kg (167 lb 15 9 oz), SpO2 99 %  ,Body mass index is 27 11 kg/m²  Medications:   Scheduled Meds:   Current Facility-Administered Medications:  atorvastatin 40 mg Oral Daily With Dinner   cefepime 2,000 mg Intravenous Q12H   insulin glargine 6 Units Subcutaneous QAM   insulin lispro 1-5 Units Subcutaneous TID AC   insulin lispro 1-5 Units Subcutaneous HS   levothyroxine 200 mcg Oral Early Morning   lisinopril 40 mg Oral Daily   metroNIDAZOLE 500 mg Intravenous Q8H   pantoprazole 40 mg Intravenous Q12H JT   sucralfate 1,000 mg Oral 4x Daily (AC & HS)     Continuous Infusions:    PRN Meds:   acetaminophen    calcium carbonate    hydrALAZINE    ondansetron    oxyCODONE    Abnormal Labs/Diagnostic Results:    02/17/18 0521    WBC 4 31 - 10 16 Thousand/uL 8 22    RBC 3 81 - 5 12 Million/uL 3 28     Hemoglobin 11 5 - 15 4 g/dL 9 8     Hematocrit 34 8 - 46 1 % 30 2     MCV 82 - 98 fL 92    MCH 26 8 - 34 3 pg 29 9    MCHC 31 4 - 37 4 g/dL 32 5    RDW 11 6 - 15 1 % 15 0    Platelets 565 - 641 Thousands/uL 63         Age/Sex: 67 y o  female     Assessment/Plan:   Principal problem  1-sepsis:  Present on admission:  secondary to peritonitis due to a perforated gastric ulcer:  The patient underwent surgical repair   She completed 7 days of antibiotics   no further spikes in temp  Continue to monitor off of antibiotics   If pt spikes temp will need to discuss with IR about drainage of small fluid collection seen in abd                -still awaiting surgery follow up for amilcar eval and removal  If they do not see over weekend will need to call them to see her to remove on Monday       2-perforated gastric ulcer:  she was orginally in the ICU and then transferred to surgery service after silvia patch repair   She was then switched to internal medicine service  S/p EGD by GI showed 2 small healing duodenal bulb ulcers and a large gastric ulcer  She is to continue ppi bid and carafate  she was changed from IV ppi to po bid as she was tolerating her diet  But she had an episode of melena yesterday  She does have a decreased hemoglobin from 11 to 9 8  Pt was changed back to IV ppi  If her h/h remains stable and no further melena will try po ppi again  - pt will ultimately need repeat egd in 2 months to confirm healing              - will check stool for h pylori     3  Acute/chronic blood loss anemia and pancytopenia- pt had acute blood loss due to ulcer  She has chronic anemia due to pancytopenia  She was transfused approx 7 units of prbc  Please see principal problem number 2  Hemoglobin dropped to 9 8                 4  Acute cva- please see principal problem 3 about ac and platelets  Cta showed 5mm segment of occlusive critical stenosis of right p1  Likely cause of the cva  Factor 5 leiden pending  Prothrombin pending  Likely due not need tommy and loop recorder however will defer to neurology for final say                -pt is high risk for ac and antiplatelet at this point  Spoke with GI  She will need a repeat EGD in 2 months to confirm healing  No ac or antiplatelets prior to that  - will also need to wait to start antiplatelets until full etiology of pancytopenia has been obtained from bone marrow biopsy     Active problems  1  -diabetes type 2:  continue to hold po antidiabetic medications  Continue insulin sliding scale  Pt has had variable po intake but increase lantus to 12 units  Will add 3 units of lispro with meals  Continue insulin sliding scale       2-hypernatremia:resolved     3-pancytopenia: appreciate hematology recommendations  Could be from imuran but due to the degree r/o other etiologies  S/p granix  S/p bone marrow biopsy  Awaiting results  Thrombocytopenia is stable     4-hypothyroidism:  Continue Synthroid     5-hyperlipidemia:  Statin on hold     6-multiple sclerosis:  Imuran on hold  appreciate Neurology consultation and recommendations  Unfortunately unable to restart imuran due to pancytopenia  No steroids due to recent infection and steroids  Also pt will likely not respond to steroids as she was on these chronically outpt   Likely slow recovery  Neurology awaiting records of type of ms       7-left hand cat bite:  No evidence of cellulitis   Status post 7 days of antibiotics      8-acute metabolic encephalopathy: Most likely multifactorial, secondary to sedating medications, acute cva, ms, likely underlying dementia, possible hypoxic injury from anemia  Pt improved further again today                 9-intermittent elevated blood presure:  Without prior h/o HTN   sbp improved this am in the 130s  Continue lisinopril and monitor       10 left upper extremity DVT:  Secondary to PICC line   Have removed PICC line   Unfortunately patient may not be on anticoagulation due to pancytopenia     11-decubitus ulcers:  Appreciate wound Care team's evaluation recommendations      12- deconditioning- 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

## 2018-02-19 NOTE — PROGRESS NOTES
Progress Note - General Surgery   Amalia Cedillo 67 y o  female MRN: [id number]  Unit/Bed#: E4 -01 Encounter: [id number]      Subjective/Objective     Subjective: Pt awake, doing OK    Objective:     /60 (BP Location: Right arm)   Pulse 88   Temp 98 4 °F (36 9 °C) (Temporal)   Resp 18   Ht 5' 6" (1 676 m)   Wt 76 2 kg (167 lb 15 9 oz)   SpO2 98%   BMI 27 11 kg/m²       Intake/Output Summary (Last 24 hours) at 02/19/18 1007  Last data filed at 02/19/18 0901   Gross per 24 hour   Intake              600 ml   Output             1172 ml   Net             -572 ml       Invasive Devices     Drain            Closed/Suction Drain Left Abdomen Bulb 15 Fr  20 days    Closed/Suction Drain Right;Other (Comment) Abdomen Bulb 15 Fr  20 days                Physical Exam:       General Appearance:    Awake   Head:    Normocephalic, without obvious abnormality, atraumatic   Nose:   Nares normal   Throat:   Lips, mucosa normal, pharynx clear   Neck:   Supple, symmetrical   Chest/Breast:   Def   Lungs:     Clear to auscultation bilaterally, respirations unlabored   Heart:    Regular rate and rhythm, S1 and S2 normal, no murmur, rub    or gallop   Abdomen:     Soft, tender, no rebound  COREY's serous       Genitalia:    Def   Rectal:    Def   Extremities:    Skin:    Neurologic:                    Lab, Imaging and other studies:I have personally reviewed pertinent lab results  Labs in chart were reviewed      VTE Pharmacologic Prophylaxis: Reason for no pharmacologic prophylaxis anemia  VTE Mechanical Prophylaxis: sequential compression device    Assessment/Plan:  Sepsis POA due to perforated gastric ulcer  S/p lap silvia patch  Continue Protonix  DC OCREY's    Fever - CT shows persistent fluid collection anterior to the stomach and posterior to Left lobe of liver  Will consult IR for drainage    Pancytopenia - awaiting bone marrow results    MS    Encephalopathy - acute infarcts on MRI and chronic infarcts    Patient Active Problem List   Diagnosis    [facility]    Type 2 diabetes mellitus [facility] )    Hyperglycemia due to type 2 diabetes mellitus ([facility] )    Hypoalbuminemia due to protein-calorie malnutrition (HCC)    Gastric ulcer    Acute perforated gastric ulcer with hemorrhage ([facility] )    Peritonitis (HCC)    Acute blood loss anemia    Hypernatremia    Hypothyroidism    Hyperlipidemia    Multiple sclerosis (HCC)    Metabolic encephalopathy    [facility]IC [facility]disseminated intravascular coagulation) ([facility] )    Anemia            This text is generated with voice recognition software  There may be translation, syntax,  or grammatical errors  If you have any questions, please contact the dictating provider

## 2018-02-19 NOTE — PROGRESS NOTES
Progress Note - Infectious Disease   Tatiana Watt 67 y o  female MRN: [id number]  Unit/Bed#: E4 -01 Encounter: [id number]      Impression/Plan:  1  Probable sepsis-fever and tachycardia  No CT evidence of recurrent peritonitis or abscess formation     Consideration for the possibility of bacteremia  Consideration for the possibility of nosocomial influenza  No other localizing findings are noted  -continue cefepime Flagyl for now  -check blood culture results  -check influenza PCR  -no additional ID workup for now  -likely discontinue antibiotics tomorrow if the blood cultures remain negative     2   Febrile neutropenia-Etiology of neutropenia unclear as below  Repeat blood cultures negative  The patient is no longer neutropenic   -antibiotics as above              -follow up repeat blood cultures  -follow temperatures closely  -await bone marrow biopsy results     3   Peritonitis-secondary to perforation of a gastric ulceration with contamination of the peritoneum with gastric contacts   Status post laparoscopic repair with washout with drains left in place   Consider MDROs and yeast as the patient had been Augmentin at the time of the perforation   Fortunately the patient has now undergone adequate source control   She has completed 1 week of antibiotics postop   Repeat CT of the abdomen and pelvis does not reveal any evidence of recurrence   -close surgical follow-up  -drain management  -serial abdominal exams     4   Pancytopenia-Unclear etiology as worsening despite D/C of Imuran and antibiotics   Oncology considering possible lymphoproliferative disorder   The white cell count has recovered with G-CSF, but is now drifting down again  -hematology oncology follow-up  -no additional G-CSF  -transfusion as needed  -follow-up bone marrow biopsy result     5   Diabetes mellitus-type 2 with hyperglycemia     6   Multiple sclerosis-apparently on Imuran for a while     -would hold on Imuran for now     7   Cat bite-left hand   No overt cellulitis at this time   Which should be well covered by the antibiotics as above   The cat is being watched at home without any behavioral abnormalities   The hand is significantly improved with decreased edema   -serial exams  -continue monitoring of the cat at home  -no additional antibiotics needed     8   Acute encephalopathy - likely toxic metabolic due to infection, electrolyte abnormalities superimposed on MS and chronic/severe SVID as suggested by CT  Newly diagnosed acute CVA may be playing a role   Doubt CNS infection                -follow MS closely              -no additional ID workup  -neurology follow-up    Antibiotics:  Cefepime 2  Flagyl 2    Subjective:  Patient has no fever, chills, sweats; no nausea, vomiting, diarrhea; no cough, shortness of breath; no increased pain  No new symptoms  Her temperatures come down since the fever spike yesterday  Objective:  Vitals:  HR:  [] 88  Resp:  [18-20] 18  BP: (134-163)/(57-86) 143/60  SpO2:  [97 %-98 %] 98 %  Temp (24hrs), Av 5 °F (36 9 °C), Min:96 4 °F (35 8 °C), Max:102 1 °F (38 9 °C)  Current: Temperature: 98 4 °F (36 9 °C)    Physical Exam:   General Appearance:  Alert, mildly confused, interactive, nontoxic, no acute distress  Throat: Oropharynx moist without lesions  Lungs:   Clear to auscultation bilaterally; no wheezes, rhonchi or rales; respirations unlabored   Heart:  RRR; no murmur, rub or gallop   Abdomen:   Soft, mild diffuse tenderness, non-distended, positive bowel sounds  Two COREY drains in place  Extremities: No clubbing, cyanosis or edema   Skin: No new rashes or lesions  No draining wounds noted         Labs, Imaging, & Other studies:   All pertinent labs and imaging studies were personally reviewed    Results from last 7 days  Lab Units 18  0618 18  0637 18  0521   WBC Thousand/uL 5 63 9 97 8 22   HEMOGLOBIN g/dL 9 3* 10 2* 9 8*   PLATELETS Thousands/uL 63* 28* 63*       Results from last 7 days  Lab Units 02/19/18  0618 02/18/18  0524 02/17/18  0521   SODIUM mmol/L 137 138 141   POTASSIUM mmol/L 4 0 4 3 3 5   CHLORIDE mmol/L 104 105 108   CO2 mmol/L 28 26 26   ANION GAP mmol/L 5 7 7   BUN mg/dL 9 11 8   CREATININE mg/dL 0 45* 0 45* 0 45*   EGFR ml/min/1 73sq m 100 100 100   GLUCOSE RANDOM mg/dL 163* 214* 170*   CALCIUM mg/dL 8 3 8 2* 8 1*         CT abdomen and pelvis-  1   No intraperitoneal collection or fluid  2   Wall thickening throughout the stomach, suggesting gastritis   Limited evaluation absence of contrast distention   No evidence of bowel obstruction, colitis or diverticulitis  3   Minimal residual gas in the intra-abdominal wall   No collection or hematoma

## 2018-02-19 NOTE — CASE MANAGEMENT
CM met with patient at bedside, patient's  and daughter Mamadou Treviñode at bedside as well  Patient states she still wants to go to rehab  Daughter and  in agreement  CM asked patient and family if she is unable to make decisions for self, then who makes the decision  The  pointed to his daughter, Mamadou Mann  [place] states it would be a family decision between her, her brother and father  MARTINEZ re-sent referrals to [address]  at [place] and [facility]  Waiting for responses  Patient also not medically cleared today but will continue to follow for discharge needs     Dania Blackburn RN  [phone]

## 2018-02-19 NOTE — SPEECH THERAPY NOTE
Speech Language/Pathology    Speech/Language Pathology Progress Note    Patient Name: April Diaz  KLCOQ'A Date: 2/19/2018     Problem List  Patient Active Problem List   Diagnosis    [facility]    Type 2 diabetes mellitus [facility]    Hyperglycemia due to type 2 diabetes mellitus ([facility]    Hypoalbuminemia due to protein-calorie malnutrition (HCC)    Gastric ulcer    Acute perforated [facility] ulcer with hemorrhage [facility]    Peritonitis (HCC)    Acute blood loss anemia    Hypernatremia    Hypothyroidism    Hyperlipidemia    Multiple sclerosis (HCC)    Metabolic encephalopathy    DIC (disseminated intravascular coagulation) ([facility] )    Anemia        Past Medical History  Past Medical History:   Diagnosis Date    [facility]     Disease of thyroid gland     Hyperlipidemia     Multiple sclerosis (HCC)         Past Surgical History  Past Surgical History:   Procedure Laterality Date    ESOPHAGOGASTRODUODENOSCOPY N/A 2/12/2018    Procedure: ESOPHAGOGASTRODUODENOSCOPY (EGD); Surgeon: Bart Chery MD;  Location: [facility]; Service: Gastroenterology    [place] LAP,DIAGNOSTIC ABDOMEN N/A 1/29/2018    Procedure: LAPAROSCOPY DIAGNOSTIC, with repair of perforated gastric ulcer;  Surgeon: Deja Atkinson MD;  Location: [facility];  Service: General         Subjective:    Pt was seen for skilled speech therapy to assess for speech/language changes this morning  Pt was sleeping upon arrival however she woke up easily with verbal cues  Objective:    Pt appeared more confused today with reduced orientation (to self only)  SLP provided a series of speech/language tasks to assess for progress  Pt completed confrontation naming tasks with 1/10; 10% accuracy today  Despite max verbal cues (e g, phonemic, semantic, sentence completion, binary choice) pt did not improve accuracy  She demonstrated perseverations on "pencil" for every object    With Object ID tasks pt achieved 0/10; 0% accuracy despite max verbal and visual prompts  She achieved 0% accuracy with generative naming tasks despite max verbal prompts  Pt was unable to follow 1 step commands today, responding "yeah" to every command given  Pt also had increased difficulty with narrative speech responding to the following questions:  What did you eat today?; -- "eat grains "  Are you hungry? -- "Munch on no "  Did anyone visit you yesterday? -- "my  "  Do you know where you are? -- "My  "    SLP spoke with pt's nurse Faviola Bangura to inform her of concern with increased difficulty with speech/language tasks this morning  Nurse is present in room and assessing  Pt is currently NPO for EGD; will continue to screen for need for swallowing evaluation as indicated due to mental status  Assessment:    Pt is demonstrating increased difficulty with speech/language tasks today including confrontation naming, following simple commands and object ID  ? If this is due to lethargy and time of day versus change in status  Nurse is aware and assessing       Plan/Recommendations:    Continue speech/language treatment  Nurse/MD to assess for changes

## 2018-02-20 ENCOUNTER — TELEPHONE (OUTPATIENT)
Dept: GASTROENTEROLOGY | Facility: CLINIC | Age: 73
End: 2018-02-20

## 2018-02-20 LAB
COMMENT: NORMAL
F5 GENE MUT ANL BLD/T: NORMAL
FLUAV AG SPEC QL: NORMAL
FLUBV AG SPEC QL: NORMAL
GLUCOSE SERPL-MCNC: 160 MG/DL (ref 65–140)
GLUCOSE SERPL-MCNC: 194 MG/DL (ref 65–140)
GLUCOSE SERPL-MCNC: 217 MG/DL (ref 65–140)
GLUCOSE SERPL-MCNC: 323 MG/DL (ref 65–140)
RSV B RNA SPEC QL NAA+PROBE: NORMAL

## 2018-02-20 PROCEDURE — 99233 SBSQ HOSP IP/OBS HIGH 50: CPT | Performed by: INTERNAL MEDICINE

## 2018-02-20 PROCEDURE — 82948 REAGENT STRIP/BLOOD GLUCOSE: CPT

## 2018-02-20 PROCEDURE — C9113 INJ PANTOPRAZOLE SODIUM, VIA: HCPCS | Performed by: INTERNAL MEDICINE

## 2018-02-20 PROCEDURE — 92526 ORAL FUNCTION THERAPY: CPT

## 2018-02-20 PROCEDURE — 99232 SBSQ HOSP IP/OBS MODERATE 35: CPT | Performed by: INTERNAL MEDICINE

## 2018-02-20 RX ADMIN — METRONIDAZOLE 500 MG: 500 INJECTION, SOLUTION INTRAVENOUS at 05:57

## 2018-02-20 RX ADMIN — Medication 500 MG: at 12:22

## 2018-02-20 RX ADMIN — INSULIN GLARGINE 6 UNITS: 100 INJECTION, SOLUTION SUBCUTANEOUS at 10:00

## 2018-02-20 RX ADMIN — CEFEPIME HYDROCHLORIDE 2000 MG: 2 INJECTION, POWDER, FOR SOLUTION INTRAVENOUS at 06:08

## 2018-02-20 RX ADMIN — PANTOPRAZOLE SODIUM 40 MG: 40 INJECTION, POWDER, FOR SOLUTION INTRAVENOUS at 10:00

## 2018-02-20 RX ADMIN — INSULIN LISPRO 3 UNITS: 100 INJECTION, SOLUTION INTRAVENOUS; SUBCUTANEOUS at 21:53

## 2018-02-20 RX ADMIN — LEVOTHYROXINE SODIUM 200 MCG: 100 TABLET ORAL at 10:00

## 2018-02-20 RX ADMIN — COLLAGENASE SANTYL: 250 OINTMENT TOPICAL at 10:00

## 2018-02-20 RX ADMIN — SUCRALFATE 1000 MG: 1 SUSPENSION ORAL at 10:00

## 2018-02-20 RX ADMIN — SUCRALFATE 1000 MG: 1 SUSPENSION ORAL at 18:23

## 2018-02-20 RX ADMIN — INSULIN LISPRO 1 UNITS: 100 INJECTION, SOLUTION INTRAVENOUS; SUBCUTANEOUS at 18:24

## 2018-02-20 RX ADMIN — METRONIDAZOLE 500 MG: 500 INJECTION, SOLUTION INTRAVENOUS at 21:54

## 2018-02-20 RX ADMIN — SUCRALFATE 1000 MG: 1 SUSPENSION ORAL at 21:53

## 2018-02-20 RX ADMIN — LISINOPRIL 40 MG: 20 TABLET ORAL at 10:00

## 2018-02-20 RX ADMIN — CEFEPIME HYDROCHLORIDE 2000 MG: 2 INJECTION, POWDER, FOR SOLUTION INTRAVENOUS at 18:24

## 2018-02-20 RX ADMIN — OXYCODONE HYDROCHLORIDE 2.5 MG: 5 TABLET ORAL at 18:34

## 2018-02-20 RX ADMIN — ATORVASTATIN CALCIUM 40 MG: 40 TABLET, FILM COATED ORAL at 18:24

## 2018-02-20 RX ADMIN — DULOXETINE HYDROCHLORIDE 20 MG: 20 CAPSULE, DELAYED RELEASE ORAL at 10:00

## 2018-02-20 RX ADMIN — METRONIDAZOLE 500 MG: 500 INJECTION, SOLUTION INTRAVENOUS at 15:07

## 2018-02-20 RX ADMIN — PANTOPRAZOLE SODIUM 40 MG: 40 INJECTION, POWDER, FOR SOLUTION INTRAVENOUS at 21:53

## 2018-02-20 NOTE — PROGRESS NOTES
Progress Note - Infectious Disease   Hunter Ivey 67 y o  female MRN: [id number]  Unit/Bed#: E4 -01 Encounter: [id number]      Impression/Plan:  1   Probable sepsis-fever and tachycardia  Possibly secondary to intra-abdominal abscess   Consideration for the possibility of bacteremia  Consideration for the possibility of nosocomial influenza    No other localizing findings are noted  The temperatures come down  The blood cultures are negative thus far   -continue cefepime Flagyl for now  -check blood culture results  -follow-up influenza PCR  -await IR drainage of collection  -no additional ID workup for now     2   Febrile neutropenia-Etiology of neutropenia unclear as below  Repeat blood cultures negative   The patient is no longer neutropenic  Follow-up blood cultures are negative thus far  -antibiotics as above              -follow up repeat blood cultures  -follow temperatures closely  -await bone marrow biopsy results     3   Peritonitis-With possible intra-abdominal abscess developing  Status post laparoscopic repair with washout with drains left in place   Consider MDROs and yeast as the patient had been Augmentin at the time of the perforation   Fortunately the patient has now undergone adequate source control   She has completed 1 week of antibiotics postop  Discussed in detail with surgery who have arranged IR drainage of the collection   -close surgical follow-up  -await IR drainage  -serial abdominal exams  -antibiotics as above     4   Pancytopenia-Unclear etiology as worsening despite D/C of Imuran and antibiotics   Oncology considering possible lymphoproliferative disorder   The white cell count has recovered with G-CSF, but is now drifting down again    -hematology oncology follow-up  -no additional G-CSF  -transfusion as needed  -follow-up bone marrow biopsy result     5   Diabetes mellitus-type 2 with hyperglycemia     6   Multiple sclerosis-apparently on Imuran for a while     -would hold on Imuran for now     7   Cat bite-left hand   No overt cellulitis at this time  Kenzie Leo should be well covered by the antibiotics as above   The cat is being watched at home without any behavioral abnormalities   The hand is significantly improved with decreased edema   -serial exams  -continue monitoring of the cat at home  -no additional antibiotics needed     8   Acute encephalopathy - likely toxic metabolic due to infection, electrolyte abnormalities superimposed on MS and chronic/severe SVID as suggested by CT   Newly diagnosed acute CVA may be playing a role   Doubt CNS infection                -follow MS closely              -no additional ID workup  -neurology follow-up    Antibiotics:  Cefepime 3  Flagyl 3    Subjective:  Patient has no fever, chills, sweats; no nausea, vomiting, diarrhea; no cough, shortness of breath; no pain  No new symptoms  Objective:  Vitals:  HR:  [79-90] 90  Resp:  [18-20] 20  BP: (139-151)/(60-75) 150/75  SpO2:  [96 %-99 %] 98 %  Temp (24hrs), Av 3 °F (36 8 °C), Min:97 1 °F (36 2 °C), Max:99 4 °F (37 4 °C)  Current: Temperature: (!) 97 1 °F (36 2 °C)    Physical Exam:   General Appearance:  Alert, confused, nontoxic   Throat: Oropharynx moist without lesions  Lungs:   Clear to auscultation bilaterally; no wheezes, rhonchi or rales; respirations unlabored   Heart:  RRR; no murmur, rub or gallop   Abdomen:   Soft, tender, non-distended, positive bowel sounds  COREY drains have been removed   Extremities: No clubbing, cyanosis or edema   Skin: No new rashes or lesions  No draining wounds noted         Labs, Imaging, & Other studies:   All pertinent labs and imaging studies were personally reviewed    Results from last 7 days  Lab Units 18  0618 18  0637 18  0521   WBC Thousand/uL 5 63 9 97 8 22   HEMOGLOBIN g/dL 9 3* 10 2* 9 8*   PLATELETS Thousands/uL 63* 28* 63*       Results from last 7 days  Lab Units 18  0618 18  0524 18  9878 SODIUM mmol/L 137 138 141   POTASSIUM mmol/L 4 0 4 3 3 5   CHLORIDE mmol/L 104 105 108   CO2 mmol/L 28 26 26   ANION GAP mmol/L 5 7 7   BUN mg/dL 9 11 8   CREATININE mg/dL 0 45* 0 45* 0 45*   EGFR ml/min/1 73sq m 100 100 100   GLUCOSE RANDOM mg/dL 163* 214* 170*   CALCIUM mg/dL 8 3 8 2* 8 1*       Results from last 7 days  Lab Units 02/19/18  1246 02/18/18  1448   BLOOD CULTURE   --  No Growth at 24 hrs  No Growth at 24 hrs     INFLUENZA A PCR  None Detected  --    INFLUENZA B PCR  None Detected  --    RSV PCR  None Detected  --

## 2018-02-20 NOTE — SPEECH THERAPY NOTE
Speech Language/Pathology    Speech/Language Pathology Progress Note    Patient Name: Mónica Galindo  KCVJZ'X Date: 2/20/2018     Problem List  Patient Active Problem List   Diagnosis    [facility]    Type 2 diabetes mellitus [facility]    Hyperglycemia due to type 2 diabetes mellitus ([facility]    Hypoalbuminemia due to protein-calorie malnutrition ([facility]    Sepsis ([facility]    Acute perforated gastric ulcer with hemorrhage ([facility]    Peritonitis ([facility]    Acute blood loss anemia    Hypernatremia    Hypothyroidism    Hyperlipidemia    Multiple sclerosis ([facility]    Metabolic encephalopathy        Past Medical History  Past Medical History:   Diagnosis Date    [facility]     Disease of thyroid gland     Hyperlipidemia     Multiple sclerosis (HCC)         Past Surgical History  Past Surgical History:   Procedure Laterality Date    ESOPHAGOGASTRODUODENOSCOPY N/A 2/12/2018    Procedure: ESOPHAGOGASTRODUODENOSCOPY (EGD); Surgeon: Aramis Lopes MD;  [facility]: [facility]; Service: Gastroenterology    [place] LAP,DIAGNOSTIC ABDOMEN N/A 1/29/2018    Procedure: LAPAROSCOPY DIAGNOSTIC, with repair of perforated gastric ulcer;  Surgeon: Jorge Wilson MD;  [facility];  Service: General         Subjective:  "oh it hurts"  Pt c/o stomach pain and pain on her bottom  Nurse in room/aware  Family also in room  Objective:  Seen for po tolerance  DIL stating "she can chew but she can't swallow" (this did not appear to be the case)  Noted pt has been npo for 2 days pending procedure  Now placed back on a regular diet  Procedure possibly tomorrow  Assessment:  Pt tolerated sips of tea and gingerale w/ prompt transfer and swallow and no s/s difficulty or aspiration  She also had a very small amt of veg soup and jello wnl  Stated "no more"  Denied being unable to swallow  Denied pain w/ swallow  Reported stomach pain and that she needed a break     Plan/Recommendations:  Consider clear liquids or surgical soft diet given current complaints  Will f/u

## 2018-02-20 NOTE — OCCUPATIONAL THERAPY NOTE
Occupational Therapy         Patient Name: Lianne Bailey  Today's Date: 2/20/2018          Attempted to see pt for tx session, but pt declining and family stating, "it's not a good day"  Will continue when pt's is more appropriate   Vimal Wiseman, OT

## 2018-02-20 NOTE — TELEPHONE ENCOUNTER
She is currently still in the hospital in [place]  Her daughter Lanita Boas would like to speak with you  She said she spoke to you yesterday   She can be reached at [phone]

## 2018-02-21 ENCOUNTER — APPOINTMENT (INPATIENT)
Dept: CT IMAGING | Facility: HOSPITAL | Age: 73
DRG: 853 | End: 2018-02-21
Attending: SURGERY
Payer: MEDICARE

## 2018-02-21 LAB
ANION GAP SERPL CALCULATED.3IONS-SCNC: 8 MMOL/L (ref 4–13)
ANISOCYTOSIS BLD QL SMEAR: PRESENT
BASOPHILS # BLD MANUAL: 0 THOUSAND/UL (ref 0–0.1)
BASOPHILS NFR MAR MANUAL: 0 % (ref 0–1)
BUN SERPL-MCNC: 11 MG/DL (ref 5–25)
CALCIUM SERPL-MCNC: 7.4 MG/DL (ref 8.3–10.1)
CHLORIDE SERPL-SCNC: 106 MMOL/L (ref 100–108)
CO2 SERPL-SCNC: 26 MMOL/L (ref 21–32)
CREAT SERPL-MCNC: 0.44 MG/DL (ref 0.6–1.3)
EOSINOPHIL # BLD MANUAL: 0 THOUSAND/UL (ref 0–0.4)
EOSINOPHIL NFR BLD MANUAL: 0 % (ref 0–6)
ERYTHROCYTE [DISTWIDTH] IN BLOOD BY AUTOMATED COUNT: 15.1 % (ref 11.6–15.1)
F2 GENE MUT ANL BLD/T: NORMAL
GFR SERPL CREATININE-BSD FRML MDRD: 101 ML/MIN/1.73SQ M
GLUCOSE SERPL-MCNC: 123 MG/DL (ref 65–140)
GLUCOSE SERPL-MCNC: 162 MG/DL (ref 65–140)
GLUCOSE SERPL-MCNC: 178 MG/DL (ref 65–140)
GLUCOSE SERPL-MCNC: 214 MG/DL (ref 65–140)
GLUCOSE SERPL-MCNC: 226 MG/DL (ref 65–140)
HCT VFR BLD AUTO: 27.8 % (ref 34.8–46.1)
HGB BLD-MCNC: 9 G/DL (ref 11.5–15.4)
LYMPHOCYTES # BLD AUTO: 1.78 THOUSAND/UL (ref 0.6–4.47)
LYMPHOCYTES # BLD AUTO: 24 % (ref 14–44)
Lab: NORMAL
MCH RBC QN AUTO: 29.9 PG (ref 26.8–34.3)
MCHC RBC AUTO-ENTMCNC: 32.4 G/DL (ref 31.4–37.4)
MCV RBC AUTO: 92 FL (ref 82–98)
MONOCYTES # BLD AUTO: 0.59 THOUSAND/UL (ref 0–1.22)
MONOCYTES NFR BLD: 8 % (ref 4–12)
NEUTROPHILS # BLD MANUAL: 5.03 THOUSAND/UL (ref 1.85–7.62)
NEUTS BAND NFR BLD MANUAL: 13 % (ref 0–8)
NEUTS SEG NFR BLD AUTO: 55 % (ref 43–75)
NRBC BLD AUTO-RTO: 0 /100 WBCS
PLATELET # BLD AUTO: 68 THOUSANDS/UL (ref 149–390)
PLATELET BLD QL SMEAR: ABNORMAL
PMV BLD AUTO: 10.9 FL (ref 8.9–12.7)
POTASSIUM SERPL-SCNC: 3 MMOL/L (ref 3.5–5.3)
RBC # BLD AUTO: 3.01 MILLION/UL (ref 3.81–5.12)
SODIUM SERPL-SCNC: 140 MMOL/L (ref 136–145)
TOTAL CELLS COUNTED SPEC: 100
WBC # BLD AUTO: 7.4 THOUSAND/UL (ref 4.31–10.16)

## 2018-02-21 PROCEDURE — 87147 CULTURE TYPE IMMUNOLOGIC: CPT | Performed by: RADIOLOGY

## 2018-02-21 PROCEDURE — 85027 COMPLETE CBC AUTOMATED: CPT | Performed by: INTERNAL MEDICINE

## 2018-02-21 PROCEDURE — 49406 IMAGE CATH FLUID PERI/RETRO: CPT

## 2018-02-21 PROCEDURE — 87070 CULTURE OTHR SPECIMN AEROBIC: CPT | Performed by: RADIOLOGY

## 2018-02-21 PROCEDURE — 97530 THERAPEUTIC ACTIVITIES: CPT

## 2018-02-21 PROCEDURE — 97110 THERAPEUTIC EXERCISES: CPT

## 2018-02-21 PROCEDURE — 87102 FUNGUS ISOLATION CULTURE: CPT | Performed by: RADIOLOGY

## 2018-02-21 PROCEDURE — 80048 BASIC METABOLIC PNL TOTAL CA: CPT | Performed by: INTERNAL MEDICINE

## 2018-02-21 PROCEDURE — 99152 MOD SED SAME PHYS/QHP 5/>YRS: CPT | Performed by: RADIOLOGY

## 2018-02-21 PROCEDURE — C1769 GUIDE WIRE: HCPCS

## 2018-02-21 PROCEDURE — C9113 INJ PANTOPRAZOLE SODIUM, VIA: HCPCS | Performed by: INTERNAL MEDICINE

## 2018-02-21 PROCEDURE — C1729 CATH, DRAINAGE: HCPCS

## 2018-02-21 PROCEDURE — 87205 SMEAR GRAM STAIN: CPT | Performed by: RADIOLOGY

## 2018-02-21 PROCEDURE — 99152 MOD SED SAME PHYS/QHP 5/>YRS: CPT

## 2018-02-21 PROCEDURE — 87106 FUNGI IDENTIFICATION YEAST: CPT | Performed by: RADIOLOGY

## 2018-02-21 PROCEDURE — 49406 IMAGE CATH FLUID PERI/RETRO: CPT | Performed by: RADIOLOGY

## 2018-02-21 PROCEDURE — 99153 MOD SED SAME PHYS/QHP EA: CPT

## 2018-02-21 PROCEDURE — 85007 BL SMEAR W/DIFF WBC COUNT: CPT | Performed by: INTERNAL MEDICINE

## 2018-02-21 PROCEDURE — 99233 SBSQ HOSP IP/OBS HIGH 50: CPT | Performed by: INTERNAL MEDICINE

## 2018-02-21 PROCEDURE — 82948 REAGENT STRIP/BLOOD GLUCOSE: CPT

## 2018-02-21 PROCEDURE — 99232 SBSQ HOSP IP/OBS MODERATE 35: CPT | Performed by: INTERNAL MEDICINE

## 2018-02-21 PROCEDURE — 0W9G3ZZ DRAINAGE OF PERITONEAL CAVITY, PERCUTANEOUS APPROACH: ICD-10-PCS | Performed by: RADIOLOGY

## 2018-02-21 RX ORDER — FENTANYL CITRATE 50 UG/ML
INJECTION, SOLUTION INTRAMUSCULAR; INTRAVENOUS CODE/TRAUMA/SEDATION MEDICATION
Status: COMPLETED | OUTPATIENT
Start: 2018-02-21 | End: 2018-02-21

## 2018-02-21 RX ORDER — MIDAZOLAM HYDROCHLORIDE 1 MG/ML
INJECTION INTRAMUSCULAR; INTRAVENOUS CODE/TRAUMA/SEDATION MEDICATION
Status: COMPLETED | OUTPATIENT
Start: 2018-02-21 | End: 2018-02-21

## 2018-02-21 RX ADMIN — PANTOPRAZOLE SODIUM 40 MG: 40 INJECTION, POWDER, FOR SOLUTION INTRAVENOUS at 08:57

## 2018-02-21 RX ADMIN — METRONIDAZOLE 500 MG: 500 INJECTION, SOLUTION INTRAVENOUS at 05:52

## 2018-02-21 RX ADMIN — CEFEPIME HYDROCHLORIDE 2000 MG: 2 INJECTION, POWDER, FOR SOLUTION INTRAVENOUS at 20:34

## 2018-02-21 RX ADMIN — COLLAGENASE SANTYL: 250 OINTMENT TOPICAL at 09:00

## 2018-02-21 RX ADMIN — PANTOPRAZOLE SODIUM 40 MG: 40 INJECTION, POWDER, FOR SOLUTION INTRAVENOUS at 21:39

## 2018-02-21 RX ADMIN — FENTANYL CITRATE 50 MCG: 50 INJECTION, SOLUTION INTRAMUSCULAR; INTRAVENOUS at 15:20

## 2018-02-21 RX ADMIN — SUCRALFATE 1000 MG: 1 SUSPENSION ORAL at 21:39

## 2018-02-21 RX ADMIN — MIDAZOLAM 1 MG: 1 INJECTION INTRAMUSCULAR; INTRAVENOUS at 15:20

## 2018-02-21 RX ADMIN — LEVOTHYROXINE SODIUM 200 MCG: 100 TABLET ORAL at 05:52

## 2018-02-21 RX ADMIN — FENTANYL CITRATE 50 MCG: 50 INJECTION, SOLUTION INTRAMUSCULAR; INTRAVENOUS at 15:01

## 2018-02-21 RX ADMIN — SUCRALFATE 1000 MG: 1 SUSPENSION ORAL at 17:40

## 2018-02-21 RX ADMIN — INSULIN GLARGINE 6 UNITS: 100 INJECTION, SOLUTION SUBCUTANEOUS at 09:06

## 2018-02-21 RX ADMIN — INSULIN LISPRO 2 UNITS: 100 INJECTION, SOLUTION INTRAVENOUS; SUBCUTANEOUS at 21:39

## 2018-02-21 RX ADMIN — MIDAZOLAM 2 MG: 1 INJECTION INTRAMUSCULAR; INTRAVENOUS at 15:01

## 2018-02-21 RX ADMIN — ATORVASTATIN CALCIUM 40 MG: 40 TABLET, FILM COATED ORAL at 16:30

## 2018-02-21 RX ADMIN — METRONIDAZOLE 500 MG: 500 INJECTION, SOLUTION INTRAVENOUS at 16:30

## 2018-02-21 RX ADMIN — MIDAZOLAM 1 MG: 1 INJECTION INTRAMUSCULAR; INTRAVENOUS at 15:16

## 2018-02-21 RX ADMIN — LISINOPRIL 40 MG: 20 TABLET ORAL at 08:57

## 2018-02-21 RX ADMIN — INSULIN LISPRO 1 UNITS: 100 INJECTION, SOLUTION INTRAVENOUS; SUBCUTANEOUS at 08:57

## 2018-02-21 RX ADMIN — FENTANYL CITRATE 50 MCG: 50 INJECTION, SOLUTION INTRAMUSCULAR; INTRAVENOUS at 15:16

## 2018-02-21 RX ADMIN — METRONIDAZOLE 500 MG: 500 INJECTION, SOLUTION INTRAVENOUS at 21:50

## 2018-02-21 RX ADMIN — DULOXETINE HYDROCHLORIDE 20 MG: 20 CAPSULE, DELAYED RELEASE ORAL at 08:57

## 2018-02-21 RX ADMIN — SUCRALFATE 1000 MG: 1 SUSPENSION ORAL at 06:00

## 2018-02-21 RX ADMIN — CEFEPIME HYDROCHLORIDE 2000 MG: 2 INJECTION, POWDER, FOR SOLUTION INTRAVENOUS at 06:30

## 2018-02-21 NOTE — PLAN OF CARE
Activity Intolerance/Impaired Mobility     Mobility/activity is maintained at optimum level for patient Progressing        Communication Impairment     Ability to express needs and understand communication Clarissa Lomas Discharge to home or other facility with appropriate resources Progressing        DISCHARGE PLANNING - CARE MANAGEMENT     Discharge to post-acute care or home with appropriate resources Progressing        HEMATOLOGIC - ADULT     Maintains hematologic stability Progressing        INFECTION - ADULT     Absence or prevention of progression during hospitalization Progressing        Knowledge Deficit     Patient/family/caregiver demonstrates understanding of disease process, treatment plan, medications, and discharge instructions Progressing        METABOLIC, FLUID AND ELECTROLYTES - ADULT     Glucose maintained within target range Progressing        MUSCULOSKELETAL - ADULT     Maintain or return mobility to safest level of function Progressing        Neurological Deficit     Neurological status is stable or improving Progressing        NEUROSENSORY - ADULT     Achieves stable or improved neurological status Progressing        Nutrition     Nutrition/Hydration status is improving Progressing        Nutrition/Hydration-ADULT     Nutrient/Hydration intake appropriate for improving, restoring or maintaining nutritional needs Progressing        PAIN - ADULT     Verbalizes/displays adequate comfort level or baseline comfort level Progressing        Potential for Aspiration     Non-ventilated patient's risk of aspiration is minimized Progressing        Potential for Falls     Patient will remain free of falls Progressing        Prexisting or High Potential for Compromised Skin Integrity     Skin integrity is maintained or improved Progressing        SAFETY ADULT     Maintain or return to baseline ADL function Progressing     Maintain or return mobility status to optimal level Progressing        SKIN/TISSUE INTEGRITY - ADULT     Skin integrity remains intact Progressing     Incision(s), wounds(s) or drain site(s) healing without S/S of infection Progressing

## 2018-02-21 NOTE — PHYSICAL THERAPY NOTE
PHYSICAL THERAPY NOTE          Patient Name: Amalia Cedillo  XIBKK'D Date: 2/21/2018 02/21/18 4134   Pain Assessment   Pain Assessment 0-10   Pain Score Worst Possible Pain   Pain Type Chronic pain   Pain Location Abdomen   Pain Orientation Mid   Pain Descriptors Aching   Pain Frequency Constant/continuous   Pain Onset Ongoing   Clinical Progression Gradually improving   Effect of Pain on Daily Activities increased pain with activity    Patient's Stated Pain Goal No pain   Hospital Pain Intervention(s) Ambulation/increased activity;Repositioned   Response to Interventions tolerated    Pain Rating: FLACC (Rest) - Face 1   Pain Rating: FLACC (Rest) - Legs 1   Pain Rating: FLACC (Rest) - Activity 0   Pain Rating: FLACC (Rest) - Cry 0   Pain Rating: FLACC (Rest) - Consolability 0   Score: FLACC (Rest) 2   Pain Rating: FLACC (Activity) - Face 1   Pain Rating: FLACC (Activity) - Legs 1   Pain Rating: FLACC (Activity) - Activity 1   Pain Rating: FLACC (Activity) - Cry 1   Pain Rating: FLACC (Activity) - Consolability 1   Score: FLACC (Activity) 5   Restrictions/Precautions   Weight Bearing Precautions Per Order No   Other Precautions Cognitive;Multiple lines; Fall Risk;Pain   General   Chart Reviewed Yes   Additional Pertinent History pt scheduled for IR drainage today   Response to Previous Treatment Patient with no complaints from previous session  Family/Caregiver Present Yes   Cognition   Overall Cognitive Status Impaired   Arousal/Participation Responsive   Attention Attends with cues to redirect   Orientation Level Oriented to person   Memory Decreased short term memory   Following Commands Follows one step commands with increased time or repetition   Subjective   Subjective " I'm having pain today"   Bed Mobility   Rolling R 2  Maximal assistance   Additional items Assist x 2; Increased time required;Verbal cues;LE management   Rolling L 2  Maximal assistance   Additional items Assist x 2; Increased time required;Verbal cues;LE management   Additional Comments pt unable to sit EOB this session secondary to increased pain    Transfers   Sit to Stand Unable to assess   Ambulation/Elevation   Gait pattern Not tested   Balance   Static Sitting Poor -   Endurance Deficit   Endurance Deficit Yes   Endurance Deficit Description fatigue and pain    Activity Tolerance   Activity Tolerance Patient limited by fatigue;Patient limited by pain   Nurse Made Aware pt able to be seen per Jose Diaz RN   Exercises   [employer] Supine;10 reps;AROM; Bilateral  (x 2 sets )   Glute Sets Supine;10 reps;AROM; Bilateral  (x 2 sets )   Knee AROM Short Arc Quad Supine;10 reps;Bilateral;AAROM  (x 2 sets )   Ankle Pumps Supine;10 reps;AROM; Bilateral  (x 2 sets )   Assessment   Prognosis Fair   Problem List Decreased strength;Decreased range of motion;Decreased endurance; Impaired balance;Decreased mobility; Decreased cognition; Impaired judgement;Decreased safety awareness;Pain;Decreased skin integrity   Assessment Pt resting in bed at time of PT treatment  Pt agreeable to PT session and was able to tolerate  Pt reports increased abdominal pain this session  Pt was unable to sit EOB today secondary to increased pain and fatigue  Pt continues to have difficulty following verbal instructions at this time  Pt was able to role to the L and R x 2 to unload the buttock, however significant increased time was required to complete bed mobility secondary to decreased cog status and self reported pain with all activity  Pt continues to require frequent VC to redirect attention and for proper hand placement with bed mobility  Pt was able to tolerate supine therex, but continues to need verbal instruction for proper form and pacing with exercise  Pt denies any increase in complaints with therex   Pt and pts family was educated on the importance of increasing activity tolerance during hospital stay  Pt was assisted back into bed at conclusion of PT session with all needs within reach  Pt resting comfortably  Pt and pts family deny any further questions at this time  PT will continue to follow  DC recommendation when medically cleared is rehab due to decreased functional mobility at current time and increased A needed from caregiver at current time  Barriers to Discharge Decreased caregiver support; Inaccessible home environment   Goals   Patient Goals none stated    STG Expiration Date 03/03/18   Short Term Goal #1 In 10 days pt will complete: 1) Bed mobility skills with min A  2) Improve balance grades to fair 3) Improve BLE strength by 1/2 grade  4) PT for ongoing pt and family education; DME needs and D/C planning to promote highest level of function in least restrictive environment  5) increase activity tolerance > 30 min   Plan   Treatment/Interventions LE strengthening/ROM; Therapeutic exercise; Endurance training;Cognitive reorientation;Patient/family training;Bed mobility;Continued evaluation;Spoke to nursing;OT;Family   Progress Slow progress, decreased activity tolerance   PT Frequency 5x/wk   Recommendation   Recommendation Short-term skilled PT   Equipment Recommended Walker   PT - OK to Discharge Yes  (to rehab when medically cleared )   Fredrick Manning, PT

## 2018-02-21 NOTE — DISCHARGE INSTRUCTIONS
TUBE CARE INSTRUCTIONS    Care after your procedure:    Resume your normal diet  Small sips of flat soda will help with nausea  1  The properly functioning catheter should be forward flushed once (1x) daily with 10ml of normal saline using clean technique  You will be given a prescription for flushes  To flush the tube, clean both connections with alcohol swab  Twist off the drainage bag/ bulb  tubing and twist the saline syringe into the drainage tube and flush  Remove the syringe and twist the drainage bag / bulb tubing tubing back on     2  The drainage bag/bulb may be emptied as necessary  Keep a record of the amount of fluid you drain from your tube  This should be done with clean technique as well  3  A fresh dressing should be applied daily over the tube insertion site  4  As the tube is secured to the skin with only a suture,try not to pull on your tube  Tub baths are not permitted  Showers are permitted if the patient's skin entry site is prevented from getting wet  Similarly, washcloth "baths" are acceptable  Contact Interventional Radiology at [phone] Cele PATIENTS: Contact Interventional Radiology at [phone]) Cinda Leal PATIENTS: Contact Interventional Radiology at [phone]) if:    1  Leakage or large amounts of liquid around the catheter  2  Fever of 101 degrees lasting several hours without other obvious cause (such as sore throat, flu, etc)  3  Persistent nausea or vomiting  4  Diminished drainage, which may be associated with pressure or pain  Or when the     drainage from your tube is less than 10mls for 48 hours  5  Catheter pulled back or falls out  The following pharmacies carry the flush syringes         [facility]                     [facility]  [address]                         [address] [facility]  Phone [phone]            Phone [phone]   [facility]                                [phone]  [address] [facility] Alysia LEE                      [address] [place]  Phone [phone]            Phone [phone]                      Mike Armstrong                                                                                                          [phone]  [facility]  [facility] [address]  [facility]  Phone [phone] Marrow Biopsy     WHAT YOU NEED TO KNOW:   A bone marrow biopsy is a procedure to remove a small amount of bone marrow from your bone  Bone marrow is the soft tissue inside your bone that helps to make blood cells  The sample is tested for disease or infection  DISCHARGE INSTRUCTIONS:     1  Limit your activities day of biopsy as directed by your doctor  2  Use medication as ordered  3  Return to your normal diet  Small sips of flat soda will help with nausea  4  Remove band-aid or dressing 24 hours after procedure  Contact Interventional Radiology at [phone] Cele PATIENTS: Contact Interventional Radiology at [phone]) Mary Abbasi PATIENTS: Contact Interventional Radiology at [phone]) if:    1  Difficulty breathing, nausea or vomiting  2  Chills or fever above 101 F     3  Pain at biopsy site not relieved by medication  4  Develop any redness, swelling, heat, unusual drainage, heavy bruising or bleeding from biopsy site  Peripherally Inserted Central Catheter     WHAT YOU NEED TO KNOW:   A PICC is an IV placed into a large blood vessel near your heart   It is usually inserted through a blood vessel in your arm  Your PICC may have multiple ports  Ports are tubes where you can inject medicine  A PICC can stay in place for several weeks or months  You may need a PICC to get nutrition, medicine, or fluids  Blood samples can be removed from your PICC and sent to the lab for tests  DISCHARGE INSTRUCTIONS:    [address] patients,    Contact Interventional Radiology at [phone] PATIENTS: Contact Interventional Radiology at [phone]   [facility] PATIENTS: Contact Interventional Radiology at [phone] if:  · Blood soaks through your bandage  · Your arm or leg feels warm, tender, and painful  It may look swollen and red  · You have trouble moving your arm  · Your catheter falls out  · You have a fever or swelling, redness, pain, or pus where the catheter was inserted  · Persistent nausea or vomiting  · You cannot flush your catheter, or you feel pain when you flush your catheter  · You see a hole or crack in the tubing of your catheter  · You see fluid leaking from the insertion site  · You run out of supplies to care for your catheter  · You have questions or concerns about your condition or care  [facility]  Post-Operative Care Instructions  Dr Precious Carnes MD, [facility]  [phone]    1  General: You will feel pulling sensations around the wound or funny aches and pains around the incisions  This is normal  Even minor surgery is a change in your body and this is your bodys way of reaction to it  If you have had abdominal surgery, it may help to support the incision with a small pillow or blanket for comfort when moving or coughing  2  Wound care:  Okay to shower  The glue will fall off over the next week or 2     3  Water: You may shower over the wound, unless there are drain tubes left in place  Do not bathe or use a pool or hot tub until cleared by the physician      4  Activity: You may go up and down stairs, walk as much as you are comfortable, but walk at least 3 times each day  If you have had abdominal surgery, do not lift anything heavier than 15 pounds for at least 2-4 weeks, unless cleared by the doctor  5  Diet: You may resume an ulcer free diet  If you had a same-day surgery or overnight stay surgery, he may wish to eat lightly for a few days: soups, crackers, and sandwiches  You may resume a regular diet when ready  6  Medications: Resume all of your previous medications, unless told otherwise by the doctor  Avoid aspirin or ibuprofen (Advil, Motrin, etc ) products for 2-3 days after the date of surgery  You may, at that time, began to take them again  Tylenol is always fine, unless you are taking any narcotic pain medication containing Tylenol (such as Percocet, Darvocet, Vicodin, or anything containing acetaminophen)  Do not take Tylenol if you're taking these medications  You do not need to take the narcotic pain medications unless you are having significant pain and discomfort  7  Driving: You will need someone to drive you home on the day of surgery  Do not drive or make any important decisions while on narcotic pain medication or 24 hours and after anesthesia or sedation for surgery  Generally, you may drive when your off all narcotic pain medications  8  Upset Stomach: You may take Maalox, Tums, or similar items for an upset stomach  If your narcotic pain medication causes an upset stomach, do not take it on an empty stomach  Try taking it with at least some crackers or toast      9  Constipation: Patients often experienced constipation after surgery  You may take over-the-counter medication for this, such as Metamucil, Senokot, Dulcolax, milk of magnesia, etc  You may take a suppository unless you have had anorectal surgery such as a procedure on your hemorrhoids   If you experience significant nausea or vomiting after abdominal surgery, call the office before trying any of these medications  10  Call the office: If you are experiencing any of the following, fevers above 101 5°, significant nausea or vomiting, if the wound develops drainage and/or is excessive redness around the wound, or if you have significant diarrhea or other worsening symptoms  11  Pain: You may be given a prescription for pain  This will be given to the hospital, the day of surgery  12  Sexual Activity: You may resume sexual activity when you feel ready and comfortable and your incision is sealed and healed without apparent infection risk  13   Call to schedule an appointment for follow-up in 2 weeks  Peptic Ulcer   WHAT YOU NEED TO KNOW:   A peptic ulcer is an open sore in the lining of your stomach, intestine, or esophagus  Peptic ulcers have different names, depending on their location  Gastric ulcers are peptic ulcers in the stomach  Duodenal ulcers are peptic ulcers in the intestine  A peptic ulcer in the esophagus is called an esophageal ulcer  Peptic ulcers may be a short-term or long-term problem  DISCHARGE INSTRUCTIONS:   Medicines:   · Medicines  that decrease the amount of acid made by your stomach may be given  You may also need medicines that protect your stomach lining from acid and antibiotics to treat H  pylori infection  · Take your medicine as directed  Contact your healthcare provider if you think your medicine is not helping or if you have side effects  Tell him or her if you are allergic to any medicine  Keep a list of the medicines, vitamins, and herbs you take  Include the amounts, and when and why you take them  Bring the list or the pill bottles to follow-up visits  Carry your medicine list with you in case of an emergency  Follow up with your healthcare provider as directed:  Write down your questions so you remember to ask them during your visits  Nutrition:   · Avoid carbonated drinks, alcohol, and caffeine  Caffeine is found in some coffees, teas, and sodas   It is also found in chocolate  · Do not eat foods that upset your stomach  These include spicy or acidic foods, such as oranges  · Eat small meals more often rather than big meals less often  An empty stomach may make your symptoms worse  Quit smoking:  If you smoke, it is never too late to quit  Smoking increases your risk of developing ulcers  Ask your healthcare provider for information if you need help quitting  Contact your healthcare provider if:   · You have a fever  · You have diarrhea or constipation  · Your stomach pain does not go away or gets worse after you take medicine  · You have questions or concerns about your condition or care  Seek care immediately or call 911 if:   · You have a fast heartbeat, fast breathing, or are too dizzy or weak to stand up  · You have severe pain in your stomach  · Your vomit looks like coffee grounds or has blood in it  · Your bowel movements are bloody or black  · You have sudden shortness of breath  © 2017 [address]  Information is for End User's use only and may not be sold, redistributed or otherwise used for commercial purposes  All illustrations and images included in CareNotes® are the copyrighted property of [employer]  or Cameron Hurtado  The above information is an  only  It is not intended as medical advice for individual conditions or treatments  Talk to your doctor, nurse or pharmacist before following any medical regimen to see if it is safe and effective for you

## 2018-02-21 NOTE — PROGRESS NOTES
Progress Note - Infectious Disease   Olivia Musa 67 y o  female MRN: [id number]  Unit/Bed#: E4 -01 Encounter: [id number]      Impression/Plan:  1   Probable sepsis-fever and tachycardia  Possibly secondary to intra-abdominal abscess   Consideration for the possibility of bacteremia   Consideration for the possibility of nosocomial influenza    No other localizing findings are noted  The temperatures come down  The blood cultures are negative thus far   -continue cefepime Flagyl for now  -recheck CBC with diff and BMP tomorrow  -check blood culture results  -await IR drainage of collection  -no additional ID workup for now     2   Febrile neutropenia-Etiology of neutropenia unclear as below  Repeat blood cultures negative   The patient is no longer neutropenic  Follow-up blood cultures are negative thus far  -antibiotics as above              -check repeat blood cultures result  -follow temperatures closely  -await bone marrow biopsy results     3   Peritonitis-With possible intra-abdominal abscess developing  Status post laparoscopic repair with washout with drains left in place  Completed more than a week of antibiotics after adequate source control  Repeat CT scan with ongoing collection   -close surgical follow-up  -await IR drainage  -serial abdominal exams  -antibiotics as above     4   Pancytopenia-Unclear etiology as worsening despite D/C of Imuran and antibiotics   Oncology considering possible lymphoproliferative disorder    Cell counts are relatively stable today   -hematology oncology follow-up  -no additional G-CSF  -transfusion as needed  -follow-up bone marrow biopsy result     5   Diabetes mellitus-type 2 with hyperglycemia     6   Multiple sclerosis-apparently on Imuran for a while     -would hold on Imuran for now     7   Cat bite-left hand   No overt cellulitis at this time   Which should be well covered by the antibiotics as above   The cat is being watched at home without any behavioral abnormalities   The hand is significantly improved with decreased edema   -serial exams  -continue monitoring of the cat at home  -no additional antibiotics needed     8   Acute encephalopathy - likely toxic metabolic due to infection, electrolyte abnormalities superimposed on MS and chronic/severe SVID as suggested by CT   Newly diagnosed acute CVA may be playing a role   Doubt CNS infection                -follow MS closely              -no additional ID workup  -neurology follow-up    Discussed in detail with Dr Brenda Wong    Antibiotics:  Cefepime 4  Flagyl 4  Subjective:  Patient has no fever, chills, sweats; no nausea, vomiting, diarrhea; no cough, shortness of breath; still some abdominal pain  No new symptoms  Objective:  Vitals:  HR:  [] 85  Resp:  [16-18] 18  BP: (140-152)/(59-73) 140/73  SpO2:  [97 %] 97 %  Temp (24hrs), Av 4 °F (36 9 °C), Min:98 1 °F (36 7 °C), Max:98 6 °F (37 °C)  Current: Temperature: 98 1 °F (36 7 °C)    Physical Exam:   General Appearance:  Alert, interactive, nontoxic, no acute distress  Throat: Oropharynx moist without lesions  Lungs:   Clear to auscultation anteriorly; no wheezes, rhonchi or rales; respirations unlabored   Heart:  RRR; no murmur, rub or gallop   Abdomen:   Soft, tender diffusely, non-distended, positive bowel sounds  Extremities: No clubbing, cyanosis or edema   Skin: No new rashes or lesions  No draining wounds noted         Labs, Imaging, & Other studies:   All pertinent labs and imaging studies were personally reviewed    Results from last 7 days  Lab Units 18  0635 18  0618 18  0637   WBC Thousand/uL 7 40 5 63 9 97   HEMOGLOBIN g/dL 9 0* 9 3* 10 2*   PLATELETS Thousands/uL 68* 63* 28*       Results from last 7 days  Lab Units 18  0635 18  0618 18  0524   SODIUM mmol/L 140 137 138   POTASSIUM mmol/L 3 0* 4 0 4 3   CHLORIDE mmol/L 106 104 105   CO2 mmol/L 26 28 26   ANION GAP mmol/L 8 5 7   BUN mg/dL 11 9 11   CREATININE mg/dL 0 44* 0 45* 0 45*   EGFR ml/min/1 73sq m 101 100 100   GLUCOSE RANDOM mg/dL 214* 163* 214*   CALCIUM mg/dL 7 4* 8 3 8 2*       Results from last 7 days  Lab Units 02/19/18  1246 02/18/18  1448   BLOOD CULTURE   --  No Growth at 48 hrs  No Growth at 48 hrs     INFLUENZA A PCR  None Detected  --    INFLUENZA B PCR  None Detected  --    RSV PCR  None Detected  --

## 2018-02-21 NOTE — PLAN OF CARE
Problem: PHYSICAL THERAPY ADULT  Goal: Performs mobility at highest level of function for planned discharge setting  See evaluation for individualized goals  Treatment/Interventions: LE strengthening/ROM, Therapeutic exercise, Endurance training, Patient/family training, Bed mobility, Continued evaluation, Spoke to nursing          See flowsheet documentation for full assessment, interventions and recommendations  Outcome: Progressing  Prognosis: Fair  Problem List: Decreased strength, Decreased range of motion, Decreased endurance, Impaired balance, Decreased mobility, Decreased cognition, Impaired judgement, Decreased safety awareness, Pain, Decreased skin integrity  Assessment: Pt resting in bed at time of PT treatment  Pt agreeable to PT session and was able to tolerate  Pt reports increased abdominal pain this session  Pt was unable to sit EOB today secondary to increased pain and fatigue  Pt continues to have difficulty following verbal instructions at this time  Pt was able to role to the L and R x 2 to unload the buttock, however significant increased time was required to complete bed mobility secondary to decreased cog status and self reported pain with all activity  Pt continues to require frequent VC to redirect attention and for proper hand placement with bed mobility  Pt was able to tolerate supine therex, but continues to need verbal instruction for proper form and pacing with exercise  Pt denies any increase in complaints with therex  Pt and pts family was educated on the importance of increasing activity tolerance during hospital stay  Pt was assisted back into bed at conclusion of PT session with all needs within reach  Pt resting comfortably  Pt and pts family deny any further questions at this time  PT will continue to follow  DC recommendation when medically cleared is rehab due to decreased functional mobility at current time and increased A needed from caregiver at current time  Barriers to Discharge: Decreased caregiver support, Inaccessible home environment     Recommendation: Short-term skilled PT     PT - OK to Discharge: Yes (to rehab when medically cleared )    See flowsheet documentation for full assessment

## 2018-02-21 NOTE — SEDATION DOCUMENTATION
Abdominal abscess drain placed with no complications  Culture gram stain and fungal culture sent  Pt stable at this time

## 2018-02-21 NOTE — CASE MANAGEMENT
Continued Stay Review    Date: 2/21/2018    Vital Signs: /56 (BP Location: Right arm)   Pulse 85   Temp 98 3 °F (36 8 °C) (Tympanic)   Resp 18   Ht 5' 6" (1 676 m)   Wt 76 2 kg (167 lb 15 9 oz)   SpO2 97%   BMI 27 11 kg/m²     RA  NPO  CT Guided perc drainage cath placement ordered    Medications:   Scheduled Meds:   Current Facility-Administered Medications:  acetaminophen 650 mg Oral Q4H PRN Sam Guerrier MD    atorvastatin 40 mg Oral Daily With Yesy-Avery, DO    calcium carbonate 500 mg Oral TID PRN Sam Guerrier MD    cefepime 2,000 mg Intravenous Q12H Magdalene Delcid, DO Last Rate: 2,000 mg (02/21/18 0630)   collagenase  Topical Daily Mejia Rich MD    DULoxetine 20 mg Oral Daily PARRISH Wood    hydrALAZINE 5 mg Intravenous Q6H PRN Sam Guerrier MD    insulin glargine 6 Units Subcutaneous QAM Magdalene Delcid DO    insulin lispro 1-5 Units Subcutaneous TID AC Robert W Spatzer, CRNP    insulin lispro 1-5 Units Subcutaneous HS Maceo Peto Spatzer, CRNP    levothyroxine 200 mcg Oral Early Morning Magdalene Delcid DO    lisinopril 40 mg Oral Daily Magdalene Delcid, DO    metroNIDAZOLE 500 mg Intravenous Q8H Magdalene Delcid, DO Last Rate: 500 mg (02/21/18 0552)   ondansetron 4 mg Intravenous Q4H PRN Jacquiline PARRISH De Oliveira    oxyCODONE 2 5 mg Oral Q4H PRN Magdalene Patron, DO    pantoprazole 40 mg Intravenous Q12H [address] Magdalene Patron, DO    sucralfate 1,000 mg Oral 4x Daily (AC & HS) Magdalene Delcid DO      Continuous Infusions:    PRN Meds:   acetaminophen    calcium carbonate    hydrALAZINE    ondansetron    oxyCODONE    Abnormal Labs/Diagnostic Results:  H&H 9 0 / 27 8,   Plats 68,   Bands 13,   K 3 0,   Ca 7 4    Age/Sex: 67 y o  female     Assessment/Plan:   Per ID :continue cefepime Flagyl for now  -recheck CBC with diff and BMP tomorrow  -check blood culture results  -await IR drainage of collection      Per ATTENDING NOTE 2/20:  Principal Problem:    Sepsis ([address] )- due to perforated gastric ulcer and peritonitis on admission  Status post surgical repair, and ongoing antibiotics  Now with suspected intra-abdominal abscess  Continue cefepime and Flagyl  For IR drainage tomorrow  Plan discussed with the patient, her son and her daughter      Active Problems:    Pancytopenia ([address] )- status post bone marrow biopsy on 02/13/2018  Results is still in process    Type 2 diabetes mellitus (HCC)-blood sugars fairly controlled  Continue low-dose Lantus and sliding scale insulin    Acute perforated gastric ulcer with hemorrhage (HCC)-status post Angel Musty patch    Acute blood loss anemia-due to perforated gastric ulcer  Status post 7 units PRBC  Hemoglobin stable    Hypothyroidism-continue levothyroxine    Hyperlipidemia-continue Lipitor daily    Multiple sclerosis (HCC)-Neurology recommendations reviewed  Off Imuran    Acute stroke-suspected artery to artery embolic stroke  Unable to start anticoagulation due to thrombocytopenia  Large sacral decubitus ulcer-encouraged off loading  Discussed with patient and son at bedside  Encouraged to participate with physical therapy  Continue repositioning and local wound care          Discharge Plan:   Short-term rehab;    patient will continue to require additional inpatient hospital stay due to Intra-abdominal fluid collection

## 2018-02-21 NOTE — PROGRESS NOTES
[address] Internal Medicine Progress Note  Patient: Ziggy Santillan 67 y o  female   MRN: [id number]  PCP: Luz Morgan DO  Unit/Bed#: E4 -01 Encounter: [id number]  Date Of Visit: 02/20/18    Assessment and plan:    Principal Problem:    Sepsis ([facility] )- due to perforated gastric ulcer and peritonitis on admission  Status post surgical repair, and ongoing antibiotics  Now with suspected intra-abdominal abscess  Continue cefepime and Flagyl  For IR drainage tomorrow  Plan discussed with the patient, her son and her daughter  Active Problems:    Pancytopenia ([facility] )- status post bone marrow biopsy on 02/13/2018  Results is still in process    Type 2 diabetes mellitus (HCC)-blood sugars fairly controlled  Continue low-dose Lantus and sliding scale insulin    Acute perforated gastric ulcer with hemorrhage (HCC)-status post Katia Dries patch    Acute blood loss anemia-due to perforated gastric ulcer  Status post 7 units PRBC  Hemoglobin stable    Hypothyroidism-continue levothyroxine    Hyperlipidemia-continue Lipitor daily    Multiple sclerosis (HCC)-Neurology recommendations reviewed  Off Imuran    Acute stroke-suspected artery to artery embolic stroke  Unable to start anticoagulation due to thrombocytopenia  Large sacral decubitus ulcer-encouraged off loading  Discussed with patient and son at bedside  Encouraged to participate with physical therapy  Continue repositioning and local wound care  Lajean Cassette VTE Pharmacologic Prophylaxis:   Pharmacologic: None  Mechanical VTE Prophylaxis in Place: Yes    Education and Discussions with Family / Patient:  Spoke with son Whitney Garcia and daughter Valentin Garcia    Time Spent for Care: 30 minutes  More than 50% of total time spent on counseling and coordination of care as described above      Current Length of Stay: 23 day(s)    Current Patient Status: Inpatient   Certification Statement: The patient will continue to require additional inpatient hospital stay due to Intra-abdominal fluid collection    Discharge Plan:  Short-term rehab    Code Status: Level 3 - DNAR and DNI      Subjective: The patient denies abdominal pain to me  She complained of abdominal pain earlier to her daughter  She denies pain in her buttock  The patient refused to participate with PT OT earlier  Objective:     Vitals:   Temp (24hrs), Av 1 °F (36 7 °C), Min:97 1 °F (36 2 °C), Max:98 7 °F (37 1 °C)    HR:  [] 109  Resp:  [16-20] 16  BP: (148-151)/(59-75) 148/59  SpO2:  [96 %-98 %] 97 %  Body mass index is 27 11 kg/m²  Input and Output Summary (last 24 hours):     No intake or output data in the 24 hours ending 18    Physical Exam:     Physical Exam   Constitutional: No distress  HENT:   Mouth/Throat: No oropharyngeal exudate  Eyes: No scleral icterus  Cardiovascular: Regular rhythm  Pulmonary/Chest: Effort normal  No stridor  No respiratory distress  She has no wheezes  Abdominal: Soft  She exhibits no distension  There is no tenderness  Musculoskeletal: She exhibits no edema  Neurological: She is alert  Skin: Skin is warm  She is not diaphoretic  Additional Data:     Labs:      Results from last 7 days  Lab Units 18  0618   WBC Thousand/uL 5 63   HEMOGLOBIN g/dL 9 3*   HEMATOCRIT % 28 3*   PLATELETS Thousands/uL 63*       Results from last 7 days  Lab Units 18  0618   SODIUM mmol/L 137   POTASSIUM mmol/L 4 0   CHLORIDE mmol/L 104   CO2 mmol/L 28   BUN mg/dL 9   CREATININE mg/dL 0 45*   CALCIUM mg/dL 8 3   GLUCOSE RANDOM mg/dL 163*           * I Have Reviewed All Lab Data Listed Above  * Additional Pertinent Lab Tests Reviewed: All Labs Within Last 24 Hours Reviewed    Imaging:    Imaging Reports Reviewed Today Include:  CT abdomen      Recent Cultures (last 7 days):       Results from last 7 days  Lab Units 18  1246 18  1448   BLOOD CULTURE   --  No Growth at 24 hrs  No Growth at 24 hrs     INFLUENZA A PCR  None Detected --    INFLUENZA B PCR  None Detected  --    RSV PCR  None Detected  --        Last 24 Hours Medication List:     Current Facility-Administered Medications:  acetaminophen 650 mg Oral Q4H PRN Curt Martin MD    atorvastatin 40 mg Oral Daily With Yesy-Avery, DO    calcium carbonate 500 mg Oral TID PRN Curt Martin MD    cefepime 2,000 mg Intravenous Q12H Magdalene Ambron, DO Last Rate: 2,000 mg (02/20/18 1824)   collagenase  Topical Daily Radha Jasso MD    DULoxetine 20 mg Oral Daily PARRISH Eden    hydrALAZINE 5 mg Intravenous Q6H PRN Curt Martin MD    insulin glargine 6 Units Subcutaneous QAM Magdalene Farhana, DO    insulin lispro 1-5 Units Subcutaneous TID AC Robert W Spatzer, CRNP    insulin lispro 1-5 Units Subcutaneous HS Antonetta Gimenez Spatzer, CRNP    levothyroxine 200 mcg Oral Early Morning Magdalene Ambron, DO    lisinopril 40 mg Oral Daily Magdalene Ambron, DO    metroNIDAZOLE 500 mg Intravenous Q8H Magdalene Ambron, DO Last Rate: 500 mg (02/20/18 1507)   ondansetron 4 mg Intravenous Q4H PRN PARRISH Garcia    oxyCODONE 2 5 mg Oral Q4H PRN Magdalene Ambron, DO    pantoprazole 40 mg Intravenous Q12H [address] Magdalene Ambron, DO    sucralfate 1,000 mg Oral 4x Daily (AC & HS) Kenan Avalos DO         Today, Patient Was Seen By: Radha Jasso MD    ** Please Note: Dragon 360 Dictation voice to text software may have been used in the creation of this document   **

## 2018-02-21 NOTE — PLAN OF CARE
Problem: OCCUPATIONAL THERAPY ADULT  Goal: Performs self-care activities at highest level of function for planned discharge setting  See evaluation for individualized goals  Treatment Interventions: ADL retraining, Functional transfer training, Endurance training, UE strengthening/ROM, Patient/family training, Compensatory technique education, Activityengagement, Energy conservation, Cognitive reorientation, Equipment evaluation/education          See flowsheet documentation for full assessment, interventions and recommendations  Outcome: Not Progressing  Limitation: Decreased UE ROM, Decreased Safe judgement during ADL, Decreased UE strength, Decreased ADL status, Decreased endurance, Decreased cognition, Decreased self-care trans, Decreased high-level ADLs, Decreased sensation  Prognosis: Fair  Assessment: Pt seen for skilled OT session focused on bed mobility, patient and caregiver education and repositioning in bed, b/l UE exercises  Pt supine in bed at start of session  Pt required MAX encouragement to complete b/l UE exercises of AAROM of elbow flexion/extension, forearm pronation/supination at 2 sets x 10 reps w/ rest breaks all to increased strength and endurance  Pt reoriented to place and time  OT unable to formally complete cognitive evaluation on pt due to pt inconsistently answering questions and following commands  Pt required MAX assist x2 for rolling in bed w/ use of bed rails  Pt repositioned in bed w/ pillows offloading L side and pillow between b/l LEs  Pt educated on participating in own self-care and self-feeding as much as possible and educated family to encourage pt to complete  Pt continues to be limited due to increased pain in bottom, impaired balance, impaired activity tolerance, decreased strength and endurance, impaired functional reach, impaired vision, impaired cognition (STM, insight, safety awareness, ability to follow directions)  Will continue to follow to address OT goals  Recommend STR when medically stable  Extend goals 7 days       OT Discharge Recommendation: Short Term Rehab  OT - OK to Discharge:  (to rehab)      Comments: Lee Hassan MS, OTR/L

## 2018-02-21 NOTE — OCCUPATIONAL THERAPY NOTE
[address] Progress Note     Patient Name: Matteo Nowak  ZSXRK'O Date: 2/21/2018  Problem List  Patient Active Problem List   Diagnosis    Pancytopenia ([facility] )    Type 2 diabetes mellitus ([facility] )    [facility]gly[facility] due to type 2 [facility] mellitus [facility]    Hypoalbuminemia due to protein-calorie malnutrition ([facility] )    Sepsis ([facility] )    Acute perforated gastric ulcer with hemorrhage ([facility] )    Peritonitis ([facility] )    Acute blood loss anemia    Hypernatremia    Hypothyroidism    Hyperlipidemia    Multiple sclerosis ([facility] )    Metabolic encephalopathy               02/21/18 1403   Restrictions/Precautions   Weight Bearing Precautions Per Order No   Other Precautions Cognitive;Multiple lines; Fall Risk;Pain   Pain Assessment   Pain Assessment 0-10   Pain Score Worst Possible Pain   Hospital Pain Intervention(s) Ambulation/increased activity;Repositioned   Pain Rating: FLACC (Rest) - Face 1   Pain Rating: FLACC (Rest) - Legs 0   Pain Rating: FLACC (Rest) - Activity 0   Pain Rating: FLACC (Rest) - Cry 1   Pain Rating: FLACC (Rest) - Consolability 1   Score: FLACC (Rest) 3   Pain Rating: FLACC (Activity) - Face 1   Pain Rating: FLACC (Activity) - Legs 1   Pain Rating: FLACC (Activity) - Activity 1   Pain Rating: FLACC (Activity) - Cry 1   Pain Rating: FLACC (Activity) - Consolability 1   Score: FLACC (Activity) 5   ADL   Where Assessed Supine, bed   Grooming Assistance 3  Moderate Assistance   Grooming Deficit Setup; Increased time to complete;Wash/dry face;Verbal cueing;Supervision/safety   Bed Mobility   Rolling R 2  Maximal assistance   Additional items Assist x 2; Increased time required;Verbal cues;LE management; Bedrails   Rolling L 2  Maximal assistance   Additional items Assist x 2; Increased time required;Verbal cues;LE management; Bedrails;HOB elevated   Supine to Sit Unable to assess  (pt refusing 2* pain)   Additional Comments pt refusing OOB positioning 2* increased pain   Transfers   Sit to Stand Unable to assess   Therapeutic Exercise - ROM   UE-ROM Yes   ROM- Right Upper Extremities   R Elbow AAROM;Elbow flexion;Elbow extension  (forearm pronation/supination)   R Weight/Reps/Sets 2 sets x 10 reps   RUE ROM Comment increased encouragement needed to participate   ROM - Left Upper Extremities    L Elbow AAROM;Elbow flexion;Elbow extension  (forearm pronation/supination)   L Weight/Reps/Sets 2 sets x 10 reps   LUE ROM Comment increased time to complete, encouragement needed to participate   Cognition   Overall Cognitive Status Impaired   Arousal/Participation Responsive;Lethargic   Attention Difficulty attending to directions   Orientation Level Oriented to person   Memory Decreased short term memory;Decreased recall of recent events;Decreased recall of precautions   Following Commands Follows one step commands inconsistently   Comments pt w/ impaired STM, impaired insight, impaired problem solving, impaired R/L discrimination   Additional Activities   Additional Activities (pt educated on participating in own self-care/feeding)   Additional Activities Comments pt needs encouragement to participate   Activity Tolerance   Activity Tolerance Patient limited by fatigue;Patient limited by pain;Treatment limited secondary to medical complications (Comment)   Medical Staff Made Aware appropriate to see per RNSerina   Assessment   Assessment Pt seen for skilled OT session focused on bed mobility, patient and caregiver education and repositioning in bed, b/l UE exercises  Pt supine in bed at start of session  Pt required MAX encouragement to complete b/l UE exercises of AAROM of elbow flexion/extension, forearm pronation/supination at 2 sets x 10 reps w/ rest breaks all to increased strength and endurance  Pt reoriented to place and time  OT unable to formally complete cognitive evaluation on pt due to pt inconsistently answering questions and following commands  Pt required MAX assist x2 for rolling in bed w/ use of bed rails  Pt repositioned in bed w/ pillows offloading L side and pillow between b/l LEs  Pt educated on participating in own self-care and self-feeding as much as possible and educated family to encourage pt to complete  Pt continues to be limited due to increased pain in bottom, impaired balance, impaired activity tolerance, decreased strength and endurance, impaired functional reach, impaired vision, impaired cognition (STM, insight, safety awareness, ability to follow directions)  Will continue to follow to address OT goals  Recommend STR when medically stable  Extend goals 7 days  Plan   Treatment Interventions ADL retraining;Functional transfer training; Endurance training; Compensatory technique education; Energy conservation; Activityengagement;Equipment evaluation/education;Patient/family training   Goal Expiration Date 02/28/18   Treatment Day 5   OT Frequency 3-5x/wk   Recommendation   OT Discharge Recommendation Short Term Rehab   OT - OK to Discharge (to rehab)   Barthel Index   Feeding 5   Bathing 0   Grooming Score 0   Dressing Score 0   Bladder Score 5   Bowels Score 5   Toilet Use Score 0   Transfers (Bed/Chair) Score 0   Mobility (Level Surface) Score 0   Stairs Score 5   Barthel Index Score 20   Modified [place] Scale   Modified [place]ilo Scale 5     Documentation completed by: Sushma Valentine MS, OTR/L

## 2018-02-21 NOTE — BRIEF OP NOTE (RAD/CATH)
Drainage placement Procedure Note    PATIENT NAME: Lianne Bailey  : 1945  MRN: [id number]     Pre-op Diagnosis:   1  Anemia    2  Gastric ulcer    3  Pneumoperitoneum    4  Acute gastric ulcer with hemorrhage and perforation ([facility] )    5  Acute posthemorrhagic anemia    6  Type 2 diabetes mellitus with hyperglycemia, unspecified long term insulin use status ([facility] )    7  BEVERLY (acute kidney injury) ([facility] )    8  Pancytopenia ([address] )    9  Acute blood loss anemia    10  Multiple sclerosis ([address] )    11  Metabolic encephalopathy    12  Acute ischemic right posterior cerebral artery (PCA) stroke ([facility] )    13  History of multiple sclerosis    14  Acute right PCA stroke ([facility] )    15  Sacral ulcer (HCC)      Post-op Diagnosis:   1  Anemia    2  Gastric ulcer    3  Pneumoperitoneum    4  Acute gastric ulcer with hemorrhage and perforation ([facility] )    5  Acute posthemorrhagic anemia    6  Type 2 diabetes mellitus with hyperglycemia, unspecified long term insulin use status ([facility] )    7  BEVERLY (acute kidney injury) ([facility] )    8  Pancytopenia ([address] )    9  Acute blood loss anemia    10  Multiple sclerosis ([facility] )    11  Metabolic encephalopathy    12  Acute ischemic right posterior cerebral artery (PCA) stroke ([facility] )    13  History of multiple sclerosis    14  Acute right PCA stroke ([facility] )    15   Sacral ulcer ([facility] )        Surgeon:   Beltran Reyes MD  Assistants:     No qualified resident was available, Resident is only observing    Estimated Blood Loss: NOne  Findings: CT guided 8 Fr drain placed into fluid collection between liver and stomach with aspiration of 15 ml of purulent fluid    Specimens: fluid for culture    Complications:  none    Anesthesia: Conscious sedation and Local    Beltran Reyes MD     Date: 2018  Time: 3:34 PM

## 2018-02-22 LAB
ANION GAP SERPL CALCULATED.3IONS-SCNC: 5 MMOL/L (ref 4–13)
ANISOCYTOSIS BLD QL SMEAR: PRESENT
BASOPHILS # BLD MANUAL: 0.05 THOUSAND/UL (ref 0–0.1)
BASOPHILS NFR MAR MANUAL: 1 % (ref 0–1)
BUN SERPL-MCNC: 10 MG/DL (ref 5–25)
CALCIUM SERPL-MCNC: 8.1 MG/DL (ref 8.3–10.1)
CHLORIDE SERPL-SCNC: 105 MMOL/L (ref 100–108)
CO2 SERPL-SCNC: 28 MMOL/L (ref 21–32)
CREAT SERPL-MCNC: 0.42 MG/DL (ref 0.6–1.3)
EOSINOPHIL # BLD MANUAL: 0 THOUSAND/UL (ref 0–0.4)
EOSINOPHIL NFR BLD MANUAL: 0 % (ref 0–6)
ERYTHROCYTE [DISTWIDTH] IN BLOOD BY AUTOMATED COUNT: 15.3 % (ref 11.6–15.1)
GFR SERPL CREATININE-BSD FRML MDRD: 103 ML/MIN/1.73SQ M
GLUCOSE SERPL-MCNC: 156 MG/DL (ref 65–140)
GLUCOSE SERPL-MCNC: 194 MG/DL (ref 65–140)
GLUCOSE SERPL-MCNC: 207 MG/DL (ref 65–140)
GLUCOSE SERPL-MCNC: 252 MG/DL (ref 65–140)
GLUCOSE SERPL-MCNC: 346 MG/DL (ref 65–140)
HCT VFR BLD AUTO: 27.4 % (ref 34.8–46.1)
HGB BLD-MCNC: 8.9 G/DL (ref 11.5–15.4)
LYMPHOCYTES # BLD AUTO: 1.61 THOUSAND/UL (ref 0.6–4.47)
LYMPHOCYTES # BLD AUTO: 32 % (ref 14–44)
MCH RBC QN AUTO: 30.1 PG (ref 26.8–34.3)
MCHC RBC AUTO-ENTMCNC: 32.5 G/DL (ref 31.4–37.4)
MCV RBC AUTO: 93 FL (ref 82–98)
MONOCYTES # BLD AUTO: 0.7 THOUSAND/UL (ref 0–1.22)
MONOCYTES NFR BLD: 14 % (ref 4–12)
NEUTROPHILS # BLD MANUAL: 2.61 THOUSAND/UL (ref 1.85–7.62)
NEUTS BAND NFR BLD MANUAL: 3 % (ref 0–8)
NEUTS SEG NFR BLD AUTO: 49 % (ref 43–75)
NRBC BLD AUTO-RTO: 0 /100 WBCS
NRBC BLD AUTO-RTO: 1 /100 WBC (ref 0–2)
PLATELET # BLD AUTO: 69 THOUSANDS/UL (ref 149–390)
PLATELET BLD QL SMEAR: ABNORMAL
PMV BLD AUTO: 11.2 FL (ref 8.9–12.7)
POLYCHROMASIA BLD QL SMEAR: PRESENT
POTASSIUM SERPL-SCNC: 3.6 MMOL/L (ref 3.5–5.3)
RBC # BLD AUTO: 2.96 MILLION/UL (ref 3.81–5.12)
SCAN RESULT: NORMAL
SODIUM SERPL-SCNC: 138 MMOL/L (ref 136–145)
TOTAL CELLS COUNTED SPEC: 100
VARIANT LYMPHS # BLD AUTO: 1 %
WBC # BLD AUTO: 5.02 THOUSAND/UL (ref 4.31–10.16)

## 2018-02-22 PROCEDURE — 80048 BASIC METABOLIC PNL TOTAL CA: CPT | Performed by: INTERNAL MEDICINE

## 2018-02-22 PROCEDURE — 99233 SBSQ HOSP IP/OBS HIGH 50: CPT | Performed by: INTERNAL MEDICINE

## 2018-02-22 PROCEDURE — 85007 BL SMEAR W/DIFF WBC COUNT: CPT | Performed by: INTERNAL MEDICINE

## 2018-02-22 PROCEDURE — 85027 COMPLETE CBC AUTOMATED: CPT | Performed by: INTERNAL MEDICINE

## 2018-02-22 PROCEDURE — 92526 ORAL FUNCTION THERAPY: CPT

## 2018-02-22 PROCEDURE — 99232 SBSQ HOSP IP/OBS MODERATE 35: CPT | Performed by: INTERNAL MEDICINE

## 2018-02-22 PROCEDURE — 82948 REAGENT STRIP/BLOOD GLUCOSE: CPT

## 2018-02-22 RX ORDER — METRONIDAZOLE 500 MG/1
500 TABLET ORAL EVERY 8 HOURS SCHEDULED
Status: DISCONTINUED | OUTPATIENT
Start: 2018-02-22 | End: 2018-02-24

## 2018-02-22 RX ORDER — PANTOPRAZOLE SODIUM 40 MG/1
40 TABLET, DELAYED RELEASE ORAL
Status: DISCONTINUED | OUTPATIENT
Start: 2018-02-22 | End: 2018-02-26 | Stop reason: HOSPADM

## 2018-02-22 RX ORDER — PANTOPRAZOLE SODIUM 40 MG/1
40 TABLET, DELAYED RELEASE ORAL
Status: DISCONTINUED | OUTPATIENT
Start: 2018-02-22 | End: 2018-02-22

## 2018-02-22 RX ADMIN — SUCRALFATE 1000 MG: 1 SUSPENSION ORAL at 22:05

## 2018-02-22 RX ADMIN — METRONIDAZOLE 500 MG: 500 TABLET ORAL at 13:31

## 2018-02-22 RX ADMIN — CEFEPIME HYDROCHLORIDE 2000 MG: 2 INJECTION, POWDER, FOR SOLUTION INTRAVENOUS at 06:22

## 2018-02-22 RX ADMIN — INSULIN LISPRO 2 UNITS: 100 INJECTION, SOLUTION INTRAVENOUS; SUBCUTANEOUS at 22:05

## 2018-02-22 RX ADMIN — CEFEPIME HYDROCHLORIDE 2000 MG: 2 INJECTION, POWDER, FOR SOLUTION INTRAVENOUS at 20:18

## 2018-02-22 RX ADMIN — OXYCODONE HYDROCHLORIDE 2.5 MG: 5 TABLET ORAL at 08:39

## 2018-02-22 RX ADMIN — INSULIN LISPRO 4 UNITS: 100 INJECTION, SOLUTION INTRAVENOUS; SUBCUTANEOUS at 17:33

## 2018-02-22 RX ADMIN — PANTOPRAZOLE SODIUM 40 MG: 40 TABLET, DELAYED RELEASE ORAL at 17:33

## 2018-02-22 RX ADMIN — INSULIN GLARGINE 6 UNITS: 100 INJECTION, SOLUTION SUBCUTANEOUS at 08:53

## 2018-02-22 RX ADMIN — SUCRALFATE 1000 MG: 1 SUSPENSION ORAL at 17:32

## 2018-02-22 RX ADMIN — LEVOTHYROXINE SODIUM 200 MCG: 100 TABLET ORAL at 05:44

## 2018-02-22 RX ADMIN — INSULIN LISPRO 1 UNITS: 100 INJECTION, SOLUTION INTRAVENOUS; SUBCUTANEOUS at 12:46

## 2018-02-22 RX ADMIN — SUCRALFATE 1000 MG: 1 SUSPENSION ORAL at 11:37

## 2018-02-22 RX ADMIN — PANTOPRAZOLE SODIUM 40 MG: 40 TABLET, DELAYED RELEASE ORAL at 11:15

## 2018-02-22 RX ADMIN — METRONIDAZOLE 500 MG: 500 TABLET ORAL at 22:05

## 2018-02-22 RX ADMIN — SUCRALFATE 1000 MG: 1 SUSPENSION ORAL at 06:00

## 2018-02-22 RX ADMIN — ATORVASTATIN CALCIUM 40 MG: 40 TABLET, FILM COATED ORAL at 17:33

## 2018-02-22 RX ADMIN — LISINOPRIL 40 MG: 20 TABLET ORAL at 08:38

## 2018-02-22 RX ADMIN — COLLAGENASE SANTYL: 250 OINTMENT TOPICAL at 08:23

## 2018-02-22 RX ADMIN — METRONIDAZOLE 500 MG: 500 INJECTION, SOLUTION INTRAVENOUS at 05:45

## 2018-02-22 RX ADMIN — DULOXETINE HYDROCHLORIDE 20 MG: 20 CAPSULE, DELAYED RELEASE ORAL at 08:38

## 2018-02-22 RX ADMIN — ACETAMINOPHEN 650 MG: 325 TABLET, FILM COATED ORAL at 13:36

## 2018-02-22 NOTE — PROGRESS NOTES
[facility] Internal Medicine Progress Note  Patient: Lucía García 67 y o  female   MRN: [id number]  PCP: Trae Mojica DO  Unit/Bed#: E4 -01 Encounter: [id number]  Date Of Visit: 02/22/18    Assessment and plan:    Principal Problem:    Sepsis ([facility] )- initially due to perforated gastric ulcer and peritonitis  Gastric ulcer was treated with Allison Rakers patch  Peritonitis was treated antibiotics  Now with intra-abdominal fluid collection/abscess status post drainage on 02/21/2018  Continue cefepime and Flagyl  Follow up culture results  Active Problems:    Pancytopenia (HCC)-status post bone marrow biopsy on 02/13/2018 with nonspecific findings  Suspect this is multifactorial due to severe illness, malnutrition and previous use of Imuran  Stress induced  Hyperglycemia- increase Lantus to 10 units    Acute perforated gastric ulcer with hemorrhage ([facility] )- status post Allison Rakers patch  Continue PPI    Acute blood loss anemia-status post multiple blood transfusions, stable    Hypothyroidism-levothyroxine daily    Hyperlipidemia-continue Lipitor due to recent stroke    Multiple sclerosis (HCC)-off Imuran    Acute embolic stroke-not a candidate for anticoagulation due to GI bleed and pancytopenia  Metabolic encephalopathy-multifactorial    Sacral deep tissue injury-continue local wound care and repositioning  Exam today showed improving surrounding skin  This was confirmed by her daughter  VTE Pharmacologic Prophylaxis:   Pharmacologic: None  Mechanical VTE Prophylaxis in Place: No    Education and Discussions with Family / Patient:  Spoke with daughter and father at the bedside    Time Spent for Care: 20 minutes  More than 50% of total time spent on counseling and coordination of care as described above      Current Length of Stay: 25 day(s)    Current Patient Status: Inpatient   Certification Statement: The patient will continue to require additional inpatient hospital stay due to Multiple reason    Discharge Plan:  Short-term rehab    Code Status: Level 3 - DNAR and DNI      Subjective: She has occasional Abdominal pain  Low-grade fever noted last night  Weakness  Objective:     Vitals:   Temp (24hrs), Av °F (37 2 °C), Min:98 °F (36 7 °C), Max:100 °F (37 8 °C)    HR:  [] 100  Resp:  [18] 18  BP: (122-145)/(60-72) 122/60  SpO2:  [97 %-98 %] 98 %  Body mass index is 27 11 kg/m²  Input and Output Summary (last 24 hours): Intake/Output Summary (Last 24 hours) at 18 1813  Last data filed at 18 1245   Gross per 24 hour   Intake              360 ml   Output                0 ml   Net              360 ml       Physical Exam:     Physical Exam   Constitutional: She appears well-developed  HENT:   Temporal wasting   Eyes:   Diminished periorbital fat   Cardiovascular: Regular rhythm  Murmur heard  Pulmonary/Chest: Effort normal    Crackles left base   Abdominal: Soft  There is tenderness  Epigastric drain in place   Musculoskeletal: She exhibits edema  Neurological: She is alert  Skin: She is not diaphoretic  Sacral DTI    Psychiatric: She has a normal mood and affect  Additional Data:     Labs:      Results from last 7 days  Lab Units 18  0517   WBC Thousand/uL 5 02   HEMOGLOBIN g/dL 8 9*   HEMATOCRIT % 27 4*   PLATELETS Thousands/uL 69*   LYMPHO PCT % 32   MONO PCT MAN % 14*   EOSINO PCT MANUAL % 0       Results from last 7 days  Lab Units 18  0517   SODIUM mmol/L 138   POTASSIUM mmol/L 3 6   CHLORIDE mmol/L 105   CO2 mmol/L 28   BUN mg/dL 10   CREATININE mg/dL 0 42*   CALCIUM mg/dL 8 1*   GLUCOSE RANDOM mg/dL 156*           * I Have Reviewed All Lab Data Listed Above  * Additional Pertinent Lab Tests Reviewed:  All Labs Within Last 24 Hours Reviewed    Imaging:    Imaging Reports Reviewed Today Include:  No new imaging      Recent Cultures (last 7 days):       Results from last 7 days  Lab Units 18  1532 18  1246 18  1448 BLOOD CULTURE   --   --  No Growth at 72 hrs  No Growth at 72 hrs  GRAM STAIN RESULT  3+ Polys  2+ Gram positive rods  --   --    BODY FLUID CULTURE, STERILE  Culture too young- will reincubate  --   --    INFLUENZA A PCR   --  None Detected  --    INFLUENZA B PCR   --  None Detected  --    RSV PCR   --  None Detected  --        Last 24 Hours Medication List:     Current Facility-Administered Medications:  acetaminophen 650 mg Oral Q4H PRN Delphine Klein MD    atorvastatin 40 mg Oral Daily With Yesy-Avery, DO    calcium carbonate 500 mg Oral TID PRN Delphine Klein MD    cefepime 2,000 mg Intravenous Q12H Magdalene Delcid, DO Last Rate: 2,000 mg (02/22/18 0622)   collagenase  Topical Daily Aramis Fish MD    DULoxetine 20 mg Oral Daily PARRISH [place]    hydrALAZINE 5 mg Intravenous Q6H PRN Delphine Klein MD    insulin glargine 6 Units Subcutaneous QAM Magdalene Delcid, DO    insulin lispro 1-5 Units Subcutaneous TID AC Robert W Spatzer, CRNP    insulin lispro 1-5 Units Subcutaneous HS [employer] [employer] Spatzer, CRNP    levothyroxine 200 mcg Oral Early Morning Magdalene Farhana, DO    lisinopril 40 mg Oral Daily Magdalene Farhana, DO    metroNIDAZOLE 500 mg Oral Q8H [address] Jose D Upton,     ondansetron 4 mg Intravenous Q4H PRN PARRISH Alonso    oxyCODONE 2 5 mg Oral Q4H PRN Magdalene Delcid DO    pantoprazole 40 mg Oral BID AC Jose D Upton DO    sucralfate 1,000 mg Oral 4x Daily (AC & HS) Allyssa Silverio DO         Today, Patient Was Seen By: Aramis Fish MD    ** Please Note: Dragon 360 Dictation voice to text software may have been used in the creation of this document   **

## 2018-02-22 NOTE — PROGRESS NOTES
[facility] Internal [facility] Progress Note  Patient: Lani Mcclellan 67 y o  female   MRN: [id number]  PCP: Sofia Chavez DO  Unit/Bed#: E4 -01 Encounter: [id number]  Date Of Visit: 02/21/18    Assessment and plan:    Principal Problem:    Sepsis ([facility] )- due to peritonitis and perforated gastric ulcer  Peritonitis resolved  Gastric ulcer repaired  Source of sepsis at this time is due to area of intra-abdominal abscess between stomach and liver  Status post drainage per IR  Continue empiric cefepime and Flagyl  Follow-up culture results  Active Problems:    Pancytopenia ([facility] )- bone marrow biopsy is nonspecific  Possibly related to acute illness as well as use of Imuran and underlying multiple sclerosis    Type 2 diabetes mellitus (HCC)-blood sugars at goal   Continue low-dose Lantus 6 units at night and Humalog sliding scale  Hyperglycemia has resolved    Hypoalbuminemia due to protein-calorie malnutrition (HCC)    Acute perforated gastric ulcer with hemorrhage (HCC)-status post Jo Bacca patch  Continue Protonix    Acute blood loss anemia-due to perforated gastric ulcer  Status post multiple packed red blood cell transfusion    Hypernatremia-resolved    Hypothyroidism- levothyroxine daily    Hyperlipidemia- lipitor daily     Multiple sclerosis (HCC)-off imuran    Metabolic encephalopathy- resolved    Acute stroke- due to suspected artery to artery embolic stroke  Unable to start anticoagulation due to pancytopenia and thrombocytopenia  Sacral deep tissue injury-local wound care and repositioning  Follow wound care instructions  VTE Pharmacologic Prophylaxis:   Pharmacologic: None  Mechanical VTE Prophylaxis in Place: Yes    Education and Discussions with Family / Patient:  Spoke with  and son at the bedside after her procedure    Time Spent for Care: 20 minutes  More than 50% of total time spent on counseling and coordination of care as described above      Current Length of Stay: 24 day(s)    Current Patient Status: Inpatient   Certification Statement: The patient will continue to require additional inpatient hospital stay due to Sepsis    Discharge Plan:  Short-term rehab    Code Status: Level 3 - DNAR and DNI      Subjective:   Patient underwent drainage of an intra-abdominal abscess with catheter placement  She has mild postoperative pain  No shortness of breath  No pruritus  Objective:     Vitals:   Temp (24hrs), Av 2 °F (36 8 °C), Min:97 9 °F (36 6 °C), Max:98 6 °F (37 °C)    HR:  [] 87  Resp:  [10-18] 18  BP: ()/(46-80) 162/64  SpO2:  [96 %-99 %] 97 %  Body mass index is 27 11 kg/m²  Input and Output Summary (last 24 hours): Intake/Output Summary (Last 24 hours) at 18 1901  Last data filed at 18 1543   Gross per 24 hour   Intake                0 ml   Output              257 ml   Net             -257 ml       Physical Exam:     Physical Exam   Constitutional: No distress  HENT:   Mouth/Throat: No oropharyngeal exudate  Eyes: No scleral icterus  Cardiovascular: Regular rhythm  No murmur heard  Pulmonary/Chest: No stridor  No respiratory distress  She has no wheezes  She has no rales  Decreased breath sounds at the bases   Abdominal:   Left upper quadrant abdominal drain in place  Mild tenderness   Musculoskeletal: She exhibits no edema  Neurological: She is alert  Skin: She is not diaphoretic  Additional Data:     Labs:      Results from last 7 days  Lab Units 18  0635   WBC Thousand/uL 7 40   HEMOGLOBIN g/dL 9 0*   HEMATOCRIT % 27 8*   PLATELETS Thousands/uL 68*   LYMPHO PCT % 24   MONO PCT MAN % 8   EOSINO PCT MANUAL % 0       Results from last 7 days  Lab Units 18  0635   SODIUM mmol/L 140   POTASSIUM mmol/L 3 0*   CHLORIDE mmol/L 106   CO2 mmol/L 26   BUN mg/dL 11   CREATININE mg/dL 0 44*   CALCIUM mg/dL 7 4*   GLUCOSE RANDOM mg/dL 214*           * I Have Reviewed All Lab Data Listed Above    * Additional Pertinent Lab Tests Reviewed: All Labs Within Last 24 Hours Reviewed    Imaging:    Imaging Reports Reviewed Today Include:  CT abdomen pelvis      Recent Cultures (last 7 days):       Results from last 7 days  Lab Units 02/19/18  1246 02/18/18  1448   BLOOD CULTURE   --  No Growth at 48 hrs  No Growth at 48 hrs  INFLUENZA A PCR  None Detected  --    INFLUENZA B PCR  None Detected  --    RSV PCR  None Detected  --        Last 24 Hours Medication List:     Current Facility-Administered Medications:  acetaminophen 650 mg Oral Q4H PRN Tonny Gomes MD    atorvastatin 40 mg Oral Daily With [place]-Avery, DO    calcium carbonate 500 mg Oral TID PRN Tonny Gomes MD    cefepime 2,000 mg Intravenous Q12H Magdalene Ambron, DO Last Rate: 2,000 mg (02/21/18 0630)   collagenase  Topical Daily Shante French MD    DULoxetine 20 mg Oral Daily PARRISH Guy    hydrALAZINE 5 mg Intravenous Q6H PRN Tonny Gomes MD    insulin glargine 6 Units Subcutaneous QAM Magdalene Ambron, DO    insulin lispro 1-5 Units Subcutaneous TID AC Robert W Spatzer, CRNP    insulin lispro 1-5 Units Subcutaneous HS Marleta Miller Spatzer, CRNP    levothyroxine 200 mcg Oral Early Morning Magdalene Ambron, DO    lisinopril 40 mg Oral Daily Magdalene Ambron, DO    metroNIDAZOLE 500 mg Intravenous Q8H Magdalene Ambron, DO Last Rate: 500 mg (02/21/18 1630)   ondansetron 4 mg Intravenous Q4H PRN PARRISH Wallace    oxyCODONE 2 5 mg Oral Q4H PRN Magdalene Ambron, DO    pantoprazole 40 mg Intravenous Q12H [facility] Magdalene Ambron, DO    sucralfate 1,000 mg Oral 4x Daily (AC & HS) Isaura Nur DO         Today, Patient Was Seen By: Shante French MD    ** Please Note: Dragon 360 Dictation voice to text software may have been used in the creation of this document   **

## 2018-02-22 NOTE — PROGRESS NOTES
Progress Note - Infectious Disease   Amalia Cedillo 67 y o  female MRN: [id number]  Unit/Bed#: E4 -01 Encounter: [id number]      Impression/Plan:  1   Probable sepsis-fever and tachycardia   Possibly secondary to intra-abdominal abscess   Consideration for the possibility of bacteremia  No other localizing findings are noted   The temperatures come down   The blood cultures are negative thus far   -continue cefepime Flagyl for now  -recheck CBC with diff and BMP tomorrow  -check blood culture results  - Follow-up abdominal fluid cultures and adjust antibiotics as needed  -no additional ID workup for now     2   Febrile neutropenia-Repeat blood cultures negative   The patient is no longer neutropenic   Follow-up blood cultures are negative thus far  Bone marrow biopsy was nondiagnostic for primary source of the neutropenia   -antibiotics as above              -check repeat blood cultures result  -follow temperatures closely     3   Peritonitis-With  intra-abdominal abscess developing   Status post laparoscopic repair with washout with drains left in place  Now status post IR drainage of the abscess    -close surgical follow-up  - antibiotics as above  - Follow-up drainage cultures and adjust antibiotics as needed  -serial abdominal exams  -antibiotics as above     4   Pancytopenia-Unclear etiology as worsening despite D/C of Imuran and antibiotics   Oncology considering possible lymphoproliferative disorder  Cell counts are relatively stable today    Bone marrow biopsy nondiagnostic for any primary hematologic process   -hematology oncology follow-up  -no additional G-CSF  -transfusion as needed     5   Diabetes mellitus-type 2 with hyperglycemia     6   Multiple sclerosis-apparently on Imuran for a while     -would hold on Imuran for now     7   Cat bite-left hand   No overt cellulitis at this time   Which should be well covered by the antibiotics as above   The cat is being watched at home without any behavioral abnormalities   The hand is significantly improved with decreased edema   -serial exams  -continue monitoring of the cat at home  -no additional antibiotics needed     8   Acute encephalopathy - likely toxic metabolic due to infection, electrolyte abnormalities superimposed on MS and chronic/severe SVID as suggested by CT   Newly diagnosed acute CVA may be playing a role   Doubt CNS infection  Still some waxing and waning of cognition               -follow MS closely              -no additional ID workup  -neurology follow-up    Antibiotics:  Cefepime 5  Flagyl 5    Subjective:  Patient has no fever, chills, sweats; Slight elevations in the temperature noted  no nausea, vomiting, diarrhea; no cough, shortness of breath; no increased abdominal pain  No new symptoms  Patient underwent percutaneous drainage of a intra-abdominal abscess yesterday  Objective:  Vitals:  HR:  [76-91] 88  Resp:  [10-18] 18  BP: ()/(46-80) 139/72  SpO2:  [96 %-99 %] 97 %  Temp (24hrs), Av 6 °F (37 °C), Min:97 9 °F (36 6 °C), Max:100 °F (37 8 °C)  Current: Temperature: 98 °F (36 7 °C)    Physical Exam:   General Appearance:  Alert, confused,    Throat: Oropharynx moist without lesions  Lungs:   Clear to auscultation bilaterally; no wheezes, rhonchi or rales; respirations unlabored   Heart:  RRR; no murmur, rub or gallop   Abdomen:   Soft, non-tender, non-distended, positive bowel sounds  Abdominal drain with bloody turbid fluid  Extremities: No clubbing, cyanosis or edema   Skin: No new rashes or lesions  No draining wounds noted         Labs, Imaging, & Other studies:   All pertinent labs and imaging studies were personally reviewed    Results from last 7 days  Lab Units 18  0517 18  0635 18  0618   WBC Thousand/uL 5 02 7 40 5 63   HEMOGLOBIN g/dL 8 9* 9 0* 9 3*   PLATELETS Thousands/uL 69* 68* 63*       Results from last 7 days  Lab Units 18  0517 18  8504 18  1419 SODIUM mmol/L 138 140 137   POTASSIUM mmol/L 3 6 3 0* 4 0   CHLORIDE mmol/L 105 106 104   CO2 mmol/L 28 26 28   ANION GAP mmol/L 5 8 5   BUN mg/dL 10 11 9   CREATININE mg/dL 0 42* 0 44* 0 45*   EGFR ml/min/1 73sq m 103 101 100   GLUCOSE RANDOM mg/dL 156* 214* 163*   CALCIUM mg/dL 8 1* 7 4* 8 3       Results from last 7 days  Lab Units 02/19/18  1246 02/18/18  1448   BLOOD CULTURE   --  No Growth at 72 hrs  No Growth at 72 hrs     INFLUENZA A PCR  None Detected  --    INFLUENZA B PCR  None Detected  --    RSV PCR  None Detected  --

## 2018-02-22 NOTE — CONSULTS
Consult for RONALDO not done due to patients instability and multisystem failure    Please call if thought to be appropriate at another time

## 2018-02-22 NOTE — PROGRESS NOTES
pantoprazole and metronidazole have been converted to Oral per [facility] for Adults as approved by the Pharmacy and Therapeutics Committee  The patient met all eligible criteria:  1) Age >/= 25years old   2) Received at least one dose of the IV form   3) Receiving at least one other scheduled oral/enteral medication   4) Tolerating an oral/enteral diet   and did not have any exclusions:   1) Critical care patient   2) Active GI bleed (IF assessing H2RAs or PPIs)   3) Continuous tube feeding (IF assessing cipro, doxycycline, levofloxacin, minocycline, rifampin, or voriconazole)   4) Receiving PO vancomycin (IF assessing metronidazole)   5) Persistent nausea and/or vomiting   6) Ileus or gastrointestinal obstruction   7) Peggy/nasogastric tube set for continuous suction   8) Specific order not to automatically convert to PO (in the order's comments or if discussed in the most recent Infectious Disease or primary team's progress notes)

## 2018-02-22 NOTE — PROGRESS NOTES
Patient returned from testing this afternoon and had an IV placed by IR on left arm, which is a limb alert with pink tag on it  IR dropped off patient, gave nurse call  Nurse with another patient stated be right there for dressing check  IR stated they needed to go, just letting me know that patient is back  Please follow up with nursing to let them know if incident report needs to be filed or if this was discussed between physicians and techs  Thanks

## 2018-02-22 NOTE — SPEECH THERAPY NOTE
Speech Language/Pathology    Speech/Language Pathology Progress Note    Patient Name: Mónica Galindo  XKMMR'V Date: 2/22/2018     Problem List  Patient Active Problem List   Diagnosis    P[facility]topenia ([facility] )    Type 2 diabetes mellitus ([facility] )    Hyperglycemia due to type 2 diabetes mellitus ([facility] )    Hypoalbuminemia due to protein-calorie malnutrition ([facility] )    Sepsis ([facility] )    Acute perforated gastric ulcer with hemorrhage ([facility] )    Peritonitis ([facility] )    Acute blood loss anemia    Hypernatremia    Hypothyroidism    Hyperlipidemia    Multiple sclerosis ([facility] )    Metabolic encephalopathy        Past Medical History  Past Medical History:   Diagnosis Date    Diabetes mellitus ([facility] )     Disease of thyroid gland     Hyperlipidemia     Multiple sclerosis (HCC)         Past Surgical History  Past Surgical History:   Procedure Laterality Date    ESOPHAGOGASTRODUODENOSCOPY N/A 2/12/2018    Procedure: ESOPHAGOGASTRODUODENOSCOPY (EGD); Surgeon: Aramis Lopes MD;  Location: [facility]; Service: Gastroenterology    MN LAP,DIAGNOSTIC ABDOMEN N/A 1/29/2018    Procedure: LAPAROSCOPY DIAGNOSTIC, with repair of perforated gastric ulcer;  Surgeon: Jorge Wilson MD;  Location: [facility];  Service: General         Subjective:  "no I didn't have any breakfast"  Staff reported pt had a tray that was already taken  She requested Pan[place]ian toast and tea  Objective:  Seen for dysphagia tx, some confusion  Faizan Jimenez Spoke about her family appropriately  Assessment:  Refused to sit upright due to pain  Fed in a reclined position  Perseverative/unnecessary mastication of the [place]ian toast, but functional  Swallows prompt  No s/s aspiration w/ fr toast or the tea  Mouth cleared w/ sips of liquids in between  Ate 1 whole piece and stated she had enough  Also took 1 tsp yogurt  Prompt transfer and swallow  Plan/Recommendations:  Noted now on a regular diet  Recommend ordering softer foods on menu and cutting into smaller pieces   Encourage the supplements  May do better w/ items such as pudding, yogurt, soft  However, due to reduced intake, do not want to limit what she can order  No swallowing concerns  No dysphagia f/u needed at this time

## 2018-02-23 LAB
ANION GAP SERPL CALCULATED.3IONS-SCNC: 5 MMOL/L (ref 4–13)
ANISOCYTOSIS BLD QL SMEAR: PRESENT
BACTERIA BLD CULT: NORMAL
BACTERIA BLD CULT: NORMAL
BASOPHILS # BLD MANUAL: 0 THOUSAND/UL (ref 0–0.1)
BASOPHILS NFR MAR MANUAL: 0 % (ref 0–1)
BUN SERPL-MCNC: 10 MG/DL (ref 5–25)
CALCIUM SERPL-MCNC: 8 MG/DL (ref 8.3–10.1)
CHLORIDE SERPL-SCNC: 105 MMOL/L (ref 100–108)
CO2 SERPL-SCNC: 29 MMOL/L (ref 21–32)
CREAT SERPL-MCNC: 0.44 MG/DL (ref 0.6–1.3)
EOSINOPHIL # BLD MANUAL: 0.04 THOUSAND/UL (ref 0–0.4)
EOSINOPHIL NFR BLD MANUAL: 1 % (ref 0–6)
ERYTHROCYTE [DISTWIDTH] IN BLOOD BY AUTOMATED COUNT: 15.1 % (ref 11.6–15.1)
GFR SERPL CREATININE-BSD FRML MDRD: 101 ML/MIN/1.73SQ M
GLUCOSE SERPL-MCNC: 137 MG/DL (ref 65–140)
GLUCOSE SERPL-MCNC: 165 MG/DL (ref 65–140)
GLUCOSE SERPL-MCNC: 180 MG/DL (ref 65–140)
GLUCOSE SERPL-MCNC: 222 MG/DL (ref 65–140)
GLUCOSE SERPL-MCNC: 224 MG/DL (ref 65–140)
HCT VFR BLD AUTO: 26.3 % (ref 34.8–46.1)
HGB BLD-MCNC: 8.5 G/DL (ref 11.5–15.4)
LYMPHOCYTES # BLD AUTO: 1.15 THOUSAND/UL (ref 0.6–4.47)
LYMPHOCYTES # BLD AUTO: 26 % (ref 14–44)
MCH RBC QN AUTO: 29.9 PG (ref 26.8–34.3)
MCHC RBC AUTO-ENTMCNC: 32.3 G/DL (ref 31.4–37.4)
MCV RBC AUTO: 93 FL (ref 82–98)
MONOCYTES # BLD AUTO: 0.22 THOUSAND/UL (ref 0–1.22)
MONOCYTES NFR BLD: 5 % (ref 4–12)
NEUTROPHILS # BLD MANUAL: 3.01 THOUSAND/UL (ref 1.85–7.62)
NEUTS BAND NFR BLD MANUAL: 7 % (ref 0–8)
NEUTS SEG NFR BLD AUTO: 61 % (ref 43–75)
NRBC BLD AUTO-RTO: 0 /100 WBCS
PLATELET # BLD AUTO: 69 THOUSANDS/UL (ref 149–390)
PLATELET BLD QL SMEAR: ABNORMAL
PMV BLD AUTO: 11.1 FL (ref 8.9–12.7)
POLYCHROMASIA BLD QL SMEAR: PRESENT
POTASSIUM SERPL-SCNC: 3.2 MMOL/L (ref 3.5–5.3)
RBC # BLD AUTO: 2.84 MILLION/UL (ref 3.81–5.12)
SODIUM SERPL-SCNC: 139 MMOL/L (ref 136–145)
TOTAL CELLS COUNTED SPEC: 100
WBC # BLD AUTO: 4.43 THOUSAND/UL (ref 4.31–10.16)

## 2018-02-23 PROCEDURE — 80048 BASIC METABOLIC PNL TOTAL CA: CPT | Performed by: INTERNAL MEDICINE

## 2018-02-23 PROCEDURE — 82948 REAGENT STRIP/BLOOD GLUCOSE: CPT

## 2018-02-23 PROCEDURE — 99232 SBSQ HOSP IP/OBS MODERATE 35: CPT | Performed by: NURSE PRACTITIONER

## 2018-02-23 PROCEDURE — 85027 COMPLETE CBC AUTOMATED: CPT | Performed by: INTERNAL MEDICINE

## 2018-02-23 PROCEDURE — 85007 BL SMEAR W/DIFF WBC COUNT: CPT | Performed by: INTERNAL MEDICINE

## 2018-02-23 PROCEDURE — 99232 SBSQ HOSP IP/OBS MODERATE 35: CPT | Performed by: INTERNAL MEDICINE

## 2018-02-23 RX ORDER — OXYCODONE HYDROCHLORIDE 5 MG/1
2.5 TABLET ORAL 3 TIMES DAILY
Status: DISCONTINUED | OUTPATIENT
Start: 2018-02-23 | End: 2018-02-26 | Stop reason: HOSPADM

## 2018-02-23 RX ADMIN — INSULIN LISPRO 1 UNITS: 100 INJECTION, SOLUTION INTRAVENOUS; SUBCUTANEOUS at 12:27

## 2018-02-23 RX ADMIN — LEVOTHYROXINE SODIUM 200 MCG: 100 TABLET ORAL at 06:01

## 2018-02-23 RX ADMIN — INSULIN LISPRO 2 UNITS: 100 INJECTION, SOLUTION INTRAVENOUS; SUBCUTANEOUS at 21:13

## 2018-02-23 RX ADMIN — SUCRALFATE 1000 MG: 1 SUSPENSION ORAL at 21:14

## 2018-02-23 RX ADMIN — COLLAGENASE SANTYL: 250 OINTMENT TOPICAL at 08:29

## 2018-02-23 RX ADMIN — CEFEPIME HYDROCHLORIDE 2000 MG: 2 INJECTION, POWDER, FOR SOLUTION INTRAVENOUS at 20:15

## 2018-02-23 RX ADMIN — METRONIDAZOLE 500 MG: 500 TABLET ORAL at 13:18

## 2018-02-23 RX ADMIN — INSULIN LISPRO 2 UNITS: 100 INJECTION, SOLUTION INTRAVENOUS; SUBCUTANEOUS at 08:30

## 2018-02-23 RX ADMIN — PANTOPRAZOLE SODIUM 40 MG: 40 TABLET, DELAYED RELEASE ORAL at 17:17

## 2018-02-23 RX ADMIN — DULOXETINE HYDROCHLORIDE 20 MG: 20 CAPSULE, DELAYED RELEASE ORAL at 08:29

## 2018-02-23 RX ADMIN — INSULIN GLARGINE 6 UNITS: 100 INJECTION, SOLUTION SUBCUTANEOUS at 08:37

## 2018-02-23 RX ADMIN — Medication 500 MG: at 12:24

## 2018-02-23 RX ADMIN — METRONIDAZOLE 500 MG: 500 TABLET ORAL at 06:01

## 2018-02-23 RX ADMIN — OXYCODONE HYDROCHLORIDE 2.5 MG: 5 TABLET ORAL at 12:25

## 2018-02-23 RX ADMIN — OXYCODONE HYDROCHLORIDE 2.5 MG: 5 TABLET ORAL at 19:45

## 2018-02-23 RX ADMIN — SUCRALFATE 1000 MG: 1 SUSPENSION ORAL at 12:25

## 2018-02-23 RX ADMIN — SUCRALFATE 1000 MG: 1 SUSPENSION ORAL at 06:01

## 2018-02-23 RX ADMIN — CEFEPIME HYDROCHLORIDE 2000 MG: 2 INJECTION, POWDER, FOR SOLUTION INTRAVENOUS at 06:02

## 2018-02-23 RX ADMIN — ACETAMINOPHEN 650 MG: 325 TABLET, FILM COATED ORAL at 17:16

## 2018-02-23 RX ADMIN — PANTOPRAZOLE SODIUM 40 MG: 40 TABLET, DELAYED RELEASE ORAL at 06:01

## 2018-02-23 RX ADMIN — METRONIDAZOLE 500 MG: 500 TABLET ORAL at 21:14

## 2018-02-23 RX ADMIN — ATORVASTATIN CALCIUM 40 MG: 40 TABLET, FILM COATED ORAL at 17:17

## 2018-02-23 RX ADMIN — LISINOPRIL 40 MG: 20 TABLET ORAL at 08:28

## 2018-02-23 RX ADMIN — SUCRALFATE 1000 MG: 1 SUSPENSION ORAL at 17:15

## 2018-02-23 NOTE — TREATMENT PLAN
We have received word that pt has had increased pain today on report by multiple teammates  She appears to continue to be confused, but intermittently experieincing pain on our brief visit  Will schedule low-dose oxyIR on her usual fentanyl patch  We would suggest D/C scheduled oxycodone immediately if there is further clinical neurologic deterioration      Fabian Carvajal MD  Palliative and Supportive Care  Pager: [phone]

## 2018-02-23 NOTE — PROGRESS NOTES
[facility] Internal Medicine Progress Note  Patient: Saleem Postal 67 y o  female   MRN: [id number]  PCP: Lisa Figueroa DO  Unit/Bed#: E4 -01 Encounter: [id number]  Date Of Visit: 02/23/18    Assessment and plan:    Principal Problem:    Sepsis ([facility] )- due to perforated gastric ulcer and peritonitis on admission  Gastric ulcer has been repaired  Peritonitis resolved  More recent source of sepsis is intra-abdominal fluid collection/abscess  Stop status post drainage and catheter placement on 02/21/2018 per IR  Continue cefepime and Flagyl  Follow-up final cultures  Active Problems:    Pancytopenia ([facility] )- multifactorial secondary to Imuran, multiple sclerosis, acute illness  Bone marrow biopsy showed no evidence of malignancy    Type 2 diabetes mellitus (HCC)-increase Lantus to 10 units q h s  due to hyperglycemia    Acute perforated gastric ulcer with hemorrhage (HCC)-status post [place] Aspen patch  Continue Protonix daily    Hypothyroidism-continue levothyroxine daily    Hyperlipidemia-continue Lipitor in light of acute CVA    Acute CVA, 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  Not a candidate for anticoagulation due to GI bleed and pancytopenia  Neurology following    Multiple sclerosis (HCC)-DC Imuran    Metabolic encephalopathy-multifactorial due to severe acute illness, stroke  Improved    Sacral deep tissue injury-continue local wound care and repositioning  Discussed eventual transfer to LTAC with the daughter due to complex sacral wound, catheter management, abdominal infection  VTE Pharmacologic Prophylaxis:   Pharmacologic: None  Mechanical VTE Prophylaxis in Place: Yes    Discussions with Specialists or Other Care Team Provider:  Discussed with Dr Matias Friendly    Education and Discussions with Family / Patient:  Discussed with daughter catheter    Time Spent for Care: 20 minutes  More than 50% of total time spent on counseling and coordination of care as described above      Current Length of Stay: 32 day(s)    Current Patient Status: Inpatient   Certification Statement: The patient will continue to require additional inpatient hospital stay due to Multiple issues    Discharge Plan:  Possible transfer to LTAC next week    Code Status: Level 3 - DNAR and DNI      Subjective:   Feels better  No fever or chills  No abdominal pain  Objective:     Vitals:   Temp (24hrs), Av 1 °F (37 3 °C), Min:98 5 °F (36 9 °C), Max:99 4 °F (37 4 °C)    HR:  [81-89] 88  Resp:  [18] 18  BP: (135-156)/(63-73) 135/63  SpO2:  [96 %-98 %] 97 %  Body mass index is 27 11 kg/m²  Input and Output Summary (last 24 hours): Intake/Output Summary (Last 24 hours) at 18 1638  Last data filed at 18 1001   Gross per 24 hour   Intake              250 ml   Output              191 ml   Net               59 ml       Physical Exam:     Physical Exam   Constitutional: No distress  HENT:   Temporal wasting   Eyes: No scleral icterus  Cardiovascular: Regular rhythm  Murmur heard  Pulmonary/Chest: Effort normal  No stridor  No respiratory distress  She has no wheezes  Abdominal: Soft  She exhibits no distension  Epigastric abdominal drain in place   Musculoskeletal: She exhibits edema  Neurological: She is alert  Skin: Skin is warm  She is not diaphoretic  Psychiatric: She has a normal mood and affect  Additional Data:     Labs:      Results from last 7 days  Lab Units 18  0509   WBC Thousand/uL 4 43   HEMOGLOBIN g/dL 8 5*   HEMATOCRIT % 26 3*   PLATELETS Thousands/uL 69*   LYMPHO PCT % 26   MONO PCT MAN % 5   EOSINO PCT MANUAL % 1       Results from last 7 days  Lab Units 18  0509   SODIUM mmol/L 139   POTASSIUM mmol/L 3 2*   CHLORIDE mmol/L 105   CO2 mmol/L 29   BUN mg/dL 10   CREATININE mg/dL 0 44*   CALCIUM mg/dL 8 0*   GLUCOSE RANDOM mg/dL 165*           * I Have Reviewed All Lab Data Listed Above  * Additional Pertinent Lab Tests Reviewed:  All Labs Within Last 24 Hours Reviewed    Imaging:    Imaging Reports Reviewed Today Include:  No new imaging      Recent Cultures (last 7 days):       Results from last 7 days  Lab Units 02/21/18  1532 02/19/18  1246 02/18/18  1448   BLOOD CULTURE   --   --  No Growth After 4 Days  No Growth After 4 Days  GRAM STAIN RESULT  3+ Polys  2+ Gram positive rods  --   --    BODY FLUID CULTURE, STERILE  4+ Growth of Enterococcus species*  1+ Growth of Candida sp  presumptively albicans*  --   --    INFLUENZA A PCR   --  None Detected  --    INFLUENZA B PCR   --  None Detected  --    RSV PCR   --  None Detected  --        Last 24 Hours Medication List:     Current Facility-Administered Medications:  acetaminophen 650 mg Oral Q4H PRN Dany Seymour MD    atorvastatin 40 mg Oral Daily With Monesbat-Avery, DO    calcium carbonate 500 mg Oral TID PRN Dany Seymour MD    cefepime 2,000 mg Intravenous Q12H Magdalene Farhana, DO Last Rate: 2,000 mg (02/23/18 0602)   collagenase  Topical Daily Lawrence Aguilar MD    DULoxetine 20 mg Oral Daily PARRIHS Mack    hydrALAZINE 5 mg Intravenous Q6H PRN Dany Seymour MD    insulin glargine 6 Units Subcutaneous QAM Magdalene DO Farhana    insulin lispro 1-5 Units Subcutaneous TID AC Robert W Spatzer, CRNP    insulin lispro 1-5 Units Subcutaneous HS [facility] Spatzer, CRNP    levothyroxine 200 mcg Oral Early Morning Magdalene Patron, DO    lisinopril 40 mg Oral Daily Magdalene Patron DO    metroNIDAZOLE 500 mg Oral Q8H [address] Jose D Upton DO    ondansetron 4 mg Intravenous Q4H PRN PARRISH Truong    oxyCODONE 2 5 mg Oral Q4H PRN Magdalene Delcid DO    pantoprazole 40 mg Oral BID AC Jose D Upton DO    sucralfate 1,000 mg Oral 4x Daily (AC & HS) Samia Sheppard DO         Today, Patient Was Seen By: Lawrence Aguilar MD    ** Please Note: Dragon 360 Dictation voice to text software may have been used in the creation of this document   **

## 2018-02-23 NOTE — PROGRESS NOTES
Progress Note - Infectious Disease   Kourtney Leslie 67 y o  female MRN: [id number]  Unit/Bed#: E4 -01 Encounter: [id number]      Impression/Plan:  1   Probable sepsis-fever and tachycardia   Possibly secondary to intra-abdominal abscess   Consideration for the possibility of bacteremia  No other localizing findings are noted   The temperatures come down   The blood cultures are negative thus far   -continue cefepime Flagyl for now  -recheck CBC with diff and BMP tomorrow  -check blood culture results  -Follow-up abdominal fluid cultures and adjust antibiotics as needed  -no additional ID workup for now  -likely simplify antibiotics tomorrow  -possibly to oral antibiotics soon     2   Febrile neutropenia-Repeat blood cultures negative   The patient is no longer neutropenic   Follow-up blood cultures are negative thus far  Bone marrow biopsy was nondiagnostic for primary source of the neutropenia   -antibiotics as above              -check repeat blood cultures result  -follow temperatures closely     3   Peritonitis-With  intra-abdominal abscess developing   Status post laparoscopic repair with washout with drains left in place  Now status post IR drainage of the abscess    -close surgical follow-up  - antibiotics as above  - Follow-up drainage cultures and adjust antibiotics as needed  -serial abdominal exams  -antibiotics as above     4   Pancytopenia-Unclear etiology as worsening despite D/C of Imuran and antibiotics   Oncology considering possible lymphoproliferative disorder   Cell counts are relatively stable today    Bone marrow biopsy nondiagnostic for any primary hematologic process   -hematology oncology follow-up  -no additional G-CSF  -transfusion as needed     5   Diabetes mellitus-type 2 with hyperglycemia     6   Multiple sclerosis-apparently on Imuran for a while     -would hold on Imuran for now     7   Cat bite-left hand   No overt cellulitis at this time   Which should be well covered by the antibiotics as above   The cat is being watched at home without any behavioral abnormalities   The hand is significantly improved with decreased edema   -serial exams  -continue monitoring of the cat at home  -no additional antibiotics needed     8   Acute encephalopathy - likely toxic metabolic due to infection, electrolyte abnormalities superimposed on MS and chronic/severe SVID as suggested by CT   Newly diagnosed acute CVA may be playing a role   Doubt CNS infection  Still some waxing and waning of cognition               -follow MS closely              -no additional ID workup  -neurology follow-up    Antibiotics:  Cefepime 6  Flagyl 6  Post drain day 2  Subjective:  Patient has no fever, chills, sweats; no nausea, vomiting, diarrhea; no cough, shortness of breath; no pain  No new symptoms  She is feeling better overall  Objective:  Vitals:  HR:  [] 89  Resp:  [18] 18  BP: (122-156)/(60-73) 156/73  SpO2:  [96 %-98 %] 96 %  Temp (24hrs), Av 2 °F (37 3 °C), Min:99 °F (37 2 °C), Max:99 4 °F (37 4 °C)  Current: Temperature: 99 3 °F (37 4 °C)    Physical Exam:   General Appearance:  Alert, interactive, nontoxic, no acute distress  Throat: Oropharynx dry without lesions  Lungs:   Clear to auscultation bilaterally; no wheezes, rhonchi or rales; respirations unlabored   Heart:  RRR; no murmur, rub or gallop   Abdomen:   Soft, non-tender, non-distended, positive bowel sounds  Abdominal drain in place with output    Extremities: No clubbing, cyanosis or edema   Skin: No new rashes or lesions  No draining wounds noted         Labs, Imaging, & Other studies:   All pertinent labs and imaging studies were personally reviewed    Results from last 7 days  Lab Units 18  0509 18  0517 18  0635   WBC Thousand/uL 4 43 5 02 7 40   HEMOGLOBIN g/dL 8 5* 8 9* 9 0*   PLATELETS Thousands/uL 69* 69* 68*       Results from last 7 days  Lab Units 18  0509 18  0517 18  6231 SODIUM mmol/L 139 138 140   POTASSIUM mmol/L 3 2* 3 6 3 0*   CHLORIDE mmol/L 105 105 106   CO2 mmol/L 29 28 26   ANION GAP mmol/L 5 5 8   BUN mg/dL 10 10 11   CREATININE mg/dL 0 44* 0 42* 0 44*   EGFR ml/min/1 73sq m 101 103 101   GLUCOSE RANDOM mg/dL 165* 156* 214*   CALCIUM mg/dL 8 0* 8 1* 7 4*       Results from last 7 days  Lab Units 02/21/18  1532 02/19/18  1246 02/18/18  1448   BLOOD CULTURE   --   --  No Growth After 4 Days  No Growth After 4 Days     GRAM STAIN RESULT  3+ Polys  2+ Gram positive rods  --   --    BODY FLUID CULTURE, STERILE  Culture too young- will reincubate  --   --    INFLUENZA A PCR   --  None Detected  --    INFLUENZA B PCR   --  None Detected  --    RSV PCR   --  None Detected  --

## 2018-02-23 NOTE — PROGRESS NOTES
Progress Note - Neurology   Sumanth Hernandez 67 y o  female MRN: [id number]  Unit/Bed#: E4 -01 Encounter: [id number]        Assessment/Plan :  1  Acute encephalopathy: likely toxic metabolic in the setting infection, electrolyte abnormalities vs delirium in the setting of prolonged hospital stay and infection  2  Sepsis: Patient with fever and tachycardia likely 2/2  intra-abdominal fluid collection/abscess s/p drainage on 2/21/18  ID consulted and appreciate recommendations  Currently on Cefepime and Flagyl  Cultures pending  3  Pancytopenia: worsening despited dc of imuran  4  Acute right occipital and temporal lobe infarcts; likely embolic: CTA head and neck on 2/16/18 demonstrating 5mm segment of occlusion/critical stenosis of right P1 segment with distal reconstitution  Evolving infact in right PCA  Factor IV leiden negative  RONALDO not completed due to patient instability and multisystem failure  5  MS: imuran on hold 2/2 pancytopenia    Plan:  -continue Lipitor  -will need opinion from GI if/when anticoagulation or antiplatelet therapy can started  -continue Telemetry until discharge  -PT/OT continue to treat; appreciate recommendations      Subjective:   No acute events overnight  Patient is a poor historian oriented to self and place but is unsure of the day, month or year  She reports abdominal pain with palpation, fever, rapid heart rate, and generalized pain, but otherwise" feels better than she has"  She has no headache, visual changes, numbness/tingling in face or extremities, cough, SOB, chest pain, nausea, vomiting, diarrhea, or dyspepsia  She prefers to lay on her left side due to pressure wound on her bottom  Review of Systems -negative except as noted above      Vitals: Blood pressure 145/68, pulse 82, temperature 98 5 °F (36 9 °C), temperature source Temporal, resp  rate 18, height 5' 6" (1 676 m), weight 76 2 kg (167 lb 15 9 oz), SpO2 98 %  ,Body mass index is 27 11 kg/m²     MEDS:    Current Facility-Administered Medications:  acetaminophen 650 mg Oral Q4H PRN Margret Larson MD    atorvastatin 40 mg Oral Daily With [place]-Avery, DO    calcium carbonate 500 mg Oral TID PRN Margret Larson MD    cefepime 2,000 mg Intravenous Q12H Magdalene Delcid, DO Last Rate: 2,000 mg (02/23/18 0602)   collagenase  Topical Daily Pa Ramos MD    DULoxetine 20 mg Oral Daily PARRISH Delgado    hydrALAZINE 5 mg Intravenous Q6H PRN Margret Larson MD    insulin glargine 6 Units Subcutaneous QAM Magdalene Delcid DO    insulin lispro 1-5 Units Subcutaneous TID AC Robert W Spatzer, CRNP    insulin lispro 1-5 Units Subcutaneous HS Corinda Buoy Spatzer, CRNP    levothyroxine 200 mcg Oral Early Morning Magdalene Delcid, DO    lisinopril 40 mg Oral Daily Magdalene Delcid, DO    metroNIDAZOLE 500 mg Oral Q8H [address] Jose D Upton DO    ondansetron 4 mg Intravenous Q4H PRN PARRISH Holguin    oxyCODONE 2 5 mg Oral Q4H PRN Magdalene Delcid DO    pantoprazole 40 mg Oral BID AC Jose D Upton DO    sucralfate 1,000 mg Oral 4x Daily (AC & HS) Magdalene Delcid DO      Physical Exam   Constitutional: No distress  Lying on her left side, no willing to move due to generalized pain and pressure wound on bottom  Not well appearing, pale, but pleasant   HENT:   Head: Normocephalic and atraumatic  Mouth/Throat: Oropharyngeal exudate present  Neck: Normal range of motion  Neck supple  Cardiovascular: Normal rate, regular rhythm and normal heart sounds  Pulmonary/Chest: Effort normal and breath sounds normal  No respiratory distress  Abdominal: Soft  Bowel sounds are normal  There is tenderness  Musculoskeletal: She exhibits edema (+3 pitting edema in B/L upper and lower extremities)  Neurological: She is alert  She has normal strength  Reflex Scores:       Bicep reflexes are 1+ on the right side and 1+ on the left side         Brachioradialis reflexes are 1+ on the right side and 1+ on the left side        Patellar reflexes are 1+ on the right side and 1+ on the left side  Skin: Skin is warm and dry  There is pallor  Sacral wound   Psychiatric: She has a normal mood and affect  Her speech is normal    Vitals reviewed  Neurologic Exam     Mental Status   Oriented to person (states name)  Oriented to place  (States [facility])  Disoriented to time  Attention: normal  Concentration: normal    Speech: speech is normal   Knowledge: good  Unable to perform simple calculations  Able to name object  Unable to read: needs glasses  Able to repeat  Normal comprehension  Cranial Nerves     CN II   Visual acuity: (needs glasses)    CN III, IV, VI   Right pupil: Size: 2 mm  Shape: regular  Left pupil: Size: 2 mm  Shape: regular  Diplopia: none    CN VII   Facial expression full, symmetric  CN VIII   Hearing: intact    CN IX, X   Palate: symmetric    CN XI   CN XI normal      CN XII   CN XII normal      Motor Exam   Muscle bulk: normal  Overall muscle tone: normal    Strength   Strength 5/5 throughout  Sensory Exam   Light touch normal    Vibration normal      Gait, Coordination, and Reflexes     Tremor   Resting tremor: absent    Reflexes   Right brachioradialis: 1+  Left brachioradialis: 1+  Right biceps: 1+  Left biceps: 1+  Right patellar: 1+  Left patellar: 1+  Right plantar: normal  Left plantar: normal      Lab Results:   I have personally reviewed pertinent reports    , CBC:   Results from last 7 days  Lab Units 02/23/18  0509 02/22/18  0517 02/21/18  0635   WBC Thousand/uL 4 43 5 02 7 40   RBC Million/uL 2 84* 2 96* 3 01*   HEMOGLOBIN g/dL 8 5* 8 9* 9 0*   HEMATOCRIT % 26 3* 27 4* 27 8*   MCV fL 93 93 92   PLATELETS Thousands/uL 69* 69* 68*     BMP/CMP:   Results from last 7 days  Lab Units 02/23/18  0509 02/22/18  0517 02/21/18  0635   SODIUM mmol/L 139 138 140   POTASSIUM mmol/L 3 2* 3 6 3 0*   CHLORIDE mmol/L 105 105 106   CO2 mmol/L 29 28 26   ANION GAP mmol/L 5 5 8   BUN mg/dL 10 10 11   CREATININE mg/dL 0 44* 0 42* 0 44*   GLUCOSE RANDOM mg/dL 165* 156* 214*   CALCIUM mg/dL 8 0* 8 1* 7 4*   EGFR ml/min/1 73sq m 101 103 101     Lipid Profile:   Results from last 7 days  Lab Units 02/17/18  0521   HDL mg/dL 26*   CHOLESTEROL mg/dL 98   LDL CALC mg/dL 48   TRIGLYCERIDES mg/dL 122     Imaging Studies: I have personally reviewed pertinent reports  and I have personally reviewed pertinent films in PACS     2/16/18 CTA head and neck: FINDINGS:  NONCONTRAST BRAIN PARENCHYMA:  Right occipital hypoattenuation likely related to evolving infarct  Severe bilateral microangiopathy  Hyperattenuation along the right posterior temporal parieto-occipital cortex likely related to petechial hemorrhage related to the infarct process  No evidence of intraparenchymal hemorrhage    VENTRICLES AND EXTRA-AXIAL SPACES:  Normal for patient's age    VISUALIZED ORBITS AND PARANASAL SINUSES:  Mild left sphenoid sinus disease    CALVARIUM AND EXTRACRANIAL SOFT TISSUES:   Normal    CERVICAL VASCULATURE   AORTIC ARCH AND GREAT VESSELS:  Moderate ahteroschlerotic disease of the arch and great vessels     RIGHT VERTEBRAL ARTERY CERVICAL SEGMENT:  Normal origin  The vessel is normal in caliber throughout the neck   LEFT VERTEBRAL ARTERY CERVICAL SEGMENT:  Normal origin  The vessel is normal in caliber throughout the neck   RIGHT EXTRACRANIAL CAROTID SEGMENT:  Moderate atherosclerotic disease of the bifurcation   LEFT EXTRACRANIAL CAROTID SEGMENT:  Moderate atherosclerotic disease of the bifurcation      NASCET criteria was used to determine the degree of internal carotid artery diameter stenosis    INTRACRANIAL VASCULATURE    INTERNAL CAROTID ARTERIES:  Normal enhancement of the intracranial portions of the internal carotid arteries  Normal ophthalmic artery origins  Normal ICA terminus  ANTERIOR CIRCULATION:  Symmetric A1 segments and anterior cerebral arteries with normal enhancement    Normal anterior communicating artery    MIDDLE CEREBRAL ARTERY CIRCULATION:  M1 segment and middle cerebral artery branches demonstrate normal enhancement bilaterally    DISTAL VERTEBRAL ARTERIES:  Normal distal vertebral arteries  Posterior inferior cerebellar artery origins are normal  Normal vertebral basilar junction    BASILAR ARTERY:  Basilar artery is normal in caliber  Normal superior cerebellar arteries    POSTERIOR CEREBRAL ARTERIES: There is 5 mm segment occlusion/critical stenosis of the right P1 segment with distal reconstitution  Left posterior artery is unremarkable  DURAL VENOUS SINUSES:  Normal   NON VASCULAR ANATOMY   BONY STRUCTURES:  No acute osseous abnormality    SOFT TISSUES OF THE NECK:  Prominent deep cervical and left supra clavicular lymph nodes  8 mm short axis precarinal lymph node    THORACIC INLET:  Unremarkable    IMPRESSION:   1   5 mm segment of occlusion/critical stenosis of the right P1 segment with distal reconstitution  This could be due to an embolus  2   Evolving infarct in the right PCA distribution  3   microangiopathy  EEG, Pathology, and Other Studies: I have personally reviewed pertinent reports     and I have personally reviewed pertinent films in PACS    VTE Prophylaxis: Sequential compression device (Venodyne)  and RX contraindicated due to: GI bleed and pancytopenia

## 2018-02-24 PROBLEM — I63.10 CEREBROVASCULAR ACCIDENT (CVA) DUE TO EMBOLISM OF PRECEREBRAL ARTERY (HCC): Status: ACTIVE | Noted: 2018-02-24

## 2018-02-24 PROBLEM — E87.0 HYPERNATREMIA: Status: RESOLVED | Noted: 2018-02-05 | Resolved: 2018-02-24

## 2018-02-24 PROBLEM — G93.41 METABOLIC ENCEPHALOPATHY: Status: RESOLVED | Noted: 2018-02-05 | Resolved: 2018-02-24

## 2018-02-24 LAB
BACTERIA SPEC BFLD CULT: ABNORMAL
GLUCOSE SERPL-MCNC: 168 MG/DL (ref 65–140)
GLUCOSE SERPL-MCNC: 242 MG/DL (ref 65–140)
GLUCOSE SERPL-MCNC: 257 MG/DL (ref 65–140)
GLUCOSE SERPL-MCNC: 282 MG/DL (ref 65–140)
GRAM STN SPEC: ABNORMAL
GRAM STN SPEC: ABNORMAL

## 2018-02-24 PROCEDURE — 99232 SBSQ HOSP IP/OBS MODERATE 35: CPT | Performed by: INTERNAL MEDICINE

## 2018-02-24 PROCEDURE — 82948 REAGENT STRIP/BLOOD GLUCOSE: CPT

## 2018-02-24 PROCEDURE — 99233 SBSQ HOSP IP/OBS HIGH 50: CPT | Performed by: INTERNAL MEDICINE

## 2018-02-24 RX ORDER — FLUCONAZOLE 100 MG/1
400 TABLET ORAL EVERY 24 HOURS
Status: DISCONTINUED | OUTPATIENT
Start: 2018-02-24 | End: 2018-02-26

## 2018-02-24 RX ADMIN — LISINOPRIL 40 MG: 20 TABLET ORAL at 08:46

## 2018-02-24 RX ADMIN — PANTOPRAZOLE SODIUM 40 MG: 40 TABLET, DELAYED RELEASE ORAL at 17:07

## 2018-02-24 RX ADMIN — SUCRALFATE 1000 MG: 1 SUSPENSION ORAL at 05:52

## 2018-02-24 RX ADMIN — FLUCONAZOLE 400 MG: 100 TABLET ORAL at 08:45

## 2018-02-24 RX ADMIN — INSULIN LISPRO 3 UNITS: 100 INJECTION, SOLUTION INTRAVENOUS; SUBCUTANEOUS at 11:18

## 2018-02-24 RX ADMIN — CEFEPIME HYDROCHLORIDE 2000 MG: 2 INJECTION, POWDER, FOR SOLUTION INTRAVENOUS at 05:48

## 2018-02-24 RX ADMIN — COLLAGENASE SANTYL: 250 OINTMENT TOPICAL at 08:50

## 2018-02-24 RX ADMIN — DULOXETINE HYDROCHLORIDE 20 MG: 20 CAPSULE, DELAYED RELEASE ORAL at 08:46

## 2018-02-24 RX ADMIN — OXYCODONE HYDROCHLORIDE 2.5 MG: 5 TABLET ORAL at 17:08

## 2018-02-24 RX ADMIN — METRONIDAZOLE 500 MG: 500 TABLET ORAL at 05:51

## 2018-02-24 RX ADMIN — OXYCODONE HYDROCHLORIDE 2.5 MG: 5 TABLET ORAL at 21:28

## 2018-02-24 RX ADMIN — SODIUM CHLORIDE 3 G: 9 INJECTION, SOLUTION INTRAVENOUS at 17:07

## 2018-02-24 RX ADMIN — PANTOPRAZOLE SODIUM 40 MG: 40 TABLET, DELAYED RELEASE ORAL at 05:52

## 2018-02-24 RX ADMIN — SODIUM CHLORIDE 3 G: 9 INJECTION, SOLUTION INTRAVENOUS at 21:24

## 2018-02-24 RX ADMIN — ATORVASTATIN CALCIUM 40 MG: 40 TABLET, FILM COATED ORAL at 17:07

## 2018-02-24 RX ADMIN — SUCRALFATE 1000 MG: 1 SUSPENSION ORAL at 17:06

## 2018-02-24 RX ADMIN — SUCRALFATE 1000 MG: 1 SUSPENSION ORAL at 21:30

## 2018-02-24 RX ADMIN — INSULIN LISPRO 1 UNITS: 100 INJECTION, SOLUTION INTRAVENOUS; SUBCUTANEOUS at 21:27

## 2018-02-24 RX ADMIN — INSULIN LISPRO 2 UNITS: 100 INJECTION, SOLUTION INTRAVENOUS; SUBCUTANEOUS at 17:08

## 2018-02-24 RX ADMIN — OXYCODONE HYDROCHLORIDE 2.5 MG: 5 TABLET ORAL at 08:45

## 2018-02-24 RX ADMIN — LEVOTHYROXINE SODIUM 200 MCG: 100 TABLET ORAL at 05:51

## 2018-02-24 RX ADMIN — INSULIN GLARGINE 6 UNITS: 100 INJECTION, SOLUTION SUBCUTANEOUS at 08:50

## 2018-02-24 RX ADMIN — INSULIN LISPRO 2 UNITS: 100 INJECTION, SOLUTION INTRAVENOUS; SUBCUTANEOUS at 08:47

## 2018-02-24 RX ADMIN — SODIUM CHLORIDE 3 G: 9 INJECTION, SOLUTION INTRAVENOUS at 11:07

## 2018-02-24 NOTE — PROGRESS NOTES
Progress Note - Infectious Disease   Matteo Nowak 67 y o  female MRN: [id number]  Unit/Bed#: E4 -01 Encounter: [id number]      Impression/Plan:  1   Probable sepsis-fever and tachycardia   Possibly secondary to intra-abdominal abscess   Not bacteremic  Jasiel Pederson other localizing findings are noted   The temperatures come down     -discontinue cefepime Flagyl  -Unasyn 3 g IV q 6 hours  -fluconazole 400 mg p  o  Q 24 hours  -check CMP and CBC with diff tomorrow  -Follow-up final abdominal fluid cultures and adjust antibiotics as needed  -no additional ID workup for now  -likely simplify antibiotics tomorrow  -likely to oral antibiotics tomorrow     2   Febrile neutropenia-Repeat blood cultures negative   The patient is no longer neutropenic   Follow-up blood cultures are negative thus far   Bone marrow biopsy was nondiagnostic for primary source of the neutropenia   -antibiotics as above  -follow temperatures closely     3   Peritonitis/Intra-abdominal abscess-Status post laparoscopic repair with washout with drains left in place   Now status post IR drainage of the abscess   Appears polymicrobial with Enterococcus and Candida albicans growing   -close surgical follow-up  -change antibiotics as above  -follow up final drain cultures and adjust antibiotics as needed  -serial abdominal exams  -antibiotics as above     4   Pancytopenia-Unclear etiology as worsening despite D/C of Imuran and antibiotics   Oncology considering possible lymphoproliferative disorder   Cell counts are relatively stable today   Bone marrow biopsy nondiagnostic for any primary hematologic process   -hematology oncology follow-up  -no additional G-CSF  -transfusion as needed     5   Diabetes mellitus-type 2 with hyperglycemia     6   Multiple sclerosis-apparently on Imuran for a while     -would hold on Imuran for now     7   Cat bite-left hand   No overt cellulitis at this time   Which should be well covered by the antibiotics as above  Debria List cat is being watched at home without any behavioral abnormalities   The hand is significantly improved with decreased edema   -serial exams  -continue monitoring of the cat at home  -no additional antibiotics needed     8   Acute encephalopathy - likely toxic metabolic due to infection, electrolyte abnormalities superimposed on MS and chronic/severe SVID as suggested by CT   Newly diagnosed acute CVA may be playing a role   Doubt CNS infection    Still some waxing and waning of cognition               -follow MS closely              -no additional ID workup  -neurology follow-up    Discussed in detail with Dr Ravindra Clemons    Antibiotics:  Cefepime 7  Flagyl 7  Post strain 3    Subjective:  Patient has no fever, chills, sweats; no nausea, vomiting, diarrhea; no cough, shortness of breath; denies pain  No new symptoms  She seems in better spirits  Objective:  Vitals:  HR:  [77-88] 81  Resp:  [18] 18  BP: (135-150)/(63-71) 148/71  SpO2:  [96 %-98 %] 98 %  Temp (24hrs), Av °F (37 2 °C), Min:98 5 °F (36 9 °C), Max:99 2 °F (37 3 °C)  Current: Temperature: 99 1 °F (37 3 °C)    Physical Exam:   General Appearance:  Alert, interactive, nontoxic, no acute distress  Throat: Oropharynx dry without lesions  Lungs:   Clear to auscultation bilaterally; no wheezes, rhonchi or rales; respirations unlabored   Heart:  RRR; no murmur, rub or gallop   Abdomen:   Soft, mildly tender, non-distended, positive bowel sounds  Left-sided abdominal drain in place  Extremities: No clubbing, cyanosis or edema   Skin: No new rashes or lesions  No draining wounds noted         Labs, Imaging, & Other studies:   All pertinent labs and imaging studies were personally reviewed    Results from last 7 days  Lab Units 18  0509 18  0517 18  0635   WBC Thousand/uL 4 43 5 02 7 40   HEMOGLOBIN g/dL 8 5* 8 9* 9 0*   PLATELETS Thousands/uL 69* 69* 68*       Results from last 7 days  Lab Units 18  0509 18  6046 02/21/18  0635   SODIUM mmol/L 139 138 140   POTASSIUM mmol/L 3 2* 3 6 3 0*   CHLORIDE mmol/L 105 105 106   CO2 mmol/L 29 28 26   ANION GAP mmol/L 5 5 8   BUN mg/dL 10 10 11   CREATININE mg/dL 0 44* 0 42* 0 44*   EGFR ml/min/1 73sq m 101 103 101   GLUCOSE RANDOM mg/dL 165* 156* 214*   CALCIUM mg/dL 8 0* 8 1* 7 4*       Results from last 7 days  Lab Units 02/21/18  1532 02/19/18  1246 02/18/18  1448   BLOOD CULTURE   --   --  No Growth After 5 Days  No Growth After 5 Days     GRAM STAIN RESULT  3+ Polys  2+ Gram positive rods  --   --    BODY FLUID CULTURE, STERILE  4+ Growth of Enterococcus species*  1+ Growth of Candida sp  presumptively albicans*  --   --    INFLUENZA A PCR   --  None Detected  --    INFLUENZA B PCR   --  None Detected  --    RSV PCR   --  None Detected  --

## 2018-02-24 NOTE — PROGRESS NOTES
[facility] Progress Note  Patient: Mere Roman 67 y o  female   MRN: [id number]  PCP: Faraz Mccarthy DO  Unit/Bed#: E4 -01 Encounter: [id number]  Date Of Visit: 02/24/18    Assessment and plan:    Principal Problem:    Sepsis ([facility] [address] )- secondary to intra-abdominal abscess  Clinically improved after drainage and ongoing cefepime/Flagyl  Follow up final culture and sensitivity  Active Problems:    Pancytopenia (HCC)-bone marrow biopsy showed nonspecific findings but likely multifactorial due to recent use of Imuran for multiple sclerosis and severe acute illness  Bone marrow biopsy specifically did not show malignancy    Type 2 diabetes mellitus (HCC)-with hyperglycemia  Increase Lantus to 10 units q h s   Continue SSI    Acute perforated gastric ulcer with hemorrhage (HCC)-status post Hussein Coppersmith  Continue Protonix daily    Peritonitis (HCC)-resolved  Acute blood loss anemia-stable after multiple units of PRBC    Hypothyroidism-continue levothyroxine daily    Hyperlipidemia-continue Lipitor in light of embolic CVA    Multiple sclerosis (HCC)-continue to hold Imuran    Cerebrovascular accident (CVA) due to embolism of precerebral artery ([facility] [address] )- patient not on anticoagulation due to persistent thrombocytopenia and high risk of bleeding  Restart anticoagulation when able  VTE Pharmacologic Prophylaxis:   Pharmacologic: None due to thrombocytopenia  Mechanical VTE Prophylaxis in Place: Yes    Discussions with Specialists or Other Care Team Provider:  Discussed with Dr Davy Mancuso    Education and Discussions with Family / Patient:  Spoke with family    Time Spent for Care: 20 minutes  More than 50% of total time spent on counseling and coordination of care as described above      Current Length of Stay: 27 day(s)    Current Patient Status: Inpatient   Certification Statement: The patient will continue to require additional inpatient hospital stay due to Abdominal infection    Discharge Plan:  LTAC when available    Code Status: Level 3 - DNAR and DNI      Subjective:   No pain  No fever  No chills  Still weak  Objective:     Vitals:   Temp (24hrs), Av 2 °F (37 3 °C), Min:99 °F (37 2 °C), Max:99 5 °F (37 5 °C)    HR:  [77-81] 80  Resp:  [18] 18  BP: (148-153)/(66-71) 153/68  SpO2:  [96 %-98 %] 97 %  Body mass index is 27 11 kg/m²  Input and Output Summary (last 24 hours):     No intake or output data in the 24 hours ending 18 1839    Physical Exam:     Physical Exam   HENT:   Mouth/Throat: No oropharyngeal exudate  Eyes: No scleral icterus  Cardiovascular: Regular rhythm  No murmur heard  Pulmonary/Chest: Effort normal  No stridor  No respiratory distress  She has no wheezes  Abdominal: Soft  She exhibits no distension  There is no tenderness  Musculoskeletal: She exhibits no edema  Neurological: She is alert  Skin: Skin is warm  She is not diaphoretic  Psychiatric: She has a normal mood and affect  Additional Data:     Labs:      Results from last 7 days  Lab Units 18  0509   WBC Thousand/uL 4 43   HEMOGLOBIN g/dL 8 5*   HEMATOCRIT % 26 3*   PLATELETS Thousands/uL 69*   LYMPHO PCT % 26   MONO PCT MAN % 5   EOSINO PCT MANUAL % 1       Results from last 7 days  Lab Units 18  0509   SODIUM mmol/L 139   POTASSIUM mmol/L 3 2*   CHLORIDE mmol/L 105   CO2 mmol/L 29   BUN mg/dL 10   CREATININE mg/dL 0 44*   CALCIUM mg/dL 8 0*   GLUCOSE RANDOM mg/dL 165*           * I Have Reviewed All Lab Data Listed Above  * Additional Pertinent Lab Tests Reviewed: All Labs Within Last 24 Hours Reviewed    Imaging:    Imaging Reports Reviewed Today Include:  No new imaging      Recent Cultures (last 7 days):       Results from last 7 days  Lab Units 18  1532 18  1246 18  1448   BLOOD CULTURE   --   --  No Growth After 5 Days  No Growth After 5 Days     GRAM STAIN RESULT  3+ Polys  2+ Gram positive rods  --   --    BODY FLUID CULTURE, STERILE  4+ Growth of - INCORRECT REPORT Enterococcus species*  1+ Growth of Candida sp  presumptively albicans*  4+ Growth of - CORRECTED REPORT Lactobacillus species*  --   --    INFLUENZA A PCR   --  None Detected  --    INFLUENZA B PCR   --  None Detected  --    RSV PCR   --  None Detected  --        Last 24 Hours Medication List:     Current Facility-Administered Medications:  acetaminophen 650 mg Oral Q4H PRN Margret Larson MD    ampicillin-sulbactam 3 g Intravenous Q6H Bria Rowe MD Last Rate: 3 g (02/24/18 1707)   atorvastatin 40 mg Oral Daily With Yesy-Avery, DO    calcium carbonate 500 mg Oral TID PRN Margret Larson MD    collagenase  Topical Daily Pa Ramos MD    DULoxetine 20 mg Oral Daily PARRISH Delgado    fluconazole 400 mg Oral Q24H Bria Rowe MD    hydrALAZINE 5 mg Intravenous Q6H PRN Margret Larson MD    insulin glargine 6 Units Subcutaneous QAM Magdalene Delcid,     insulin lispro 1-5 Units Subcutaneous TID AC Robert W Spatzer, CRNP    insulin lispro 1-5 Units Subcutaneous HS Corinda Buoy Spatzer, CRNP    levothyroxine 200 mcg Oral Early Morning Magdalene Delcid,     lisinopril 40 mg Oral Daily Magdalene Farhana,     ondansetron 4 mg Intravenous Q4H PRN PARRISH Holguin    oxyCODONE 2 5 mg Oral Q4H PRN Magdalene Delcid, DO    oxyCODONE 2 5 mg Oral TID Rachel Antonio [place]    pantoprazole 40 mg Oral BID AC Jose D Upton DO    sucralfate 1,000 mg Oral 4x Daily (AC & HS) Russell Iyer DO         Today, Patient Was Seen By: Pa Ramos MD    ** Please Note: Dragon 360 Dictation voice to text software may have been used in the creation of this document   **

## 2018-02-25 PROBLEM — T14.8XXA DEEP TISSUE INJURY: Status: ACTIVE | Noted: 2018-02-25

## 2018-02-25 PROBLEM — K65.9 PERITONITIS (HCC): Status: RESOLVED | Noted: 2018-02-05 | Resolved: 2018-02-25

## 2018-02-25 LAB
GLUCOSE SERPL-MCNC: 144 MG/DL (ref 65–140)
GLUCOSE SERPL-MCNC: 171 MG/DL (ref 65–140)
GLUCOSE SERPL-MCNC: 228 MG/DL (ref 65–140)
GLUCOSE SERPL-MCNC: 295 MG/DL (ref 65–140)

## 2018-02-25 PROCEDURE — 99232 SBSQ HOSP IP/OBS MODERATE 35: CPT | Performed by: INTERNAL MEDICINE

## 2018-02-25 PROCEDURE — 82948 REAGENT STRIP/BLOOD GLUCOSE: CPT

## 2018-02-25 RX ADMIN — SODIUM CHLORIDE 3 G: 9 INJECTION, SOLUTION INTRAVENOUS at 03:52

## 2018-02-25 RX ADMIN — OXYCODONE HYDROCHLORIDE 2.5 MG: 5 TABLET ORAL at 08:31

## 2018-02-25 RX ADMIN — DULOXETINE HYDROCHLORIDE 20 MG: 20 CAPSULE, DELAYED RELEASE ORAL at 08:30

## 2018-02-25 RX ADMIN — LISINOPRIL 40 MG: 20 TABLET ORAL at 08:30

## 2018-02-25 RX ADMIN — OXYCODONE HYDROCHLORIDE 2.5 MG: 5 TABLET ORAL at 17:37

## 2018-02-25 RX ADMIN — INSULIN GLARGINE 6 UNITS: 100 INJECTION, SOLUTION SUBCUTANEOUS at 08:39

## 2018-02-25 RX ADMIN — LEVOTHYROXINE SODIUM 200 MCG: 100 TABLET ORAL at 05:00

## 2018-02-25 RX ADMIN — SUCRALFATE 1000 MG: 1 SUSPENSION ORAL at 05:00

## 2018-02-25 RX ADMIN — INSULIN LISPRO 2 UNITS: 100 INJECTION, SOLUTION INTRAVENOUS; SUBCUTANEOUS at 17:40

## 2018-02-25 RX ADMIN — PANTOPRAZOLE SODIUM 40 MG: 40 TABLET, DELAYED RELEASE ORAL at 17:37

## 2018-02-25 RX ADMIN — PANTOPRAZOLE SODIUM 40 MG: 40 TABLET, DELAYED RELEASE ORAL at 05:00

## 2018-02-25 RX ADMIN — FLUCONAZOLE 400 MG: 100 TABLET ORAL at 08:29

## 2018-02-25 RX ADMIN — SODIUM CHLORIDE 3 G: 9 INJECTION, SOLUTION INTRAVENOUS at 08:32

## 2018-02-25 RX ADMIN — INSULIN LISPRO 1 UNITS: 100 INJECTION, SOLUTION INTRAVENOUS; SUBCUTANEOUS at 12:39

## 2018-02-25 RX ADMIN — SUCRALFATE 1000 MG: 1 SUSPENSION ORAL at 12:38

## 2018-02-25 RX ADMIN — SODIUM CHLORIDE 3 G: 9 INJECTION, SOLUTION INTRAVENOUS at 14:16

## 2018-02-25 RX ADMIN — SODIUM CHLORIDE 3 G: 9 INJECTION, SOLUTION INTRAVENOUS at 21:25

## 2018-02-25 RX ADMIN — SUCRALFATE 1000 MG: 1 SUSPENSION ORAL at 17:37

## 2018-02-25 RX ADMIN — INSULIN LISPRO 3 UNITS: 100 INJECTION, SOLUTION INTRAVENOUS; SUBCUTANEOUS at 21:27

## 2018-02-25 RX ADMIN — SUCRALFATE 1000 MG: 1 SUSPENSION ORAL at 21:25

## 2018-02-25 RX ADMIN — OXYCODONE HYDROCHLORIDE 2.5 MG: 5 TABLET ORAL at 21:24

## 2018-02-25 RX ADMIN — COLLAGENASE SANTYL: 250 OINTMENT TOPICAL at 08:40

## 2018-02-25 RX ADMIN — ATORVASTATIN CALCIUM 40 MG: 40 TABLET, FILM COATED ORAL at 17:37

## 2018-02-25 NOTE — PROGRESS NOTES
[facility] Progress Note  Patient: Roula Gtz 67 y o  female   MRN: [id number]  PCP: Cuate Wright DO  Unit/Bed#: E4 -01 Encounter: [id number]  Date Of Visit: 02/25/18    Assessment and plan:    Principal Problem:    Sepsis ([address] )- due to intra-abdominal infection/abscess  Postoperative day number 4 status post percutaneous drainage and catheter placement  Body fluid culture grew Candida and lactobacillus species  Antibiotic tailored to Unasyn and Diflucan  Active Problems:    Deep tissue injury-continue local wound care and repositioning  Pancytopenia (HCC)-multifactorial secondary to acute illness, Imuran  No malignancy  Repeat labs in a m  Type 2 diabetes mellitus with hyperglycemia (HCC)-blood sugars trending down while Lantus was increased to 10 units from 6  Continue sliding scale insulin  Cerebrovascular accident (CVA) due to embolism of precerebral artery (HCC)-patient was not a candidate for anticoagulation secondary to severe thrombocytopenia and GI blood loss  Start anticoagulation when able    Hyperlipidemia-continue Lipitor in light of CVA    Hypothyroidism-continue thyroxine daily    Multiple sclerosis (HCC)-discontinue Imuran indefinitely    Hypoalbuminemia due to protein-calorie malnutrition (HCC)    Acute perforated gastric ulcer with hemorrhage (HCC)-resolved status post [place] patch    Acute blood loss anemia-status post multiple blood transfusions, stable          VTE Pharmacologic Prophylaxis:   Pharmacologic: None  Mechanical VTE Prophylaxis in Place: Yes      Education and Discussions with Family / Patient:  Spoke with son, daughter and  earlier today    Time Spent for Care: 20 minutes  More than 50% of total time spent on counseling and coordination of care as described above      Current Length of Stay: 28 day(s)    Current Patient Status: Inpatient   Certification Statement: The patient will continue to require additional inpatient hospital stay due to Placement    Discharge Plan:  LTAC placement Monday or Tuesday when available    Code Status: Level 3 - DNAR and DNI      Subjective:   Patient denies abdominal pain  Tolerating p o  No events overnight  Patient and family agreeable to go to [facility]    Objective:     Vitals:   Temp (24hrs), Av 1 °F (37 3 °C), Min:99 °F (37 2 °C), Max:99 2 °F (37 3 °C)    HR:  [78-80] 79  Resp:  [18] 18  BP: (137-152)/(63-77) 143/63  SpO2:  [95 %-100 %] 99 %  Body mass index is 27 11 kg/m²  Input and Output Summary (last 24 hours): Intake/Output Summary (Last 24 hours) at 18 1611  Last data filed at 18 0901   Gross per 24 hour   Intake              510 ml   Output               15 ml   Net              495 ml       Physical Exam:     Physical Exam   Constitutional: She appears well-developed  No distress  HENT:   Head: Normocephalic and atraumatic  Eyes: No scleral icterus  Neck: No JVD present  Cardiovascular: Regular rhythm  No murmur heard  Pulmonary/Chest: Effort normal  No respiratory distress  She has no wheezes  Abdominal: Soft  She exhibits no distension  There is no tenderness  Epigastric abdominal COREY drain in place   Musculoskeletal: She exhibits no edema  Neurological: She is alert  Skin: Skin is warm  Psychiatric:   Pleasant mood       Additional Data:     Labs:      Results from last 7 days  Lab Units 18  0509   WBC Thousand/uL 4 43   HEMOGLOBIN g/dL 8 5*   HEMATOCRIT % 26 3*   PLATELETS Thousands/uL 69*   LYMPHO PCT % 26   MONO PCT MAN % 5   EOSINO PCT MANUAL % 1       Results from last 7 days  Lab Units 18  0509   SODIUM mmol/L 139   POTASSIUM mmol/L 3 2*   CHLORIDE mmol/L 105   CO2 mmol/L 29   BUN mg/dL 10   CREATININE mg/dL 0 44*   CALCIUM mg/dL 8 0*   GLUCOSE RANDOM mg/dL 165*           * I Have Reviewed All Lab Data Listed Above  * Additional Pertinent Lab Tests Reviewed:  All Labs Within Last 24 Hours Reviewed    Imaging:    Imaging Reports Reviewed Today Include:  No new imaging      Recent Cultures (last 7 days):       Results from last 7 days  Lab Units 02/21/18  1532 02/19/18  1246   GRAM STAIN RESULT  3+ Polys  2+ Gram positive rods  --    BODY FLUID CULTURE, STERILE  4+ Growth of - INCORRECT REPORT Enterococcus species*  1+ Growth of Candida sp  presumptively albicans*  4+ Growth of - CORRECTED REPORT Lactobacillus species*  --    INFLUENZA A PCR   --  None Detected   INFLUENZA B PCR   --  None Detected   RSV PCR   --  None Detected       Last 24 Hours Medication List:     Current Facility-Administered Medications:  acetaminophen 650 mg Oral Q4H PRN Stella Miller MD    ampicillin-sulbactam 3 g Intravenous Q6H Meenu Johnson MD Last Rate: 3 g (02/25/18 1416)   atorvastatin 40 mg Oral Daily With Yesy-Avery, DO    calcium carbonate 500 mg Oral TID PRN Stella Miller MD    collagenase  Topical Daily Jimbo James MD    [facility]    fluconazole 400 mg Oral Q24H Meenu Johnson MD    hydrALAZINE 5 mg Intravenous Q6H PRN Stella Miller MD    insulin glargine 6 Units Subcutaneous QAM Magdalene Delcid, DO    insulin lispro 1-5 Units Subcutaneous TID AC Robert W Spatzer, CRNP    insulin lispro 1-5 Units Subcutaneous HS Eliseo Ridgel Spatzer, CRNP    levothyroxine 200 mcg Oral Early Morning Magdalene Farhana, DO    lisinopril 40 mg Oral Daily Magdalene Farhana, DO    ondansetron 4 mg Intravenous Q4H PRN PARRISH Ch    oxyCODONE 2 5 mg Oral Q4H PRN Magdalene Delcid, DO    oxyCODONE 2 5 mg Oral TID Faye Cochran [place]    pantoprazole 40 mg Oral BID AC Jose D Upton,     sucralfate 1,000 mg Oral 4x Daily (AC & HS) Анна Rangel DO         Today, Patient Was Seen By: Jimbo James MD    ** Please Note: Dragon 360 Dictation voice to text software may have been used in the creation of this document   **

## 2018-02-25 NOTE — PLAN OF CARE
Problem: Potential for Falls  Goal: Patient will remain free of falls  INTERVENTIONS:  - Assess patient frequently for physical needs  -  Identify cognitive and physical deficits and behaviors that affect risk of falls    -  [place] fall precautions as indicated by assessment   - Educate patient/family on patient safety including physical limitations  - Instruct patient to call for assistance with activity based on assessment  - Modify environment to reduce risk of injury  - Consider OT/PT consult to assist with strengthening/mobility   Outcome: Progressing      Problem: Prexisting or High Potential for Compromised Skin Integrity  Goal: Skin integrity is maintained or improved  INTERVENTIONS:  - Identify patients at risk for skin breakdown  - Assess and monitor skin integrity  - Assess and monitor nutrition and hydration status  - Monitor labs (i e  albumin)  - Assess for incontinence   - Turn and reposition patient  - Assist with mobility/ambulation  - Relieve pressure over bony prominences  - Avoid friction and shearing  - Provide appropriate hygiene as needed including keeping skin clean and dry  - Evaluate need for skin moisturizer/barrier cream  - Collaborate with interdisciplinary team (i e  Nutrition, Rehabilitation, etc )   - Patient/family teaching   Outcome: Progressing      Problem: PAIN - ADULT  Goal: Verbalizes/displays adequate comfort level or baseline comfort level  Interventions:  - Encourage patient to monitor pain and request assistance  - Assess pain using appropriate pain scale  - Administer analgesics based on type and severity of pain and evaluate response  - Implement non-pharmacological measures as appropriate and evaluate response  - Consider cultural and social influences on pain and pain management  - Notify physician/advanced practitioner if interventions unsuccessful or patient reports new pain   Outcome: Progressing      Problem: INFECTION - ADULT  Goal: Absence or prevention of progression during hospitalization  INTERVENTIONS:  - Assess and monitor for signs and symptoms of infection  - Monitor lab/diagnostic results  - Monitor all insertion sites, i e  indwelling lines, tubes, and drains  - Monitor endotracheal (as able) and nasal secretions for changes in amount and color  - [place] appropriate cooling/warming therapies per order  - Administer medications as ordered  - Instruct and encourage patient and family to use good hand hygiene technique  - Identify and instruct in appropriate isolation precautions for identified infection/condition   Outcome: Progressing      Problem: SAFETY ADULT  Goal: Maintain or return to baseline ADL function  INTERVENTIONS:  -  Assess patient's ability to carry out ADLs; assess patient's baseline for ADL function and identify physical deficits which impact ability to perform ADLs (bathing, care of mouth/teeth, toileting, grooming, dressing, etc )  - Assess/evaluate cause of self-care deficits   - Assess range of motion  - Assess patient's mobility; develop plan if impaired  - Assess patient's need for assistive devices and provide as appropriate  - Encourage maximum independence but intervene and supervise when necessary  ¯ Involve family in performance of ADLs  ¯ Assess for home care needs following discharge   ¯ Request OT consult to assist with ADL evaluation and planning for discharge  ¯ Provide patient education as appropriate   Outcome: Progressing    Goal: Maintain or return mobility status to optimal level  INTERVENTIONS:  - Assess patient's baseline mobility status (ambulation, transfers, stairs, etc )    - Identify cognitive and physical deficits and behaviors that affect mobility  - Identify mobility aids required to assist with transfers and/or ambulation (gait belt, sit-to-stand, lift, walker, cane, etc )  - [place] fall precautions as indicated by assessment  - Record patient progress and toleration of activity level on Mobility SBAR; progress patient to next Phase/Stage  - Instruct patient to call for assistance with activity based on assessment  - Request Rehabilitation consult to assist with strengthening/weightbearing, etc    Outcome: Progressing      Problem: DISCHARGE PLANNING  Goal: Discharge to home or other facility with appropriate resources  INTERVENTIONS:  - Identify barriers to discharge w/patient and caregiver  - Arrange for needed discharge resources and transportation as appropriate  - Identify discharge learning needs (meds, wound care, etc )  - Arrange for interpretive services to assist at discharge as needed  - Refer to Case Management Department for coordinating discharge planning if the patient needs post-hospital services based on physician/advanced practitioner order or complex needs related to functional status, cognitive ability, or social support system   Outcome: Progressing      Problem: Knowledge Deficit  Goal: Patient/family/caregiver demonstrates understanding of disease process, treatment plan, medications, and discharge instructions  Complete learning assessment and assess knowledge base  Interventions:  - Provide teaching at level of understanding  - Provide teaching via preferred learning methods   Outcome: Progressing      Problem: Nutrition/Hydration-ADULT  Goal: Nutrient/Hydration intake appropriate for improving, restoring or maintaining nutritional needs  Monitor and assess patient's nutrition/hydration status for malnutrition (ex- brittle hair, bruises, dry skin, pale skin and conjunctiva, muscle wasting, smooth red tongue, and disorientation)  Collaborate with interdisciplinary team and initiate plan and interventions as ordered  Monitor patient's weight and dietary intake as ordered or per policy  Utilize nutrition screening tool and intervene per policy  Determine patient's food preferences and provide high-protein, high-caloric foods as appropriate       INTERVENTIONS:  - Monitor oral intake, urinary output, labs, and treatment plans  - Assess nutrition and hydration status and recommend course of action  - Evaluate amount of meals eaten  - Assist patient with eating if necessary   - Allow adequate time for meals  - Recommend/ encourage appropriate diets, oral nutritional supplements, and vitamin/mineral supplements  - Order, calculate, and assess calorie counts as needed  - Recommend, monitor, and adjust tube feedings and TPN/PPN based on assessed needs  - Assess need for intravenous fluids  - Provide specific nutrition/hydration education as appropriate  - Include patient/family/caregiver in decisions related to nutrition   Outcome: Not Progressing      Problem: DISCHARGE PLANNING - CARE MANAGEMENT  Goal: Discharge to post-acute care or home with appropriate resources  INTERVENTIONS:  - Conduct assessment to determine patient/family and health care team treatment goals, and need for post-acute services based on payer coverage, community resources, and patient preferences, and barriers to discharge  - Address psychosocial, clinical, and financial barriers to discharge as identified in assessment in conjunction with the patient/family and health care team  - Arrange appropriate level of post-acute services according to patient's   needs and preference and payer coverage in collaboration with the physician and health care team  - Communicate with and update the patient/family, physician, and health care team regarding progress on the discharge plan  - Arrange appropriate transportation to post-acute venues    Outcome: Progressing      Problem: Neurological Deficit  Goal: Neurological status is stable or improving  Interventions:  - Monitor and assess patient's level of consciousness, motor function, sensory function, and level of assistance needed for ADLs  - Monitor and report changes from baseline  Collaborate with interdisciplinary team to initiate plan and implement interventions as ordered     - Provide and maintain a safe environment  - Utilize seizure precautions  - Utilize fall precautions  - Utilize aspiration precautions  - Utilize bleeding precautions  Outcome: Not Progressing      Problem: Activity Intolerance/Impaired Mobility  Goal: Mobility/activity is maintained at optimum level for patient  Interventions:  - Assess and monitor patient  barriers to mobility and need for assistive/adaptive devices  - Assess patient's emotional response to limitations  - Collaborate with interdisciplinary team and initiate plans and interventions as ordered  - Encourage independent activity per ability   - Maintain proper body alignment  - Perform active/passive rom as tolerated/ordered  - Plan activities to conserve energy   - Turn patient   Outcome: Not Progressing      Problem: Communication Impairment  Goal: Ability to express needs and understand communication  Assess patient's communication skills and ability to understand information  Patient will demonstrate use of effective communication techniques, alternative methods of communication and understanding even if not able to speak  - Encourage communication and provide alternate methods of communication as needed  - Collaborate with case management/ for discharge needs  - Include patient/family/caregiver in decisions related to communication  Outcome: Progressing      Problem: Potential for Aspiration  Goal: Non-ventilated patient's risk of aspiration is minimized  Assess and monitor vital signs, respiratory status, and labs (WBC)  Monitor for signs of aspiration (tachypnea, cough, rales, wheezing, cyanosis, fever)  - Assess and monitor patient's ability to swallow  - Place patient up in chair to eat if possible  - HOB up at 90 degrees to eat if unable to get patient up into chair   - Supervise patient during oral intake  - Instruct patient to take small bites    - Instruct patient to take small single sips when taking liquids  - Follow patient-specific strategies generated by speech pathologist    Outcome: Progressing      Problem: Nutrition  Goal: Nutrition/Hydration status is improving  Monitor and assess patient's nutrition/hydration status for malnutrition (ex- brittle hair, bruises, dry skin, pale skin and conjunctiva, muscle wasting, smooth red tongue, and disorientation)  Collaborate with interdisciplinary team and initiate plan and interventions as ordered  Monitor patient's weight and dietary intake as ordered or per policy  Utilize nutrition screening tool and intervene per policy  Determine patient's food preferences and provide high-protein, high-caloric foods as appropriate  - Assist patient with eating   - Allow adequate time for meals   - Encourage patient to take dietary supplement as ordered  - Collaborate with clinical nutritionist   - Include patient/family/caregiver in decisions related to nutrition  Outcome: Not Progressing      Problem: NEUROSENSORY - ADULT  Goal: Achieves stable or improved neurological status  INTERVENTIONS  - Monitor and report changes in neurological status  - Initiate measures to prevent increased intracranial pressure  - Maintain blood pressure and fluid volume within ordered parameters to optimize cerebral perfusion  - Monitor temperature, glucose, and sodium or any other associated labs   Initiate appropriate interventions as ordered  - Monitor for seizure activity   - Administer anti-seizure medications as ordered   Outcome: Not Progressing      Problem: METABOLIC, FLUID AND ELECTROLYTES - ADULT  Goal: Glucose maintained within target range  INTERVENTIONS:  - Monitor Blood Glucose as ordered  - Assess for signs and symptoms of hyperglycemia and hypoglycemia  - Administer ordered medications to maintain glucose within target range  - Assess nutritional intake and initiate nutrition service referral as needed   Outcome: Progressing      Problem: SKIN/TISSUE INTEGRITY - ADULT  Goal: Skin integrity remains intact  INTERVENTIONS  - Identify patients at risk for skin breakdown  - Assess and monitor skin integrity  - Assess and monitor nutrition and hydration status  - Monitor labs (i e  albumin)  - Assess for incontinence   - Turn and reposition patient  - Assist with mobility/ambulation  - Relieve pressure over bony prominences  - Avoid friction and shearing  - Provide appropriate hygiene as needed including keeping skin clean and dry  - Evaluate need for skin moisturizer/barrier cream  - Collaborate with interdisciplinary team (i e  Nutrition, Rehabilitation, etc )   - Patient/family teaching   Outcome: Not Progressing    Goal: Incision(s), wounds(s) or drain site(s) healing without S/S of infection  INTERVENTIONS  - Assess and document risk factors for skin impairment   - Assess and document dressing, incision, wound bed, drain sites and surrounding tissue  - Initiate Nutrition services consult and/or wound management as needed   Outcome: Progressing      Problem: HEMATOLOGIC - ADULT  Goal: Maintains hematologic stability  INTERVENTIONS  - Assess for signs and symptoms of bleeding or hemorrhage  - Monitor labs  - Administer supportive blood products/factors as ordered and appropriate   Outcome: Progressing      Problem: MUSCULOSKELETAL - ADULT  Goal: Maintain or return mobility to safest level of function  INTERVENTIONS:  - Assess patient's ability to carry out ADLs; assess patient's baseline for ADL function and identify physical deficits which impact ability to perform ADLs (bathing, care of mouth/teeth, toileting, grooming, dressing, etc )  - Assess/evaluate cause of self-care deficits   - Assess range of motion  - Assess patient's mobility; develop plan if impaired  - Assess patient's need for assistive devices and provide as appropriate  - Encourage maximum independence but intervene and supervise when necessary  - Involve family in performance of ADLs  - Assess for home care needs following discharge   - Request OT consult to assist with ADL evaluation and planning for discharge  - Provide patient education as appropriate   Outcome: Not Progressing

## 2018-02-26 VITALS
HEIGHT: 66 IN | WEIGHT: 167.99 LBS | DIASTOLIC BLOOD PRESSURE: 94 MMHG | SYSTOLIC BLOOD PRESSURE: 151 MMHG | HEART RATE: 75 BPM | OXYGEN SATURATION: 97 % | TEMPERATURE: 99.3 F | RESPIRATION RATE: 18 BRPM | BODY MASS INDEX: 27 KG/M2

## 2018-02-26 PROBLEM — E43 SEVERE PROTEIN-CALORIE MALNUTRITION (HCC): Status: ACTIVE | Noted: 2018-02-26

## 2018-02-26 LAB
ANISOCYTOSIS BLD QL SMEAR: PRESENT
BASOPHILS # BLD MANUAL: 0 THOUSAND/UL (ref 0–0.1)
BASOPHILS NFR MAR MANUAL: 0 % (ref 0–1)
EOSINOPHIL # BLD MANUAL: 0.11 THOUSAND/UL (ref 0–0.4)
EOSINOPHIL NFR BLD MANUAL: 3 % (ref 0–6)
ERYTHROCYTE [DISTWIDTH] IN BLOOD BY AUTOMATED COUNT: 16.1 % (ref 11.6–15.1)
GLUCOSE SERPL-MCNC: 175 MG/DL (ref 65–140)
GLUCOSE SERPL-MCNC: 189 MG/DL (ref 65–140)
HCT VFR BLD AUTO: 26.4 % (ref 34.8–46.1)
HGB BLD-MCNC: 8.4 G/DL (ref 11.5–15.4)
LYMPHOCYTES # BLD AUTO: 1.1 THOUSAND/UL (ref 0.6–4.47)
LYMPHOCYTES # BLD AUTO: 29 % (ref 14–44)
MCH RBC QN AUTO: 30.2 PG (ref 26.8–34.3)
MCHC RBC AUTO-ENTMCNC: 31.8 G/DL (ref 31.4–37.4)
MCV RBC AUTO: 95 FL (ref 82–98)
MONOCYTES # BLD AUTO: 0.27 THOUSAND/UL (ref 0–1.22)
MONOCYTES NFR BLD: 7 % (ref 4–12)
MYELOCYTES NFR BLD MANUAL: 1 % (ref 0–1)
NEUTROPHILS # BLD MANUAL: 2.27 THOUSAND/UL (ref 1.85–7.62)
NEUTS BAND NFR BLD MANUAL: 1 % (ref 0–8)
NEUTS SEG NFR BLD AUTO: 59 % (ref 43–75)
NRBC BLD AUTO-RTO: 0 /100 WBCS
PLATELET # BLD AUTO: 83 THOUSANDS/UL (ref 149–390)
PLATELET BLD QL SMEAR: ABNORMAL
PMV BLD AUTO: 10.9 FL (ref 8.9–12.7)
POLYCHROMASIA BLD QL SMEAR: PRESENT
RBC # BLD AUTO: 2.78 MILLION/UL (ref 3.81–5.12)
SCAN RESULT: NORMAL
TOTAL CELLS COUNTED SPEC: 100
WBC # BLD AUTO: 3.79 THOUSAND/UL (ref 4.31–10.16)

## 2018-02-26 PROCEDURE — 82948 REAGENT STRIP/BLOOD GLUCOSE: CPT

## 2018-02-26 PROCEDURE — 99232 SBSQ HOSP IP/OBS MODERATE 35: CPT | Performed by: INTERNAL MEDICINE

## 2018-02-26 PROCEDURE — 85007 BL SMEAR W/DIFF WBC COUNT: CPT | Performed by: INTERNAL MEDICINE

## 2018-02-26 PROCEDURE — 99239 HOSP IP/OBS DSCHRG MGMT >30: CPT | Performed by: INTERNAL MEDICINE

## 2018-02-26 PROCEDURE — 85027 COMPLETE CBC AUTOMATED: CPT | Performed by: INTERNAL MEDICINE

## 2018-02-26 RX ORDER — DULOXETIN HYDROCHLORIDE 20 MG/1
20 CAPSULE, DELAYED RELEASE ORAL DAILY
Refills: 0
Start: 2018-02-27

## 2018-02-26 RX ORDER — AMOXICILLIN AND CLAVULANATE POTASSIUM 875; 125 MG/1; MG/1
1 TABLET, FILM COATED ORAL EVERY 12 HOURS SCHEDULED
Qty: 18 TABLET | Refills: 0
Start: 2018-02-26 | End: 2018-03-07

## 2018-02-26 RX ORDER — FLUCONAZOLE 100 MG/1
200 TABLET ORAL EVERY 24 HOURS
Status: DISCONTINUED | OUTPATIENT
Start: 2018-02-27 | End: 2018-02-26

## 2018-02-26 RX ORDER — OXYCODONE HYDROCHLORIDE 5 MG/1
2.5 TABLET ORAL 3 TIMES DAILY
Qty: 15 TABLET | Refills: 0 | Status: SHIPPED | OUTPATIENT
Start: 2018-02-26 | End: 2018-03-08

## 2018-02-26 RX ORDER — ATORVASTATIN CALCIUM 40 MG/1
40 TABLET, FILM COATED ORAL
Refills: 0
Start: 2018-02-26

## 2018-02-26 RX ORDER — AMOXICILLIN AND CLAVULANATE POTASSIUM 875; 125 MG/1; MG/1
1 TABLET, FILM COATED ORAL EVERY 12 HOURS SCHEDULED
Status: DISCONTINUED | OUTPATIENT
Start: 2018-02-26 | End: 2018-02-26 | Stop reason: HOSPADM

## 2018-02-26 RX ORDER — PANTOPRAZOLE SODIUM 40 MG/1
40 TABLET, DELAYED RELEASE ORAL
Refills: 0
Start: 2018-02-26 | End: 2018-03-25 | Stop reason: HOSPADM

## 2018-02-26 RX ORDER — SUCRALFATE ORAL 1 G/10ML
1000 SUSPENSION ORAL
Qty: 420 ML | Refills: 0
Start: 2018-02-26

## 2018-02-26 RX ORDER — FLUCONAZOLE 200 MG/1
200 TABLET ORAL EVERY 24 HOURS
Qty: 9 TABLET | Refills: 0 | Status: SHIPPED | OUTPATIENT
Start: 2018-02-27 | End: 2018-03-08

## 2018-02-26 RX ORDER — INSULIN GLARGINE 100 [IU]/ML
6 INJECTION, SOLUTION SUBCUTANEOUS EVERY MORNING
Qty: 10 ML | Refills: 0 | Status: SHIPPED | OUTPATIENT
Start: 2018-02-27 | End: 2018-03-22 | Stop reason: CLARIF

## 2018-02-26 RX ORDER — LISINOPRIL 40 MG/1
40 TABLET ORAL DAILY
Refills: 0
Start: 2018-02-27 | End: 2021-11-26

## 2018-02-26 RX ORDER — FLUCONAZOLE 100 MG/1
200 TABLET ORAL EVERY 24 HOURS
Status: DISCONTINUED | OUTPATIENT
Start: 2018-02-27 | End: 2018-02-26 | Stop reason: HOSPADM

## 2018-02-26 RX ADMIN — LISINOPRIL 40 MG: 20 TABLET ORAL at 08:37

## 2018-02-26 RX ADMIN — PANTOPRAZOLE SODIUM 40 MG: 40 TABLET, DELAYED RELEASE ORAL at 05:26

## 2018-02-26 RX ADMIN — INSULIN LISPRO 1 UNITS: 100 INJECTION, SOLUTION INTRAVENOUS; SUBCUTANEOUS at 08:38

## 2018-02-26 RX ADMIN — SODIUM CHLORIDE 3 G: 9 INJECTION, SOLUTION INTRAVENOUS at 08:36

## 2018-02-26 RX ADMIN — FLUCONAZOLE 400 MG: 100 TABLET ORAL at 08:37

## 2018-02-26 RX ADMIN — INSULIN LISPRO 1 UNITS: 100 INJECTION, SOLUTION INTRAVENOUS; SUBCUTANEOUS at 11:58

## 2018-02-26 RX ADMIN — DULOXETINE HYDROCHLORIDE 20 MG: 20 CAPSULE, DELAYED RELEASE ORAL at 08:37

## 2018-02-26 RX ADMIN — INSULIN GLARGINE 6 UNITS: 100 INJECTION, SOLUTION SUBCUTANEOUS at 08:45

## 2018-02-26 RX ADMIN — SUCRALFATE 1000 MG: 1 SUSPENSION ORAL at 05:26

## 2018-02-26 RX ADMIN — OXYCODONE HYDROCHLORIDE 2.5 MG: 5 TABLET ORAL at 08:36

## 2018-02-26 RX ADMIN — LEVOTHYROXINE SODIUM 200 MCG: 100 TABLET ORAL at 05:25

## 2018-02-26 RX ADMIN — SODIUM CHLORIDE 3 G: 9 INJECTION, SOLUTION INTRAVENOUS at 03:59

## 2018-02-26 RX ADMIN — COLLAGENASE SANTYL: 250 OINTMENT TOPICAL at 08:46

## 2018-02-26 RX ADMIN — SUCRALFATE 1000 MG: 1 SUSPENSION ORAL at 11:58

## 2018-02-26 NOTE — CASE MANAGEMENT
CM received call from [employer] (P[place] [phone], patient was accepted  Transportation set up today by [facility] for [address]   Numbers for report entered in EPIC  MD, Bedside nurse, family and patient made aware     Aroldo Miranda RN  [phone]

## 2018-02-26 NOTE — PROGRESS NOTES
[facility] Internal Medicine Progress Note  Patient: Olivia uMsa 67 y o  female   MRN: [id number]  PCP: Twila Andrew DO  Unit/Bed#: E4 -01 Encounter: [id number]  Date Of Visit: 02/26/18    Assessment:    Principal Problem:    Sepsis ([facility] )  Active Problems:    Pancytopenia ([facility] )    Type 2 diabetes mellitus with hyperglycemia ([facility] )    Hypoalbuminemia due to protein-calorie malnutrition ([facility] )    Acute perforated gastric ulcer with hemorrhage ([facility] )    Acute blood loss anemia    Hypothyroidism    Hyperlipidemia    Multiple sclerosis ([facility] )    Cerebrovascular accident (CVA) due to embolism of precerebral artery ([facility] )    Deep tissue injury    Severe protein-calorie malnutrition ([facility] )      Plan:    · Intra-abdominal infection abscess presenting as sepsis    Postop day 5 intact COREY drain remains body fluid culture grew Candida and lactobacillus continue Unasyn and Diflucan per Infectious Disease may change to oral antibiotics today and we will look to length of therapy per ID  · Deep tissue injury continue local wound care and repositioning  · Thrombocytopenia remained stable and 80 to 15380 range  · Type 2 diabetes mellitus Lantus 10 units and sliding scale  · Cerebral vascular accident presumed to be embolic 2 pre cerebral artery was not candidate for anticoagulation secondary to thrombocytopenia and GI blood loss/anticoagulate when thrombocytopenia resolves  · Hyperlipidemia continue statin  · Multiple sclerosis off Imuran  · Acute perforated gastric ulcer with hemorrhage status post Janelle Spark patch  · Acute blood loss anemia status post multiple transfusions now stable  · Severe protein calorie malnutrition in relation to multiple comorbidities and medical illness  · Awaiting to disposition for LTAC      VTE Pharmacologic Prophylaxis:   Pharmacologic: Pharmacologic VTE Prophylaxis contraindicated due to Thrombocytopenia  Mechanical VTE Prophylaxis in Place: Yes    Discussions with Specialists or Other Care Team Provider:  Await clearance from  case management for LTAC medically stable for transfer    Time Spent for Care: 30 minutes  More than 50% of total time spent on counseling and coordination of care as described above  Subjective:   No acute distress no abdominal pain remains deconditioned and weak    Objective:     Vitals:   Temp (24hrs), Av 8 °F (37 1 °C), Min:97 8 °F (36 6 °C), Max:99 3 °F (37 4 °C)    HR:  [73-79] 75  Resp:  [18] 18  BP: (143-156)/(63-94) 151/94  SpO2:  [94 %-100 %] 97 %  Body mass index is 27 11 kg/m²  Input and Output Summary (last 24 hours): Intake/Output Summary (Last 24 hours) at 18 08  Last data filed at 18 0901   Gross per 24 hour   Intake              250 ml   Output                0 ml   Net              250 ml       Physical Exam:     Physical Exam:   General appearance: alert, appears stated age and cooperative  Head: Normocephalic, without obvious abnormality, atraumatic  Lungs: clear to auscultation bilaterally  Heart: regular rate and rhythm  Abdomen: soft, non-tender; bowel sounds normal; no masses,  no organomegaly COREY drain minimal drainage  Back: negative  Extremities: extremities normal, atraumatic, no cyanosis or edema  Neurologic: Grossly normal      Additional Data:     Labs:      Results from last 7 days  Lab Units 18  0635   WBC Thousand/uL 3 79*   HEMOGLOBIN g/dL 8 4*   HEMATOCRIT % 26 4*   PLATELETS Thousands/uL 83*   LYMPHO PCT % 29   MONO PCT MAN % 7   EOSINO PCT MANUAL % 3       Results from last 7 days  Lab Units 18  0509   SODIUM mmol/L 139   POTASSIUM mmol/L 3 2*   CHLORIDE mmol/L 105   CO2 mmol/L 29   BUN mg/dL 10   CREATININE mg/dL 0 44*   CALCIUM mg/dL 8 0*   GLUCOSE RANDOM mg/dL 165*           * I Have Reviewed All Lab Data Listed Above  * Additional Pertinent Lab Tests Reviewed:  [facility] Admission Reviewed    Imaging:  Cta Head And Neck W Wo Contrast    Result Date: 2018  Narrative: CTA NECK AND BRAIN WITH AND WITHOUT CONTRAST INDICATION: Cerebral ischemia  History taken directly from the electronic ordering system  COMPARISON:   MRI February 14, 2018 TECHNIQUE:  Routine CT imaging of the Brain without contrast   Post contrast imaging was performed after administration of iodinated contrast through the neck and brain  Post contrast axial 0 625 mm images timed to opacify the arterial system  3D rendering was performed on an independent workstation  MIP reconstructions performed  Coronal reconstructions were performed of the noncontrast portion of the brain  Radiation dose length product (DLP) for this visit:  1421 mGy-cm   This examination, like all CT scans performed in the [facility], was performed utilizing techniques to minimize radiation dose exposure, including the use of iterative reconstruction and automated exposure control  IV Contrast:  85 mL of iohexol (OMNIPAQUE)  IMAGE QUALITY:   Diagnostic FINDINGS: NONCONTRAST BRAIN PARENCHYMA:  Right occipital hypoattenuation likely related to evolving infarct  Severe bilateral microangiopathy  Hyperattenuation along the right posterior temporal parieto-occipital cortex likely related to petechial hemorrhage related to the infarct process  No evidence of intraparenchymal hemorrhage  VENTRICLES AND EXTRA-AXIAL SPACES:  Normal for patient's age  VISUALIZED ORBITS AND PARANASAL SINUSES:  Mild left sphenoid sinus disease  CALVARIUM AND EXTRACRANIAL SOFT TISSUES:   Normal  CERVICAL VASCULATURE AORTIC ARCH AND GREAT VESSELS:  Moderate ahteroschlerotic disease of the arch and great vessels  RIGHT VERTEBRAL ARTERY CERVICAL SEGMENT:  Normal origin  The vessel is normal in caliber throughout the neck  LEFT VERTEBRAL ARTERY CERVICAL SEGMENT:  Normal origin  The vessel is normal in caliber throughout the neck  RIGHT EXTRACRANIAL CAROTID SEGMENT:  Moderate atherosclerotic disease of the bifurcation    LEFT EXTRACRANIAL CAROTID SEGMENT:  Moderate atherosclerotic disease of the bifurcation  NASCET criteria was used to determine the degree of internal carotid artery diameter stenosis  INTRACRANIAL VASCULATURE INTERNAL CAROTID ARTERIES:  Normal enhancement of the intracranial portions of the internal carotid arteries  Normal ophthalmic artery origins  Normal ICA terminus  ANTERIOR CIRCULATION:  Symmetric A1 segments and anterior cerebral arteries with normal enhancement  Normal anterior communicating artery  MIDDLE CEREBRAL ARTERY CIRCULATION:  M1 segment and middle cerebral artery branches demonstrate normal enhancement bilaterally  DISTAL VERTEBRAL ARTERIES:  Normal distal vertebral arteries  Posterior inferior cerebellar artery origins are normal  Normal vertebral basilar junction  BASILAR ARTERY:  Basilar artery is normal in caliber  Normal superior cerebellar arteries  POSTERIOR CEREBRAL ARTERIES: There is 5 mm segment occlusion/critical stenosis of the right P1 segment with distal reconstitution  Left posterior artery is unremarkable  DURAL VENOUS SINUSES:  Normal  NON VASCULAR ANATOMY BONY STRUCTURES:  No acute osseous abnormality  SOFT TISSUES OF THE NECK:  Prominent deep cervical and left supra clavicular lymph nodes  8 mm short axis precarinal lymph node  THORACIC INLET:  Unremarkable  Impression: 1   5 mm segment of occlusion/critical stenosis of the right P1 segment with distal reconstitution  This could be due to an embolus  2   Evolving infarct in the right PCA distribution  3   Microangiopathy  I personally discussed this study with Dr Eloisa Perez on 2/16/2018 at 7:30 PM   Workstation performed: [id number]     Xr Chest Portable    Result Date: 2/9/2018  Narrative: CHEST  INDICATION: Fever  COMPARISON:  2/5/2018  VIEWS:   AP frontal; 1 image FINDINGS: The cardiomediastinal silhouette is stable  The lungs are clear  No pleural effusion  Osseous structures are age appropriate  Impression: No active disease  Workstation performed: [id number]     Xr Chest 2 Views    Result Date: 1/29/2018  Narrative: CHEST INDICATION: fatigue  History taken directly from the electronic ordering system  COMPARISON: None VIEWS:  AP semierect and lateral IMAGES:  3 FINDINGS: The patient is rotated to the left  The cardiomediastinal silhouette is unremarkable  The lungs are clear  No pleural effusions  Mild degenerative changes, thoracic spine  Impression: No active pulmonary disease    Workstation performed: [id number]     Xr Abdomen Obstruction Series    Result Date: 2/1/2018  Narrative: OBSTRUCTION SERIES INDICATION:  Status post patch of gastric ulcer COMPARISON: None VIEWS:  (Supine, left lateral decubitus abdomen) IMAGES:  9 FINDINGS: Images were obtained prior to and following administration of 500 mL of Omnipaque 350 through the patient's nasogastric tube  Initial image demonstrates mildly dilated loops of small bowel proximally  Following contrast administration, there was normal passage of contrast through the dilated small bowel without obstruction identified  This likely represents adynamic ileus  There is no free, layering contrast on supine or decubitus imaging to suggest a large leak  However, portions of the abdominal cavity were obscured by retained small bowel and gastric contrast and the possibility of a small or contained leak cannot be entirely excluded  Impression: 1  No layering free contrast to suggest large leak  However, the study was limited for smaller or contained leaks due to retained contrast in the stomach and small bowel  This limited study was performed due to the patient's inability to undergo diagnostic upper GI  If there is continued clinical concern for leak, a follow-up upper GI can be attempted when the patient is able    Alternatively, an additional abdomen x-ray in the a m  can be obtained to allow for passage of contrast into the colon  and better visualization of the underlying abdominal cavity  CT would also better evaluate for contained leak  2   Dilated small bowel without evidence of obstruction, likely adynamic ileus  Workstation performed: [id number]     Ct Head Wo Contrast    Result Date: 2/9/2018  Narrative: CT BRAIN - WITHOUT CONTRAST INDICATION:  Decreased alertness  History of a mass  COMPARISON:  None  TECHNIQUE:  CT examination of the brain was performed  In addition to axial images, coronal reformatted images were created and submitted for interpretation  Radiation dose length product (DLP) for this visit:  967 mGy-cm   This examination, like all CT scans performed in the [facility], was performed utilizing techniques to minimize radiation dose exposure, including the use of iterative reconstruction and automated exposure control  IMAGE QUALITY:  Diagnostic  FINDINGS:  PARENCHYMA:  There are extensive periventricular and subcortical foci of white matter low attenuation which is nonspecific and likely related to the patient's history of multiple sclerosis and/or chronic small vessel ischemic changes  There is an area of encephalomalacia involving the right parietal lobe with a few areas of gyriform hyperdensity likely related to cortical laminar necrosis  There is ex vacuo dilatation of the adjacent right lateral ventricular posterior horn  There is low-attenuation  noted within the right occipital lobe which is likely the sequela of prior ischemia  No intracranial mass, mass effect or midline shift  No CT signs of acute infarction  There is no parenchymal hemorrhage  There are old bilateral basal ganglia lacunar infarcts  There is a small old posterior left parietal lobe infarct  VENTRICLES AND EXTRA-AXIAL SPACES:  There is prominence of the ventricles and sulci related to mild volume loss  VISUALIZED ORBITS AND PARANASAL SINUSES:  Unremarkable   CALVARIUM AND EXTRACRANIAL SOFT TISSUES:  There is incidental hyperostosis frontalis interna  Impression: Extensive periventricular and subcortical foci of white matter low attenuation which is nonspecific and likely representing a combination of the patient's history of multiple sclerosis and chronic small vessel ischemic changes  Evaluation for subtle acute ischemia is limited secondary to these severe white matter changes  An MRI of the brain could be performed for further evaluation  Focus of encephalomalacia within the right parietal lobe with a few areas of gyriform hyperdensity suggestive of cortical laminar necrosis in an old right MCA territory infarct  Less likely, the findings may represent tiny foci of acute petechial hemorrhage  No prior studies are available for comparison  Old bilateral basal ganglia lacunar infarcts  I personally discussed this study with DR Kayden Neff 2/9/2018 at 6:08 PM  Workstation performed: [id number]     Ct Guided Perc Drainage Catheter Placeme    Result Date: 2/22/2018  Narrative: CT-guided abscess drainage Clinical History: Intra-abdominal abscess   Sedation time: 35 minutes Procedure: After explaining the risks and benefits of the procedure to the patient, informed consent was obtained  CT was used to localize the right upper quadrant air-fluid collection   Examination was performed utilizing techniques to minimize radiation dose, including the use of dose reduction software  The overlying skin was prepped and draped in the usual sterile fashion and local anesthesia obtained with a 1% lidocaine solution  A 18-gauge Silverman needle was advanced into the abscess cavity under CT guidance  After looping a 0 035 heavy-duty wire, the tract was dilated and a 8 5 [employer] Le[place] all-purpose drainage catheter placed  The self-retaining loop was locked in position  CT was used to confirm proper positioning  Approximately 15 cc's  of purulent fluid was aspirated and sent for culture and sensitivity   The catheter was secured in place with a 2-0 prolene suture and placed to a Mitchell-Linder drainage bulb  The patient tolerated the procedure well and left the department in stable condition  Impression: Impression: Successful CT-guided abscess drainage  Workstation performed: [id number]     Mri Brain Wo Contrast    Result Date: 2/14/2018  Narrative: MRI BRAIN WITHOUT CONTRAST INDICATION:  Mental status change  COMPARISON:   CT brain dated February 9, 2018  TECHNIQUE:  Sagittal T1, axial T2, axial FLAIR, axial T1, axial [place] and axial diffusion imaging  IMAGE QUALITY:  Diagnostic  FINDINGS: BRAIN PARENCHYMA: There is an area of restricted diffusion involving the posterior right occipital lobe compatible with acute ischemia  Another small focus of restricted diffusion is noted at the posterior right temporal lobe  No acute intracranial hemorrhage  There are extensive periventricular and subcortical foci of white matter T2 hyperintensity which is nonspecific and most likely related to chronic small vessel ischemic changes  There is a focus of encephalomalacia involving the right parietal lobe with gyriform T1 hyperintensity compatible with cortical laminar necrosis from a chronic infarct  There is a small old posterior left parietal lobe infarct  There is no discrete mass, mass effect or midline shift  VENTRICLES:  The ventricles are normal in size and contour  SELLA AND PITUITARY GLAND:  Normal  ORBITS:  Normal  PARANASAL SINUSES:  Normal  VASCULATURE:  Evaluation of the major intracranial vasculature demonstrates appropriate flow voids  CALVARIUM AND SKULL BASE:  Normal  EXTRACRANIAL SOFT TISSUES:  Normal      Impression: Acute foci of ischemia involving the right PCA territory, including the posterior right temporal and occipital lobes  Extensive periventricular and subcortical foci of white matter T2 hyperintensity which is nonspecific and likely related to chronic small vessel ischemic changes  Other less likely etiologies include demyelinating disease and vasculitis  No acute intracranial hemorrhage  Small focus of encephalomalacia within the right parietal lobe which demonstrates gyriform T1 hyperintensity compatible with cortical laminar necrosis and a chronic infarct  I personally discussed this study with  [facility] on 2/14/2018 at 6:42 PM  Workstation performed: [id number]     Ct Bone Marrow Biopsy And Aspiration    Result Date: 2/13/2018  Narrative: Procedure: Computed tomography guided bone marrow aspiration and biopsy  History: 67year-old female patient with pancytopenia  Bone marrow aspiration and biopsy have been requested  Comments: The patient was identified  The procedure, risks, benefits and alternatives were explained to the patient's daughter who expressed understanding and signed an informed consent  The patient was placed in the right lateral decubitus position  Limited computed tomography imaging of the pelvis was performed for localization purposes  The right posterior superior iliac crest was chosen for needle access and bone marrow aspiration  Maximum sterile barrier technique including cap, mask, gown and gloves, as well as a large sterile sheet was used  Appropriate hand hygiene was performed  The lower back was prepped using 2% chlorhexidine for cutaneous antisepsis and draped in the usual sterile fashion  Timeout verification, with all participants present, was performed prior to any intervention  1% LIDOCAINE (10 mL)was used for local anesthesia  A small dermatotomy was made  Using parameters obtained from the computed tomography localization process, an  11-gauge bone biopsy needle was directed at the right posterior superior iliac crest  After limited computed tomography imaging confirmation, the needle was advanced into the marrow cavity using a powered bone access system  7 mL of bone marrow were aspirated and submitted to the cytotechnologist and Dr Zenia Omalley who were present to receive and evaluate the specimens for adequacy   The needle was withdrawn to the cortex and displaced minimally to access an adjacent site on the right posterior superior iliac crest  An approximately 1 5 cm length bone/bone marrow core was obtained with power assist and submitted to the cytotechnologist for on-site preliminary review by Dr Brown Cleaves  Hemostasis was obtained by manual compression  A sterile dressing was applied  The patient tolerated the procedure well  Image count:  6  Estimated blood loss: None  VTe prophylaxis: Short duration procedure not requiring VTe prophylaxis  Complications: None  Radiation dose length product (DLP) for this visit:  324 mGy-cm   This examination, like all CT scans performed in the [facility], was performed utilizing techniques to minimize radiation dose exposure, including the use of iterative reconstruction and automated exposure control  Medications: IV moderate sedation was utilized for 24 minutes  The patient's cardiorespiratory status was monitored before, during and after the procedure by interventional radiology nursing staff, under my direct supervision  Impression: Impression: Computed tomography guided bone marrow aspiration and bone marrow biopsy of the right posterior superior iliac crest was performed with no immediate postprocedure complication  Workstation performed: [id number]     Vas Upper Limb Venous Duplex Scan, Unilateral/limited    Result Date: 2/8/2018  Narrative:  [facility] REPORT CLINICAL: Indications:  Left Swelling [M79 [id number]]  Patient has PICC line in place in left arm  She is having swelling and pain in the arm    FINDINGS:  Left          Impression          Thrombus           Axillary      Thrombosed (acute)                     Ceph Upper                        Acute - Occlusive  Ceph Mid Arm                      Acute - Occlusive     CONCLUSION: Impression RIGHT UPPER LIMB LIMITED: Evaluation shows no evidence of thrombus in the internal jugular vein, subclavian vein, and the brachiocephalic vein  LEFT UPPER LIMB: Abnormal  (LIMITED) There is acute deep vein thrombosis in the axillary vein  There is acute superficial thrombophlebitis in the visualized cephalic vein in the mid and proximal upper arm  Doppler evaluation shows a normal response to augmentation maneuvers  Tech Note: The pain was having severe pain in her arm and would not allow repositioning of her arm to complete the exam, no further images could be obtained  Preliminary report given to "Gina Castellon" the patient's nurse, and faxed  SIGNATURE: Electronically Signed by: Amada Malloy MD, [address] on 2018-02-08 04:24:15 PM    Xr Chest Picc Line Portable    Result Date: 2/5/2018  Narrative: CHEST  INDICATION: PICC line placement  COMPARISON: January 28, 2018 VIEWS:  AP frontal IMAGES:  1 FINDINGS: The patient is rotated to the left  Left sided PICC line tip terminates overlying the mediastinum possibly in the innominate vein  No pneumothorax  Cardiomediastinal silhouette appears unremarkable  The lungs are clear  No pneumothorax or pleural effusion  Visualized osseous structures appear within normal limits for the patient's age  Residual contrast is noted in the colon from previous CAT scan  Impression: No pneumothorax status post PICC line insertion  See above  Workstation performed: [id number]     Ct Abdomen Pelvis W Contrast    Result Date: 2/18/2018  Narrative: CT ABDOMEN AND PELVIS WITH IV CONTRAST INDICATION:  Abdominal pain  COMPARISON: 2/9/2018  TECHNIQUE:  CT examination of the abdomen and pelvis was performed  Axial, sagittal, and coronal 2D reformatted images were created from the source data and submitted for interpretation  Radiation dose length product (DLP) for this visit:  690 mGy-cm     This examination, like all CT scans performed in the [facility], was performed utilizing techniques to minimize radiation dose exposure, including the use of iterative reconstruction and automated exposure control  IV Contrast:  50 mL of iohexol (OMNIPAQUE) 100 mL of iohexol (OMNIPAQUE) Enteric Contrast:  Enteric contrast was not administered  FINDINGS: ABDOMEN LOWER CHEST:  Atelectasis at the right lung base  Geronimo Po LIVER/BILIARY TREE:  Unremarkable  GALLBLADDER:  No calcified gallstones  No pericholecystic inflammatory change  SPLEEN:  Stable foci of hypoenhancement in the spleen suggesting infarcts  PANCREAS:  Unremarkable  ADRENAL GLANDS:  Unremarkable  KIDNEYS/URETERS:  Stable right renal cysts measuring up to 4 6 cm  Left renal subcentimeter cysts and 1 8 cm exophytic cyst   Nonobstructing 3 mm left renal calculus  No evidence of pyelonephritis or obstructive uropathy  STOMACH AND BOWEL:  Wall thickening throughout the stomach, not well evaluated in the absence of contrast distention  No evidence of duodenitis, enteritis or colitis  No bowel obstruction  APPENDIX:  No findings to suggest appendicitis  ABDOMINOPELVIC CAVITY: Acute scapular in the right upper abdomen  Collection  No ascites or free intraperitoneal air  No lymphadenopathy  VESSELS:  Unremarkable for patient's age  PELVIS REPRODUCTIVE ORGANS:  Unremarkable for patient's age  URINARY BLADDER:  Unremarkable  ABDOMINAL WALL/INGUINAL REGIONS:  Few remaining foci of gas in the anterior abdomen and left groin, likely postsurgical   No collection or hematoma  OSSEOUS STRUCTURES:  No acute fracture or destructive osseous lesion  Impression: 1  No intraperitoneal collection or fluid  2   Wall thickening throughout the stomach, suggesting gastritis  Limited evaluation absence of contrast distention  No evidence of bowel obstruction, colitis or diverticulitis  3   Minimal residual gas in the intra-abdominal wall  No collection or hematoma  Workstation performed: [id number]     Ct Abdomen Pelvis W Contrast    Result Date: 2/9/2018  Narrative: CT ABDOMEN AND PELVIS WITH IV CONTRAST INDICATION:  Abdominal pain, fever    History of perforated gastric ulcer, status post surgical repair    COMPARISON: CT abdomen/pelvis dated February 2, 2018  TECHNIQUE:  CT examination of the abdomen and pelvis was performed  Reformatted images were created in axial, sagittal, and coronal planes  Radiation dose length product (DLP) for this visit:  872 mGy-cm   This examination, like all CT scans performed in the [facility], was performed utilizing techniques to minimize radiation dose exposure, including the use of iterative reconstruction and automated exposure control  IV Contrast:  100 mL of iohexol (OMNIPAQUE) Enteric Contrast:  Enteric contrast was administered  FINDINGS: ABDOMEN LOWER CHEST:  Atelectatic changes are noted at the lung bases  No other significant abnormality identified in the lower chest  LIVER/BILIARY TREE:  Unremarkable  GALLBLADDER:  No calcified gallstones  No pericholecystic inflammatory change  SPLEEN:  There are stable multifocal wedge-shaped foci of decreased enhancement involving the periphery of the spleen suggestive of infarcts  PANCREAS:  Unremarkable  ADRENAL GLANDS:  Unremarkable  KIDNEYS/URETERS:  No hydronephrosis  There is a stable exophytic cyst at the right renal lower pole  Punctate bilateral nonobstructing intrarenal calculi  One or more sharply circumscribed subcentimeter renal hypodensities are noted  These lesions are too small to accurately characterize, but are statistically most likely to represent benign cortical renal cyst(s)  According to the guidelines published in  the [facility] Paper of the ACR Incidental Findings Committee (Radiology 2010), no further workup of these lesions is recommended  STOMACH AND BOWEL:  There has been interval removal of a nasogastric tube  Again noted is a loculated collection adjacent to the gastric lesser curvature measuring 5 4 x 3 3 cm which demonstrates air-fluid levels on (series 2, image 28)  There is mild diffuse small bowel dilatation suggestive of an ileus    Oral contrast is noted extending to the level of the rectum  APPENDIX:  No findings to suggest appendicitis  ABDOMINOPELVIC CAVITY:  No free air or intraperitoneal air  No lymphadenopathy  Again noted is a catheter via a right paramedian anterior abdominal approach adjacent to the inferior right hepatic lobe extending to the mid abdomen  A 2nd left-sided lower abdominal catheter is stable  VESSELS:  Unremarkable for patient's age  PELVIS REPRODUCTIVE ORGANS:  Stable  URINARY BLADDER:  Unremarkable  ABDOMINAL WALL/INGUINAL REGIONS:  When compared to the prior exam, there has been interval decrease in the amount of previously described air within the subcutaneous fat of the anterior abdominal wall and pelvis  OSSEOUS STRUCTURES:  No acute fracture or destructive osseous lesion  Impression: Mild interval decrease in air within the subcutaneous soft tissues of the anterior abdomen and pelvis  The findings may be iatrogenic in nature  Stable bilateral anterior abdominal percutaneous catheters  Mild diffuse small bowel dilatation with oral contrast extending to the level of the rectum  The findings are likely secondary to an ileus  5 4 x 3 3 cm loculated air-fluid collection adjacent to the lesser curvature the stomach which may be postoperative in nature  Follow-up is recommended  Stable multifocal splenic infarcts  Workstation performed: [id number]     Ct Abdomen Pelvis W Contrast    Result Date: 2/2/2018  Narrative: CT ABDOMEN AND PELVIS WITH IV CONTRAST INDICATION:  Abdominal pain  Blood in NG tube  COMPARISON: 1/29/2018 TECHNIQUE:  CT examination of the abdomen and pelvis was performed  Reformatted images were created in axial, sagittal, and coronal planes  Radiation dose length product (DLP) for this visit:  572 mGy-cm     This examination, like all CT scans performed in the [facility], was performed utilizing techniques to minimize radiation dose exposure, including the use of iterative reconstruction and automated exposure control  IV Contrast:       100 mL of iohexol (OMNIPAQUE) Enteric Contrast:  Enteric contrast was administered  FINDINGS: ABDOMEN LOWER CHEST:  Posterior basilar atelectatic change  LIVER/BILIARY TREE:  Unremarkable  GALLBLADDER:  No calcified gallstones  No pericholecystic inflammatory change  SPLEEN:  Stable hypodensities within the spleen somewhat wedge-shaped and extending to the periphery suggestive of splenic infarctions  PANCREAS:  Unremarkable  ADRENAL GLANDS:  Unremarkable  KIDNEYS/URETERS:  Small cortical cysts  Punctate nonobstructing calculus within the midportion of the left kidney, unchanged  Stable exophytic cyst arising from the lower pole the right kidney  STOMACH AND BOWEL:  NG tube extends into the stomach  Oral contrast extends to the rectum  No signs of bowel obstruction  Mild distention of small and large bowel loops  APPENDIX:  No findings to suggest appendicitis  ABDOMINOPELVIC CAVITY:  Catheter via a right paramedian anterior abdominal approach adjacent to the inferior aspect of the liver extending into the mid abdomen below the left lobe of the liver  There is a 2nd small catheter via a left paramedian lower abdominal approach extending into the left pelvis  VESSELS:  Atherosclerotic calcification of the aorta and iliac arteries  PELVIS REPRODUCTIVE ORGANS:  Unremarkable for patient's age  URINARY BLADDER:  Unremarkable  ABDOMINAL WALL/INGUINAL REGIONS:  Large amount of air within the subcutaneous fat of the abdominal wall the abdomen and pelvis  There is a small amount of air between the muscles of the right paramedian abdominal wall  These findings may be iatrogenic  No hernia  OSSEOUS STRUCTURES:  L2-3, L3-4 and L4-5 degenerative change  Loss of disc height with vacuum phenomenon and mild endplate hypertrophic change  Lower lumbar facet arthropathy       Impression: There is a large amount of air within the subcutaneous fat of the abdominal wall extending inferiorly into the pelvic wall  Small amount of air present in between the muscles of the right superior lateral abdominal wall  This may be iatrogenic from recent catheter insertion x2  Posterior basilar atelectatic change  Oral contrast extends to the rectum with no evidence of obstruction  Mild small and large bowel distention  Stable hypodensities within the spleen suggestive of infarctions  Workstation performed: [id number]     Ct Abdomen Pelvis W Contrast    Result Date: 1/29/2018  Narrative: CT ABDOMEN AND PELVIS WITH IV CONTRAST INDICATION:  Abdominal pain and vomiting  COMPARISON: None  TECHNIQUE:  CT examination of the abdomen and pelvis was performed  Reformatted images were created in axial, sagittal, and coronal planes  Radiation dose length product (DLP) for this visit:  464 mGy-cm   This examination, like all CT scans performed in the [facility], was performed utilizing techniques to minimize radiation dose exposure, including the use of iterative reconstruction and automated exposure control  IV Contrast:  100 mL of iodixanol (VISIPAQUE)         Enteric Contrast:  Enteric contrast was not administered  FINDINGS: ABDOMEN LOWER CHEST:  No significant abnormalities identified in the lower chest  Borderline cardiomegaly  LIVER/BILIARY TREE:  Decreased attenuation compared to the spleen suggesting fatty infiltration  GALLBLADDER:  Small calcified gallstones in the fundus  No pericholecystic inflammatory change  SPLEEN:  There are patchy subcapsular areas of decreased attenuation suspicious for infarcts  PANCREAS:  Unremarkable  ADRENAL GLANDS:  Unremarkable  KIDNEYS/URETERS:  2 adjacent 2-3 mm nonobstructing left lower pole calculi  Small bilateral renal cysts  No hydronephrosis   STOMACH AND BOWEL:  Although the etiology of the pneumoperitoneum is uncertain, there is thickening of the gastric antrum with associated luminal irregularity and focal wall thinning for which a perforated gastric ulcer may be considered  The second and third portions of the duodenum appear mildly dilated with a moderate sized diverticulum noted  Mildly dilated proximal jejunal loops, possibly related to ileus  No transition to suggest mechanical obstruction  No focal inflammatory changes  APPENDIX:  No findings to suggest appendicitis  ABDOMINOPELVIC CAVITY:  There is a moderate amount of pneumoperitoneum noted  There is a small amount of abdominal and pelvic ascites  No evidence of rim-enhancing abscess  VESSELS:  Atherosclerotic changes are present  No evidence of aneurysm  PELVIS REPRODUCTIVE ORGANS:  Unremarkable for patient's age  Clips in the adnexa bilaterally  URINARY BLADDER:  Unremarkable  ABDOMINAL WALL/INGUINAL REGIONS:  Unremarkable  OSSEOUS STRUCTURES:  No acute fracture or destructive osseous lesion  Mild lumbar levoscoliosis with multilevel degenerative disc disease  Impression: Moderate pneumoperitoneum suggesting bowel perforation  Although the origin is uncertain, possibility of perforated gastric ulcer may be considered  Small amount of abdominal and pelvic ascites  No evidence for organized abscess  Mild small bowel dilatation may reflect reactive ileus  Cholelithiasis  I personally discussed this study with Kenyon Guillermo on 1/29/2018 1:35 PM  Workstation performed: [id number]     Ir Picc Repo    Result Date: 2/6/2018  Narrative: Examination: PICC reposition History: Patient had a PIC line placed on the floor, position was technically suboptimal  Fluoroscopy time: 0 2 minutes Images: 1 Technique: The existing PICC line was manipulated using fluoroscopic guidance, an advanced so that the tip is in the central left innominate vein  All elements of maximal sterile barrier technique, cap and mask and sterile gown and sterile gloves and sterile full-body drape and hand hygiene and 2% chlorhexidine for cutaneous antisepsis    Findings: PICC line position is now in the innominate vein  Impression: Impression: Technically successful reposition of PICC line  Because of positioning and innominate vein, the catheter should not be used for TPN or chemotherapy  Workstation performed: [id number]     Imaging Reports Reviewed Today Include:   Imaging Personally Reviewed by Myself Includes:    No results found       Recent Cultures (last 7 days):       Results from last 7 days  Lab Units 02/21/18  1532 02/19/18  1246   GRAM STAIN RESULT  3+ Polys  2+ Gram positive rods  --    BODY FLUID CULTURE, STERILE  4+ Growth of - INCORRECT REPORT Enterococcus species*  1+ Growth of Candida sp  presumptively albicans*  4+ Growth of - CORRECTED REPORT Lactobacillus species*  --    INFLUENZA A PCR   --  None Detected   INFLUENZA B PCR   --  None Detected   RSV PCR   --  None Detected       Last 24 Hours Medication List:     Current Facility-Administered Medications:  acetaminophen 650 mg Oral Q4H PRN Rafita Clark MD    ampicillin-sulbactam 3 g Intravenous Q6H Claudine Couch MD Last Rate: 3 g (02/26/18 0359)   atorvastatin 40 mg Oral Daily With Dixie-Avery, DO    calcium carbonate 500 mg Oral TID PRN Rafita Clark MD    collagenase  Topical Daily Carmelo Sofia MD    DULoxetine 20 mg Oral Daily PARRISH Patton    fluconazole 400 mg Oral Q24H Claudine Couch MD    hydrALAZINE 5 mg Intravenous Q6H PRN Rafita Clark MD    insulin glargine 6 Units Subcutaneous QAM Magdalene Delcid, DO    insulin lispro 1-5 Units Subcutaneous TID AC Robert W Spatzer, CRNP    insulin lispro 1-5 Units Subcutaneous HS Willim Kohut Spatzer, CRNP    levothyroxine 200 mcg Oral Early Morning Magdalene Farhana, DO    lisinopril 40 mg Oral Daily Magdalene Farhana, DO    ondansetron 4 mg Intravenous Q4H PRN PARRISH Rendon    oxyCODONE 2 5 mg Oral Q4H PRN Magdalene Ambron, DO    oxyCODONE 2 5 mg Oral TID [place] [facility] [place]    pantoprazole 40 mg Oral BID AC Jose D Upton,     sucralfate 1,000 mg Oral 4x Daily (AC & HS) Stew Holguin DO         Today, Patient Was Seen By: Hallie Yi MD    ** Please Note: Dragon 360 Dictation voice to text software may have been used in the creation of this document   **

## 2018-02-26 NOTE — CASE MANAGEMENT
Continued Stay Review    Date:  2/25/2018    Vital Signs: /94 (BP Location: Right arm)   Pulse 75   Temp 99 3 °F (37 4 °C) (Temporal)   Resp 18   Ht 5' 6" (1 676 m)   Wt 76 2 kg (167 lb 15 9 oz)   SpO2 97%   BMI 27 11 kg/m²     Medications:    Last 24 Hours Medication List:      Current Facility-Administered Medications:  acetaminophen 650 mg Oral Q4H PRN Tonny Gomes MD     ampicillin-sulbactam 3 g Intravenous Q6H Anais Carvajal MD Last Rate: 3 g (02/25/18 1416)   atorvastatin 40 mg Oral Daily With [place]-Avery, DO     calcium carbonate 500 mg Oral TID PRN Tonny Gomes MD     collagenase   Topical Daily Shante French MD     DULoxetine 20 mg Oral Daily Elilacya Sensor, CRNP     fluconazole 400 mg Oral Q24H Anais Carvajal MD     hydrALAZINE 5 mg Intravenous Q6H PRN Tonny Gomes MD     insulin glargine 6 Units Subcutaneous QAM Magdalene Ambron, DO     insulin lispro 1-5 Units Subcutaneous TID AC Marleta Miller Spatzer, FABINP     insulin lispro 1-5 Units Subcutaneous HS Marleta Miller Spatzer, CRNP     levothyroxine 200 mcg Oral Early Morning Magdalene Farhana, DO     lisinopril 40 mg Oral Daily Magdalene Ambron, DO     ondansetron 4 mg Intravenous Q4H PRN PARRISH Wallace     oxyCODONE 2 5 mg Oral Q4H PRN Isaura Nur, DO     oxyCODONE 2 5 mg Oral TID [place] Jock Alden     pantoprazole 40 mg Oral BID AC Jose D Upton, DO     sucralfate 1,000 mg Oral 4x Daily (AC & HS) Isaura Nur, DO          COREY Drain    Abnormal Labs/Diagnostic Results:     Age/Sex: 67 y o  female     Assessment/Plan: Principal Problem:    Sepsis ([address] )- due to intra-abdominal infection/abscess  Postoperative day number 4 status post percutaneous drainage and catheter placement  Body fluid culture grew Candida and lactobacillus species  Antibiotic tailored to Unasyn and Diflucan  Active Problems:    Deep tissue injury-continue local wound care and repositioning  Pancytopenia (HCC)-multifactorial secondary to acute illness, Imuran  No malignancy  Repeat labs in a m  Type 2 diabetes mellitus with hyperglycemia (HCC)-blood sugars trending down while Lantus was increased to 10 units from 6  Continue sliding scale insulin  Cerebrovascular accident (CVA) due to embolism of precerebral artery (HCC)-patient was not a candidate for anticoagulation secondary to severe thrombocytopenia and GI blood loss    Start anticoagulation when able    Hyperlipidemia-continue Lipitor in light of CVA    Hypothyroidism-continue thyroxine daily    Multiple sclerosis (HCC)-discontinue Imuran indefinitely    Hypoalbuminemia due to protein-calorie malnutrition (HCC)    Acute perforated gastric ulcer with hemorrhage (HCC)-resolved status post Allison Rakers patch    Acute blood loss anemia-status post multiple blood transfusions, stable    Discharge Plan:  LTAC placement Monday or Tuesday when available

## 2018-02-26 NOTE — PROGRESS NOTES
Progress Note - Infectious Disease   Na Sethi 67 y o  female MRN: [id number]  Unit/Bed#: E4 -01 Encounter: [id number]      Impression/Plan:  1   Probable sepsis-fever and tachycardia   Possibly secondary to intra-abdominal abscess   Not bacteremic  No other localizing findings are noted   Patient is clinically well  Fever has resolved  WBC has normalized  -antibiotic plan as in below  -no additional ID workup for now     2   Febrile neutropenia-Repeat blood cultures negative   The patient is no longer neutropenic   Follow-up blood cultures are negative thus far   Bone marrow biopsy was nondiagnostic for primary source of the neutropenia   -antibiotics as above  -follow temperatures closely     3   Peritonitis/Intra-abdominal abscess-Status post laparoscopic repair with washout with drains left in place   Now status post IR drainage of the abscess   Appears polymicrobial with lactobacillus and Candida albicans growing   -change antibiotic to Augmentin/fluconazole  -serial abdominal exams  -treat x 14 days post drainage     4   Pancytopenia-Unclear etiology as worsening despite D/C of Imuran and antibiotics   Oncology considering possible lymphoproliferative disorder   Cell counts are relatively stable today   Bone marrow biopsy nondiagnostic for any primary hematologic process   -hematology oncology follow-up  -no additional G-CSF  -transfusion as needed     5   Diabetes mellitus-type 2 with hyperglycemia     6   Multiple sclerosis-apparently on Imuran for a while     -would hold on Imuran for now     7   Cat bite-left hand   No overt cellulitis at this time   Which should be well covered by the antibiotics as above   The cat is being watched at home without any behavioral abnormalities   The hand is significantly improved with decreased edema   -serial exams  -continue monitoring of the cat at home  -no additional antibiotics needed     8   Acute encephalopathy - likely toxic metabolic due to infection, electrolyte abnormalities superimposed on MS and chronic/severe SVID as suggested by CT   Newly diagnosed acute CVA may be playing a role   Doubt CNS infection    Still some waxing and waning of cognition               -follow MS closely              -no additional ID workup  -neurology follow-up     Discussed with patient in detail regarding the above plan  Discussed in detail with Dr Alexus Mo from [facility] service  Okay for discharge from ID viewpoint      Antibiotics:  Unasyn/fluconazole  Antibiotic 8  Post drain 4     Subjective:  Patient was seen earlier today  She feels well  No abdominal pain  Temperature stays down  No chills  She is tolerating antibiotics well  No nausea, vomiting or diarrhea  Objective:  Vitals:  HR:  [73-75] 75  Resp:  [18] 18  BP: (151-156)/(75-94) 151/94  SpO2:  [94 %-97 %] 97 %  Temp (24hrs), Av 6 °F (37 °C), Min:97 8 °F (36 6 °C), Max:99 3 °F (37 4 °C)  Current: Temperature: 99 3 °F (37 4 °C)    Physical Exam:     General: Awake, alert, cooperative, no distress  Lungs: Expansion symmetric, no rales, no wheezing, respirations unlabored  Heart[de-identified]  Regular rate and rhythm, S1 and S2 normal, no murmur  Abdomen: Soft, nondistended, non-tender, bowel sounds active all four quadrants,        no masses, no organomegaly  Extremities: Trace edema  No erythema/warmth  No ulcer  Nontender to palpation  Skin:  No rash  Invasive Devices     Peripheral Intravenous Line            Peripheral IV 18 Right Forearm 4 days          Drain            Closed/Suction Drain Anterior;Medial Abdomen Bulb 8 5 Fr  4 days                Labs studies:   I have personally reviewed pertinent labs      Results from last 7 days  Lab Units 18  0509 18  0517 18  0635   SODIUM mmol/L 139 138 140   POTASSIUM mmol/L 3 2* 3 6 3 0*   CHLORIDE mmol/L 105 105 106   CO2 mmol/L 29 28 26   ANION GAP mmol/L 5 5 8   BUN mg/dL 10 10 11   CREATININE mg/dL 0 44* 0 42* 0 44*   EGFR ml/min/1 73sq m 101 103 101   GLUCOSE RANDOM mg/dL 165* 156* 214*   CALCIUM mg/dL 8 0* 8 1* 7 4*       Results from last 7 days  Lab Units 02/26/18  0635 02/23/18  0509 02/22/18  0517   WBC Thousand/uL 3 79* 4 43 5 02   HEMOGLOBIN g/dL 8 4* 8 5* 8 9*   PLATELETS Thousands/uL 83* 69* 69*       Results from last 7 days  Lab Units 02/21/18  1532   GRAM STAIN RESULT  3+ Polys  2+ Gram positive rods   BODY FLUID CULTURE, STERILE  4+ Growth of - INCORRECT REPORT Enterococcus species*  1+ Growth of Candida sp  presumptively albicans*  4+ Growth of - CORRECTED REPORT Lactobacillus species*       Imaging Studies:   I have personally reviewed pertinent imaging study reports and images in PACS  EKG, Pathology, and Other Studies:   I have personally reviewed pertinent reports

## 2018-02-26 NOTE — DISCHARGE SUMMARY
Discharge Summary - [place] [address] [facility]    Patient Information: Anselmo Saha 67 y o  female MRN: [id number]  Unit/Bed#: E4 -01 Encounter: [id number]    Discharging Physician / Practitioner: Eloisa Braden MD  PCP: Rodríguez Arambula DO  Admission Date: 1/28/2018  Discharge Date: 02/26/18    Reason for Admission:  Sepsis    Discharge Diagnoses:  Sepsis presenting as intra-abdominal infection and abscess    Principal Problem:    Sepsis ([address] )  Active Problems:    Pancytopenia ([address] )    Type 2 diabetes mellitus with hyperglycemia ([address] )    Hypoalbuminemia due to protein-calorie malnutrition ([address] )    Acute perforated gastric ulcer with hemorrhage ([address] )    Acute blood loss anemia    Hypothyroidism    Hyperlipidemia    Multiple sclerosis ([address] )    Cerebrovascular accident (CVA) due to embolism of precerebral artery ([address] )    Deep tissue injury    Severe protein-calorie malnutrition ([address] )  Resolved Problems:    BEVERLY (acute kidney injury) ([address] )    Pneumoperitoneum    Peritonitis ([address] )    Hypernatremia    Metabolic encephalopathy      Consultations During Hospital Stay:  · Palliative Care, Infectious Disease, Critical Care,    Procedures Performed:     Cta Head And Neck W Wo Contrast    Result Date: 2/16/2018  Narrative: CTA NECK AND BRAIN WITH AND WITHOUT CONTRAST INDICATION: Cerebral ischemia  History taken directly from the electronic ordering system  COMPARISON:   MRI February 14, 2018 TECHNIQUE:  Routine CT imaging of the Brain without contrast   Post contrast imaging was performed after administration of iodinated contrast through the neck and brain  Post contrast axial 0 625 mm images timed to opacify the arterial system  3D rendering was performed on an independent workstation  MIP reconstructions performed  Coronal reconstructions were performed of the noncontrast portion of the brain  Radiation dose length product (DLP) for this visit:  1421 mGy-cm     This examination, like all CT scans performed in the [facility], was performed utilizing techniques to minimize radiation dose exposure, including the use of iterative reconstruction and automated exposure control  IV Contrast:  85 mL of iohexol (OMNIPAQUE)  IMAGE QUALITY:   Diagnostic FINDINGS: NONCONTRAST BRAIN PARENCHYMA:  Right occipital hypoattenuation likely related to evolving infarct  Severe bilateral microangiopathy  Hyperattenuation along the right posterior temporal parieto-occipital cortex likely related to petechial hemorrhage related to the infarct process  No evidence of intraparenchymal hemorrhage  VENTRICLES AND EXTRA-AXIAL SPACES:  Normal for patient's age  VISUALIZED ORBITS AND PARANASAL SINUSES:  Mild left sphenoid sinus disease  CALVARIUM AND EXTRACRANIAL SOFT TISSUES:   Normal  CERVICAL VASCULATURE AORTIC ARCH AND GREAT VESSELS:  Moderate ahteroschlerotic disease of the arch and great vessels  RIGHT VERTEBRAL ARTERY CERVICAL SEGMENT:  Normal origin  The vessel is normal in caliber throughout the neck  LEFT VERTEBRAL ARTERY CERVICAL SEGMENT:  Normal origin  The vessel is normal in caliber throughout the neck  RIGHT EXTRACRANIAL CAROTID SEGMENT:  Moderate atherosclerotic disease of the bifurcation  LEFT EXTRACRANIAL CAROTID SEGMENT:  Moderate atherosclerotic disease of the bifurcation  NASCET criteria was used to determine the degree of internal carotid artery diameter stenosis  INTRACRANIAL VASCULATURE INTERNAL CAROTID ARTERIES:  Normal enhancement of the intracranial portions of the internal carotid arteries  Normal ophthalmic artery origins  Normal ICA terminus  ANTERIOR CIRCULATION:  Symmetric A1 segments and anterior cerebral arteries with normal enhancement  Normal anterior communicating artery  MIDDLE CEREBRAL ARTERY CIRCULATION:  M1 segment and middle cerebral artery branches demonstrate normal enhancement bilaterally  DISTAL VERTEBRAL ARTERIES:  Normal distal vertebral arteries    Posterior inferior cerebellar artery origins are normal  Normal vertebral basilar junction  BASILAR ARTERY:  Basilar artery is normal in caliber  Normal superior cerebellar arteries  POSTERIOR CEREBRAL ARTERIES: There is 5 mm segment occlusion/critical stenosis of the right P1 segment with distal reconstitution  Left posterior artery is unremarkable  DURAL VENOUS SINUSES:  Normal  NON VASCULAR ANATOMY BONY STRUCTURES:  No acute osseous abnormality  SOFT TISSUES OF THE NECK:  Prominent deep cervical and left supra clavicular lymph nodes  8 mm short axis precarinal lymph node  THORACIC INLET:  Unremarkable  Impression: 1   5 mm segment of occlusion/critical stenosis of the right P1 segment with distal reconstitution  This could be due to an embolus  2   Evolving infarct in the right PCA distribution  3   Microangiopathy  I personally discussed this study with Dr Amaya Bhakta on 2/16/2018 at 7:30 PM   Workstation performed: [id number]     Xr Chest Portable    Result Date: 2/9/2018  Narrative: CHEST  INDICATION: Fever  COMPARISON:  2/5/2018  VIEWS:   AP frontal; 1 image FINDINGS: The cardiomediastinal silhouette is stable  The lungs are clear  No pleural effusion  Osseous structures are age appropriate  Impression: No active disease  Workstation performed: [id number]     Xr Chest 2 Views    Result Date: 1/29/2018  Narrative: CHEST INDICATION: fatigue  History taken directly from the electronic ordering system  COMPARISON: None VIEWS:  AP semierect and lateral IMAGES:  3 FINDINGS: The patient is rotated to the left  The cardiomediastinal silhouette is unremarkable  The lungs are clear  No pleural effusions  Mild degenerative changes, thoracic spine  Impression: No active pulmonary disease        Workstation performed: [id number]     Xr Abdomen Obstruction Series    Result Date: 2/1/2018  Narrative: OBSTRUCTION SERIES INDICATION:  Status post patch of gastric ulcer COMPARISON: None VIEWS:  (Supine, left lateral decubitus abdomen) IMAGES:  9 FINDINGS: Images were obtained prior to and following administration of 500 mL of Omnipaque 350 through the patient's nasogastric tube  Initial image demonstrates mildly dilated loops of small bowel proximally  Following contrast administration, there was normal passage of contrast through the dilated small bowel without obstruction identified  This likely represents adynamic ileus  There is no free, layering contrast on supine or decubitus imaging to suggest a large leak  However, portions of the abdominal cavity were obscured by retained small bowel and gastric contrast and the possibility of a small or contained leak cannot be entirely excluded  Impression: 1  No layering free contrast to suggest large leak  However, the study was limited for smaller or contained leaks due to retained contrast in the stomach and small bowel  This limited study was performed due to the patient's inability to undergo diagnostic upper GI  If there is continued clinical concern for leak, a follow-up upper GI can be attempted when the patient is able  Alternatively, an additional abdomen x-ray in the a m  can be obtained to allow for passage of contrast into the colon  and better visualization of the underlying abdominal cavity  CT would also better evaluate for contained leak  2   Dilated small bowel without evidence of obstruction, likely adynamic ileus  Workstation performed: [id number]     Ct Head Wo Contrast    Result Date: 2/9/2018  Narrative: CT BRAIN - WITHOUT CONTRAST INDICATION:  Decreased alertness  History of a mass  COMPARISON:  None  TECHNIQUE:  CT examination of the brain was performed  In addition to axial images, coronal reformatted images were created and submitted for interpretation  Radiation dose length product (DLP) for this visit:  967 mGy-cm     This examination, like all CT scans performed in the [facility], was performed utilizing techniques to minimize radiation dose exposure, including the use of iterative reconstruction and automated exposure control  IMAGE QUALITY:  Diagnostic  FINDINGS:  PARENCHYMA:  There are extensive periventricular and subcortical foci of white matter low attenuation which is nonspecific and likely related to the patient's history of multiple sclerosis and/or chronic small vessel ischemic changes  There is an area of encephalomalacia involving the right parietal lobe with a few areas of gyriform hyperdensity likely related to cortical laminar necrosis  There is ex vacuo dilatation of the adjacent right lateral ventricular posterior horn  There is low-attenuation  noted within the right occipital lobe which is likely the sequela of prior ischemia  No intracranial mass, mass effect or midline shift  No CT signs of acute infarction  There is no parenchymal hemorrhage  There are old bilateral basal ganglia lacunar infarcts  There is a small old posterior left parietal lobe infarct  VENTRICLES AND EXTRA-AXIAL SPACES:  There is prominence of the ventricles and sulci related to mild volume loss  VISUALIZED ORBITS AND PARANASAL SINUSES:  Unremarkable  CALVARIUM AND EXTRACRANIAL SOFT TISSUES:  There is incidental hyperostosis frontalis interna  Impression: Extensive periventricular and subcortical foci of white matter low attenuation which is nonspecific and likely representing a combination of the patient's history of multiple sclerosis and chronic small vessel ischemic changes  Evaluation for subtle acute ischemia is limited secondary to these severe white matter changes  An MRI of the brain could be performed for further evaluation  Focus of encephalomalacia within the right parietal lobe with a few areas of gyriform hyperdensity suggestive of cortical laminar necrosis in an old right MCA territory infarct  Less likely, the findings may represent tiny foci of acute petechial hemorrhage    No prior studies are available for comparison  Old bilateral basal ganglia lacunar infarcts  I personally discussed this study with DR Tiff Andrews 2/9/2018 at 6:08 PM  Workstation performed: [id number]     Ct Guided Perc Drainage Catheter Placeme    Result Date: 2/22/2018  Narrative: CT-guided abscess drainage Clinical History: Intra-abdominal abscess   Sedation time: 35 minutes Procedure: After explaining the risks and benefits of the procedure to the patient, informed consent was obtained  CT was used to localize the right upper quadrant air-fluid collection   Examination was performed utilizing techniques to minimize radiation dose, including the use of dose reduction software  The overlying skin was prepped and draped in the usual sterile fashion and local anesthesia obtained with a 1% lidocaine solution  A 18-gauge Silverman needle was advanced into the abscess cavity under CT guidance  After looping a 0 035 heavy-duty wire, the tract was dilated and a 8 5 [employer] [place] all-purpose drainage catheter placed  The self-retaining loop was locked in position  CT was used to confirm proper positioning  Approximately 15 cc's  of purulent fluid was aspirated and sent for culture and sensitivity  The catheter was secured in place with a 2-0 prolene suture and placed to a Mitchell-Linder drainage bulb  The patient tolerated the procedure well and left the department in stable condition  Impression: Impression: Successful CT-guided abscess drainage  Workstation performed: [id number]     Mri Brain Wo Contrast    Result Date: 2/14/2018  Narrative: MRI BRAIN WITHOUT CONTRAST INDICATION:  Mental status change  COMPARISON:   CT brain dated February 9, 2018  TECHNIQUE:  Sagittal T1, axial T2, axial FLAIR, axial T1, axial [place] and axial diffusion imaging  IMAGE QUALITY:  Diagnostic  FINDINGS: BRAIN PARENCHYMA: There is an area of restricted diffusion involving the posterior right occipital lobe compatible with acute ischemia    Another small focus of restricted diffusion is noted at the posterior right temporal lobe  No acute intracranial hemorrhage  There are extensive periventricular and subcortical foci of white matter T2 hyperintensity which is nonspecific and most likely related to chronic small vessel ischemic changes  There is a focus of encephalomalacia involving the right parietal lobe with gyriform T1 hyperintensity compatible with cortical laminar necrosis from a chronic infarct  There is a small old posterior left parietal lobe infarct  There is no discrete mass, mass effect or midline shift  VENTRICLES:  The ventricles are normal in size and contour  SELLA AND PITUITARY GLAND:  Normal  ORBITS:  Normal  PARANASAL SINUSES:  Normal  VASCULATURE:  Evaluation of the major intracranial vasculature demonstrates appropriate flow voids  CALVARIUM AND SKULL BASE:  Normal  EXTRACRANIAL SOFT TISSUES:  Normal      Impression: Acute foci of ischemia involving the right PCA territory, including the posterior right temporal and occipital lobes  Extensive periventricular and subcortical foci of white matter T2 hyperintensity which is nonspecific and likely related to chronic small vessel ischemic changes  Other less likely etiologies include demyelinating disease and vasculitis  No acute intracranial hemorrhage  Small focus of encephalomalacia within the right parietal lobe which demonstrates gyriform T1 hyperintensity compatible with cortical laminar necrosis and a chronic infarct  I personally discussed this study with  [facility] on 2/14/2018 at 6:42 PM  Workstation performed: [id number]     Ct Bone Marrow Biopsy And Aspiration    Result Date: 2/13/2018  Narrative: Procedure: Computed tomography guided bone marrow aspiration and biopsy  History: 67year-old female patient with pancytopenia  Bone marrow aspiration and biopsy have been requested  Comments: The patient was identified   The procedure, risks, benefits and alternatives were explained to the patient's daughter who expressed understanding and signed an informed consent  The patient was placed in the right lateral decubitus position  Limited computed tomography imaging of the pelvis was performed for localization purposes  The right posterior superior iliac crest was chosen for needle access and bone marrow aspiration  Maximum sterile barrier technique including cap, mask, gown and gloves, as well as a large sterile sheet was used  Appropriate hand hygiene was performed  The lower back was prepped using 2% chlorhexidine for cutaneous antisepsis and draped in the usual sterile fashion  Timeout verification, with all participants present, was performed prior to any intervention  1% LIDOCAINE (10 mL)was used for local anesthesia  A small dermatotomy was made  Using parameters obtained from the computed tomography localization process, an  11-gauge bone biopsy needle was directed at the right posterior superior iliac crest  After limited computed tomography imaging confirmation, the needle was advanced into the marrow cavity using a powered bone access system  7 mL of bone marrow were aspirated and submitted to the cytotechnologist and Dr Yesenia Phillips who were present to receive and evaluate the specimens for adequacy  The needle was withdrawn to the cortex and displaced minimally to access an adjacent site on the right posterior superior iliac crest  An approximately 1 5 cm length bone/bone marrow core was obtained with power assist and submitted to the cytotechnologist for on-site preliminary review by Dr Yesenia Phillips  Hemostasis was obtained by manual compression  A sterile dressing was applied  The patient tolerated the procedure well  Image count:  6  Estimated blood loss: None  VTe prophylaxis: Short duration procedure not requiring VTe prophylaxis  Complications: None  Radiation dose length product (DLP) for this visit:  324 mGy-cm     This examination, like all CT scans performed in the [facility], was performed utilizing techniques to minimize radiation dose exposure, including the use of iterative reconstruction and automated exposure control  Medications: IV moderate sedation was utilized for 24 minutes  The patient's cardiorespiratory status was monitored before, during and after the procedure by interventional radiology nursing staff, under my direct supervision  Impression: Impression: Computed tomography guided bone marrow aspiration and bone marrow biopsy of the right posterior superior iliac crest was performed with no immediate postprocedure complication  Workstation performed: [id number]     Vas Upper Limb Venous Duplex Scan, Unilateral/limited    Result Date: 2/8/2018  Narrative:  [facility] REPORT CLINICAL: Indications:  Left Swelling [M79 [id number]]  Patient has PICC line in place in left arm  She is having swelling and pain in the arm  FINDINGS:  Left          Impression          Thrombus           Axillary      Thrombosed (acute)                     Ceph Upper                        Acute - Occlusive  Ceph Mid Arm                      Acute - Occlusive     CONCLUSION: Impression RIGHT UPPER LIMB LIMITED: Evaluation shows no evidence of thrombus in the internal jugular vein, subclavian vein, and the brachiocephalic vein  LEFT UPPER LIMB: Abnormal  (LIMITED) There is acute deep vein thrombosis in the axillary vein  There is acute superficial thrombophlebitis in the visualized cephalic vein in the mid and proximal upper arm  Doppler evaluation shows a normal response to augmentation maneuvers  Tech Note: The pain was having severe pain in her arm and would not allow repositioning of her arm to complete the exam, no further images could be obtained  Preliminary report given to "Bolivar Sierra" the patient's nurse, and faxed    SIGNATURE: Electronically Signed by: Mateo Kinsey MD, [address] on 2018-02-08 04:24:15 PM    Xr Chest Picc Line Portable    Result Date: 2/5/2018  Narrative: CHEST  INDICATION: PICC line placement  COMPARISON: January 28, 2018 VIEWS:  AP frontal IMAGES:  1 FINDINGS: The patient is rotated to the left  Left sided PICC line tip terminates overlying the mediastinum possibly in the innominate vein  No pneumothorax  Cardiomediastinal silhouette appears unremarkable  The lungs are clear  No pneumothorax or pleural effusion  Visualized osseous structures appear within normal limits for the patient's age  Residual contrast is noted in the colon from previous CAT scan  Impression: No pneumothorax status post PICC line insertion  See above  Workstation performed: [id number]     Ct Abdomen Pelvis W Contrast    Result Date: 2/18/2018  Narrative: CT ABDOMEN AND PELVIS WITH IV CONTRAST INDICATION:  Abdominal pain  COMPARISON: 2/9/2018  TECHNIQUE:  CT examination of the abdomen and pelvis was performed  Axial, sagittal, and coronal 2D reformatted images were created from the source data and submitted for interpretation  Radiation dose length product (DLP) for this visit:  690 mGy-cm   This examination, like all CT scans performed in the [facility], was performed utilizing techniques to minimize radiation dose exposure, including the use of iterative reconstruction and automated exposure control  IV Contrast:  50 mL of iohexol (OMNIPAQUE) 100 mL of iohexol (OMNIPAQUE) Enteric Contrast:  Enteric contrast was not administered  FINDINGS: ABDOMEN LOWER CHEST:  Atelectasis at the right lung base  Antwan Heckler LIVER/BILIARY TREE:  Unremarkable  GALLBLADDER:  No calcified gallstones  No pericholecystic inflammatory change  SPLEEN:  Stable foci of hypoenhancement in the spleen suggesting infarcts  PANCREAS:  Unremarkable  ADRENAL GLANDS:  Unremarkable  KIDNEYS/URETERS:  Stable right renal cysts measuring up to 4 6 cm  Left renal subcentimeter cysts and 1 8 cm exophytic cyst   Nonobstructing 3 mm left renal calculus  No evidence of pyelonephritis or obstructive uropathy   STOMACH AND BOWEL:  Wall thickening throughout the stomach, not well evaluated in the absence of contrast distention  No evidence of duodenitis, enteritis or colitis  No bowel obstruction  APPENDIX:  No findings to suggest appendicitis  ABDOMINOPELVIC CAVITY: Acute scapular in the right upper abdomen  Collection  No ascites or free intraperitoneal air  No lymphadenopathy  VESSELS:  Unremarkable for patient's age  PELVIS REPRODUCTIVE ORGANS:  Unremarkable for patient's age  URINARY BLADDER:  Unremarkable  ABDOMINAL WALL/INGUINAL REGIONS:  Few remaining foci of gas in the anterior abdomen and left groin, likely postsurgical   No collection or hematoma  OSSEOUS STRUCTURES:  No acute fracture or destructive osseous lesion  Impression: 1  No intraperitoneal collection or fluid  2   Wall thickening throughout the stomach, suggesting gastritis  Limited evaluation absence of contrast distention  No evidence of bowel obstruction, colitis or diverticulitis  3   Minimal residual gas in the intra-abdominal wall  No collection or hematoma  Workstation performed: [id number]     Ct Abdomen Pelvis W Contrast    Result Date: 2/9/2018  Narrative: CT ABDOMEN AND PELVIS WITH IV CONTRAST INDICATION:  Abdominal pain, fever  History of perforated gastric ulcer, status post surgical repair    COMPARISON: CT abdomen/pelvis dated February 2, 2018  TECHNIQUE:  CT examination of the abdomen and pelvis was performed  Reformatted images were created in axial, sagittal, and coronal planes  Radiation dose length product (DLP) for this visit:  872 mGy-cm   This examination, like all CT scans performed [facility], was performed utilizing techniques to minimize radiation dose exposure, including the use of iterative reconstruction and automated exposure control  IV Contrast:  100 mL of iohexol (OMNIPAQUE) Enteric Contrast:  Enteric contrast was administered  FINDINGS: ABDOMEN LOWER CHEST:  Atelectatic changes are noted at the lung bases    No other significant abnormality identified in the lower chest  LIVER/BILIARY TREE:  Unremarkable  GALLBLADDER:  No calcified gallstones  No pericholecystic inflammatory change  SPLEEN:  There are stable multifocal wedge-shaped foci of decreased enhancement involving the periphery of the spleen suggestive of infarcts  PANCREAS:  Unremarkable  ADRENAL GLANDS:  Unremarkable  KIDNEYS/URETERS:  No hydronephrosis  There is a stable exophytic cyst at the right renal lower pole  Punctate bilateral nonobstructing intrarenal calculi  One or more sharply circumscribed subcentimeter renal hypodensities are noted  These lesions are too small to accurately characterize, but are statistically most likely to represent benign cortical renal cyst(s)  According to the guidelines published in  the [facility] Paper of the ACR Incidental Findings Committee (Radiology 2010), no further workup of these lesions is recommended  STOMACH AND BOWEL:  There has been interval removal of a nasogastric tube  Again noted is a loculated collection adjacent to the gastric lesser curvature measuring 5 4 x 3 3 cm which demonstrates air-fluid levels on (series 2, image 28)  There is mild diffuse small bowel dilatation suggestive of an ileus  Oral contrast is noted extending to the level of the rectum  APPENDIX:  No findings to suggest appendicitis  ABDOMINOPELVIC CAVITY:  No free air or intraperitoneal air  No lymphadenopathy  Again noted is a catheter via a right paramedian anterior abdominal approach adjacent to the inferior right hepatic lobe extending to the mid abdomen  A 2nd left-sided lower abdominal catheter is stable  VESSELS:  Unremarkable for patient's age  PELVIS REPRODUCTIVE ORGANS:  Stable  URINARY BLADDER:  Unremarkable  ABDOMINAL WALL/INGUINAL REGIONS:  When compared to the prior exam, there has been interval decrease in the amount of previously described air within the subcutaneous fat of the anterior abdominal wall and pelvis   OSSEOUS STRUCTURES:  No acute fracture or destructive osseous lesion  Impression: Mild interval decrease in air within the subcutaneous soft tissues of the anterior abdomen and pelvis  The findings may be iatrogenic in nature  Stable bilateral anterior abdominal percutaneous catheters  Mild diffuse small bowel dilatation with oral contrast extending to the level of the rectum  The findings are likely secondary to an ileus  5 4 x 3 3 cm loculated air-fluid collection adjacent to the lesser curvature the stomach which may be postoperative in nature  Follow-up is recommended  Stable multifocal splenic infarcts  Workstation performed: [id number]     Ct Abdomen Pelvis W Contrast    Result Date: 2/2/2018  Narrative: CT ABDOMEN AND PELVIS WITH IV CONTRAST INDICATION:  Abdominal pain  Blood in NG tube  COMPARISON: 1/29/2018 TECHNIQUE:  CT examination of the abdomen and pelvis was performed  Reformatted images were created in axial, sagittal, and coronal planes  Radiation dose length product (DLP) for this visit:  572 mGy-cm   This examination, like all CT scans performed in the [facility], was performed utilizing techniques to minimize radiation dose exposure, including the use of iterative reconstruction and automated exposure control  IV Contrast:       100 mL of iohexol (OMNIPAQUE) Enteric Contrast:  Enteric contrast was administered  FINDINGS: ABDOMEN LOWER CHEST:  Posterior basilar atelectatic change  LIVER/BILIARY TREE:  Unremarkable  GALLBLADDER:  No calcified gallstones  No pericholecystic inflammatory change  SPLEEN:  Stable hypodensities within the spleen somewhat wedge-shaped and extending to the periphery suggestive of splenic infarctions  PANCREAS:  Unremarkable  ADRENAL GLANDS:  Unremarkable  KIDNEYS/URETERS:  Small cortical cysts  Punctate nonobstructing calculus within the midportion of the left kidney, unchanged  Stable exophytic cyst arising from the lower pole the right kidney  STOMACH AND BOWEL:  NG tube extends into the stomach  Oral contrast extends to the rectum  No signs of bowel obstruction  Mild distention of small and large bowel loops  APPENDIX:  No findings to suggest appendicitis  ABDOMINOPELVIC CAVITY:  Catheter via a right paramedian anterior abdominal approach adjacent to the inferior aspect of the liver extending into the mid abdomen below the left lobe of the liver  There is a 2nd small catheter via a left paramedian lower abdominal approach extending into the left pelvis  VESSELS:  Atherosclerotic calcification of the aorta and iliac arteries  PELVIS REPRODUCTIVE ORGANS:  Unremarkable for patient's age  URINARY BLADDER:  Unremarkable  ABDOMINAL WALL/INGUINAL REGIONS:  Large amount of air within the subcutaneous fat of the abdominal wall the abdomen and pelvis  There is a small amount of air between the muscles of the right paramedian abdominal wall  These findings may be iatrogenic  No hernia  OSSEOUS STRUCTURES:  L2-3, L3-4 and L4-5 degenerative change  Loss of disc height with vacuum phenomenon and mild endplate hypertrophic change  Lower lumbar facet arthropathy  Impression: There is a large amount of air within the subcutaneous fat of the abdominal wall extending inferiorly into the pelvic wall  Small amount of air present in between the muscles of the right superior lateral abdominal wall  This may be iatrogenic from recent catheter insertion x2  Posterior basilar atelectatic change  Oral contrast extends to the rectum with no evidence of obstruction  Mild small and large bowel distention  Stable hypodensities within the spleen suggestive of infarctions  Workstation performed: [id number]     Ct Abdomen Pelvis W Contrast    Result Date: 1/29/2018  Narrative: CT ABDOMEN AND PELVIS WITH IV CONTRAST INDICATION:  Abdominal pain and vomiting  COMPARISON: None  TECHNIQUE:  CT examination of the abdomen and pelvis was performed       Reformatted images were created in axial, sagittal, and coronal planes  Radiation dose length product (DLP) for this visit:  464 mGy-cm   This examination, like all CT scans performed in the [facility], was performed utilizing techniques to minimize radiation dose exposure, including the use of iterative reconstruction and automated exposure control  IV Contrast:  100 mL of iodixanol (VISIPAQUE)         Enteric Contrast:  Enteric contrast was not administered  FINDINGS: ABDOMEN LOWER CHEST:  No significant abnormalities identified in the lower chest  Borderline cardiomegaly  LIVER/BILIARY TREE:  Decreased attenuation compared to the spleen suggesting fatty infiltration  GALLBLADDER:  Small calcified gallstones in the fundus  No pericholecystic inflammatory change  SPLEEN:  There are patchy subcapsular areas of decreased attenuation suspicious for infarcts  PANCREAS:  Unremarkable  ADRENAL GLANDS:  Unremarkable  KIDNEYS/URETERS:  2 adjacent 2-3 mm nonobstructing left lower pole calculi  Small bilateral renal cysts  No hydronephrosis  STOMACH AND BOWEL:  Although the etiology of the pneumoperitoneum is uncertain, there is thickening of the gastric antrum with associated luminal irregularity and focal wall thinning for which a perforated gastric ulcer may be considered  The second and third portions of the duodenum appear mildly dilated with a moderate sized diverticulum noted  Mildly dilated proximal jejunal loops, possibly related to ileus  No transition to suggest mechanical obstruction  No focal inflammatory changes  APPENDIX:  No findings to suggest appendicitis  ABDOMINOPELVIC CAVITY:  There is a moderate amount of pneumoperitoneum noted  There is a small amount of abdominal and pelvic ascites  No evidence of rim-enhancing abscess  VESSELS:  Atherosclerotic changes are present  No evidence of aneurysm  PELVIS REPRODUCTIVE ORGANS:  Unremarkable for patient's age  Clips in the adnexa bilaterally   URINARY BLADDER: Unremarkable  ABDOMINAL WALL/INGUINAL REGIONS:  Unremarkable  OSSEOUS STRUCTURES:  No acute fracture or destructive osseous lesion  Mild lumbar levoscoliosis with multilevel degenerative disc disease  Impression: Moderate pneumoperitoneum suggesting bowel perforation  Although the origin is uncertain, possibility of perforated gastric ulcer may be considered  Small amount of abdominal and pelvic ascites  No evidence for organized abscess  Mild small bowel dilatation may reflect reactive ileus  Cholelithiasis  I personally discussed this study with Kesha Oquendo on 1/29/2018 1:35 PM  Workstation performed: [id number]     Ir Picc Repo    Result Date: 2/6/2018  Narrative: Examination: PICC reposition History: Patient had a PIC line placed on the floor, position was technically suboptimal  Fluoroscopy time: 0 2 minutes Images: 1 Technique: The existing PICC line was manipulated using fluoroscopic guidance, an advanced so that the tip is in the central left innominate vein  All elements of maximal sterile barrier technique, cap and mask and sterile gown and sterile gloves and sterile full-body drape and hand hygiene and 2% chlorhexidine for cutaneous antisepsis  Findings: PICC line position is now in the innominate vein  Impression: Impression: Technically successful reposition of PICC line  Because of positioning and innominate vein, the catheter should not be used for TPN or chemotherapy  Workstation performed: [id number]         Significant Findings:     · Patient with a known history of multiple sclerosis 67years old presented with fever and tachycardia and a presentation of sepsis with underlying febrile neutropenia and peritonitis intra-abdominal abscess was found and require laparoscopic drainage with washout and drains left in place  She further required interventional radiology drainage of abscess with growth of Candida and lactobacillus    She had been initially started on cefepime Flagyl combination later switched to Unasyn  · She is now 5th postop day and antibiotic coverage has been changed to fluconazole 200 mg daily after course of 400 mg last 5 days and Unasyn discontinued in favor of Augmentin 875 mg to be continued for another 9 days  · She requires intensive wound care in relation to sacral decubitus I with santal debridement and ongoing wound care at an acute care facility such as a long-term acute care facility    · Pancytopenia of unclear etiology thusly Imuran was discontinued in the setting of her multiple sclerosis and Oncology is considering a possible lymphoproliferative disorder her cell counts however have stabilize bone marrow biopsy was nondiagnostic for PE primary hematologic process she will require Hematology Oncology follow-up  · Acute encephalopathy felt to be likely metabolic in relation to infection and electrolyte imbalance on presentation superimposed on her multiple sclerosis  · Deep tissue injury and sacral decubitus that will require wound care and ongoing debridement  · type 2 diabetes mellitus presently on Lantus insulin 10 units with sliding-scale  · Will require close surgical follow-up in regards to status post laparoscopic drainage of abscess and ongoing COREY drain follow-up final drain cultures with serial abdominal exams also    Incidental Findings:   · Cat bite of left hand no overt cellulitis noted on presentation and adequate coverage of present antibiotics non last CT is being watched at home without any behavioral abnormalities  ·  MRI of the brain showed an acute foci of ischemia involving the right PCA territory including the posterior right temporal and occipital lobes also noting extensive periventricular and subcortical foci of white matter felt to be nonspecific there is a small focus of encephalomalacia in the right parietal lobe/felt to have suffered embolic event but not a candidate for anticoagulation due to severe thrombocytopenia and GI blood loss on presentation consider anticoagulation once further stabilized  · Acute perforated gastric ulcer with hemorrhage resolved status post Jorene Ion patch acute blood loss anemia status post multiple transfusions now stable    Test Results Pending at Discharge (will require follow up): · None     Outpatient Tests Requested:  · None    Complications:  As above    Hospital Course:     Amalia Cedillo is a 67 y o  female patient who originally presented to the hospital on 1/28/2018 due to sepsis  Please see above significant findings for hospital course and treatment plan    Condition at Discharge: good     Discharge Day Visit / Exam:     * Please refer to separate progress for these details *    Discharge instructions/Information to patient and family:   See after visit summary for information provided to patient and family  Provisions for Follow-Up Care:  See after visit summary for information related to follow-up care and any pertinent home health orders  Disposition:     Acute Rehab at [facility] longterm acute care facility    For Discharges to [facility] SNF:   · Not Applicable to this Patient - Not Applicable to this Patient      Discharge Statement:  I spent56 minutes discharging the patient  This time was spent on the day of discharge  I had direct contact with the patient on the day of discharge  Greater than 50% of the total time was spent examining patient, answering all patient questions, arranging and discussing plan of care with patient as well as directly providing post-discharge instructions  Additional time then spent on discharge activities  Discharge Medications:  See after visit summary for reconciled discharge medications provided to patient and family  ** Please Note: Dragon 360 Dictation voice to text software may have been used in the creation of this document   **

## 2018-02-28 LAB — FUNGUS SPEC CULT: ABNORMAL

## 2018-03-02 LAB — SCAN RESULT: NORMAL

## 2018-03-22 ENCOUNTER — HOSPITAL ENCOUNTER (INPATIENT)
Facility: HOSPITAL | Age: 73
LOS: 3 days | Discharge: NURSING, REHAB OR LONG-TERM CARE | DRG: 064 | End: 2018-03-25
Attending: INTERNAL MEDICINE | Admitting: INTERNAL MEDICINE
Payer: MEDICARE

## 2018-03-22 DIAGNOSIS — K25.2 ACUTE PERFORATED GASTRIC ULCER WITH HEMORRHAGE (HCC): ICD-10-CM

## 2018-03-22 DIAGNOSIS — M54.2 NECK PAIN ON RIGHT SIDE: ICD-10-CM

## 2018-03-22 DIAGNOSIS — R29.810 FACIAL DROOP: ICD-10-CM

## 2018-03-22 DIAGNOSIS — K25.6: ICD-10-CM

## 2018-03-22 DIAGNOSIS — I63.10 CEREBROVASCULAR ACCIDENT (CVA) DUE TO EMBOLISM OF PRECEREBRAL ARTERY (HCC): Primary | ICD-10-CM

## 2018-03-22 DIAGNOSIS — I63.531 ACUTE ISCHEMIC RIGHT PCA STROKE (HCC): ICD-10-CM

## 2018-03-22 LAB
ANION GAP SERPL CALCULATED.3IONS-SCNC: 6 MMOL/L (ref 4–13)
BASOPHILS # BLD AUTO: 0.02 THOUSANDS/ΜL (ref 0–0.1)
BASOPHILS NFR BLD AUTO: 0 % (ref 0–1)
BUN SERPL-MCNC: 15 MG/DL (ref 5–25)
CALCIUM SERPL-MCNC: 8.9 MG/DL (ref 8.3–10.1)
CHLORIDE SERPL-SCNC: 107 MMOL/L (ref 100–108)
CO2 SERPL-SCNC: 29 MMOL/L (ref 21–32)
CREAT SERPL-MCNC: 0.55 MG/DL (ref 0.6–1.3)
EOSINOPHIL # BLD AUTO: 0.2 THOUSAND/ΜL (ref 0–0.61)
EOSINOPHIL NFR BLD AUTO: 3 % (ref 0–6)
ERYTHROCYTE [DISTWIDTH] IN BLOOD BY AUTOMATED COUNT: 20 % (ref 11.6–15.1)
GFR SERPL CREATININE-BSD FRML MDRD: 94 ML/MIN/1.73SQ M
GLUCOSE SERPL-MCNC: 117 MG/DL (ref 65–140)
HCT VFR BLD AUTO: 27.9 % (ref 34.8–46.1)
HGB BLD-MCNC: 8.8 G/DL (ref 11.5–15.4)
LYMPHOCYTES # BLD AUTO: 2 THOUSANDS/ΜL (ref 0.6–4.47)
LYMPHOCYTES NFR BLD AUTO: 27 % (ref 14–44)
MCH RBC QN AUTO: 32.6 PG (ref 26.8–34.3)
MCHC RBC AUTO-ENTMCNC: 31.5 G/DL (ref 31.4–37.4)
MCV RBC AUTO: 103 FL (ref 82–98)
MONOCYTES # BLD AUTO: 0.9 THOUSAND/ΜL (ref 0.17–1.22)
MONOCYTES NFR BLD AUTO: 12 % (ref 4–12)
NEUTROPHILS # BLD AUTO: 4.27 THOUSANDS/ΜL (ref 1.85–7.62)
NEUTS SEG NFR BLD AUTO: 58 % (ref 43–75)
NRBC BLD AUTO-RTO: 0 /100 WBCS
PLATELET # BLD AUTO: 153 THOUSANDS/UL (ref 149–390)
PMV BLD AUTO: 10 FL (ref 8.9–12.7)
POTASSIUM SERPL-SCNC: 4 MMOL/L (ref 3.5–5.3)
RBC # BLD AUTO: 2.7 MILLION/UL (ref 3.81–5.12)
SODIUM SERPL-SCNC: 142 MMOL/L (ref 136–145)
WBC # BLD AUTO: 7.44 THOUSAND/UL (ref 4.31–10.16)

## 2018-03-22 PROCEDURE — 93005 ELECTROCARDIOGRAM TRACING: CPT | Performed by: INTERNAL MEDICINE

## 2018-03-22 PROCEDURE — 36415 COLL VENOUS BLD VENIPUNCTURE: CPT | Performed by: INTERNAL MEDICINE

## 2018-03-22 PROCEDURE — 85025 COMPLETE CBC W/AUTO DIFF WBC: CPT | Performed by: INTERNAL MEDICINE

## 2018-03-22 PROCEDURE — 80048 BASIC METABOLIC PNL TOTAL CA: CPT | Performed by: INTERNAL MEDICINE

## 2018-03-22 PROCEDURE — 99223 1ST HOSP IP/OBS HIGH 75: CPT | Performed by: INTERNAL MEDICINE

## 2018-03-22 RX ORDER — METOPROLOL TARTRATE 50 MG/1
50 TABLET, FILM COATED ORAL EVERY 12 HOURS SCHEDULED
COMMUNITY
End: 2021-11-27

## 2018-03-22 RX ORDER — LISINOPRIL 20 MG/1
20 TABLET ORAL
COMMUNITY
End: 2021-11-26

## 2018-03-22 RX ORDER — SACCHAROMYCES BOULARDII 250 MG
250 CAPSULE ORAL 2 TIMES DAILY
COMMUNITY
End: 2021-11-26

## 2018-03-22 RX ORDER — HYDRALAZINE HYDROCHLORIDE 50 MG/1
50 TABLET, FILM COATED ORAL 3 TIMES DAILY
COMMUNITY

## 2018-03-22 RX ORDER — OLANZAPINE 5 MG/1
5 TABLET ORAL
COMMUNITY

## 2018-03-22 RX ORDER — HYDROCHLOROTHIAZIDE 12.5 MG/1
12.5 CAPSULE, GELATIN COATED ORAL DAILY
COMMUNITY
End: 2021-11-26

## 2018-03-23 ENCOUNTER — APPOINTMENT (INPATIENT)
Dept: RADIOLOGY | Facility: HOSPITAL | Age: 73
DRG: 064 | End: 2018-03-23
Payer: MEDICARE

## 2018-03-23 PROBLEM — R53.1 LEFT-SIDED WEAKNESS: Status: ACTIVE | Noted: 2018-03-23

## 2018-03-23 PROBLEM — Z86.73 H/O: CVA (CEREBROVASCULAR ACCIDENT): Status: ACTIVE | Noted: 2018-02-24

## 2018-03-23 PROBLEM — I10 ESSENTIAL HYPERTENSION: Status: ACTIVE | Noted: 2018-03-23

## 2018-03-23 PROBLEM — R29.810 FACIAL DROOP: Status: ACTIVE | Noted: 2018-03-23

## 2018-03-23 PROBLEM — R29.90 STROKE-LIKE SYMPTOMS: Status: ACTIVE | Noted: 2018-03-23

## 2018-03-23 PROBLEM — L89.153 DECUBITUS ULCER OF SACRAL REGION, STAGE 3 (HCC): Status: ACTIVE | Noted: 2018-03-23

## 2018-03-23 PROBLEM — G93.40 ACUTE ENCEPHALOPATHY: Status: ACTIVE | Noted: 2018-03-23

## 2018-03-23 LAB
ATRIAL RATE: 79 BPM
CHOLEST SERPL-MCNC: 124 MG/DL (ref 50–200)
EST. AVERAGE GLUCOSE BLD GHB EST-MCNC: 97 MG/DL
GLUCOSE SERPL-MCNC: 123 MG/DL (ref 65–140)
GLUCOSE SERPL-MCNC: 147 MG/DL (ref 65–140)
GLUCOSE SERPL-MCNC: 150 MG/DL (ref 65–140)
GLUCOSE SERPL-MCNC: 192 MG/DL (ref 65–140)
HBA1C MFR BLD: 5 % (ref 4.2–6.3)
HDLC SERPL-MCNC: 42 MG/DL (ref 40–60)
LDLC SERPL CALC-MCNC: 56 MG/DL (ref 0–100)
P AXIS: 26 DEGREES
PR INTERVAL: 142 MS
QRS AXIS: -50 DEGREES
QRSD INTERVAL: 96 MS
QT INTERVAL: 396 MS
QTC INTERVAL: 454 MS
T WAVE AXIS: 69 DEGREES
TRIGL SERPL-MCNC: 130 MG/DL
VENTRICULAR RATE: 79 BPM

## 2018-03-23 PROCEDURE — 99223 1ST HOSP IP/OBS HIGH 75: CPT | Performed by: NURSE PRACTITIONER

## 2018-03-23 PROCEDURE — 99222 1ST HOSP IP/OBS MODERATE 55: CPT | Performed by: INTERNAL MEDICINE

## 2018-03-23 PROCEDURE — 99232 SBSQ HOSP IP/OBS MODERATE 35: CPT | Performed by: NURSE PRACTITIONER

## 2018-03-23 PROCEDURE — 80061 LIPID PANEL: CPT | Performed by: INTERNAL MEDICINE

## 2018-03-23 PROCEDURE — 82948 REAGENT STRIP/BLOOD GLUCOSE: CPT

## 2018-03-23 PROCEDURE — G8978 MOBILITY CURRENT STATUS: HCPCS

## 2018-03-23 PROCEDURE — 92610 EVALUATE SWALLOWING FUNCTION: CPT

## 2018-03-23 PROCEDURE — 93010 ELECTROCARDIOGRAM REPORT: CPT | Performed by: INTERNAL MEDICINE

## 2018-03-23 PROCEDURE — 70551 MRI BRAIN STEM W/O DYE: CPT

## 2018-03-23 PROCEDURE — G8987 SELF CARE CURRENT STATUS: HCPCS

## 2018-03-23 PROCEDURE — 70496 CT ANGIOGRAPHY HEAD: CPT

## 2018-03-23 PROCEDURE — 97163 PT EVAL HIGH COMPLEX 45 MIN: CPT

## 2018-03-23 PROCEDURE — 70498 CT ANGIOGRAPHY NECK: CPT

## 2018-03-23 PROCEDURE — G8988 SELF CARE GOAL STATUS: HCPCS

## 2018-03-23 PROCEDURE — 99285 EMERGENCY DEPT VISIT HI MDM: CPT

## 2018-03-23 PROCEDURE — 99233 SBSQ HOSP IP/OBS HIGH 50: CPT | Performed by: INTERNAL MEDICINE

## 2018-03-23 PROCEDURE — 97167 OT EVAL HIGH COMPLEX 60 MIN: CPT

## 2018-03-23 PROCEDURE — G8979 MOBILITY GOAL STATUS: HCPCS

## 2018-03-23 PROCEDURE — 83036 HEMOGLOBIN GLYCOSYLATED A1C: CPT | Performed by: INTERNAL MEDICINE

## 2018-03-23 RX ORDER — HYDROCHLOROTHIAZIDE 12.5 MG/1
12.5 TABLET ORAL DAILY
Status: DISCONTINUED | OUTPATIENT
Start: 2018-03-23 | End: 2018-03-25 | Stop reason: HOSPADM

## 2018-03-23 RX ORDER — SUCRALFATE ORAL 1 G/10ML
1000 SUSPENSION ORAL
Status: DISCONTINUED | OUTPATIENT
Start: 2018-03-23 | End: 2018-03-25 | Stop reason: HOSPADM

## 2018-03-23 RX ORDER — METOPROLOL TARTRATE 50 MG/1
50 TABLET, FILM COATED ORAL EVERY 12 HOURS SCHEDULED
Status: DISCONTINUED | OUTPATIENT
Start: 2018-03-23 | End: 2018-03-25 | Stop reason: HOSPADM

## 2018-03-23 RX ORDER — CLOPIDOGREL BISULFATE 75 MG/1
75 TABLET ORAL DAILY
Status: DISCONTINUED | OUTPATIENT
Start: 2018-03-23 | End: 2018-03-25 | Stop reason: HOSPADM

## 2018-03-23 RX ORDER — DULOXETIN HYDROCHLORIDE 20 MG/1
20 CAPSULE, DELAYED RELEASE ORAL DAILY
Status: DISCONTINUED | OUTPATIENT
Start: 2018-03-23 | End: 2018-03-25 | Stop reason: HOSPADM

## 2018-03-23 RX ORDER — AZATHIOPRINE 50 MG/1
50 TABLET ORAL 3 TIMES DAILY
Status: DISCONTINUED | OUTPATIENT
Start: 2018-03-23 | End: 2018-03-25 | Stop reason: HOSPADM

## 2018-03-23 RX ORDER — ATORVASTATIN CALCIUM 40 MG/1
40 TABLET, FILM COATED ORAL
Status: DISCONTINUED | OUTPATIENT
Start: 2018-03-23 | End: 2018-03-25 | Stop reason: HOSPADM

## 2018-03-23 RX ORDER — PANTOPRAZOLE SODIUM 40 MG/1
40 TABLET, DELAYED RELEASE ORAL
Status: DISCONTINUED | OUTPATIENT
Start: 2018-03-23 | End: 2018-03-25 | Stop reason: HOSPADM

## 2018-03-23 RX ORDER — MELATONIN
1000 DAILY
Status: DISCONTINUED | OUTPATIENT
Start: 2018-03-23 | End: 2018-03-25 | Stop reason: HOSPADM

## 2018-03-23 RX ORDER — HYDRALAZINE HYDROCHLORIDE 20 MG/ML
5 INJECTION INTRAMUSCULAR; INTRAVENOUS EVERY 6 HOURS PRN
Status: DISCONTINUED | OUTPATIENT
Start: 2018-03-23 | End: 2018-03-25 | Stop reason: HOSPADM

## 2018-03-23 RX ORDER — HYDRALAZINE HYDROCHLORIDE 50 MG/1
50 TABLET, FILM COATED ORAL 3 TIMES DAILY
Status: DISCONTINUED | OUTPATIENT
Start: 2018-03-23 | End: 2018-03-25 | Stop reason: HOSPADM

## 2018-03-23 RX ORDER — LISINOPRIL 20 MG/1
40 TABLET ORAL DAILY
Status: DISCONTINUED | OUTPATIENT
Start: 2018-03-23 | End: 2018-03-25 | Stop reason: HOSPADM

## 2018-03-23 RX ORDER — LISINOPRIL 20 MG/1
20 TABLET ORAL
Status: DISCONTINUED | OUTPATIENT
Start: 2018-03-23 | End: 2018-03-25 | Stop reason: HOSPADM

## 2018-03-23 RX ORDER — LORAZEPAM 2 MG/ML
0.5 INJECTION INTRAMUSCULAR ONCE AS NEEDED
Status: DISCONTINUED | OUTPATIENT
Start: 2018-03-23 | End: 2018-03-25 | Stop reason: HOSPADM

## 2018-03-23 RX ORDER — LEVOTHYROXINE SODIUM 0.1 MG/1
200 TABLET ORAL
Status: DISCONTINUED | OUTPATIENT
Start: 2018-03-23 | End: 2018-03-25 | Stop reason: HOSPADM

## 2018-03-23 RX ADMIN — HYDRALAZINE HYDROCHLORIDE 50 MG: 50 TABLET, FILM COATED ORAL at 21:54

## 2018-03-23 RX ADMIN — ATORVASTATIN CALCIUM 40 MG: 40 TABLET, FILM COATED ORAL at 18:20

## 2018-03-23 RX ADMIN — SUCRALFATE 1000 MG: 1 SUSPENSION ORAL at 18:19

## 2018-03-23 RX ADMIN — IOHEXOL 85 ML: 350 INJECTION, SOLUTION INTRAVENOUS at 16:45

## 2018-03-23 RX ADMIN — SUCRALFATE 1000 MG: 1 SUSPENSION ORAL at 21:54

## 2018-03-23 RX ADMIN — AZATHIOPRINE 50 MG: 50 TABLET ORAL at 18:21

## 2018-03-23 RX ADMIN — LISINOPRIL 20 MG: 20 TABLET ORAL at 21:54

## 2018-03-23 RX ADMIN — PANTOPRAZOLE SODIUM 40 MG: 40 TABLET, DELAYED RELEASE ORAL at 18:20

## 2018-03-23 RX ADMIN — AZATHIOPRINE 50 MG: 50 TABLET ORAL at 21:55

## 2018-03-23 RX ADMIN — HYDRALAZINE HYDROCHLORIDE 50 MG: 50 TABLET, FILM COATED ORAL at 18:20

## 2018-03-23 RX ADMIN — CLOPIDOGREL BISULFATE 75 MG: 75 TABLET ORAL at 18:22

## 2018-03-23 RX ADMIN — METOPROLOL TARTRATE 50 MG: 50 TABLET ORAL at 21:54

## 2018-03-23 NOTE — ED NOTES
Called P7 for telebox   Do not have any available at this time     Дмитрий Thornton, RN  03/22/18 3720

## 2018-03-23 NOTE — OCCUPATIONAL THERAPY NOTE
[address]  Evaluation     Patient Name: Audrey Metcalf  VXZFP'[id number] Date: 3/23/2018  Problem List  Patient Active Problem List   Diagnosis    [facility]    Type 2 diabetes mellitus with hyperglycemia ([facility]    Hypoalbuminemia due to protein-calorie malnutrition ([facility]    Sepsis ([facility]    [facility] with [facility]    Acute blood loss anemia    Hypothyroidism    Hyperlipidemia    [facility] sclerosis [facility]    Cerebrovascular accident (CVA) due to embolism of precerebral artery ([facility]    Deep tissue injury    Severe protein-calorie malnutrition (HCC)    Facial droop    Stroke-like symptoms     Past Medical History  Past Medical History:   Diagnosis Date    Diabetes mellitus [facility]     Disease of thyroid gland     Hyperlipidemia     Multiple sclerosis [facility]      Past Surgical History  Past Surgical History:   Procedure Laterality Date    ESOPHAGOGASTRODUODENOSCOPY N/A 2/12/2018    Procedure: ESOPHAGOGASTRODUODENOSCOPY (EGD); Surgeon: Fredrick Paredes MD;  Location: [facility]; Service: Gastroenterology    MI LAP,DIAGNOSTIC ABDOMEN N/A 1/29/2018    Procedure: LAPAROSCOPY DIAGNOSTIC, with repair of perforated gastric ulcer;  Surgeon: Vania Sierra MD;  Location: [facility];  Service: General            03/23/18 1419   Note Type   Note type Eval/Treat   Restrictions/Precautions   Weight Bearing Precautions Per Order No   Other Precautions Fall Risk;Cognitive; Chair Alarm; Bed Alarm;Aspiration;Multiple lines;Telemetry;O2;Visual impairment;Pain   Pain Assessment   Pain Assessment FLACC   Pain Rating: FLACC (Rest) - Face 1   Pain Rating: FLACC (Rest) - Legs 1   Pain Rating: FLACC (Rest) - Activity 2   Pain Rating: FLACC (Rest) - Cry 1   Pain Rating: FLACC (Rest) - Consolability 1   Score: FLACC (Rest) 6   Pain Rating: FLACC (Activity) - Face 1   Pain Rating: FLACC (Activity) - Legs 1   Pain Rating: FLACC (Activity) - Activity 2   Pain Rating: FLACC (Activity) - Cry 1   Pain Rating: FLACC (Activity) - Consolability 1   Score: FLACC (Activity) 6   Home Living   Type of Home Other (Comment)   Additional Comments PT ADMIT TO [facility] IN FEBRUARY W/ TRANSFER TO [facility]  PT TRANSFERRED TO [facility]  [address] ON 3/22 AND WAS THERE FOR APPROX 1 HOUR PRIOR TO TRANSFER TO [facility]  Prior Function   Level of [place] Needs assistance with ADLs and functional mobility; Needs assistance with IADLs   Lives With Facility staff   Receives Help From Other (Comment)  (FACILITY STAFF)   ADL Assistance Needs assistance   IADLs Needs assistance   Falls in the last 6 months 1 to 4   Vocational Retired   Lifestyle   [employer] PT [address]  PER FAMILY, PT HAS RECENTLY BEEN SITTING AT EOB, LATERALLY SLIDING OOB TO CHAIR AND REQUIRES A X1 FOR ADLS  Reciprocal Relationships AT BASELINE, PT RESIDING W/ FAMILY  Service to Others PT IS RETIRED  Intrinsic Gratification PT ENJOYS WORKING ON CERAMICS AND LISTENING TO MUSIC  Psychosocial   Psychosocial (WDL) WDL   Subjective   Subjective "RUB MY BACK"   ADL   Where Assessed Chair   Eating Assistance 2  Maximal Assistance   Grooming Assistance 2  Maximal Assistance   UB Bathing Assistance 1  Total Assistance   LB Bathing Assistance 1  Total Assistance   UB Dressing Assistance 1  Total Assistance   LB Dressing Assistance 1  Total [address]  1  Total Assistance   Bed Mobility   Supine to Sit 2  Maximal assistance   Additional items Assist x 2; Increased time required;Verbal cues;LE management   Sit to Supine 2  Maximal assistance   Additional items Assist x 2; Increased time required;Verbal cues;LE management   Additional Comments SAT EOB X5 MINS  ABLE TO MAINTAIN W/ MAX A X1  L LATERAL LEAN      Transfers   Sit to Stand Unable to assess   Stand to Sit Unable to assess   Functional Mobility   Additional Comments UNABLE TO ASSESS   Balance   Static Sitting Poor -   Dynamic Sitting Poor -   Activity Tolerance   Activity Tolerance Patient limited by fatigue;Treatment limited secondary to medical complications (Comment)   Medical Staff Made Aware F/U W/ CM ROLAND/LATESHA   Nurse Made Aware OKAY TO SEE PER RN    RUE Assessment   RUE Assessment X   RUE Overall AROM   R Shoulder Flexion WFL   R Elbow Flexion WFL   R Mass Grasp WFL   RUE Strength   R Shoulder Flexion 3+/5   R Elbow Flexion 3/5   RUE Tone   RUE Tone Hypotonic   LUE Assessment   LUE Assessment X   LUE Overall AROM   L Shoulder Flexion ACHIEVES <45*   L Elbow Flexion ACHIEVES <45*   L Mass Grasp LIMITED AROM    LUE Overall PROM   L Shoulder Flexion WFL   L Elbow Flexion WFL   L Mass Grasp WFL   LUE Strength   L Shoulder Flexion 2-/5   L Elbow Flexion 2-/5   LUE Tone   LUE Tone Hypotonic   Hand Function   Gross Motor Coordination Impaired  (B/L UE IMPAIRMENTS L>R)   Fine Motor Coordination Impaired  (B/L UE IMPAIRMENTS L>R)   Sensation   Additional Comments LIMITED ASSESSMENT DUE TO PATIENT COGNITION   Proprioception   Proprioception Severe deficits in the RUE;Severe deficits in the LUE   Vision - Complex Assessment   Head Position WDL   Tracking Unable to test secondary to decreased visual attention   Additional Comments VISION ASSESSMENT SIGNIFICANTLY LIMITED  PT IDENTIFIES THE TV AS THE CLOCK THIS AM  PT ABLE TO SUCCESSFULLY IDENTIFY COLORS HOWEVER UNABLE TO PARTICIPATE IN FURTHER ASSESSMENT DUE TO COGNITION  Perception   Inattention/Neglect Cues to attend left visual field;Cues to attend to left side of body;Cues to maintain midline in sitting   Cognition   Overall Cognitive Status Impaired   Arousal/Participation Alert   Attention Difficulty attending to directions   Orientation Level Oriented to person;Disoriented to place; Disoriented to time;Disoriented to situation   Memory Decreased recall of precautions;Decreased recall of recent events;Decreased short term memory   Following Commands Unable to follow one step commands Comments PT SIGNIFICANTLY LIMITED FROM A COGNITIVE PERSPECTIVE  PT ALERT HOWEVER DISORIENTED X3  PT UNABLE TO FOLLOW COMMANDS  Assessment   Limitation Decreased ADL status; Decreased UE ROM; Decreased UE strength;Decreased Safe judgement during ADL;Decreased cognition;Decreased endurance;Decreased sensation;Visual deficit; Decreased fine motor control;Decreased self-care trans;Decreased high-level ADLs; Non-func R UE  (BALANCE, MEDICAL STATUS)   Prognosis Fair   Assessment PT IS A 73 YO F ADMIT AS TRANSFER FROM OUTSIDE HOSPITAL  PT ACTUALLY TRANSFERRED FROM THE [facility]  [address] W/ AMS AND L FACIAL DROOP  PT W/ RECENT ADMISSION TO [facility]28[facility] AND [facility]SFERRED TO [facility] AT [facility] ON 3/22 HOURS BEFORE TRANSFER TO [facility]  AT [facility], CT BRAIN REVEALED CEREBRAL INFARCTION INVOLVING PORTIONS OF THE R OCCIPITAL, POSTERIOR PARIETAL AND TEMPORAL LOBS  CT BRAIN AND MRI BRAIN AT [facility] PENDING AT THIS TIME  PT W/ PMH SIGNIFICANT FOR DM, MS, HYPOTHYROIDISM, HLD, EGD, THYROID DISEASE, PERFORATED GASTRIC ULCER, RECENT R OCCIPITAL AND TEMPORAL LOBE INFARCTS  SINCE [facility] ADMISSION PT HAS REQUIRED ASSIST FOR ADL/MOBILITY  PT HAS COMPLETED BED MOBILITY W/ A X1-2 AND REQUIRES A X1 FOR ADLS  CURRENTLY PT REQUIRES TOTAL ASSIST UB/LB ADLS, MAX A X2 BED MOBILITY AND MAX A TO MAINTAIN SITTING BALANCE AT EOB  PT NOTED W/ INVOLUNTARY MOVEMENTS OF RUE T/O EVALUATION AND SIGNIFICANT LUE HEMIPARESIS/HYPOTONICITY  PT ADD'L NOTED W/ IMPAIRED VISION HOWEVER ASSESSMENT LIMITED DUE TO COGNITIVE DEFICITS  PT APPEARS LIMITED 2* IMPAIRED BALANCE, ACTIVITY TOLERANCE, MEDICAL STATUS, ADL IMPAIRMENTS, WEAKNESS, DECONDITIONING, UE STRENGTH/ROM/COORDINATION/TONE DEFICITS, SKIN INTEGRITY, VISION IMPAIRMENTS AS WELL AS IMPAIRED ATTENTION, INSIGHT, SAFETY, JUDGEMENT, ORIENTATION, PROCESSING AND RECALL   FROM OT PERSPECTIVE, PT WILL BENEFIT FROM CONTINUED OT SERVICES IN AN INPT REHAB SETTING PENDING FURTHER PROGRESS AND MEDICAL STABILITY  WILL CONTINUE TO FOLLOW PT 2-3/XWEEK IN ORDER TO MEET THE BELOW DESCRIBED GOALS IN 10-14 DAYS  Goals   Patient Goals PT WOULD LIKE TO LAY DOWN   LTG Time Frame 10-14   Long Term Goal #1 PLEASE SEE BELOW DESCRIBED GOALS  Plan   Treatment Interventions ADL retraining;Functional transfer training;Visual perceptual retraining;UE strengthening/ROM; Endurance training;Cognitive reorientation;Patient/family training;Equipment evaluation/education; Neuromuscular reeducation; Fine motor coordination activities; Compensatory technique education;Continued evaluation; Energy conservation; Activityengagement   Goal Expiration Date 04/06/18   OT Frequency 2-3x/wk   Recommendation   OT Discharge Recommendation Short Term Rehab   OT - OK to Discharge (TO STR AT THIS TIME)   Barthel Index   Feeding 5   Bathing 0   Grooming Score 0   Dressing Score 0   Bladder Score 5   Bowels Score 5   Toilet Use Score 0   Transfers (Bed/Chair) Score 0   Mobility (Level Surface) Score 0   Stairs Score 0   Barthel Index Score 15   Modified Nilo Scale   Modified Nilo Scale 5     GOALS TO BE MET IN 10-14 DAYS:    1) Pt will increase bed mobility to MIN A and transfer EOB to participate in functional activity with G tolerance and balance  2) Pt will improve functional transfers to on/off all surfaces w/ MAX A using DME PRN w/ G balance/safety including toileting  3) Pt will increase independence in UB ADLS to MOD A and LB ADLS to MAX A with G balance sitting upright in chair  4) Pt will complete toileting w/ MAX A w/ G hygiene/thoroughness using DME PRN  5) Pt will improve activity tolerance to G for min 30 min txment sessions  6) Pt will participate in light grooming task with MOD A using setup sitting at EOB ~3-5mins with G safety and balance      7) Pt will improve UB fx'l use/ROM to [facility] and strength 1/2 MMT via AROM/AAROM/PROM in all planes as tolerated s/p skilled education of HEP w/ mod I w/o cues for carryover    8) Pt will improve attention to re-integration of L side during functional tasks with Mod Multi Modal cueing    9) Pt will participate in fine motor coordination/ strengthening/ dexterity exercises to improve participation in functional ADL/IADL tasks  10) Pt will engage in ongoing cognitive assessment(S) w/ G participation to A w/ safe d/c planning/recommendations      11) Pt will follow 100% simple 2 step commands and be A&O x4 consistently with environmental cues to increase activity participation to [address], [place], OTR/L

## 2018-03-23 NOTE — CONSULTS
PHYSICAL MEDICINE AND REHABILITATION CONSULT NOTE  Gloria Felix 67 y o  female MRN: [id number]  [facility] Encounter: [id number]    Requested by (Physician/Service): Mai Baxter MD  Reason for Consultation:  Assessment of rehabilitation needs    Chief complaint: Confusion and left facial droop     HPI: Gloria Felix is a 67 y o  female with a PMH of diabetes, MS, hypothyroidism and HLD who was originally admitted to [place] [address] from 1/28-2/26 with sepsis, peritonitis with intra-abdominal abscess s/p laparocopic drainage and COREY drain  Cultures were positive for Canddida and lactobacillus  She also had an acute perforated gastric ulcer with hemorrhage s/p Bobie Yaa patch  Her hospital course was further complicated by right occipital and temporal lobe infarcts that were thought to be embolic however she was not a candidate for AC/AP give GI bleed and thrombocytopenia  She was discharged to [facility] and from there was discharged to [facility] rehab on 3/22  Upon admission to rehab it was noted by family that she was more confused and had a facial droop  Imaging showed possible evolution of her prior strokes but no new hemorrhage  Neurology consult is pending as well as therapy consults        Subjective: The patient was seen in her room, her sons were at bedside  The patient stated that she was at rehab, she was oriented to person only  Per family at bedside she resided with her  who will not be able to physically assist her  Her son lives nearby but works in [place]  They would like her to return to [facility] once medically stable  She was able to follow one step commands inconsistently        Review of Systems: A 10-point review of systems was performed   Negative except as listed above      Assessment:   - Altered mental status  - hx of store  - hx of GI bleed  - hx of peritonitis  - hx of diabetes  - hx of MS     PT OT   Pending evaluation Pending evaluation Plan:    - Chart reviewed  - Labs reviewed  - Imaging reviewed  - Continue PT/OT while in hospital  - Patient will likely need referral to sub-acute rehabilitation once medically stable  Thank you for allowing the PM&R service to participate in the care of this patient  We will continue to follow Jermaine Hagan progress with you  Please do not hesitate to call with questions or concerns    Physical Exam:  General: distracted and slowed mentation  Head: Normal, normocephalic, atraumatic  Eye: Normal external eye, conjunctiva, lids   Ears: Normal external ears  Nose: Normal external nose, mucus membranes  Pharynx: Dental Hygiene adequate  Normal buccal mucosa  Normal pharynx  Neck / Thyroid: Supple, no masses, nodes, nodules or enlargement  Pulmonary: clear to auscultation bilaterally and no crackles, no wheezes, chest expansion normal  Cardiovascular: normal rate, regular rhythm, normal S1, S2, no murmurs, rubs, clicks or gallops  Abdomen: soft, nontender, nondistended, no masses or organomegaly  Skin/Extremity: no rashes, no erythema, no peripheral edema  Neurologic: appears to have some right sided neglect, follows one-step commands inconsisently  Psych: Appropriate affect, alert and oriented to person, place and time   Musculoskeletal - Strength: patient moving all 4 extremities but not consistently following commands    [address] with: lives with their spouse  She lives in [facility] single family home  The living area: can live on one level  Equipment in home: [address] [facility], Manual Wheelchair, [address] and None  There No steps to enter the home  Patient/family's goals: Family looking for alternative housing    The patient will have 24 hour ARC Supervision/physical assistance: supervision available upon discharge      Allergies:   No Known Allergies     Past Medical History:   Past Surgical History:   Family History:   Social history:   Past Medical History:   Diagnosis Date    Diabetes mellitus ([facility] [place] [facility] )     Disease of thyroid gland     Hyperlipidemia     Multiple sclerosis ([facility] [place] [facility] )     Past Surgical History:   Procedure Laterality Date    ESOPHAGOGASTRODUODENOSCOPY N/A 2/12/2018    Procedure: ESOPHAGOGASTRODUODENOSCOPY (EGD); Surgeon: Lexy Hummel MD;  Location: [facility]; Service: Gastroenterology    AK LAP,DIAGNOSTIC ABDOMEN N/A 1/29/2018    Procedure: LAPAROSCOPY DIAGNOSTIC, with repair of perforated gastric ulcer;  Surgeon: Memo Bolton MD;  Location: [facility];  Service: General     History reviewed  No pertinent family history  Social History     Social History    Marital status: /Civil Union     Spouse name: N/A    Number of children: N/A    Years of education: N/A     Social History Main Topics    Smoking status: Former Smoker     Types: Cigarettes     Quit date: 2007    Smokeless tobacco: Never Used    Alcohol use Yes      Comment: rarely    Drug use: No    Sexual activity: Not Asked     Other Topics Concern    None     Social History Narrative    None          Vital Signs: Reviewed    Temp:  [98 °F (36 7 °C)-99 2 °F (37 3 °C)] 98 °F (36 7 °C)  HR:  [66-77] 77  Resp:  [18-29] 18  BP: (144-210)/(55-88) 145/65 No intake or output data in the 24 hours ending 03/23/18 1220     Laboratory: Reviewed    Lab Results   Component Value Date    HGB 8 8 (L) 03/22/2018    HCT 27 9 (L) 03/22/2018    WBC 7 44 03/22/2018     Lab Results   Component Value Date    BUN 15 03/22/2018     03/22/2018    K 4 0 03/22/2018     03/22/2018    GLUCOSE 117 03/22/2018    GLUCOSE 328 (H) 01/29/2018    CREATININE 0 55 (L) 03/22/2018     Lab Results   Component Value Date    PROTIME 13 7 02/12/2018    INR 1 05 02/12/2018        Imaging: Reviewed  No results found      Current Medications:     Current Facility-Administered Medications:     atorvastatin (LIPITOR) tablet 40 mg, 40 mg, Oral, Daily With Kassie Saha MD    [facility]) tablet 50 mg, 50 mg, Oral, TID, Yamilet Parr MD    cholecalciferol (VITAMIN D3) tablet 1,000 Units, 1,000 Units, Oral, Daily, Yamilet Parr MD    DULoxetine (CYMBALTA) delayed release capsule 20 mg, 20 mg, Oral, Daily, Yamilet Parr MD    hydrALAZINE (APRESOLINE) injection 5 mg, 5 mg, Intravenous, Q6H PRN, Munira Turner PA-C    hydrALAZINE (APRESOLINE) tablet 50 mg, 50 mg, Oral, TID, Yamilet Parr MD    hydrochlorothiazide (HYDRODIURIL) tablet 12 5 mg, 12 5 mg, Oral, Daily, Yamilet Parr MD    insulin lispro (HumaLOG) 100 units/mL subcutaneous injection 1-6 Units, 1-6 Units, Subcutaneous, TID AC **AND** Fingerstick Glucose (POCT), , , TID AC, Yamilet Parr MD    levothyroxine tablet 200 mcg, 200 mcg, Oral, Early Morning, Yamilet Parr MD    lisinopril (ZESTRIL) tablet 20 mg, 20 mg, Oral, HS, Yamilet Parr MD    lisinopril (ZESTRIL) tablet 40 mg, 40 mg, Oral, Daily, Yamilet Parr MD    LORazepam (ATIVAN) 2 mg/mL injection 0 5 mg, 0 5 mg, Intravenous, Once PRN, Ashly Enamorado MD    metoprolol tartrate (LOPRESSOR) tablet 50 mg, 50 mg, Oral, Q12H [address], Yamilet Parr MD    pantoprazole (PROTONIX) EC tablet 40 mg, 40 mg, Oral, BID AC, Yamilet Parr MD    sucralfate (CARAFATE) oral suspension 1,000 mg, 1,000 mg, Oral, 4x Daily (AC & HS), Yamilet Parr MD

## 2018-03-23 NOTE — ASSESSMENT & PLAN NOTE
Recent CVA diagnosed when she was admitted to [facility] in February  Was not started on antiplatelet or anticoagulation because of perforated gastric ulcer

## 2018-03-23 NOTE — SOCIAL WORK
Met with pt and pts son Bryant Herman to discuss the role of CM and to discuss any help pt may need prior to dc  Pt was admitted from [facility] where pt was for [address] states plan is for pt to return when medically stable  [facility] referral sent to same  Prior to admission to [facility] pt was at [facility] for 3-4 weeks  Pt's son Bryant Herman states pt was requiring total assist for all ADL's; pt is mainly bed confined; not yet able to transfer to a WC or ambulate  Prior to [address] stay pt was living with her  Marciano Sutton in a 2 story home; bed/bathroom on the 2nd floor  Pt was indpt; pt has a Wc, Rw, and cane at home  No hx of HHC  Pt's  transports to appointments  No POA  No hx of mental health or D&A treatment  Pt's PCP is Dr Giovani Mai  Pt's uses [facility] in [place]       Contact: Bryant Herman (son) [phone] or Marciano Sutton () [phone]

## 2018-03-23 NOTE — PLAN OF CARE
Problem: Potential for Falls  Goal: Patient will remain free of falls  INTERVENTIONS:  - Assess patient frequently for physical needs  -  Identify cognitive and physical deficits and behaviors that affect risk of falls  -  [place] fall precautions as indicated by assessment   - Educate patient/family on patient safety including physical limitations  - Instruct patient to call for assistance with activity based on assessment  - Modify environment to reduce risk of injury  - Consider OT/PT consult to assist with strengthening/mobility   Outcome: Progressing      Problem: Nutrition/Hydration-ADULT  Goal: Nutrient/Hydration intake appropriate for improving, restoring or maintaining nutritional needs  Monitor and assess patient's nutrition/hydration status for malnutrition (ex- brittle hair, bruises, dry skin, pale skin and conjunctiva, muscle wasting, smooth red tongue, and disorientation)  Collaborate with interdisciplinary team and initiate plan and interventions as ordered  Monitor patient's weight and dietary intake as ordered or per policy  Utilize nutrition screening tool and intervene per policy  Determine patient's food preferences and provide high-protein, high-caloric foods as appropriate       INTERVENTIONS:  - Monitor oral intake, urinary output, labs, and treatment plans  - Assess nutrition and hydration status and recommend course of action  - Evaluate amount of meals eaten  - Assist patient with eating if necessary   - Allow adequate time for meals  - Recommend/ encourage appropriate diets, oral nutritional supplements, and vitamin/mineral supplements  - Order, calculate, and assess calorie counts as needed  - Assess need for intravenous fluids  - Provide specific nutrition/hydration education as appropriate  - Include patient/family/caregiver in decisions related to nutrition   Outcome: Progressing      Problem: Prexisting or High Potential for Compromised Skin Integrity  Goal: Skin integrity is maintained or improved  INTERVENTIONS:  - Identify patients at risk for skin breakdown  - Assess and monitor skin integrity  - Assess and monitor nutrition and hydration status  - Monitor labs (i e  albumin)  - Assess for incontinence   - Turn and reposition patient  - Assist with mobility/ambulation  - Relieve pressure over bony prominences  - Avoid friction and shearing  - Provide appropriate hygiene as needed including keeping skin clean and dry  - Evaluate need for skin moisturizer/barrier cream  - Collaborate with interdisciplinary team (i e  Nutrition, Rehabilitation, etc )   - Patient/family teaching   Outcome: Progressing      Problem: Neurological Deficit  Goal: Neurological status is stable or improving  Interventions:  - Monitor and assess patient's level of consciousness, motor function, sensory function, and level of assistance needed for ADLs  - Monitor and report changes from baseline  Collaborate with interdisciplinary team to initiate plan and implement interventions as ordered  - Provide and maintain a safe environment  - Utilize seizure precautions  - Utilize fall precautions  - Utilize aspiration precautions  - Utilize bleeding precautions  Outcome: Progressing      Problem: Activity Intolerance/Impaired Mobility  Goal: Mobility/activity is maintained at optimum level for patient  Interventions:  - Assess and monitor patient  barriers to mobility and need for assistive/adaptive devices  - Assess patient's emotional response to limitations  - Collaborate with interdisciplinary team and initiate plans and interventions as ordered  - Encourage independent activity per ability   - Maintain proper body alignment  - Perform active/passive rom as tolerated/ordered    - Plan activities to conserve energy   - Turn patient  Outcome: Progressing      Problem: Communication Impairment  Goal: Ability to express needs and understand communication  Assess patient's communication skills and ability to understand information  Patient will demonstrate use of effective communication techniques, alternative methods of communication and understanding even if not able to speak  - Encourage communication and provide alternate methods of communication as needed  - Collaborate with case management/ for discharge needs  - Include patient/family/caregiver in decisions related to communication  Outcome: Progressing      Problem: Potential for Aspiration  Goal: Non-ventilated patient's risk of aspiration is minimized  Assess and monitor vital signs, respiratory status, and labs (WBC)  Monitor for signs of aspiration (tachypnea, cough, rales, wheezing, cyanosis, fever)  - Assess and monitor patient's ability to swallow  - Place patient up in chair to eat if possible  - HOB up at 90 degrees to eat if unable to get patient up into chair   - Supervise patient during oral intake  - Instruct patient to take small bites  - Instruct patient to take small single sips when taking liquids  - Follow patient-specific strategies generated by speech pathologist   Outcome: Progressing      Problem: Nutrition  Goal: Nutrition/Hydration status is improving  Monitor and assess patient's nutrition/hydration status for malnutrition (ex- brittle hair, bruises, dry skin, pale skin and conjunctiva, muscle wasting, smooth red tongue, and disorientation)  Collaborate with interdisciplinary team and initiate plan and interventions as ordered  Monitor patient's weight and dietary intake as ordered or per policy  Utilize nutrition screening tool and intervene per policy  Determine patient's food preferences and provide high-protein, high-caloric foods as appropriate  - Assist patient with eating   - Allow adequate time for meals   - Encourage patient to take dietary supplement as ordered  - Collaborate with clinical nutritionist   - Include patient/family/caregiver in decisions related to nutrition    Outcome: Progressing      Problem: PAIN - ADULT  Goal: Verbalizes/displays adequate comfort level or baseline comfort level  Interventions:  - Encourage patient to monitor pain and request assistance  - Assess pain using appropriate pain scale  - Administer analgesics based on type and severity of pain and evaluate response  - Implement non-pharmacological measures as appropriate and evaluate response  - Consider cultural and social influences on pain and pain management  - Notify physician/advanced practitioner if interventions unsuccessful or patient reports new pain  Outcome: Progressing      Problem: INFECTION - ADULT  Goal: Absence or prevention of progression during hospitalization  INTERVENTIONS:  - Assess and monitor for signs and symptoms of infection  - Monitor lab/diagnostic results  - Monitor all insertion sites, i e  indwelling lines, tubes, and drains  - Monitor endotracheal (as able) and nasal secretions for changes in amount and color  - [place] appropriate cooling/warming therapies per order  - Administer medications as ordered  - Instruct and encourage patient and family to use good hand hygiene technique  - Identify and instruct in appropriate isolation precautions for identified infection/condition  Outcome: Progressing      Problem: SAFETY ADULT  Goal: Maintain or return to baseline ADL function  INTERVENTIONS:  -  Assess patient's ability to carry out ADLs; assess patient's baseline for ADL function and identify physical deficits which impact ability to perform ADLs (bathing, care of mouth/teeth, toileting, grooming, dressing, etc )  - Assess/evaluate cause of self-care deficits   - Assess range of motion  - Assess patient's mobility; develop plan if impaired  - Assess patient's need for assistive devices and provide as appropriate  - Encourage maximum independence but intervene and supervise when necessary  ¯ Involve family in performance of ADLs  ¯ Assess for home care needs following discharge ¯ Request OT consult to assist with ADL evaluation and planning for discharge  ¯ Provide patient education as appropriate  Outcome: Progressing    Goal: Maintain or return mobility status to optimal level  INTERVENTIONS:  - Assess patient's baseline mobility status (ambulation, transfers, stairs, etc )    - Identify cognitive and physical deficits and behaviors that affect mobility  - Identify mobility aids required to assist with transfers and/or ambulation (gait belt, sit-to-stand, lift, walker, cane, etc )  - [place] fall precautions as indicated by assessment  - Record patient progress and toleration of activity level on Mobility SBAR; progress patient to next Phase/Stage  - Instruct patient to call for assistance with activity based on assessment  - Request Rehabilitation consult to assist with strengthening/weightbearing, etc   Outcome: Progressing      Problem: DISCHARGE PLANNING  Goal: Discharge to home or other facility with appropriate resources  INTERVENTIONS:  - Identify barriers to discharge w/patient and caregiver  - Arrange for needed discharge resources and transportation as appropriate  - Identify discharge learning needs (meds, wound care, etc )  - Arrange for interpretive services to assist at discharge as needed  - Refer to Case Management Department for coordinating discharge planning if the patient needs post-hospital services based on physician/advanced practitioner order or complex needs related to functional status, cognitive ability, or social support system  Outcome: Progressing      Problem: Knowledge Deficit  Goal: Patient/family/caregiver demonstrates understanding of disease process, treatment plan, medications, and discharge instructions  Complete learning assessment and assess knowledge base    Interventions:  - Provide teaching at level of understanding  - Provide teaching via preferred learning methods  Outcome: Progressing

## 2018-03-23 NOTE — ASSESSMENT & PLAN NOTE
Most likely due to due to stroke  MRI brain and CTA head and neck as above  Stroke management    As above

## 2018-03-23 NOTE — PROGRESS NOTES
Patient transferred from outside facility with reported new stroke-like symptoms  CT scan performed showed hyper density in old stroke bed concerning for hemorrhagic conversion  At that time, no prior CT's were available for comparison  I had the images uploaded, but they were uploaded to [facility] ([id number]) which is not yet merged with this chart and can only be searched under PACS  When I compared these images with patient's last CT scan on 2/16, the hyperdense areas appear unchanged  The stroke area has increased as the stroke appears to have evolved, but I do not feel that there is any new hemorrhage  Given these findings, I feel that patient is appropriate for workup by and admission to [facility]  Discussed with Dr Pushpa Guzman

## 2018-03-23 NOTE — SPEECH THERAPY NOTE
Speech Language/Pathology  Speech/Language Pathology  Assessment    Patient Name: Gloria VINES Date: 3/23/2018     Problem List  Patient Active Problem List   Diagnosis    [facility]ancytopenia ([facility]    Type 2 diabetes mellitus with hyperglycemia ([facility] )    Hypoalbuminemia due to protein-calorie malnutrition ([facility]    Sepsis ([facility]    Acute perforated gastric ulcer with hemorrhage ([facility] )    Acute blood loss anemia    Hypothyroidism    Hyperlipidemia    Multiple sclerosis ([facility]    Cerebrovascular accident (CVA) due to embolism of precerebral artery ([facility]    Deep tissue injury    Severe protein-calorie malnutrition (HCC)    Facial droop    Stroke-like symptoms     Past Medical History  Past Medical History:   Diagnosis Date    Diabetes mellitus ([facility]     Disease of thyroid gland     Hyperlipidemia     Multiple sclerosis ([facility] )      Past Surgical History  Past Surgical History:   Procedure Laterality Date    ESOPHAGOGASTRODUODENOSCOPY N/A 2/12/2018    Procedure: ESOPHAGOGASTRODUODENOSCOPY (EGD); Surgeon: Araceli Jaramillo MD;  Location: [facility]; Service: Gastroenterology    MN LAP,DIAGNOSTIC ABDOMEN N/A 1/29/2018    Procedure: LAPAROSCOPY DIAGNOSTIC, with repair of perforated gastric ulcer;  Surgeon: Dee Dee Heath MD;  Location: [facility];  Service: General        03/23/18 1205   Patient Information   Current Medical Pt is a 66 y/o female admitted to [facility]- on 3/22/2018 with change in mental status and L facial droop  CT at [facility] reported hyperdensity in old stroke bed ? hemorrhagic conversion  Request was made for swallowing evaluation  Special Studies MRI pending   Social/Educational/Vocational Hx Was at [facility]  18  at [facility]  Swallow Information   Current Risks for Dysphagia & Aspiration New Neuro event;Mental status change   Current Symptoms/Concerns Cough   Current Diet NPO   Baseline Diet Regular; Thin liquids   Baseline Assessment   Behavior/Cognition Alert;Confused Speech/Language Status Mild dysarthria and word finding errors  Pt has baseline cognitive impairments with resulting aphasia symptoms   Patient Positioning Upright in bed   Swallow Mechanism Exam   Labial Symmetry Abnormal symmetry left   Labial Strength Reduced   Labial ROM Reduced left   Facial Symmetry Left droop   Facial Strength WFL   Facial ROM Mild;Reduced left   Lingual Symmetry WFL   Lingual Strength Mild reduced   Lingual ROM Mild reduced   Velum WFL   Mandible WFL   Dentition Adequate   Volitional Cough Weak   Consistencies Assessed and Performance   Materials Adminstered Comment 4 oz applesauce, 4 oz HTL by cup/tsp/straw, 2 oz NTL by straw, 1 sip water  Oral Stage Mild impaired   Oral Stage Comment Bolus retrieval and lip seal were WFL however at times pt did not recognize presentation until it touched her lips  Manipulation and transfer of puree were mildly prolonged  With silvia cracker she sucked on silvia cracker- would not attempt to bite it despite cues  No oral retention noted  Phargngeal Stage Moderate impaired   Pharyngeal Stage Comment Swallows were mildly delayed with fair hyolaryngeal rise  No s/s of aspiration noted with puree or HTL  Pt had one weak cough with NTL x1, immediate cough with watery eyes noted with 1 sip thin water  Swallow Mechanics Mild delayed   Esophageal Concerns No s/s reported   Summary   Swallow Summary Pt presents with mild oral and moderate pharyngeal dysphagia characterized by mild delay manipulation/transfer, inability to bite soft solids, delayed swallows, and s/s of aspiration with NTL and thin liquids  At this time pt appears safe with a pureed diet and honey thick liquids however she should be monitored closely for any changes in status  Speech to follow  Recommendations   Risk for Aspiration Moderate   Recommendations Consider oral diet; Dysphagia treatment   Diet Solid Recommendation Level 1 Dysphagia/pureed   Diet Liquid Recommendation Honey thick liquid   Recommended Form of Meds Crushed; With puree   General Precautions Aspiration precautions; Feed only when alert;Minimize distractions;Upright as possible for all oral intake;Remain upright for 45 mins after meals; Supervision with meals;Assist with feeding   Compensatory Swallowing Strategies Alternate solids and liquids   Further Evaluations Neurology   Results Reviewed with RN;PT/Family/Caregiver   Treatment Recommendations   Follow up treatments Strategy training;Continue clinical assessment; Assure diet tolerance; Patient/family education   Dysphagia Goals Patient will tolerate recommended diet without observed clinical signs of oral/pharngeal dysphagia; Patient will tolerate advanced diet with no signs of oral or pharngeal dysphagia; Family will care for appropriate strategies for swallowing safety   Speech Therapy Prognosis   Prognosis Fair   Prognosis Considerations Co-Morbidities; Medical Diagnosis

## 2018-03-23 NOTE — ASSESSMENT & PLAN NOTE
Symptoms highly concerning for acute stroke  CT head done at outside hospital concerning for new stroke  This was reviewed by critical care and was thought to be old  CTA head and neck MRI brain ordered pending  Appreciate GI recommendations about anticoagulation given the recent history of perforated gastric ulcer  Patient started on Plavix  Not started on any other anticoagulation at this point  No aspirin given history of peptic ulcer disease  Continue Protonix b i d   Monitor hemoglobin  Neurology following  Appreciate recommendations

## 2018-03-23 NOTE — PHYSICAL THERAPY NOTE
Physical Therapy Evaluation    Patient's Name: Reagan Narayan    Admitting Diagnosis  Facial droop [R29 810]  Stroke-like symptoms [R29 90]  Cerebrovascular accident (CVA) due to embolism of precerebral artery ([facility] ) [I63 10]    Problem List  Patient Active Problem List   Diagnosis    Pancytopenia ([facility] [address] [facility]    Type 2 diabetes mellitus with hyperglycemia ([facility] [address] [facility]    Hypoalbuminemia due to protein-calorie malnutrition ([facility] [facility]    Sepsis ([facility] [address] [facility] )    Acute perforated gastric ulcer with hemorrhage ([address]    Acute blood loss anemia    Hypothyroidism    Hyperlipidemia    Multiple sclerosis ([facility] [address] [facility]    Cerebrovascular accident (CVA) due to embolism of precerebral artery ([facility] [address] )    Deep tissue injury    Severe protein-calorie malnutrition (HCC)    Facial droop    Stroke-like symptoms       Past Medical History  Past Medical History:   Diagnosis Date    Diabetes mellitus ([facility] [address] [facility] )     Disease of thyroid gland     Hyperlipidemia     Multiple sclerosis ([facility] [address] [facility] )        Past Surgical History  Past Surgical History:   Procedure Laterality Date    ESOPHAGOGASTRODUODENOSCOPY N/A 2/12/2018    Procedure: ESOPHAGOGASTRODUODENOSCOPY (EGD); Surgeon: Roger River MD;  Location: [facility];   Service: Gastroenterology    LA LAP,DIAGNOSTIC ABDOMEN N/A 1/29/2018    Procedure: LAPAROSCOPY DIAGNOSTIC, with repair of perforated gastric ulcer;  Surgeon: Precious Carnes MD;  [facility];  Service: General          03/23/18 1100   Note Type   Note type Eval only   Pain Assessment   Pain Assessment FLACC   Pain Rating: FLACC (Rest) - Face 1   Pain Rating: FLACC (Rest) - Legs 1   Pain Rating: FLACC (Rest) - Activity 2   Pain Rating: FLACC (Rest) - Cry 1   Pain Rating: FLACC (Rest) - Consolability 1   Score: FLACC (Rest) 6   Pain Rating: FLACC (Activity) - Face 1   Pain Rating: FLACC (Activity) - Legs 1   Pain Rating: FLACC (Activity) - Activity 2   Pain Rating: FLACC (Activity) - Cry 1   Pain Rating: FLACC (Activity) - Consolability 1   Score: FLACC (Activity) 6   Home Living   Type of Home House;SNF;Other (Comment)   Additional Comments Resided at home w/  until late January- had CVA- seen at [facility], went to [facility] and just sent to [place] at [facility]- was there x only approx 1 hour  Per family present for eval, was working on sitting EOB, could stand w/ significan A of 1, and OOB to w/c by lateral slide only  Prior Function   Level of [place] Needs assistance with ADLs and functional mobility; Total dependent   Falls in the last 6 months 1 to 4   Restrictions/Precautions   Weight Bearing Precautions Per Order No   Other Precautions Cognitive; Chair Alarm; Bed Alarm;Aspiration;Multiple lines;Telemetry;O2;Fall Risk   General   Family/Caregiver Present Yes  (, son and DIL)   Cognition   Overall Cognitive Status Impaired   Arousal/Participation Persistent stimuli required   Orientation Level Oriented to person   Memory Unable to assess   Following Commands Unable to follow one step commands   Comments awake, disoriented  difficulty following commnads  random R sided mvmts in UE   RLE Assessment   RLE Assessment (strength grossly 2+ to 3-/5, inconsistant w/ uncontrol mvmts)   LLE Assessment   LLE Assessment (strength grossly 3-/5- inconsistant)   Coordination   Movements are Fluid and Coordinated 0   Coordination and Movement Description ataxia all 4 ext, R > L   Bed Mobility   Supine to Sit 2  Maximal assistance   Additional items Assist x 2   Sit to Supine 2  Maximal assistance   Additional items Assist x 2   Additional Comments sat EOB x 5-7 min, focus on tolerance    maintains w/ max A of 1   heavy posterior and L sided lean/LOB/pushing   Balance   Static Sitting Poor -   Dynamic Sitting Poor -   Endurance Deficit   Endurance Deficit Yes   Endurance Deficit Description fatigue, weakness, cog   Activity Tolerance   Activity Tolerance Patient limited by fatigue;Treatment limited secondary to medical complications (Comment)   Nurse Made Aware yes   Assessment   Prognosis Guarded   Problem List Decreased strength;Decreased endurance; Impaired balance;Decreased mobility; Decreased coordination;Decreased cognition; Impaired judgement;Decreased safety awareness; Impaired tone  (uncontroledd R sided mvmts)   Assessment Pt seen for high complexity physical therapy evaluation  Pt is a 68 y/o female w history/comorbidities of DM, MS, hypothyroidism, HLD, recent admit to [facility] w/ sepsis, peritonitis, abscess and recent R occipital /temporal lobe infarcts now readmitted from sn[facility] rehab w/ worsening MS, L facial droop, confusion w/ concern for acute CVA  seen at outside institution, and had ? hemorrhagic conversion on imaging  to have MRI while here  Due to unclear medical dx w/ cog changes, fall risk, note unstable clinical picture  PT consulted to assess mobility, d/c needs  Pt presents w/ decreased bed mob, sitting balance, endurance, B LE strength and coordination, barriers at home  will benefit from skilled PT to correct for the above problems, as well as to assess transfers, standing balance when appropriate  Will need to return to rehab when stable   Goals   Patient Goals none stated   STG Expiration Date 04/06/18   Short Term Goal #1 1-2 wks: bed mob w/ min A, sitting balance to fair dynamically, sitting tolerance x 30-45 min EOB, increase B LE strength by 1/2 -1 grade, active participation in PT session to 100%, PT to see to assess transfers, standing   Treatment Day 0   Plan   Treatment/Interventions Functional transfer training;LE strengthening/ROM; Therapeutic exercise; Endurance training;Patient/family training;Equipment eval/education; Bed mobility   PT Frequency 5x/wk   Recommendation   Recommendation (recommend return to rehab at d/c)   Equipment Recommended Wheelchair   PT - OK to Discharge Yes  (to rehab when stable)   Modified Nilo Scale   Modified Nilo Scale 5   Barthel Index   Feeding 5   Bathing 0   Grooming Score 0   Dressing Score 0   Bladder Score 5   Bowels Score 5   Toilet Use Score 0   Transfers (Bed/Chair) Score 0   Mobility (Level Surface) Score 0   Stairs Score 0   Barthel Index Score 15         Jenn Subramanian PT, DPT, CSRS

## 2018-03-23 NOTE — CONSULTS
Consultation Note - Wound   Cally Degroot 67 y o  female MRN: [id number]  Unit/Bed#: [facility] 728-01 Encounter: [id number]      Assessment:   Patient is a 67year old female just recently discharged from [facility] to a different facility and was sent to [facility] with altered mental status and L facial droop  Prior to this admission patient was admitted to [facility] with sepsis and neutropenia secondary to peritonitis with intraabdominal abscess requiring drainage s/p COREY drain  Patient seen this morning by wound care for present on admission pressure injury and found chronic appearing stage 3 pressure injury to mid sacrum measuring as documented below  Wound presents with thin white-yellow slough at base with scattered red granular buds, periwound is slightly macerated with minimally epiboled edges, no tunneling or undermining appreciate  Bilateral buttocks is intact, bilateral heels with pink blanching erythema, legs with hyperkeratotic skin build up, no other skin issues seen and patient is noted grossly incontinent of urine  Plan:   1-Cleanse sacral wound with soap and water skin prep periwound, calcium alginate to wound bed, cover hydrocolloid then protect dressing from inc  W/tegaderm  Change q3d  2-Hydraguard to bilateral heels BID and PRN  3-Soft care cushion when out of bed  4-Moisturize skin daily with skin nourishing cream   5-Turn/repostion q2h  6-Elevate heels to offload pressure  7-Calazime to perineum BID and PRN  Vitals: Blood pressure 145/65, pulse 77, temperature 98 °F (36 7 °C), temperature source Oral, resp  rate 18, height 5' 6" (1 676 m), weight 75 8 kg (167 lb), SpO2 96 %  ,Body mass index is 26 95 kg/m²  Pressure Ulcer 03/23/18 Sacrum Mid      Midline sacrum stage 3 pressure injury (Active)   Staging Stage III 3/23/2018 10:00 AM   Wound Description Pink;Slough; White 3/23/2018 10:00 AM   Mira-wound Assessment Intact 3/23/2018 10:00 AM   Shape Circular 3/23/2018 12:45 AM Wound Length (cm) 2 5 cm 3/23/2018 10:00 AM   Wound Width (cm) 1 3 cm 3/23/2018 10:00 AM   Wound Depth (cm) 0 3 3/23/2018 10:00 AM   Calculated Wound Area (cm^2) 3 25 cm^2 3/23/2018 10:00 AM   Calculated Wound Volume (cm^3) 0 98 cm^3 3/23/2018 10:00 AM   Tunneling 0 cm 3/23/2018 10:00 AM   Undermining 0 3/23/2018 10:00 AM   Drainage Amount Small 3/23/2018 10:00 AM   Drainage Description Serous 3/23/2018 10:00 AM   Treatment Cleansed; Offload; Turn & reposition 3/23/2018 10:00 AM   Dressing Calcium Alginate;Hydrocolloid 3/23/2018 10:00 AM   Dressing Changed New dressing applied 3/23/2018 10:00 AM   Patient Tolerance Tolerated well 3/23/2018 10:00 AM   Dressing Status Clean;Dry; Intact 3/23/2018 12:45 AM       Primary RN at bed side during assessment, aware of findings and recommendations  Our recommendations are placed as orders, please call wound care toe ext  [phone] or [phone] with questions of concerns      Christine Roman RN, BSN, [facility]

## 2018-03-23 NOTE — PROGRESS NOTES
Progress Note - Chrystine Postal 1945, 67 y o  female MRN: [id number]    Unit/Bed#: [facility] 728-01 Encounter: [id number]    Primary Care Provider: Lisa Figueroa DO   Date and time admitted to hospital: 3/22/2018  7:29 PM        * Left-sided weakness   Assessment & Plan    Symptoms highly concerning for acute stroke  CT head done at outside hospital concerning for new stroke  This was reviewed by critical care and was thought to be old  CTA head and neck MRI brain ordered pending  Appreciate GI recommendations about anticoagulation given the recent history of perforated gastric ulcer  Patient started on Plavix  Not started on any other anticoagulation at this point  No aspirin given history of peptic ulcer disease  Continue Protonix b i d   Monitor hemoglobin  Neurology following  Appreciate recommendations  Facial droop   Assessment & Plan    Most likely due to due to stroke  MRI brain and CT head and neck pending  Acute encephalopathy   Assessment & Plan    Most likely due to stroke  Patient very sleepy and drowsy as per family  Low suspicion for infection at this point with no fever or leukocytosis  Continue to monitor  Decubitus ulcer of sacral region, stage 3 [facility])   Assessment & Plan    Wound care evaluate  Cont local wound care  Essential hypertension   Assessment & Plan    Currently well controlled  Continue metoprolol, lisinopril, HCTZ and hydralazine  H/O: CVA (cerebrovascular accident)   Assessment & Plan    Recent CVA diagnosed when she was admitted to [facility] in February  Was not started on antiplatelet or anticoagulation because of perforated gastric ulcer  Type 2 diabetes mellitus with hyperglycemia (HCC)   Assessment & Plan    Currently well controlled  Continue sliding scale insulin  Continue oral antihyperglycemic              VTE Pharmacologic Prophylaxis:   Pharmacologic: Pharmacologic VTE Prophylaxis contraindicated due to recent perforated peptic ulcer and possible ICH  Mechanical VTE Prophylaxis in Place: Yes  Patient Centered Rounds: I have performed bedside rounds with nursing staff today  Discussions with Specialists or Other Care Team Provider: neuro, GI  Education and Discussions with Family / Patient:  and son at bedside  Time Spent for Care: 45 minutes  More than 50% of total time spent on counseling and coordination of care as described above  Current Length of Stay: 1 day(s)  Current Patient Status: Inpatient   Certification Statement: The patient will continue to require additional inpatient hospital stay due to above  Discharge Plan: pending further evaluation  Code Status: Level 2 - DNAR: but accepts endotracheal intubation      Subjective:   Pt seen and examined by me this morning  Pt was sleepy but did open her eyes when called  Denies any specific complaints  Patient's  and son was at bedside  As per them patient was able to move all extremities 2 days ago  Also she did not have the facial droop  She was also awake and alert  Objective:     Vitals:   Temp (24hrs), Av 4 °F (36 9 °C), Min:97 8 °F (36 6 °C), Max:99 2 °F (37 3 °C)    HR:  [66-91] 91  Resp:  [16-29] 16  BP: (140-210)/(55-88) 166/65  SpO2:  [95 %-98 %] 96 %  Body mass index is 26 95 kg/m²  Input and Output Summary (last 24 hours):     No intake or output data in the 24 hours ending 18    Physical Exam:     Physical Exam    Constitutional: Pt appears well-developed and well-nourished  Not in any acute distress  Was drowsy and sleepy but did wake up when called  HENT:   Head: Normocephalic and atraumatic  Eyes: EOM are normal    Neck: Neck supple  Cardiovascular: Normal rate, regular rhythm, normal heart sounds and intact distal pulses  Exam reveals no gallop and no friction rub  No murmur heard  Pulmonary/Chest: Effort normal and breath sounds normal  No respiratory distress  Pt has no wheezes or rales  Abdominal: Soft  Non-distended, Non-tender  Bowel sounds are normal    Neurological: alert and oriented to self and person - identified son and   Normal strength and sensations on right extremities, significantly decreased on left strength 3-4/5  Unable to do full neuro exam because patient not very cooperative  Psychiatric: confused    Additional Data:     Labs:      Results from last 7 days  Lab Units 03/22/18  2212   WBC Thousand/uL 7 44   HEMOGLOBIN g/dL 8 8*   HEMATOCRIT % 27 9*   PLATELETS Thousands/uL 153   NEUTROS PCT % 58   LYMPHS PCT % 27   MONOS PCT % 12   EOS PCT % 3       Results from last 7 days  Lab Units 03/22/18  2212   SODIUM mmol/L 142   POTASSIUM mmol/L 4 0   CHLORIDE mmol/L 107   CO2 mmol/L 29   BUN mg/dL 15   CREATININE mg/dL 0 55*   CALCIUM mg/dL 8 9   GLUCOSE RANDOM mg/dL 117           * I Have Reviewed All Lab Data Listed Above  * Additional Pertinent Lab Tests Reviewed:  Andres 66 Admission Reviewed    Imaging:    Imaging Reports Reviewed Today Include:   Imaging Personally Reviewed by Myself Includes:      Recent Cultures (last 7 days):           Last 24 Hours Medication List:     Current Facility-Administered Medications:  atorvastatin 40 mg Oral Daily With Karrie Lowry MD   azaTHIOprine 50 mg Oral TID Albaro Crawford MD   cholecalciferol 1,000 Units Oral Daily Albaro Crawford MD   clopidogrel 75 mg Oral Daily Sukhjinder Chew DO   DULoxetine 20 mg Oral Daily Albaro Crawford MD   hydrALAZINE 5 mg Intravenous Q6H PRN Munira Turner PA-C   hydrALAZINE 50 mg Oral TID Albaro Crawford MD   hydrochlorothiazide 12 5 mg Oral Daily Albaro Crawford MD   insulin lispro 1-6 Units Subcutaneous TID [facility] [place] Albaro Crawford MD   levothyroxine 200 mcg Oral Early Morning Albaro Crawford MD   lisinopril 20 mg Oral HS Albaro Crawford MD   lisinopril 40 mg Oral Daily Albaro Crawford MD   LORazepam 0 5 mg Intravenous Once PRN Marlene El MD   metoprolol tartrate 50 mg Oral Q12H [facility] Albaro Crawford MD pantoprazole 40 mg Oral BID AC Yamilet Parr MD   sucralfate 1,000 mg Oral 4x Daily (AC & HS) Yamilet Parr MD        Today, Patient Was Seen By: Ashly Enamorado MD    ** Please Note: Dictation voice to text software may have been used in the creation of this document   **

## 2018-03-23 NOTE — PHYSICIAN ADVISOR
This patient is a 67 y o  y/o female who is admitted to the hospital for Facial Droop (Per EMS pt's family noticed a L sided facial droop at [address] [place] [address] today  Per EMS pt was previously seen here for a GI bleed and CVA while admitted  Pt was then discharged to [facility] for rehab, then discharged from rehab to the [address] [place] [address] where family noticed changes today  EMS reported pt had a R sided gaze, ataxia, and L sided weakness  EMS reports pt is confused at baseline  )   The patient presented to the ED on 3/22/18 at 1929 and was admitted to the hospital on 3/22/2018 1929  History of Present Illness includes: the patient had a prior admission from 1/28-2/26 secondary to sepsis - the patient presented with febrile neutropenia and peritonitis  The patient wad found to have an intra-abdominal abscess and underwent laparascopic drainage with washout and IV abx  The patient presented with acute change in mental status and left facial droop  Vital signs in the ER are as follows   ED Triage Vitals   Temperature Pulse Respirations Blood Pressure SpO2   03/1945 03/22/18 1932 03/22/18 1932 03/22/18 1932 03/22/18 1932   98 2 °F (36 8 °C) 76 20 151/88 95 %      Temp Source Heart Rate Source Patient Position - Orthostatic VS BP Location FiO2 (%)   03/1945 03/22/18 2000 03/22/18 2000 03/22/18 2000 --   Tympanic Monitor Lying Right arm       Pain Score       03/22/18 1932       No Pain           On admission the abdomen is soft and nontender  On neuro exam there is 4/5 strength on the left side  This patient is being admitted for acute change in mental status, left facial droop and slurred speech  The etiology of the patient's symptoms are not entirely clear at the time of admission  The plan of care includes UA, neuro checks, MRI of the brain, neurology consultation, NPO   This patient is appropriate for INPATIENT admission as her length of stay is expected to be at least 2 midnights  This admission is UNRELATED to her prior admission as she was discharged in stable admission

## 2018-03-23 NOTE — CONSULTS
Consultation - Neurology   Hunter Ivey 67 y o  female MRN: [id number]  Unit/Bed#: [facility] 728-01 Encounter: [id number]      Physician Requesting Consult: Mildred Gottron, MD  Reason for Consult / Principal Problem: facial droop  Hx and PE limited by: none  Review of previous medical records  completed  Family, were present at the bedside for history and examination    Assessment/Plan:  67 y o  female with pmh of MS, hypothyroidism, HLD, diabetes, with recent LUE DVT, and recent acute right occipital and temporal lobe infarcts who presents with left facial droop, change in mental status, left sided neglect, and slurred speech and EMS reported right sided gaze  Concern for new acute right infarct or evolving right infarct  Patient not currently on antiplatelet or anticoagulation due to history of thrombocytopenia and gastric ulcer hemorrhage with perforation  CTA head and neck on 2/16/18 demonstrating 5mm segment of occlusion/critical stenosis of right P1 segment with distal reconstitution  This could be due to an embolus  Evolving infact in right PCA  1  Continue on stroke pathway  2  CTA head and neck pending  3  MRI Brain wo contrast pending  4  Consult to GI for initiation of antiplatelet (plavix) and anticoagulation with patient recent history of gastric ulcer hemorrhage and perforation and acute right evolving occipital and temporal infacrts  5  Continue lipitor  6  telemetry monitoring  7  PT/OT/SL evaluate and treat  8  SBP goal <160  9  continue neuro checks, Stat CT Head with any neuro changes and contact neurology on call       HPI: Hunter Ivey is a  67 y o  female with pmh of MS, hypothyroidism, HLD, diabetes, with recent left DVT,  right occipital and temporal lobe infarcts (per report embolic in appearance) and chronic B/L parietal (posterior circulation) and left frontal lobe infarcts (anterior circulation) who presents form [facility] [place] with change in mental status and left facial droop   EMS reported patient with right sided gaze, ataxia and left sided weakness  Of note patient was hospitalized from 1/28-2/26 for sepsis with neutropenia, peritonitis, intra abdominal abscess requiring drainage  Her coarse was further complicated by an acute perforated gastric ulcer with hemorrhage s/p Travis Fluke patch and acute occipital and temporal infarcts likely embolic  Per neurology note on 2/23/18  "will need opinion from GI if/when anticoagulation or antiplatelet therapy can started"  At that time, CTA head and neck on 2/16/18 demonstrating 5mm segment of occlusion/critical stenosis of right P1 segment with distal reconstitution  Evolving infact in right PCA  Factor IV leiden negative  RONALDO not completed due to patient instability and multisystem failure  Son at bedside and reports that yesterday patient appeared more sleepy, with general weakness, slurred speech and left facial droop which was a significant change from 2 days ago when she appeared well, eating and more interactive working with PT  Last night she was complaining of a headache and was rubbing her neck complaining of pain  He says she has had a stiff neck for approximately a week which has improved slightly  Patient is a poor historian       Today, patient appears agitated during exam, hitting my hands away during assessment and not willing to participate in full exam  She states "everything is fine" and my MS is not "acting up"    ROS: 12 system cued query: negative except as noted in HPI    NIH Stroke Scale    Flowsheet Row Most Recent Value   Level of Consciousness (1a )  0 Filed at: 03/23/2018 0100   LOC Questions (1b )  2 Filed at: 03/23/2018 0100   LOC Commands (1c )  0 Filed at: 03/23/2018 0100   Best Gaze (2 )  1 Filed at: 03/23/2018 0100   Visual (3 )  1 Filed at: 03/23/2018 0100   Facial Palsy (4 )  1 Filed at: 03/23/2018 0100   Motor Arm, Left (5a )  1 Filed at: 03/23/2018 0100   Motor Arm, Right (5b )  0 Filed at: 03/23/2018 0100 Motor Leg, Left (6a )  3 Filed at: 03/23/2018 0100   Motor Leg, Right (6b )  2 Filed at: 03/23/2018 0100   Limb Ataxia (7 )  2 Filed at: 03/23/2018 0100   Sensory (8 )  1 Filed at: 03/23/2018 0100   Best Language (9 )  2 Filed at: 03/23/2018 0100   Dysarthria (10 )  0 Filed at: 03/23/2018 0100   Extinction and Inattention (11 ) (Formerly Neglect)  1 Filed at: 03/23/2018 0100   Total  17 Filed at: 03/23/2018 0100          Historical Information     Past Medical History:   Diagnosis Date    Diabetes mellitus ([facility] [address] )     Disease of thyroid gland     Hyperlipidemia     Multiple sclerosis ([facility] [address] )      Past Surgical History:   Procedure Laterality Date    ESOPHAGOGASTRODUODENOSCOPY N/A 2/12/2018    Procedure: ESOPHAGOGASTRODUODENOSCOPY (EGD); Surgeon: Lexy Hummel MD;  Location: [facility]; Service: Gastroenterology    MS LAP,DIAGNOSTIC ABDOMEN N/A 1/29/2018    Procedure: LAPAROSCOPY DIAGNOSTIC, with repair of perforated gastric ulcer;  Surgeon: Memo Bolton MD;  Location: [facility];  Service: General       Social History   Former smoker  No alcohol use  No illicit drug use      Family History: History reviewed  No pertinent family history  No Known Allergies  Meds:  all current active meds have been reviewed and PTA meds:   Prior to Admission Medications   Prescriptions Last Dose Informant Patient Reported? Taking?    DULoxetine (CYMBALTA) 20 mg capsule   No Yes   Sig: Take 1 capsule (20 mg total) by mouth daily   OLANZapine (ZyPREXA) 2 5 mg tablet   Yes Yes   Sig: Take 5 mg by mouth daily at bedtime   alendronate (FOSAMAX) 70 mg tablet   Yes Yes   Sig: Take 70 mg by mouth every 7 days   atorvastatin (LIPITOR) 40 mg tablet   Yes Yes   Sig: Take 40 mg by mouth daily   atorvastatin (LIPITOR) 40 mg tablet   No Yes   Sig: Take 1 tablet (40 mg total) by mouth daily with dinner   azaTHIOprine (IMURAN) 50 mg tablet   Yes Yes   Sig: Take 50 mg by mouth 3 (three) times a day   cholecalciferol (VITAMIN D3) 1,000 units tablet   Yes Yes   Sig: Take 1,000 Units by mouth daily   collagenase (SANTYL) ointment   No Yes   Sig: Apply topically daily   ergocalciferol (VITAMIN D2) 50,000 units   Yes Yes   Sig: Take 50,000 Units by mouth once a week wednesday    glipiZIDE (GLUCOTROL) 10 mg tablet   Yes Yes   Sig: Take 5 mg by mouth 2 (two) times a day before meals     hydrALAZINE (APRESOLINE) 50 mg tablet   Yes Yes   Sig: Take 50 mg by mouth 3 (three) times a day   hydrochlorothiazide (MICROZIDE) 12 5 mg capsule   Yes Yes   Sig: Take 12 5 mg by mouth daily   insulin aspart (NovoLOG) 100 units/mL injection   Yes Yes   Sig: Inject 1-10 Units under the skin 3 (three) times a day before meals   levothyroxine 200 mcg tablet   Yes Yes   Sig: Take 200 mcg by mouth daily Skip Sundays    lisinopril (ZESTRIL) 20 mg tablet   Yes Yes   Sig: Take 20 mg by mouth daily at bedtime   lisinopril (ZESTRIL) 40 mg tablet   No Yes   Sig: Take 1 tablet (40 mg total) by mouth daily   metFORMIN (GLUCOPHAGE) 1000 MG tablet   Yes Yes   Sig: Take 1,000 mg by mouth 2 (two) times a day with meals   metoprolol tartrate (LOPRESSOR) 50 mg tablet   Yes Yes   Sig: Take 50 mg by mouth every 12 (twelve) hours   pantoprazole (PROTONIX) 40 mg tablet   No Yes   Sig: Take 1 tablet (40 mg total) by mouth 2 (two) times a day   pantoprazole (PROTONIX) 40 mg tablet   No Yes   Sig: Take 1 tablet (40 mg total) by mouth 2 (two) times a day before meals   saccharomyces boulardii (FLORASTOR) 250 mg capsule   Yes Yes   Sig: Take 250 mg by mouth 2 (two) times a day   sucralfate (CARAFATE) 1 g/10 mL suspension   No Yes   Sig: Take 10 mL (1,000 mg total) by mouth 4 (four) times a day (before meals and at bedtime)      Facility-Administered Medications: None       Scheduled Meds:  Current Facility-Administered Medications:  atorvastatin 40 mg Oral Daily With Jordon Fuentes MD   azaTHIOprine 50 mg Oral TID Yelena Pelayo MD   cholecalciferol 1,000 Units Oral Daily Yelena Pelayo MD   DULoxetine 20 mg Oral Daily Lashae Self MD   hydrALAZINE 5 mg Intravenous Q6H PRN Munira Turner PA-C   hydrALAZINE 50 mg Oral TID Lashae Self MD   hydrochlorothiazide 12 5 mg Oral Daily Lashae Self MD   insulin lispro 1-6 Units Subcutaneous TID [facility] Lashae Self MD   levothyroxine 200 mcg Oral Early Morning Lashae Self MD   lisinopril 20 mg Oral HS Lashae Self MD   lisinopril 40 mg Oral Daily Lashae Self MD   LORazepam 0 5 mg Intravenous Once PRN Isabelle De MD   metoprolol tartrate 50 mg Oral Q12H [facility] Lashae Self MD   pantoprazole 40 mg Oral BID AC Lashae Self MD   sucralfate 1,000 mg Oral 4x Daily (AC & HS) Lashae Self MD     PRN Meds: hydrALAZINE    LORazepam        Physical Exam:   Objective   Vitals:Blood pressure 154/59, pulse 74, temperature 99 2 °F (37 3 °C), temperature source Oral, resp  rate 18, height 5' 6" (1 676 m), weight 75 8 kg (167 lb), SpO2 96 %  ,Body mass index is 26 95 kg/m²  Physical Exam   Constitutional:   Chronically ill appearing, agitated, cachectic   HENT:   Head: Normocephalic and atraumatic  Mouth/Throat: Oropharynx is clear and moist    Cardiovascular: Normal rate, regular rhythm, normal heart sounds and intact distal pulses  Pulmonary/Chest: Effort normal and breath sounds normal  No respiratory distress  Abdominal: Soft  Bowel sounds are normal  She exhibits no distension  Neurological:   Reflex Scores:       Tricep reflexes are 2+ on the right side and 2+ on the left side  Bicep reflexes are 2+ on the right side and 2+ on the left side  Brachioradialis reflexes are 2+ on the right side and 2+ on the left side  Patellar reflexes are 0 on the right side and 0 on the left side  Skin: Skin is warm and dry  Psychiatric:   Agitated and slightly combative   Vitals reviewed  Neurologic Exam     Mental Status   Oriented to person  Disoriented to place  Disoriented to time  Patient waxes and wanes with participation in exam, is agitated and combative  She is awake but drowsy, oriented to self and  only  Cranial Nerves     CN VII   Left facial weakness: left facial droop  CN XI   Left sternocleidomastoid strength: weak  Left trapezius strength: weak    CN XII   Tongue deviation: left    Motor Exam   Muscle bulk: normal  Right arm tone: normal  Left arm tone: decreased    Sensory Exam   Light touch normal    Vibration normal      Gait, Coordination, and Reflexes     Tremor   Resting tremor: absent    Reflexes   Right brachioradialis: 2+  Left brachioradialis: 2+  Right biceps: 2+  Left biceps: 2+  Right triceps: 2+  Left triceps: 2+  Right patellar: 0  Left patellar: 0  Right plantar: normal  Left plantar: upgoing  Right Hummel: absent  Left Hummel: absent      Lab Results:   I have personally reviewed pertinent reports  CBC:   Results from last 7 days  Lab Units 18  2212   WBC Thousand/uL 7 44   RBC Million/uL 2 70*   HEMOGLOBIN g/dL 8 8*   HEMATOCRIT % 27 9*   MCV fL 103*   PLATELETS Thousands/uL 153     BMP/CMP:   Results from last 7 days  Lab Units 18  2212   SODIUM mmol/L 142   POTASSIUM mmol/L 4 0   CHLORIDE mmol/L 107   CO2 mmol/L 29   ANION GAP mmol/L 6   BUN mg/dL 15   CREATININE mg/dL 0 55*   GLUCOSE RANDOM mg/dL 117   CALCIUM mg/dL 8 9   EGFR ml/min/1 73sq m 94     HgBA1C:   Results from last 7 days  Lab Units 18  0451   HEMOGLOBIN A1C % 5 0       Lipid Profile:   Results from last 7 days  Lab Units 18  0451   HDL mg/dL 42   CHOLESTEROL mg/dL 124   LDL CALC mg/dL 56   TRIGLYCERIDES mg/dL 130       Imaging Studies: I have personally reviewed pertinent reports  and I have personally reviewed pertinent films in PACS    18 CTA head and neck:FINDINGS:  NONCONTRAST BRAIN     PARENCHYMA:  Right occipital hypoattenuation likely related to evolving infarct  Severe bilateral microangiopathy      Hyperattenuation along the right posterior temporal parieto-occipital cortex likely related to petechial hemorrhage related to the infarct process  No evidence of intraparenchymal hemorrhage    VENTRICLES AND EXTRA-AXIAL SPACES:  Normal for patient's age    VISUALIZED ORBITS AND PARANASAL SINUSES:  Mild left sphenoid sinus disease    CALVARIUM AND EXTRACRANIAL SOFT TISSUES:   Normal       CERVICAL VASCULATURE   AORTIC ARCH AND GREAT VESSELS:  Moderate ahteroschlerotic disease of the arch and great vessels     RIGHT VERTEBRAL ARTERY CERVICAL SEGMENT:  Normal origin  The vessel is normal in caliber throughout the neck    LEFT VERTEBRAL ARTERY CERVICAL SEGMENT:  Normal origin  The vessel is normal in caliber throughout the neck    RIGHT EXTRACRANIAL CAROTID SEGMENT:  Moderate atherosclerotic disease of the bifurcation      LEFT EXTRACRANIAL CAROTID SEGMENT:  Moderate atherosclerotic disease of the bifurcation      NASCET criteria was used to determine the degree of internal carotid artery diameter stenosis        INTRACRANIAL VASCULATURE      INTERNAL CAROTID ARTERIES:  Normal enhancement of the intracranial portions of the internal carotid arteries  Normal ophthalmic artery origins  Normal ICA terminus     ANTERIOR CIRCULATION:  Symmetric A1 segments and anterior cerebral arteries with normal enhancement  Normal anterior communicating artery    MIDDLE CEREBRAL ARTERY CIRCULATION:  M1 segment and middle cerebral artery branches demonstrate normal enhancement bilaterally    DISTAL VERTEBRAL ARTERIES:  Normal distal vertebral arteries  Posterior inferior cerebellar artery origins are normal  Normal vertebral basilar junction    BASILAR ARTERY:  Basilar artery is normal in caliber  Normal superior cerebellar arteries    POSTERIOR CEREBRAL ARTERIES: There is 5 mm segment occlusion/critical stenosis of the right P1 segment with distal reconstitution    Left posterior artery is unremarkable     DURAL VENOUS SINUSES:  Normal       NON VASCULAR ANATOMY   BONY STRUCTURES:  No acute osseous abnormality    SOFT TISSUES OF THE NECK:  Prominent deep cervical and left supra clavicular lymph nodes  8 mm short axis precarinal lymph node    THORACIC INLET:  Unremarkable    IMPRESSION:   1   5 mm segment of occlusion/critical stenosis of the right P1 segment with distal reconstitution  This could be due to an embolus  2   Evolving infarct in the right PCA distribution  3   Microangiopathy  EEG, Echo, Pathology, and Other Studies: I have personally reviewed pertinent reports  and I have personally reviewed pertinent films in PACS    2/08/18 VAS B/L upper limb venous duplex:   CONCLUSION:Impression  RIGHT UPPER LIMB LIMITED:  Evaluation shows no evidence of thrombus in the internal jugular vein,  subclavian vein, and the brachiocephalic vein  LEFT UPPER LIMB: Abnormal  (LIMITED)  There is acute deep vein thrombosis in the axillary vein  There is acute superficial thrombophlebitis in the visualized cephalic vein in  the mid and proximal upper arm  Doppler evaluation shows a normal response to augmentation maneuvers  2/14/18 Echo: SUMMARY   LEFT VENTRICLE:  Systolic function was normal  Ejection fraction was estimated in the range of 55 % to 60 %  There were no regional wall motion abnormalities  Wall thickness was moderately to markedly increased    There was mild assymetrical hypertrophy of the septum    LEFT ATRIUM:  The atrium was mildly dilated    MITRAL VALVE:  There was mild regurgitation    AORTIC VALVE:  There was moderate regurgitation    TRICUSPID VALVE:  There was mild regurgitation    PULMONIC VALVE:  There was mild regurgitation      VTE Prophylaxis: Sequential compression device (Venodyne)

## 2018-03-23 NOTE — PLAN OF CARE
Problem: SLP ADULT - SWALLOWING, IMPAIRED  Goal: Initial SLP swallow eval performed  Outcome: Completed Date Met: 03/23/18

## 2018-03-23 NOTE — ED NOTES
Dr Atul Clarke with [phone] responded and at nurses station        Adrian Perez RN  03/22/18 2001

## 2018-03-23 NOTE — SOCIAL WORK
MCG Guide Used for Initial Round: Stroke: Ischemic RRG  Optimal GLOS: 2  Hospital Day: 1 day  DC Readiness:   Discharge Readiness  Return to top of Stroke: Ischemic RRG - ISC  · Discharge readiness is indicated by patient meeting Recovery Milestones, including ALL of the following:  ? Hemodynamic stability  ? Mental status at baseline or stable  ? Neurologic deficits absent or stable  ? Unimpaired swallowing  ? Tolerates sitting in chair  ? Dangerous arrhythmia absent  ? Oral hydration, medications, diet  ? Discharge plans and education understood    Identified Barriers: MRI to be completed    Discussion Date (Time): 03/23/18 with Dr Nilda Lawson

## 2018-03-23 NOTE — CASE MANAGEMENT
Initial Clinical Review    Admission: Date/Time/Statement: 3/22/18 @ 2153     Orders Placed This Encounter   Procedures    Inpatient Admission     Standing Status:   Standing     Number of Occurrences:   1     Order Specific Question:   Admitting Physician     Answer:   Kishore Cassidy [[id number]]     Order Specific Question:   Level of Care     Answer:   Med Surg [16]     Order Specific Question:   Estimated length of stay     Answer:   More than 2 Midnights     Order Specific Question:   Certification     Answer:   I certify that inpatient services are medically necessary for this patient for a duration of greater than two midnights  See H&P and MD Progress Notes for additional information about the patient's course of treatment  ED: Date/Time/Mode of Arrival:   ED Arrival Information     Expected Arrival Acuity Means of Arrival Escorted By Service Admission Type    3/22/2018 17:11 3/22/2018 19:29 Immediate Ambulance [facility] Emergency    Arrival Complaint    stroke symptoms          Chief Complaint:   Chief Complaint   Patient presents with    Facial Droop     Per EMS pt's family noticed a L sided facial droop at [address] [place] [address] today  Per EMS pt was previously seen here for a GI bleed and CVA while admitted  Pt was then [facility], then discharged from rehab to the [address] [place] [address] where family noticed changes today  EMS reported pt had a R sided gaze, ataxia, and L sided weakness  EMS reports pt is confused at baseline  History of Illness:       Kourtney Leslie is a 67 y o  female with a medical history significant for diabetes, MS, hypothyroidism, and HLD who presents from [address] [place] [address] with c/o AMS and left facial droop  She was recently admitted to [facility] 1/28-2/26 with sepsis with neutropenia, peritonitis intra-abdominal abscess requiring laparoscopic drainage s/p COREY drain  Cultures grew Canddida and lactobacillus   Her Imuran was discontinued due to pancytopenia of unclear etiology and she was continued on abx with fluconazole and augmentin  Course c/b acute perforated gastric ulcer with hemorrhage s/p Dario Hensley patch  Would also note that during that admission, she had an acute right occipital and temporal lobe infarcts thought to be embolic  Per Neurology note on 2/23, he "will need opinion from GI if/when anticoagulation or antiplatelet therapy can started"  On day of discharge, he was not a candidate for anticoag due to thrombocytopenia and GI blood loss  Today, her son accompanies patient and reports that she was doing well yesterday  Today, she was to be transferred to [facility] rehab from her LTAC however was noted to be confused and had facial droop  She was sent to the OSH where CT brain showed cerebral infarction involving portions of the right occipital, posterior parietal, and temporal lobes  Also with  areas of increased cortical density are seen at the anterior margin of the infarction which could relate to hemorrhagic byproducts r/t laminar necrosis  She was initially to be transferred to [facility] MIC[facility] however Critical Care reviewed images and appeared to be evolving prior stroke  On my evaluation, patient is confused  Son reports that she has been slurring her speech  Son believes she hasn't been moving her left arm as well as he knows her to        ED Vital Signs:   ED Triage Vitals   Temperature Pulse Respirations Blood Pressure SpO2   03/1945 03/22/18 1932 03/22/18 1932 03/22/18 1932 03/22/18 1932   98 2 °F (36 8 °C) 76 20 151/88 95 %      Temp Source Heart Rate Source Patient Position - Orthostatic VS BP Location FiO2 (%)   03/1945 03/22/18 2000 03/22/18 2000 03/22/18 2000 --   Tympanic Monitor Lying Right arm       Pain Score       03/22/18 1932       No Pain        Wt Readings from Last 1 Encounters:      03/22/18 75 8 kg (167 lb)       Vital Signs (abnormal):     03/23/18 0200  98 9 °F (37 2 °C)  68 18  150/72  96 %  None (Room air)  Lying   03/23/18 0100  98 2 °F (36 8 °C)  77  20   178/60  95 %  None (Room air)  Lying   03/23/18 0000  --  74  22   178/74  98 %  None (Room air)  Lying   03/22/18 2315  --  68  20  145/65  98 %  None (Room air)  Lying   03/22/18 2300  --  74   27  169/72  97 %  None (Room air)  Lying   03/22/18 2200  --  70   23   175/74  97 %  None (Room air)  Lying   03/22/18 2130  --  76   23   177/73  95 %  None (Room air)  Lying   03/22/18 2115  --  70  22  144/55  97 %  None (Room air)  Lying   03/22/18 2045 --  70   24  161/79  95 %  None (Room air)  Lying   03/22/18 2015 --  68   25   210/77  97 %  None (Room air)  Lying         Abnormal Labs/Diagnostic Test Results:       CT brain:  1  E/o cerebral infarction involving portions of the right occipital, posterior parietal, and temporal lobes  Chronicity uncertain  2  Serpiginous areas of increased cortical density are seen at the anterior margin of the infarction which could relate to hemorrhagic byproducts r/t laminar necrosis  3  Cerebral and cerebellar atrophy with evidence of diffuse ischemic small vessel disease           Past Medical/Surgical History:    Active Ambulatory Problems     Diagnosis Date Noted    Pancytopenia ([address] ) 01/28/2018    Type 2 diabetes mellitus with hyperglycemia ([address] ) 01/28/2018    Hypoalbuminemia due to protein-calorie malnutrition ([address] ) 01/29/2018    Sepsis ([address] ) 02/05/2018    Acute perforated gastric ulcer with hemorrhage ([address] ) 02/05/2018    Acute blood loss anemia 02/05/2018    Hypothyroidism 02/05/2018    Hyperlipidemia 02/05/2018    Multiple sclerosis ([address] ) 02/05/2018    Cerebrovascular accident (CVA) due to embolism of precerebral artery ([address] ) 02/24/2018    Deep tissue injury 02/25/2018    Severe protein-calorie malnutrition ([address] ) 02/26/2018     Resolved Ambulatory Problems     Diagnosis Date Noted    BEVERLY (acute kidney injury) ([address] ) 01/28/2018    Pneumoperitoneum 01/28/2018    Peritonitis ([facility] ) 02/05/2018    Hypernatremia 72/08/0005    Metabolic encephalopathy 27/23/3686     Past Medical History:    Diabetes mellitus ([facility] )    Disease of thyroid gland    Hyperlipidemia    Multiple sclerosis (HCC)       Admitting Diagnosis: Facial droop [R29 810]  Stroke-like symptoms [R29 90]  Cerebrovascular accident (CVA) due to embolism of precerebral artery ([facility] ) [I63 10]    Age/Sex: 67 y o  female    Assessment/Plan:     67 y o  female with a medical history significant for diabetes, MS, hypothyroidism, and HLD who presents from [facility] with c/o AMS and left facial droop      # AMS, left facial droop, slurred speech  - CT brain with right sided temporal, parietal, and occipital lobes (infarct was seen on 2/14 in same area), also with increased cortical density with ?hemorrhagic byproducts  Would also consider acute infection causing AMS  - check UA  - q4h neuro checks  - MRI ordered  - neuro consult  - telemetry  - NPO until swallow eval and improvement in mentation  - main concern is that she has ?embolic phenomena based on last discharge summary however no anticoag started given GI bleed history  However, given hemorrhagic byproducts on CT, would also avoid anticoag for now   Will need further discussions  - holding sedating meds (zyprexa)     # HTN  - elevated BPs to 170s  - will continue outpatient DE LOS SANTOS, lisinopril, HCTZ, metoprolol     # GERD - cont ppi  # MS - cont azathioprine  # HLD - cont statin  # Depression - cont cymbalta  # DM - SSI, holding glipizide, metformin  # Hypothyroidism - cont levothyroxine    CONSULT PHYSICAL MEDICINE AND REHABILITATION    Continue PT/OT while in hospital  - Patient will likely need referral to sub-acute rehabilitation once medically stable      Admission Orders:    MRI BRAIN  DYSPHAGIA DIET   CONSULT WOUND CARE   SEQUENTIAL COMPRESSION DEVICE  STROKE EDUCATION  DYSPHAGIA ASSESSMENT  TELEMETRY  NEURO CHECKS  Q4 HR  CONSULT NEUROLOGY  Flash Frazier / [place] / Carlos A Miller EVAL AND TREAT    Reason for not initiating IV thrombolytic: Last known well       Scheduled Meds:   Current Facility-Administered Medications:  atorvastatin 40 mg Oral Daily With Dinner   azaTHIOprine 50 mg Oral TID   cholecalciferol 1,000 Units Oral Daily   DULoxetine 20 mg Oral Daily   hydrALAZINE 5 mg Intravenous Q6H PRN   hydrALAZINE 50 mg Oral TID   hydrochlorothiazide 12 5 mg Oral Daily   insulin lispro 1-6 Units Subcutaneous TID AC   levothyroxine 200 mcg Oral Early Morning   lisinopril 20 mg Oral HS   lisinopril 40 mg Oral Daily   LORazepam 0 5 mg Intravenous Once PRN   metoprolol tartrate 50 mg Oral Q12H JT   pantoprazole 40 mg Oral BID AC   sucralfate 1,000 mg Oral 4x Daily (AC & HS)     Continuous Infusions:    PRN Meds: hydrALAZINE    LORazepam

## 2018-03-23 NOTE — H&P
History and Physical - [facility]    Patient Information: Amalia Cedillo 67 y o  female MRN: [id number]  Unit/Bed#: [facility] 728-01 Encounter: [id number]  Admitting Physician: Luly Augustine MD  PCP: Ijeoma Bruce DO  Date of Admission:  03/23/18      Chief Complaint: AMS, left facial droop    History of Present Illness:   Amalia Cedillo is a 67 y o  female with a medical history significant for diabetes, MS, hypothyroidism, and HLD who presents from [facility] with c/o AMS and left facial droop  She was recently admitted to [facility] 1/28-2/26 with sepsis with neutropenia, peritonitis intra-abdominal abscess requiring laparoscopic drainage s/p COREY drain  Cultures grew Canddida and lactobacillus  Her Imuran was discontinued due to pancytopenia of unclear etiology and she was continued on abx with fluconazole and augmentin  Course c/b acute perforated gastric ulcer with hemorrhage s/p Jorene Ion patch  Would also note that during that admission, she had an acute right occipital and temporal lobe infarcts thought to be embolic  Per Neurology note on 2/23, he "will need opinion from GI if/when anticoagulation or antiplatelet therapy can started"  On day of discharge, he was not a candidate for anticoag due to thrombocytopenia and GI blood loss  Today, her son accompanies patient and reports that she was doing well yesterday  Today, she was to be transferred to [facility] rehab from her LTAC however was noted to be confused and had facial droop  She was sent to the OSH where CT brain showed cerebral infarction involving portions of the right occipital, posterior parietal, and temporal lobes  Also with  areas of increased cortical density are seen at the anterior margin of the infarction which could relate to hemorrhagic byproducts r/t laminar necrosis  She was initially to be [facility] however Critical Care reviewed images and appeared to be evolving prior stroke   On my evaluation, patient is confused  Son reports that she has been slurring her speech  He says she hasn't complained of any pains  Patient denies chest pain, sob, abdominal pain, vomiting, diarrhea, dysuria  Son believes she hasn't been moving her left arm as well as he knows her to  Past Medical  Past Medical History:   Diagnosis Date    Diabetes mellitus ([facility] )     Disease of thyroid gland     Hyperlipidemia     Multiple sclerosis ([facility] )        Past Surgical History:   Past Surgical History:   Procedure Laterality Date    ESOPHAGOGASTRODUODENOSCOPY N/A 2/12/2018    Procedure: ESOPHAGOGASTRODUODENOSCOPY (EGD); Surgeon: Bart Chery MD;  Location: [facility]; Service: Gastroenterology    LA LAP,DIAGNOSTIC ABDOMEN N/A 1/29/2018    Procedure: LAPAROSCOPY DIAGNOSTIC, with repair of perforated gastric ulcer;  Surgeon: Deja Atkinson MD;  Location: [facility];  Service: General       Home Medications:   Prior to Admission medications    Medication Sig Start Date End Date Taking?  Authorizing Provider   alendronate (FOSAMAX) 70 mg tablet Take 70 mg by mouth every 7 days    Historical Provider, MD   atorvastatin (LIPITOR) 40 mg tablet Take 40 mg by mouth daily    Historical Provider, MD   atorvastatin (LIPITOR) 40 mg tablet Take 1 tablet (40 mg total) by mouth daily with dinner 2/26/18   Hallie Yi MD   azaTHIOprine (IMURAN) 50 mg tablet Take 50 mg by mouth 3 (three) times a day    Historical Provider, MD   cholecalciferol (VITAMIN D3) 1,000 units tablet Take 1,000 Units by mouth daily    Historical Provider, MD   collagenase (SANTYL) ointment Apply topically daily 2/27/18   Hallie Yi MD   DULoxetine (CYMBALTA) 20 mg capsule Take 1 capsule (20 mg total) by mouth daily 2/27/18   Hallie Yi MD   ergocalciferol (VITAMIN D2) 50,000 units Take 50,000 Units by mouth once a week wednesday     Historical Provider, MD   glipiZIDE (GLUCOTROL) 10 mg tablet Take 5 mg by mouth 2 (two) times a day before meals      Historical Provider, MD   insulin glargine (LANTUS) 100 units/mL subcutaneous injection Inject 6 Units under the skin every morning 2/27/18   Ada Case MD   levothyroxine 200 mcg tablet Take 200 mcg by mouth daily Skip Sundays     Historical Provider, MD   lisinopril (ZESTRIL) 40 mg tablet Take 1 tablet (40 mg total) by mouth daily 2/27/18   Ada Case MD   metFORMIN (GLUCOPHAGE) 1000 MG tablet Take 1,000 mg by mouth 2 (two) times a day with meals    Historical Provider, MD   pantoprazole (PROTONIX) 40 mg tablet Take 1 tablet (40 mg total) by mouth 2 (two) times a day 2/7/18   Agapito Calhoun MD   pantoprazole (PROTONIX) 40 mg tablet Take 1 tablet (40 mg total) by mouth 2 (two) times a day before meals 2/26/18   Ada Case MD   sucralfate (CARAFATE) 1 g/10 mL suspension Take 10 mL (1,000 mg total) by mouth 4 (four) times a day (before meals and at bedtime) 2/26/18   Ada Case MD       Home meds reviewed and reconciled based on records    Allergies:  No Known Allergies    Social History:  Social History   Substance Use Topics    Smoking status: Former Smoker     Types: Cigarettes     Quit date: 2007    Smokeless tobacco: Never Used    Alcohol use Yes      Comment: rarely     History   Drug Use No       Family History:   History reviewed  No pertinent family history  Review of Systems:    All other review of systems reviewed and are negative except for as above in HPI  Physical Exam:    Temp: 98 9 °F (37 2 °C) (Oral)  HR:  [68-77] 68  Resp:  [18-29] 18  BP: (144-210)/(55-88) 150/72  SpO2:  [95 %-98 %] 96 %  Body mass index is 26 95 kg/m²    Height: 5' 6" (167 6 cm) Weight - Scale: 75 8 kg (167 lb)     No intake or output data in the 24 hours ending 03/23/18 0233    General: Awake, alert, no acute distress  HEENT: NC/AT, moving eyes in all directions, mmm  Neck: supple, no LAD  Lungs: CTA anteriorly  Heart[de-identified] regular, nl S1/S2  Abdomen: +BS, soft, NT/ND  Ext: no LE edema  Skin: no rash  Neuro: 4/5 strength on left, 5/5 on right, sensation diminished in bl LEs (reported chronic), CN II exam is limited as patient has gross deficits which appear to be present in left eye, decreased strength in turning head to right, left facial droop, O x self    Labs:  WBC 7 6, Hb 9 4, Plt 189  Na 140, K 3 9, Cr 0 46    I have personally reviewed pertinent reports  and I have personally reviewed pertinent films in PACS    Imaging:    CT brain:  1  E/o cerebral infarction involving portions of the right occipital, posterior parietal, and temporal lobes  Chronicity uncertain  2  Serpiginous areas of increased cortical density are seen at the anterior margin of the infarction which could relate to hemorrhagic byproducts r/t laminar necrosis  3  Cerebral and cerebellar atrophy with evidence of diffuse ischemic small vessel disease    EKG: pending    I have personally reviewed pertinent reports  and I have personally reviewed pertinent films in PACS    Assessment/Plan:   67 y o  female with a medical history significant for diabetes, MS, hypothyroidism, and HLD who [facility]/o [facility] and left facial droop  # AMS, left facial droop, slurred speech  - CT brain with right sided temporal, parietal, and occipital lobes (infarct was seen on 2/14 in same area), also with increased cortical density with ?hemorrhagic byproducts  Would also consider acute infection causing AMS  - check UA  - q4h neuro checks  - MRI ordered  - neuro consult  - telemetry  - NPO until swallow eval and improvement in mentation  - main concern is that she has ?embolic phenomena based on last discharge summary however no anticoag started given GI bleed history  However, given hemorrhagic byproducts on CT, would also avoid anticoag for now   Will need further discussions  - holding sedating meds (zyprexa)    # HTN  - elevated BPs to 170s  - will continue outpatient DE LOS SANTOS, lisinopril, HCTZ, metoprolol    # GERD - cont ppi  # MS - cont azathioprine  # HLD - cont statin  # Depression - cont cymbalta  # DM - SSI, holding glipizide, metformin  # Hypothyroidism - cont levothyroxine      FEN: Diet NPO  PPx:  Pharmacologic VTE Prophylaxis contraindicated due to hemorrhagic findings on CT brain  / sequential compression device   Code: Level 2 - DNAR: but accepts endotracheal intubation, as noted on records  Dispo: HLOC    I have discussed the plan of care with: patient, son    Anticipated Length of Stay:  Patient will be admitted on an Inpatient basis with an anticipated length of stay of greater than 2 midnights  Justification for Hospital Stay: encephalopathy, stroke    Total Time for Visit, including Counseling / Coordination of Care: 1 hour  Greater than 50% of this total time spent on direct patient counseling and coordination of care      Allscripts / [employer] Records Reviewed: Yes       Signature:  Keith Goldmann, MD     Electronic Signature

## 2018-03-23 NOTE — PLAN OF CARE
Problem: DISCHARGE PLANNING - CARE MANAGEMENT  Goal: Discharge to post-acute care or home with appropriate resources  INTERVENTIONS:  - Conduct assessment to determine patient/family and health care team treatment goals, and need for post-acute services based on payer coverage, community resources, and patient preferences, and barriers to discharge  - Address psychosocial, clinical, and financial barriers to discharge as identified in assessment in conjunction with the patient/family and health care team  - Arrange appropriate level of post-acute services according to patient's   needs and preference and payer coverage in collaboration with the physician and health care team  - Communicate with and update the patient/family, physician, and health care team regarding progress on the discharge plan  - Arrange appropriate transportation to post-acute venues  - Plan for discharge [facility]    Outcome: Progressing

## 2018-03-23 NOTE — PLAN OF CARE
Problem: OCCUPATIONAL THERAPY ADULT  Goal: Performs self-care activities at highest level of function for planned discharge setting  See evaluation for individualized goals  Treatment Interventions: ADL retraining, Functional transfer training, Visual perceptual retraining, UE strengthening/ROM, Endurance training, Cognitive reorientation, Patient/family training, Equipment evaluation/education, Neuromuscular reeducation, Fine motor coordination activities, Compensatory technique education, Continued evaluation, Energy conservation, Activityengagement          See flowsheet documentation for full assessment, interventions and recommendations  Limitation: Decreased ADL status, Decreased UE ROM, Decreased UE strength, Decreased Safe judgement during ADL, Decreased cognition, Decreased endurance, Decreased sensation, Visual deficit, Decreased fine motor control, Decreased self-care trans, Decreased high-level ADLs, Non-func R UE (BALANCE, MEDICAL STATUS)  Prognosis: Fair  Assessment: PT IS A 71 YO F ADMIT AS TRANSFER FROM [address] [place] [address]  PT ACTUALLY TRANSFERRED FROM THE [facility]  [place]  W/ AMS AND L FACIAL DROOP  PT W/ RECENT ADMISSION TO [facility]28-2/[facility] AND WAS TRANSFERRED TO [facility] AT [facility] ON 3/22 HOURS BEFORE TRANSFER TO [facility]  AT [address] [place] [address], CT BRAIN REVEALED CEREBRAL INFARCTION INVOLVING PORTIONS OF THE R OCCIPITAL, POSTERIOR PARIETAL AND TEMPORAL LOBS  CT BRAIN AND MRI BRAIN AT [facility] PENDING AT THIS TIME  PT W/ PMH SIGNIFICANT FOR DM, MS, HYPOTHYROIDISM, HLD, EGD, THYROID DISEASE, PERFORATED GASTRIC ULCER, RECENT R OCCIPITAL AND TEMPORAL LOBE INFARCTS  SINCE [facility] ADMISSION PT HAS REQUIRED ASSIST FOR ADL/MOBILITY  PT HAS COMPLETED BED MOBILITY W/ A X1-2 AND REQUIRES A X1 FOR ADLS  CURRENTLY PT REQUIRES TOTAL ASSIST UB/LB ADLS, MAX A X2 BED MOBILITY AND MAX A TO MAINTAIN SITTING BALANCE AT EOB   PT NOTED W/ INVOLUNTARY MOVEMENTS OF RUE T/O EVALUATION AND SIGNIFICANT LUE HEMIPARESIS/HYPOTONICITY  PT ADD'L NOTED W/ IMPAIRED VISION HOWEVER ASSESSMENT LIMITED DUE TO COGNITIVE DEFICITS  PT APPEARS LIMITED 2* IMPAIRED BALANCE, ACTIVITY TOLERANCE, MEDICAL STATUS, ADL IMPAIRMENTS, WEAKNESS, DECONDITIONING, UE STRENGTH/ROM/COORDINATION/TONE DEFICITS, SKIN INTEGRITY, VISION IMPAIRMENTS AS WELL AS IMPAIRED ATTENTION, INSIGHT, SAFETY, JUDGEMENT, ORIENTATION, PROCESSING AND RECALL  FROM OT PERSPECTIVE, PT WILL BENEFIT FROM CONTINUED OT SERVICES IN AN INPT REHAB SETTING PENDING FURTHER PROGRESS AND MEDICAL STABILITY  WILL CONTINUE TO FOLLOW PT 2-3/XWEEK IN ORDER TO MEET THE BELOW DESCRIBED GOALS IN 10-14 DAYS        OT Discharge Recommendation: Short Term Rehab  OT - OK to Discharge:  (TO STR AT THIS TIME)

## 2018-03-23 NOTE — ASSESSMENT & PLAN NOTE
Most likely due to stroke  Patient very sleepy and drowsy as per family  Low suspicion for infection at this point with no fever or leukocytosis  Continue to monitor

## 2018-03-23 NOTE — PLAN OF CARE
Problem: PHYSICAL THERAPY ADULT  Goal: Performs mobility at highest level of function for planned discharge setting  See evaluation for individualized goals  Treatment/Interventions: Functional transfer training, LE strengthening/ROM, Therapeutic exercise, Endurance training, Patient/family training, Equipment eval/education, Bed mobility  Equipment Recommended: Wheelchair       See flowsheet documentation for full assessment, interventions and recommendations  Prognosis: Guarded  Problem List: Decreased strength, Decreased endurance, Impaired balance, Decreased mobility, Decreased coordination, Decreased cognition, Impaired judgement, Decreased safety awareness, Impaired tone (uncontroledd R sided mvmts)  Assessment: Pt seen for high complexity physical therapy evaluation  Pt is a 68 y/o female w history/comorbidities of DM, MS, hypothyroidism, HLD, recent admit to [facility] w/ sepsis, peritonitis, abscess and recent R occipital /temporal lobe infarcts now readmitted from snf rehab w/ worsening MS, L facial droop, confusion w/ concern for acute CVA  seen at outside institution, and had ? hemorrhagic conversion on imaging  to have MRI while here  Due to unclear medical dx w/ cog changes, fall risk, note unstable clinical picture  PT consulted to assess mobility, d/c needs  Pt presents w/ decreased bed mob, sitting balance, endurance, B LE strength and coordination, barriers at home  will benefit from skilled PT to correct for the above problems, as well as to assess transfers, standing balance when appropriate  Will need to return to rehab when stable        Recommendation:  (recommend return to rehab at d/c)     PT - OK to Discharge: (S) Yes (to rehab when stable)    See flowsheet documentation for full assessment

## 2018-03-23 NOTE — ASSESSMENT & PLAN NOTE
MRI shows  acute on subacute right PCA territory infarction  CTA shows occlusion of P1 segment of right PCA  Appreciate GI recommendations about anticoagulation given the recent history of perforated gastric ulcer  Patient started on Plavix  Not started on any other anticoagulation at this point  No aspirin given history of peptic ulcer disease  Continue Protonix b i d  and Carafate  Monitor hemoglobin  Neurology following  Appreciate recommendations

## 2018-03-23 NOTE — SOCIAL WORK
Received phone call from Home Mane, admissions director from [facility], who stated that pt was admitted from their facility  She stated that pt is not a bedhold, but is able to return upon discharge

## 2018-03-23 NOTE — ASSESSMENT & PLAN NOTE
Recent CVA diagnosed when she was admitted to [facility] in February  Was not started on antiplatelet or anticoagulation because of perforated gastric ulcer

## 2018-03-24 PROBLEM — M54.2 NECK PAIN ON RIGHT SIDE: Status: ACTIVE | Noted: 2018-03-24

## 2018-03-24 LAB
ANION GAP SERPL CALCULATED.3IONS-SCNC: 6 MMOL/L (ref 4–13)
BUN SERPL-MCNC: 12 MG/DL (ref 5–25)
CALCIUM SERPL-MCNC: 9.2 MG/DL (ref 8.3–10.1)
CHLORIDE SERPL-SCNC: 104 MMOL/L (ref 100–108)
CO2 SERPL-SCNC: 30 MMOL/L (ref 21–32)
CREAT SERPL-MCNC: 0.49 MG/DL (ref 0.6–1.3)
ERYTHROCYTE [DISTWIDTH] IN BLOOD BY AUTOMATED COUNT: 19.8 % (ref 11.6–15.1)
GFR SERPL CREATININE-BSD FRML MDRD: 98 ML/MIN/1.73SQ M
GLUCOSE SERPL-MCNC: 137 MG/DL (ref 65–140)
GLUCOSE SERPL-MCNC: 173 MG/DL (ref 65–140)
GLUCOSE SERPL-MCNC: 269 MG/DL (ref 65–140)
HCT VFR BLD AUTO: 28.8 % (ref 34.8–46.1)
HGB BLD-MCNC: 9.2 G/DL (ref 11.5–15.4)
MCH RBC QN AUTO: 33.2 PG (ref 26.8–34.3)
MCHC RBC AUTO-ENTMCNC: 31.9 G/DL (ref 31.4–37.4)
MCV RBC AUTO: 104 FL (ref 82–98)
PLATELET # BLD AUTO: 157 THOUSANDS/UL (ref 149–390)
PMV BLD AUTO: 9.8 FL (ref 8.9–12.7)
POTASSIUM SERPL-SCNC: 3.6 MMOL/L (ref 3.5–5.3)
RBC # BLD AUTO: 2.77 MILLION/UL (ref 3.81–5.12)
SODIUM SERPL-SCNC: 140 MMOL/L (ref 136–145)
WBC # BLD AUTO: 7.3 THOUSAND/UL (ref 4.31–10.16)

## 2018-03-24 PROCEDURE — 80048 BASIC METABOLIC PNL TOTAL CA: CPT | Performed by: INTERNAL MEDICINE

## 2018-03-24 PROCEDURE — 85027 COMPLETE CBC AUTOMATED: CPT | Performed by: INTERNAL MEDICINE

## 2018-03-24 PROCEDURE — 82948 REAGENT STRIP/BLOOD GLUCOSE: CPT

## 2018-03-24 PROCEDURE — 99232 SBSQ HOSP IP/OBS MODERATE 35: CPT | Performed by: INTERNAL MEDICINE

## 2018-03-24 PROCEDURE — 99233 SBSQ HOSP IP/OBS HIGH 50: CPT | Performed by: PSYCHIATRY & NEUROLOGY

## 2018-03-24 RX ORDER — METHOCARBAMOL 500 MG/1
500 TABLET, FILM COATED ORAL EVERY 6 HOURS PRN
Status: DISCONTINUED | OUTPATIENT
Start: 2018-03-24 | End: 2018-03-25 | Stop reason: HOSPADM

## 2018-03-24 RX ORDER — BUTALBITAL, ACETAMINOPHEN AND CAFFEINE 50; 325; 40 MG/1; MG/1; MG/1
1 TABLET ORAL EVERY 4 HOURS PRN
Status: DISCONTINUED | OUTPATIENT
Start: 2018-03-24 | End: 2018-03-25 | Stop reason: HOSPADM

## 2018-03-24 RX ORDER — MAGNESIUM SULFATE HEPTAHYDRATE 40 MG/ML
2 INJECTION, SOLUTION INTRAVENOUS EVERY 6 HOURS PRN
Status: DISCONTINUED | OUTPATIENT
Start: 2018-03-24 | End: 2018-03-25 | Stop reason: HOSPADM

## 2018-03-24 RX ORDER — ACETAMINOPHEN 325 MG/1
650 TABLET ORAL EVERY 6 HOURS PRN
Status: DISCONTINUED | OUTPATIENT
Start: 2018-03-24 | End: 2018-03-25 | Stop reason: HOSPADM

## 2018-03-24 RX ADMIN — SUCRALFATE 1000 MG: 1 SUSPENSION ORAL at 09:46

## 2018-03-24 RX ADMIN — SUCRALFATE 1000 MG: 1 SUSPENSION ORAL at 13:19

## 2018-03-24 RX ADMIN — LISINOPRIL 40 MG: 20 TABLET ORAL at 09:45

## 2018-03-24 RX ADMIN — PANTOPRAZOLE SODIUM 40 MG: 40 TABLET, DELAYED RELEASE ORAL at 16:05

## 2018-03-24 RX ADMIN — LEVOTHYROXINE SODIUM 200 MCG: 100 TABLET ORAL at 05:23

## 2018-03-24 RX ADMIN — PANTOPRAZOLE SODIUM 40 MG: 40 TABLET, DELAYED RELEASE ORAL at 05:23

## 2018-03-24 RX ADMIN — HYDRALAZINE HYDROCHLORIDE 50 MG: 50 TABLET, FILM COATED ORAL at 09:46

## 2018-03-24 RX ADMIN — INSULIN LISPRO 3 UNITS: 100 INJECTION, SOLUTION INTRAVENOUS; SUBCUTANEOUS at 16:15

## 2018-03-24 RX ADMIN — METOPROLOL TARTRATE 50 MG: 50 TABLET ORAL at 22:01

## 2018-03-24 RX ADMIN — AZATHIOPRINE 50 MG: 50 TABLET ORAL at 09:46

## 2018-03-24 RX ADMIN — SUCRALFATE 1000 MG: 1 SUSPENSION ORAL at 16:05

## 2018-03-24 RX ADMIN — HYDRALAZINE HYDROCHLORIDE 50 MG: 50 TABLET, FILM COATED ORAL at 22:01

## 2018-03-24 RX ADMIN — HYDROCHLOROTHIAZIDE 12.5 MG: 12.5 TABLET ORAL at 09:45

## 2018-03-24 RX ADMIN — ATORVASTATIN CALCIUM 40 MG: 40 TABLET, FILM COATED ORAL at 16:05

## 2018-03-24 RX ADMIN — METOPROLOL TARTRATE 50 MG: 50 TABLET ORAL at 09:46

## 2018-03-24 RX ADMIN — VITAMIN D, TAB 1000IU (100/BT) 1000 UNITS: 25 TAB at 09:45

## 2018-03-24 RX ADMIN — CLOPIDOGREL BISULFATE 75 MG: 75 TABLET ORAL at 09:46

## 2018-03-24 RX ADMIN — SUCRALFATE 1000 MG: 1 SUSPENSION ORAL at 22:01

## 2018-03-24 RX ADMIN — DULOXETINE HYDROCHLORIDE 20 MG: 20 CAPSULE, DELAYED RELEASE ORAL at 09:45

## 2018-03-24 RX ADMIN — AZATHIOPRINE 50 MG: 50 TABLET ORAL at 16:05

## 2018-03-24 RX ADMIN — LISINOPRIL 20 MG: 20 TABLET ORAL at 22:01

## 2018-03-24 RX ADMIN — HYDRALAZINE HYDROCHLORIDE 50 MG: 50 TABLET, FILM COATED ORAL at 16:05

## 2018-03-24 RX ADMIN — METHOCARBAMOL 500 MG: 500 TABLET ORAL at 14:35

## 2018-03-24 RX ADMIN — AZATHIOPRINE 50 MG: 50 TABLET ORAL at 22:01

## 2018-03-24 NOTE — PROGRESS NOTES
Progress Note - Gloria Felix 1945, 67 y o  female MRN: [id number]    Unit/Bed#: [facility] 728-01 Encounter: [id number]    Primary Care Provider: Rachel Martin DO   Date and time admitted to hospital: 3/22/2018  7:29 PM        * Left-sided weakness   Assessment & Plan    MRI shows  acute on subacute right PCA territory infarction  CTA shows occlusion of P1 segment of right PCA  Appreciate GI recommendations about anticoagulation given the recent history of perforated gastric ulcer  Patient started on Plavix  Not started on any other anticoagulation at this point  No aspirin given history of peptic ulcer disease  Continue Protonix b i d  and Carafate  Monitor hemoglobin  Neurology following  Appreciate recommendations  Facial droop   Assessment & Plan    Most likely due to due to stroke  MRI brain and CTA head and neck as above  Stroke management  As above        Acute encephalopathy   Assessment & Plan    Most likely due to stroke  Patient very sleepy and drowsy as per family  Low suspicion for infection at this point with no fever or leukocytosis  Continue to monitor  Neck pain on right side   Assessment & Plan    Most likely muscular pain  Will give Robaxin for now  Decubitus ulcer of sacral region, stage 3 [facility])   Assessment & Plan    Wound care evaluate  Cont local wound care  Essential hypertension   Assessment & Plan    Currently well controlled  Continue metoprolol, lisinopril, HCTZ and hydralazine  H/O: CVA (cerebrovascular accident)   Assessment & Plan    Recent CVA diagnosed when she was admitted to [facility] in February  Was not started on antiplatelet or anticoagulation because of perforated gastric ulcer  Type 2 diabetes mellitus with hyperglycemia (HCC)   Assessment & Plan    Currently well controlled  Continue sliding scale insulin  Continue oral antihyperglycemic              VTE Pharmacologic Prophylaxis:   Pharmacologic: Pharmacologic VTE Prophylaxis contraindicated due to recent perforated peptic ulcer and possible ICH  Mechanical VTE Prophylaxis in Place: Yes  Patient Centered Rounds: I have performed bedside rounds with nursing staff today  Discussions with Specialists or Other Care Team Provider: neuro, GI  Education and Discussions with Family / Patient:  and son at bedside  Time Spent for Care: 30 minutes  More than 50% of total time spent on counseling and coordination of care as described above  Current Length of Stay: 2 day(s)  Current Patient Status: Inpatient   Certification Statement: The patient will continue to require additional inpatient hospital stay due to above  Discharge Plan: pending placement  Pt medically stable  Code Status: Level 2 - DNAR: but accepts endotracheal intubation    Subjective:   Pt seen and examined by me this morning  Pt complained of pain on right side of her neck  Denies any other new complaints  Objective:     Vitals:   Temp (24hrs), Av 7 °F (37 1 °C), Min:97 2 °F (36 2 °C), Max:99 3 °F (37 4 °C)    HR:  [] 83  Resp:  [16-18] 16  BP: (146-193)/(56-66) 146/56  SpO2:  [96 %-97 %] 97 %  Body mass index is 26 97 kg/m²  Input and Output Summary (last 24 hours): Intake/Output Summary (Last 24 hours) at 18 1739  Last data filed at 18 1300   Gross per 24 hour   Intake              240 ml   Output                0 ml   Net              240 ml       Physical Exam:     Physical Exam    Constitutional: Pt appears well-developed and well-nourished  Appeared to be in mild distress due to neck pain  Cardiovascular: Normal rate, regular rhythm, normal heart sounds and intact distal pulses  Exam reveals no gallop and no friction rub  No murmur heard  Pulmonary/Chest: Effort normal and breath sounds normal  No respiratory distress  Pt has no wheezes or rales  Abdominal: Soft  Non-distended, Non-tender   Bowel sounds are normal    Neurological: alert and oriented to self and person - identified son and   Normal strength and sensations on right extremities, significantly decreased on left strength 3-4/5  Unable to do full neuro exam because patient not very cooperative  Psychiatric: confused  Neck:  Patient has had turned to left  Significant tenderness to palpation in right trapezius area  Normal movements of the shoulder  No shoulder tenderness  Additional Data:     Labs:      Results from last 7 days  Lab Units 03/24/18  0652 03/22/18  2212   WBC Thousand/uL 7 30 7 44   HEMOGLOBIN g/dL 9 2* 8 8*   HEMATOCRIT % 28 8* 27 9*   PLATELETS Thousands/uL 157 153   NEUTROS PCT %  --  58   LYMPHS PCT %  --  27   MONOS PCT %  --  12   EOS PCT %  --  3       Results from last 7 days  Lab Units 03/24/18  0517   SODIUM mmol/L 140   POTASSIUM mmol/L 3 6   CHLORIDE mmol/L 104   CO2 mmol/L 30   BUN mg/dL 12   CREATININE mg/dL 0 49*   CALCIUM mg/dL 9 2   GLUCOSE RANDOM mg/dL 173*           * I Have Reviewed All Lab Data Listed Above  * Additional Pertinent Lab Tests Reviewed:  [facility]    Imaging:    Imaging Reports Reviewed Today Include:   Imaging Personally Reviewed by Myself Includes:      Recent Cultures (last 7 days):           Last 24 Hours Medication List:     Current Facility-Administered Medications:  acetaminophen 650 mg Oral Q6H PRN Sukhjinder Chew DO   atorvastatin 40 mg Oral Daily With Kassie Saha MD   azaTHIOprine 50 mg Oral TID Natalie Allen MD   butalbital-acetaminophen-caffeine 1 tablet Oral Q4H PRN Paty Chew DO   cholecalciferol 1,000 Units Oral Daily Natalie Allen MD   clopidogrel 75 mg Oral Daily Sukhjinder Chew DO   DULoxetine 20 mg Oral Daily Natalie Allen MD   hydrALAZINE 5 mg Intravenous Q6H PRN Munira Turner PA-C   hydrALAZINE 50 mg Oral TID Natalie Allne MD   hydrochlorothiazide 12 5 mg Oral Daily Natalie Allen MD   insulin lispro 1-6 Units Subcutaneous TID [facility] Natalie Allen MD   levothyroxine 200 mcg Oral Early Morning Helio Faustin MD   lisinopril 20 mg Oral HS Helio Faustin MD   lisinopril 40 mg Oral Daily Helio Faustin MD   LORazepam 0 5 mg Intravenous Once PRN Jose Rafael Pina MD   magnesium sulfate 2 g Intravenous Q6H PRN Sukhjinder Chew DO   methocarbamol 500 mg Oral Q6H PRN Jose Rafael Pina MD   metoprolol tartrate 50 mg Oral Q12H [address] [place] [address] MD Ivy   pantoprazole 40 mg Oral BID AC Helio Faustin MD   sucralfate 1,000 mg Oral 4x Daily ([place] HS) Helio Faustin MD        Today, Patient Was Seen By: Jose Rafael Pina MD    ** Please Note: Dictation voice to text software may have been used in the creation of this document   **

## 2018-03-24 NOTE — PROGRESS NOTES
Progress Note - Neurology   Roula Gtz 67 y o  female MRN: [id number]  Unit/Bed#: [facility] 728-01 Encounter: [id number]    Assessment/Plan:  Attending Addendum: Agree with PA/CRNP/resident note with the following additions:  - 77-year-old female with acute on subacute right PCA territory infarction, suspect secondary to P1 segment stenosis demonstrated on CTA from 02/16/2018  Antiplatelet was not started that time due to acute/subacute perforated gastric ulcer with hemorrhage and anemia  1) MRI brain demonstrates extension of the above-mentioned right PCA infarction, to include the right thalamic involvement, consistent with the dense left hemiplegia involving face/arm/leg   - CTA head and neck demonstrates abrupt cutoff of the right PCA the about the same location as the above-mentioned P1 segment stenosis  - given recent gastric ulcer, would not recommend aspirin given its effects on prostaglandin synthesis  Instead would recommend Plavix 75 mg daily  Given above-mentioned subacute ulcer, and completion of above-mentioned PCA, will not load Plavix   - Okay to resume normotension, goal maps greater than 85   - A1c is unremarkable  - continue Lipitor 40    2) gastric ulcer: - appreciate GI input regarding risk benefits of antiplatelet this time  - Patient is on Protonix b i d   - hemoglobin is 9 2, up from 8 8 on 03/22  3) headache  - likely related to posterior circulation stroke  - start Tylenol and Fioricet p r n   - will start magnesium 2 g IV Q 6 hours p r n  No additional neurologic recommendations at this time  Okay to discharge from neurologic standpoint, disposition pending PT/OT  Please call with questions    Subjective:   "Headache"    ROS:  Review of Systems   Constitutional: Negative  HENT: Negative for hearing loss  Eyes: Negative for photophobia and visual disturbance  Respiratory: Negative for wheezing  Cardiovascular: Negative for chest pain and palpitations  Genitourinary: Negative for dysuria and urgency  Neurological: Negative for dizziness, , light-headedness, numbness  Positive for headache and weakness  All other systems reviewed are negative  Vitals: Blood pressure 163/65, pulse 72, temperature 98 7 °F (37 1 °C), temperature source Oral, resp  rate 18, height 5' 6" (1 676 m), weight 75 8 kg (167 lb), SpO2 96 %  ,Body mass index is 26 97 kg/m²  Physical Exam  Physical Exam   Constitutional: oriented to person, place, and time  appears well-developed and well-nourished  HENT:  Unremarkable  Head: Normocephalic and atraumatic  Eyes: EOM are normal  Pupils are equal, round, and reactive to light  Cardiovascular: Normal rate,      Neurologic Exam   Mental Status    Oriented to person, place, and time  Level of consciousness: alert  Normal comprehension  Cranial Nerves    Visual fields full to confrontation  Pupils are equal, round, and reactive to light  Extraocular motions are normal    Nystagmus: none   Decreased nasolabial fold and widened palpebral fissure on the left  Tongue, palate, uvula midline  Motor Exam   0-1/5 strength left upper and lower extremity  5/5 strength in the right upper lower extremity  Muscle bulk: normal  Overall muscle tone:  Decreased in the left  Sensory Exam   Sensation intact to light touch and temp  Gait, Coordination, and Reflexes   DTR's symmetric  No gross ataxia  Lab, Imaging and other studies: I have personally reviewed pertinent reports      VTE Prophylaxis: Sequential compression device (Venodyne)

## 2018-03-25 VITALS
WEIGHT: 167 LBS | BODY MASS INDEX: 26.84 KG/M2 | TEMPERATURE: 98.1 F | HEIGHT: 66 IN | HEART RATE: 70 BPM | RESPIRATION RATE: 16 BRPM | OXYGEN SATURATION: 96 % | SYSTOLIC BLOOD PRESSURE: 134 MMHG | DIASTOLIC BLOOD PRESSURE: 58 MMHG

## 2018-03-25 PROBLEM — I63.531 ACUTE ISCHEMIC RIGHT PCA STROKE (HCC): Status: ACTIVE | Noted: 2018-03-23

## 2018-03-25 LAB
ERYTHROCYTE [DISTWIDTH] IN BLOOD BY AUTOMATED COUNT: 20 % (ref 11.6–15.1)
GLUCOSE SERPL-MCNC: 182 MG/DL (ref 65–140)
GLUCOSE SERPL-MCNC: 187 MG/DL (ref 65–140)
GLUCOSE SERPL-MCNC: 223 MG/DL (ref 65–140)
HCT VFR BLD AUTO: 29.9 % (ref 34.8–46.1)
HGB BLD-MCNC: 9.6 G/DL (ref 11.5–15.4)
MCH RBC QN AUTO: 33.1 PG (ref 26.8–34.3)
MCHC RBC AUTO-ENTMCNC: 32.1 G/DL (ref 31.4–37.4)
MCV RBC AUTO: 103 FL (ref 82–98)
PLATELET # BLD AUTO: 148 THOUSANDS/UL (ref 149–390)
PMV BLD AUTO: 10.1 FL (ref 8.9–12.7)
RBC # BLD AUTO: 2.9 MILLION/UL (ref 3.81–5.12)
WBC # BLD AUTO: 8.31 THOUSAND/UL (ref 4.31–10.16)

## 2018-03-25 PROCEDURE — 85027 COMPLETE CBC AUTOMATED: CPT | Performed by: INTERNAL MEDICINE

## 2018-03-25 PROCEDURE — 92526 ORAL FUNCTION THERAPY: CPT

## 2018-03-25 PROCEDURE — 99239 HOSP IP/OBS DSCHRG MGMT >30: CPT | Performed by: INTERNAL MEDICINE

## 2018-03-25 PROCEDURE — 82948 REAGENT STRIP/BLOOD GLUCOSE: CPT

## 2018-03-25 RX ORDER — CLOPIDOGREL BISULFATE 75 MG/1
75 TABLET ORAL DAILY
Qty: 30 TABLET | Refills: 0
Start: 2018-03-26

## 2018-03-25 RX ORDER — METHOCARBAMOL 500 MG/1
500 TABLET, FILM COATED ORAL EVERY 6 HOURS PRN
Qty: 20 TABLET | Refills: 0
Start: 2018-03-25

## 2018-03-25 RX ORDER — ACETAMINOPHEN 325 MG/1
650 TABLET ORAL EVERY 6 HOURS PRN
Qty: 30 TABLET | Refills: 0
Start: 2018-03-25

## 2018-03-25 RX ADMIN — HYDRALAZINE HYDROCHLORIDE 50 MG: 50 TABLET, FILM COATED ORAL at 09:27

## 2018-03-25 RX ADMIN — METOPROLOL TARTRATE 50 MG: 50 TABLET ORAL at 09:27

## 2018-03-25 RX ADMIN — SUCRALFATE 1000 MG: 1 SUSPENSION ORAL at 15:48

## 2018-03-25 RX ADMIN — INSULIN LISPRO 1 UNITS: 100 INJECTION, SOLUTION INTRAVENOUS; SUBCUTANEOUS at 09:28

## 2018-03-25 RX ADMIN — INSULIN LISPRO 2 UNITS: 100 INJECTION, SOLUTION INTRAVENOUS; SUBCUTANEOUS at 11:46

## 2018-03-25 RX ADMIN — HYDROCHLOROTHIAZIDE 12.5 MG: 12.5 TABLET ORAL at 09:27

## 2018-03-25 RX ADMIN — LEVOTHYROXINE SODIUM 200 MCG: 100 TABLET ORAL at 05:31

## 2018-03-25 RX ADMIN — DULOXETINE HYDROCHLORIDE 20 MG: 20 CAPSULE, DELAYED RELEASE ORAL at 09:27

## 2018-03-25 RX ADMIN — LISINOPRIL 40 MG: 20 TABLET ORAL at 09:27

## 2018-03-25 RX ADMIN — CLOPIDOGREL BISULFATE 75 MG: 75 TABLET ORAL at 09:27

## 2018-03-25 RX ADMIN — SUCRALFATE 1000 MG: 1 SUSPENSION ORAL at 11:45

## 2018-03-25 RX ADMIN — SUCRALFATE 1000 MG: 1 SUSPENSION ORAL at 05:31

## 2018-03-25 RX ADMIN — HYDRALAZINE HYDROCHLORIDE 50 MG: 50 TABLET, FILM COATED ORAL at 15:48

## 2018-03-25 RX ADMIN — ATORVASTATIN CALCIUM 40 MG: 40 TABLET, FILM COATED ORAL at 15:48

## 2018-03-25 RX ADMIN — INSULIN LISPRO 1 UNITS: 100 INJECTION, SOLUTION INTRAVENOUS; SUBCUTANEOUS at 16:01

## 2018-03-25 RX ADMIN — AZATHIOPRINE 50 MG: 50 TABLET ORAL at 15:48

## 2018-03-25 RX ADMIN — PANTOPRAZOLE SODIUM 40 MG: 40 TABLET, DELAYED RELEASE ORAL at 05:31

## 2018-03-25 RX ADMIN — PANTOPRAZOLE SODIUM 40 MG: 40 TABLET, DELAYED RELEASE ORAL at 15:48

## 2018-03-25 RX ADMIN — VITAMIN D, TAB 1000IU (100/BT) 1000 UNITS: 25 TAB at 09:27

## 2018-03-25 RX ADMIN — METHOCARBAMOL 500 MG: 500 TABLET ORAL at 13:14

## 2018-03-25 RX ADMIN — AZATHIOPRINE 50 MG: 50 TABLET ORAL at 09:28

## 2018-03-25 NOTE — PROGRESS NOTES
Progress Note - Nay Gonzales 1945, 67 y o  female MRN: [id number]    Unit/Bed#: [facility] 728-01 Encounter: [id number]    Primary Care Provider: Mandy Thayer DO   Date and time admitted to hospital: 3/22/2018  7:29 PM        * Acute ischemic right PCA stroke [facility])   Assessment & Plan    MRI shows  acute on subacute right PCA territory infarction  CTA shows occlusion of P1 segment of right PCA  Patient still has significant left-sided weakness  Appreciate GI recommendations about anticoagulation given the recent history of perforated gastric ulcer  Patient started on Plavix  Not started on any other anticoagulation at this point  No aspirin given history of peptic ulcer disease  Continue Protonix b i d  and Carafate  Monitor hemoglobin  stable today  Neurology following  Appreciate recommendations  Facial droop   Assessment & Plan    Most likely due to stroke  MRI brain and CTA head and neck as above  Stroke management  As above        Acute encephalopathy   Assessment & Plan    Most likely due to stroke  Patient very sleepy and drowsy as per family  Low suspicion for infection at this point with no fever or leukocytosis  Continue to monitor  Neck pain on right side   Assessment & Plan    Most likely muscular pain  Resolved today        Decubitus ulcer of sacral region, stage 3 [facility])   Assessment & Plan    Wound care evaluate  Cont local wound care  Essential hypertension   Assessment & Plan    Currently well controlled  Continue metoprolol, lisinopril, HCTZ and hydralazine  H/O: CVA (cerebrovascular accident)   Assessment & Plan    Recent CVA diagnosed when she was admitted to [facility] in February  Was not started on antiplatelet or anticoagulation because of perforated gastric ulcer  Type 2 diabetes mellitus with hyperglycemia (HCC)   Assessment & Plan    Currently well controlled  Continue sliding scale insulin  Continue oral antihyperglycemic  VTE Pharmacologic Prophylaxis:   Pharmacologic: Pharmacologic VTE Prophylaxis contraindicated due to recent perforated peptic ulcer and possible ICH  Mechanical VTE Prophylaxis in Place: Yes  Patient Centered Rounds: I have performed bedside rounds with nursing staff today  Discussions with Specialists or Other Care Team Provider: neuro, GI  Education and Discussions with Family / Powell Fleischer and son at bedside  Time Spent for Care: 30 minutes   More than 50% of total time spent on counseling and coordination of care as described above  Current Length of Stay: 3 day(s)  Current Patient Status: Inpatient   Certification Statement: The patient will continue to require additional inpatient hospital stay due to above  Discharge Plan: pending placement  Pt medically stable  Code Status: Level 2 - DNAR: but accepts endotracheal intubation    Subjective:   Pt seen and examined by me this morning  Pt denied any complaints  Says she is feeling fine  No neck pain  Objective:     Vitals:   Temp (24hrs), Av 1 °F (36 7 °C), Min:97 5 °F (36 4 °C), Max:99 1 °F (37 3 °C)    HR:  [66-87] 72  Resp:  [16-18] 18  BP: (131-149)/(52-68) 137/65  SpO2:  [95 %-99 %] 99 %  Body mass index is 26 97 kg/m²  Input and Output Summary (last 24 hours): Intake/Output Summary (Last 24 hours) at 18 1416  Last data filed at 18 1755   Gross per 24 hour   Intake                0 ml   Output              235 ml   Net             -235 ml       Physical Exam:     Physical Exam    Constitutional: Pt appears well-developed and well-nourished  no distress  Cardiovascular: Normal rate, regular rhythm, normal heart sounds and intact distal pulses   Exam reveals no gallop and no friction rub   No murmur heard  Pulmonary/Chest: Effort normal and breath sounds normal  No respiratory distress  Pt has no wheezes or rales  Abdominal: Soft  Non-distended, Non-tender   Bowel sounds are normal    Neurological: alert and oriented to self and person - identified son and   Normal strength and sensations on right extremities, significantly decreased on left strength 3-4/5   Unable to do full neuro exam because patient not very cooperative  Psychiatric: confused  Neck: normal ROM, no tenderness     Additional Data:     Labs:      Results from last 7 days  Lab Units 03/25/18  0515  03/22/18  2212   WBC Thousand/uL 8 31  < > 7 44   HEMOGLOBIN g/dL 9 6*  < > 8 8*   HEMATOCRIT % 29 9*  < > 27 9*   PLATELETS Thousands/uL 148*  < > 153   NEUTROS PCT %  --   --  58   LYMPHS PCT %  --   --  27   MONOS PCT %  --   --  12   EOS PCT %  --   --  3   < > = values in this interval not displayed  Results from last 7 days  Lab Units 03/24/18  0517   SODIUM mmol/L 140   POTASSIUM mmol/L 3 6   CHLORIDE mmol/L 104   CO2 mmol/L 30   BUN mg/dL 12   CREATININE mg/dL 0 49*   CALCIUM mg/dL 9 2   GLUCOSE RANDOM mg/dL 173*           * I Have Reviewed All Lab Data Listed Above  * Additional Pertinent Lab Tests Reviewed:  [facility] Admission Reviewed    Imaging:    Imaging Reports Reviewed Today Include:   Imaging Personally Reviewed by Myself Includes:      Recent Cultures (last 7 days):           Last 24 Hours Medication List:     Current Facility-Administered Medications:  acetaminophen 650 mg Oral Q6H PRN Sukhjinder Chew DO   atorvastatin 40 mg Oral Daily With Angelica Matute MD   azaTHIOprine 50 mg Oral TID Hernandez Camp MD   butalbital-acetaminophen-caffeine 1 tablet Oral Q4H PRN Mallorie Chew DO   cholecalciferol 1,000 Units Oral Daily Hernandez Camp MD   clopidogrel 75 mg Oral Daily Sukhjinder Chew DO   DULoxetine 20 mg Oral Daily Hernandez Camp MD   hydrALAZINE 5 mg Intravenous Q6H PRN Munira Turner PA-C   hydrALAZINE 50 mg Oral TID Hernandez Camp MD   hydrochlorothiazide 12 5 mg Oral Daily Hernandez Camp MD   insulin lispro 1-6 Units Subcutaneous TID [facility] [place] Hernandez Camp MD   levothyroxine 200 mcg Oral Early Morning Hernandez Camp MD lisinopril 20 mg Oral HS Natalie Allen MD   lisinopril 40 mg Oral Daily Natalie Allen MD   LORazepam 0 5 mg Intravenous Once PRN Katherin Jimenez MD   magnesium sulfate 2 g Intravenous Q6H PRN Sukhjinder Chew DO   methocarbamol 500 mg Oral Q6H PRN Katherin Jimenez MD   metoprolol tartrate 50 mg Oral Q12H [address] [place] [address] MD Ivy   pantoprazole 40 mg Oral BID AC Natalie Allen MD   sucralfate 1,000 mg Oral 4x Daily ([facility] [place] HS) Natalie Allen MD        Today, Patient Was Seen By: Katherin Jimenez MD    ** Please Note: Dictation voice to text software may have been used in the creation of this document   **

## 2018-03-25 NOTE — ASSESSMENT & PLAN NOTE
MRI shows  acute on subacute right PCA territory infarction  CTA shows occlusion of P1 segment of right PCA  Patient still has significant left-sided weakness  Appreciate GI recommendations about anticoagulation given the recent history of perforated gastric ulcer  Patient started on Plavix  Not started on any other anticoagulation at this point  No aspirin given history of peptic ulcer disease  Continue Protonix b i d  and Carafate  Monitor hemoglobin  stable today  Neurology following  Appreciate recommendations

## 2018-03-25 NOTE — DISCHARGE INSTRUCTIONS
For stage 3 sacral decubitus ulcer, wound care as follows:    1-Cleanse sacral wound with soap and water skin prep periwound, calcium alginate to wound bed, cover hydrocolloid then protect dressing from inc  W/tegaderm  Change q3d  2-Hydraguard to bilateral heels BID and PRN  3-Soft care cushion when out of bed  4-Moisturize skin daily with skin nourishing cream   5-Turn/repostion q2h  6-Elevate heels to offload pressure    7-Calazime to perineum BID and PRN

## 2018-03-25 NOTE — SOCIAL WORK
CM was informed by Dr Philipp Patel that pt is medically clear for d/c today  CM called [facility] and spoke with Emma Nesbitt who informed CM that they can accept pt  CM called and left VM with pt's  Chandan Saini  CM called and spoke with pt's son Thong King to informed that pt is going to be d/c to [facility] today  Thong King is agreeable with and told CM that they will visit pt there tomorrow  CM called and spoke with Kailey Triplett with [facility] to set up BLS transport at 5:30 PM  CM informed Dr Libby Castillo at the [facility], pt's son Thong King and RN Lorna Melton  Facility Transfer form and CMN done and given to [facility]

## 2018-03-25 NOTE — PLAN OF CARE
Activity Intolerance/Impaired Mobility     Mobility/activity is maintained at optimum level for patient Progressing        Communication Impairment     Ability to express needs and understand communication [place]     Discharge to home or other facility with appropriate resources Progressing        DISCHARGE PLANNING - CARE MANAGEMENT     Discharge to post-acute care or home with appropriate resources Progressing        INFECTION - ADULT     Absence or prevention of progression during hospitalization Progressing        Knowledge Deficit     Patient/family/caregiver demonstrates understanding of disease process, treatment plan, medications, and discharge instructions Progressing        Neurological Deficit     Neurological status is stable or improving Progressing        Nutrition     Nutrition/Hydration status is improving Progressing        Nutrition/Hydration-ADULT     Nutrient/Hydration intake appropriate for improving, restoring or maintaining nutritional needs Progressing        PAIN - ADULT     Verbalizes/displays adequate comfort level or baseline comfort level Progressing        Potential for Aspiration     Non-ventilated patient's risk of aspiration is minimized Progressing        Potential for Falls     Patient will remain free of falls Progressing        Prexisting or High Potential for Compromised Skin Integrity     Skin integrity is maintained or improved Progressing        SAFETY ADULT     Maintain or return to baseline ADL function Progressing     Maintain or return mobility status to optimal level Progressing

## 2018-03-25 NOTE — PLAN OF CARE
Problem: SLP ADULT - SWALLOWING, IMPAIRED  Goal: Advance to least restrictive diet without signs or symptoms of aspiration for planned discharge setting  See evaluation for individualized goals         Outcome: Progressing

## 2018-03-25 NOTE — DISCHARGE SUMMARY
Transition of Care Discharge Summary - [address] [facility]    Patient Information: Nay Gonzales 67 y o  female MRN: [id number]  [facility] Encounter: [id number]    Discharging Physician / Practitioner: James Samano MD  PCP: Mandy Thayer DO  Admission Date: 3/22/2018  Discharge Date: 03/25/18    Disposition:      Short Term Rehab or SNF at [facility]    For Discharges to [facility] SNF:   · Not Applicable to this Patient - Not Applicable to this Patient    Reason for Admission:  Altered mental status, left-sided facial droop and left-sided weakness    Discharge Diagnoses:     Principal Problem:    Acute ischemic right PCA stroke ([facility] )  Active Problems:    Facial droop    Acute encephalopathy    Type 2 diabetes mellitus with hyperglycemia ([facility] )    H/O: CVA (cerebrovascular accident)    Essential hypertension    Decubitus ulcer of sacral region, stage 3 ([facility] [address] )    Neck pain on right side  Resolved Problems:    * No resolved hospital problems  *      Consultations During Hospital Stay:  · Neurology    Procedures Performed:     · None    Medication Adjustments and Discharge Medications:  · Summary of Medication Adjustments made as a result of this hospitalization:  Patient started on Plavix for acute CVA  Please refer to discharge med rec for further details of further medications  · Discharge Medication List: See after visit summary for reconciled discharge medications  Wound Care Recommendations:  When applicable, please see wound care section of After Visit Summary  Diet Recommendations at Discharge:   Diet -        Diet Orders            Start     Ordered    03/25/18 1150  Diet Dysphagia/Modified Consistency; Dysphagia 1-Pureed;  Nectar Thick Liquid; Dysphagia 1-Pureed  Diet effective now     Question Answer Comment   Diet Type Dysphagia/Modified Consistency    Dysphagia/Modified Consistency Dysphagia 1-Pureed    Liquid Modifier Nectar Thick Liquid    Other Restriction(s): Dysphagia 1-Pureed    RD to adjust diet per protocol? Yes        03/25/18 1149    03/23/18 1814  Dietary nutrition supplements  Once     Question Answer Comment   Select Supplement: HoneyShake    Frequency Breakfast, Lunch, Dinner        03/23/18 1813          Significant Findings / Test Results:     MRI brain wo contrast   Acute right posterior cerebral artery artery territory infarct involving a majority of the right occipital lobe as well as the right thalamus  There is no evidence of hemorrhagic transformation    Extensive periventricular and subcortical foci of white matter T2 hyperintensity which is nonspecific and likely related to chronic small vessel ischemic changes  Other less likely etiologies include demyelinating disease and vasculitis    Chronic bilateral parietal lobe infarcts  CTA head and neck w wo contrast  Acute right posterior cerebral artery infarct with abrupt cut off of the proximal P1 segment of the right posterior cerebral artery with nonvisualization of the more distal branches  No evidence of hemorrhagic transformation    No saccular aneurysm within the [place]    No hemodynamically significant stenosis within either common or internal carotid artery  Less than 50% stenosis by NASCET criteria    Extensive chronic small vessel ischemic changes  Old bilateral parietal lobe infarcts  Incidental Findings:   · none     Test Results Pending at Discharge (will require follow up):   · none     Outpatient Tests Requested:  · Repeat CBC in 1 week to check hemoglobin  Complications:  none    Hospital Course:     Christelle Hitchcock is a 67 y o  female patient who originally presented to the hospital on 3/22/2018 due to left-sided weakness, facial droop and altered mental status  Patient was recently diagnosed with stroke but at that time she was not started on any antiplatelets or anticoagulation because she had perforated gastric ulcer    She was discharged to rehab at that time and came in with worsening neurological symptoms  She had an MRI done which showed worsening of the previous stroke  She was evaluated by Neurology  Given the new stroke, GI was consulted to get clearance for antiplatelet/anticoagulation  Patient was started on Plavix and not on aspirin because of the peptic ulcer  No anticoagulation was currently started  Patient will be discharged on Plavix  She will continue to take Protonix b i d  and Carafate  Her hemoglobin was monitored in the hospital has been stable  She will need repeat CBC in 1 week to check the hemoglobin  She still continues to have significant left-sided weakness with facial droop  Her confusion that she came in with initially was most likely thought to be due to stroke and it improved  She will need close blood pressure monitoring with goal blood pressure of MAP > 80 given the intracranial stenosis seen on the CT head and neck  Patient was complaining of right-sided neck pain yesterday for which she was given Robaxin and her pain improved  She will be discharged on p r n  Robaxin  Patient was also noticed to have stage III decubitus ulcer of the sacral region  There was no evidence of infection  She was evaluated by wound care nurse  She need continuous wound care as follows:  1-Cleanse sacral wound with soap and water skin prep periwound, calcium alginate to wound bed, cover hydrocolloid then protect dressing from inc  W/tegaderm  Change q3d  2-Hydraguard to bilateral heels BID and PRN  3-Soft care cushion when out of bed  4-Moisturize skin daily with skin nourishing cream   5-Turn/repostion q2h  6-Elevate heels to offload pressure    7-Calazime to perineum BID and PRN        Condition at Discharge: fair     Discharge Day Visit / Exam:     * Please refer to separate progress note for these details *    Goals of Care Discussions:  · Code Status at Discharge: Level 2 - DNAR: but accepts endotracheal intubation  · Were there any Goals of Care Discussions during Hospitalization?: No  · Results of any General Goals of Care Discussions:    · POLST Completed: No   · If POLST Completed, Summary of POLST Agreement Provided Here:    · OK to Rehospitalize if Needed? Yes    Discharge instructions/Information to patient and family:   See after visit summary section titled Discharge Instructions for information provided to patient and family  Planned Readmission: no      Discharge Statement:  I spent 40 minutes discharging the patient  This time was spent on the day of discharge  I had direct contact with the patient on the day of discharge  Greater than 50% of the total time was spent examining patient, answering all patient questions, arranging and discussing plan of care with patient as well as directly providing post-discharge instructions  Additional time then spent on discharge activities      ** Please Note: This note has been constructed using a voice recognition system **

## 2018-03-25 NOTE — ASSESSMENT & PLAN NOTE
Recent CVA diagnosed when she was admitted to [facility] in February  Was not started on antiplatelet or anticoagulation because of perforated gastric ulcer

## 2018-03-25 NOTE — SPEECH THERAPY NOTE
Speech Language/Pathology    Speech/Language Pathology Progress Note    Patient Name: Roula Gtz  DDAZB'A Date: 3/25/2018     Problem List  Patient Active Problem List   Diagnosis    Pancytopenia [facility] )    Type 2 diabetes mellitus with hyperglycemia ([facility] )    Hypoalbuminemia due to protein-calorie malnutrition ([facility] )    Sepsis ([facility] )    [facility]    Acute blood loss anemia    Hypothyroidism    Hyperlipidemia    Multiple sclerosis ([facility] )    H/O: CVA (cerebrovascular accident)    Deep tissue injury    Severe protein-calorie malnutrition (HCC)    Facial droop    Acute encephalopathy    Acute ischemic right PCA stroke (HCC)    Essential hypertension    Decubitus ulcer of sacral region, stage 3 (HCC)    Neck pain on right side        Past Medical History  Past Medical History:   Diagnosis Date    [facility]     Disease of thyroid gland     Hyperlipidemia     [facility] sclerosis [facility]         Past Surgical History  Past Surgical History:   Procedure Laterality Date    ESOPHAGOGASTRODUODENOSCOPY N/A 2/12/2018    Procedure: ESOPHAGOGASTRODUODENOSCOPY (EGD); Surgeon: Molly Burnham MD;  Location: [facility]; Service: Gastroenterology    IN LAP,DIAGNOSTIC ABDOMEN N/A 1/29/2018    Procedure: LAPAROSCOPY DIAGNOSTIC, with repair of perforated gastric ulcer;  Surgeon: Sis Serra MD;  Location: [facility] OR;  Service: General         Subjective:  Pt seen for dysphagia tx  Pt sitting upright in bed, pleasant and cooperative  L facial droop noted  Objective:  Nursing requested pt be reassessed, as she was refusing to drink the HTL and there was concern about dehydration  Significant L facial droop noted  Lingual protrusion was at midline; tongue appears coated and white  Torus palatini noted on hard palate  Pt was given PO trials of tsps, cup, and straw sips of NTL  She tolerated these well, with no s/s of aspiration   She was thin given trials of thin water, by tsp, cup, and straw  Pt tolerated tsps with no s/s of aspiration  With cup sips, slight throat clear noted  With sip from straw there was immediate strong coughing; cough continued intermittently for several minutes  Pt ate 2 oz applesauce as well, with no s/s of aspiration  With softened silvia crackers in applesauce, mastication and bolus formation were prolonged  Recommendations for diet change discussed with pt, RN, and MD  Sign changed to reflect new liquid consistency  Assessment:  Immediate s/s of aspiration with thin liquids by straw, intermittent subtle s/s by cup  No s/s of aspiration with NTL by cup or straw  Good toleration of pureed foods, slow mastication/bolus formation with soft foods  Plan/Recommendations:  Continue pureed diet and change liquids to nectar thick  Consider VBS as objective measure of swallow  Continue dysphagia tx, trials of level 2 foods and thin liquids  Pt would benefit from speech/language assessment

## 2018-03-26 ENCOUNTER — TELEPHONE (OUTPATIENT)
Dept: NEUROLOGY | Facility: CLINIC | Age: 73
End: 2018-03-26

## 2018-03-27 ENCOUNTER — TELEPHONE (OUTPATIENT)
Dept: NEUROLOGY | Facility: CLINIC | Age: 73
End: 2018-03-27

## 2021-08-26 NOTE — ASSESSMENT & PLAN NOTE
Most likely muscular pain  Will give Robaxin for now  [de-identified] : PREOP SHOULDER SURGERY DISCUSSION:\par \par PROCEDURE DISCUSSED - QUESTIONS ANSWERED\par PATIENT WISHES TO PROCEED\par \par POST OP CARE AND LIMITATIONS REVIEWED - HANDOUT PROVIDED \par \par COLD PACKS RECOMMENDED\par ANALGESICS PRESCRIBED \par \par \par THERE ARE NO GUARANTEES THAT ALL SYMPTOMS WILL BE ALLEVIATED  \par SHOULDER ARTHROSCOPY, ACROMIOPLASTY, DEBRIDEMENT,  RC REPAIR AND LABRUM REPAIRS- ON AVERAGE 75- 85% SATISFACTORY RESULTS FOR TEARS < 3CM AFTER 9-12 MONTHS HEALING AND REHABILITATION. \par \par REPAIRS WILL REQUIRE STRICT SHOULDER IMMOBILIZER 4-6 WEEKS\par \par RC TEARS 3CM OR LARGER MAY REQUIRE COLLAGEN PATCH AUGMENTATION GENERALLY HAVE LESS SATISFACTORY RESULTS\par \par PHYSICAL THERAPY REQUIRED 2X WEEK FOR  MINIMUM 8-12 WEEKS FOR ALL PROCEDURES \par CONTINUED HOME EXERCISES 6-9 MONTHS AFTER THAT REQUIRED FOR OPTIMAL OUTCOMES \par \par ROUTINE SURGICAL AND ANESTHETIC RISKS INCLUDE RISK OF SURGICAL INFECTION, ANESTHETIC COMPLICATION OR ALLERGY, POSSIBLE RETEARS OR PROGRESSION OF TEAR, STIFFNESS OF SHOULDER AND UNSATISFACTORY OUTCOMES\par \par PATIENT UNDERSTANDS AND WISHES TO PROCEED\par

## 2021-11-26 ENCOUNTER — HOSPITAL ENCOUNTER (OUTPATIENT)
Facility: HOSPITAL | Age: 76
Setting detail: OBSERVATION
Discharge: NURSING, REHAB OR LONG-TERM CARE | End: 2021-11-27
Attending: EMERGENCY MEDICINE | Admitting: FAMILY MEDICINE
Payer: MEDICARE

## 2021-11-26 DIAGNOSIS — D64.9 ANEMIA: Primary | ICD-10-CM

## 2021-11-26 DIAGNOSIS — N17.9 AKI (ACUTE KIDNEY INJURY) (HCC): ICD-10-CM

## 2021-11-26 LAB
ABO GROUP BLD: NORMAL
ALBUMIN SERPL BCP-MCNC: 2.2 G/DL (ref 3.5–5)
ALP SERPL-CCNC: 73 U/L (ref 46–116)
ALT SERPL W P-5'-P-CCNC: 13 U/L (ref 12–78)
ANION GAP SERPL CALCULATED.3IONS-SCNC: 9 MMOL/L (ref 4–13)
APTT PPP: 33 SECONDS (ref 23–37)
AST SERPL W P-5'-P-CCNC: 26 U/L (ref 5–45)
BASOPHILS # BLD MANUAL: 0 THOUSAND/UL (ref 0–0.1)
BASOPHILS NFR MAR MANUAL: 0 % (ref 0–1)
BILIRUB SERPL-MCNC: 0.34 MG/DL (ref 0.2–1)
BLD GP AB SCN SERPL QL: NEGATIVE
BUN SERPL-MCNC: 46 MG/DL (ref 5–25)
CALCIUM ALBUM COR SERPL-MCNC: 10.4 MG/DL (ref 8.3–10.1)
CALCIUM SERPL-MCNC: 9 MG/DL (ref 8.3–10.1)
CHLORIDE SERPL-SCNC: 112 MMOL/L (ref 100–108)
CO2 SERPL-SCNC: 22 MMOL/L (ref 21–32)
CREAT SERPL-MCNC: 2.2 MG/DL (ref 0.6–1.3)
EOSINOPHIL # BLD MANUAL: 0.27 THOUSAND/UL (ref 0–0.4)
EOSINOPHIL NFR BLD MANUAL: 2 % (ref 0–6)
ERYTHROCYTE [DISTWIDTH] IN BLOOD BY AUTOMATED COUNT: 14.4 % (ref 11.6–15.1)
GFR SERPL CREATININE-BSD FRML MDRD: 21 ML/MIN/1.73SQ M
GLUCOSE SERPL-MCNC: 198 MG/DL (ref 65–140)
HCT VFR BLD AUTO: 26.3 % (ref 34.8–46.1)
HGB BLD-MCNC: 7.6 G/DL (ref 11.5–15.4)
HYPERCHROMIA BLD QL SMEAR: PRESENT
INR PPP: 1.04 (ref 0.84–1.19)
LYMPHOCYTES # BLD AUTO: 19 % (ref 14–44)
LYMPHOCYTES # BLD AUTO: 2.58 THOUSAND/UL (ref 0.6–4.47)
MCH RBC QN AUTO: 29.5 PG (ref 26.8–34.3)
MCHC RBC AUTO-ENTMCNC: 28.9 G/DL (ref 31.4–37.4)
MCV RBC AUTO: 102 FL (ref 82–98)
METAMYELOCYTES NFR BLD MANUAL: 1 % (ref 0–1)
MONOCYTES # BLD AUTO: 1.36 THOUSAND/UL (ref 0–1.22)
MONOCYTES NFR BLD: 10 % (ref 4–12)
MYELOCYTES NFR BLD MANUAL: 1 % (ref 0–1)
NEUTROPHILS # BLD MANUAL: 9.09 THOUSAND/UL (ref 1.85–7.62)
NEUTS SEG NFR BLD AUTO: 67 % (ref 43–75)
PLATELET # BLD AUTO: 360 THOUSANDS/UL (ref 149–390)
PLATELET BLD QL SMEAR: ADEQUATE
PMV BLD AUTO: 10.2 FL (ref 8.9–12.7)
POLYCHROMASIA BLD QL SMEAR: PRESENT
POTASSIUM SERPL-SCNC: 5.3 MMOL/L (ref 3.5–5.3)
POTASSIUM SERPL-SCNC: 5.8 MMOL/L (ref 3.5–5.3)
PROT SERPL-MCNC: 7.4 G/DL (ref 6.4–8.2)
PROTHROMBIN TIME: 13.5 SECONDS (ref 11.6–14.5)
RBC # BLD AUTO: 2.58 MILLION/UL (ref 3.81–5.12)
RBC MORPH BLD: PRESENT
RH BLD: POSITIVE
SODIUM SERPL-SCNC: 143 MMOL/L (ref 136–145)
SPECIMEN EXPIRATION DATE: NORMAL
TSH SERPL DL<=0.05 MIU/L-ACNC: 4.23 UIU/ML (ref 0.36–3.74)
WBC # BLD AUTO: 13.57 THOUSAND/UL (ref 4.31–10.16)

## 2021-11-26 PROCEDURE — P9040 RBC LEUKOREDUCED IRRADIATED: HCPCS

## 2021-11-26 PROCEDURE — 85610 PROTHROMBIN TIME: CPT | Performed by: EMERGENCY MEDICINE

## 2021-11-26 PROCEDURE — 86901 BLOOD TYPING SEROLOGIC RH(D): CPT | Performed by: EMERGENCY MEDICINE

## 2021-11-26 PROCEDURE — 36415 COLL VENOUS BLD VENIPUNCTURE: CPT | Performed by: EMERGENCY MEDICINE

## 2021-11-26 PROCEDURE — 99219 PR INITIAL OBSERVATION CARE/DAY 50 MINUTES: CPT | Performed by: NURSE PRACTITIONER

## 2021-11-26 PROCEDURE — 86920 COMPATIBILITY TEST SPIN: CPT

## 2021-11-26 PROCEDURE — 96360 HYDRATION IV INFUSION INIT: CPT

## 2021-11-26 PROCEDURE — 82728 ASSAY OF FERRITIN: CPT | Performed by: NURSE PRACTITIONER

## 2021-11-26 PROCEDURE — 99285 EMERGENCY DEPT VISIT HI MDM: CPT | Performed by: EMERGENCY MEDICINE

## 2021-11-26 PROCEDURE — 82607 VITAMIN B-12: CPT | Performed by: NURSE PRACTITIONER

## 2021-11-26 PROCEDURE — 80053 COMPREHEN METABOLIC PANEL: CPT | Performed by: EMERGENCY MEDICINE

## 2021-11-26 PROCEDURE — 96361 HYDRATE IV INFUSION ADD-ON: CPT

## 2021-11-26 PROCEDURE — 83550 IRON BINDING TEST: CPT | Performed by: NURSE PRACTITIONER

## 2021-11-26 PROCEDURE — 86900 BLOOD TYPING SEROLOGIC ABO: CPT | Performed by: EMERGENCY MEDICINE

## 2021-11-26 PROCEDURE — 99284 EMERGENCY DEPT VISIT MOD MDM: CPT

## 2021-11-26 PROCEDURE — 85025 COMPLETE CBC W/AUTO DIFF WBC: CPT | Performed by: EMERGENCY MEDICINE

## 2021-11-26 PROCEDURE — 86850 RBC ANTIBODY SCREEN: CPT | Performed by: EMERGENCY MEDICINE

## 2021-11-26 PROCEDURE — 84132 ASSAY OF SERUM POTASSIUM: CPT | Performed by: EMERGENCY MEDICINE

## 2021-11-26 PROCEDURE — 83540 ASSAY OF IRON: CPT | Performed by: NURSE PRACTITIONER

## 2021-11-26 PROCEDURE — 85027 COMPLETE CBC AUTOMATED: CPT | Performed by: EMERGENCY MEDICINE

## 2021-11-26 PROCEDURE — 84443 ASSAY THYROID STIM HORMONE: CPT | Performed by: NURSE PRACTITIONER

## 2021-11-26 PROCEDURE — 85730 THROMBOPLASTIN TIME PARTIAL: CPT | Performed by: EMERGENCY MEDICINE

## 2021-11-26 PROCEDURE — 85007 BL SMEAR W/DIFF WBC COUNT: CPT | Performed by: EMERGENCY MEDICINE

## 2021-11-26 RX ORDER — LEVOTHYROXINE SODIUM 0.1 MG/1
200 TABLET ORAL
Status: DISCONTINUED | OUTPATIENT
Start: 2021-11-27 | End: 2021-11-27 | Stop reason: HOSPADM

## 2021-11-26 RX ORDER — OMEPRAZOLE 20 MG/1
20 CAPSULE, DELAYED RELEASE ORAL DAILY
COMMUNITY

## 2021-11-26 RX ORDER — METHOCARBAMOL 500 MG/1
500 TABLET, FILM COATED ORAL EVERY 6 HOURS PRN
Status: DISCONTINUED | OUTPATIENT
Start: 2021-11-26 | End: 2021-11-27 | Stop reason: HOSPADM

## 2021-11-26 RX ORDER — ATORVASTATIN CALCIUM 40 MG/1
40 TABLET, FILM COATED ORAL
Status: DISCONTINUED | OUTPATIENT
Start: 2021-11-27 | End: 2021-11-27 | Stop reason: HOSPADM

## 2021-11-26 RX ORDER — INSULIN GLARGINE 100 [IU]/ML
32 INJECTION, SOLUTION SUBCUTANEOUS
Status: DISCONTINUED | OUTPATIENT
Start: 2021-11-27 | End: 2021-11-27 | Stop reason: HOSPADM

## 2021-11-26 RX ORDER — DULOXETIN HYDROCHLORIDE 20 MG/1
20 CAPSULE, DELAYED RELEASE ORAL DAILY
Status: DISCONTINUED | OUTPATIENT
Start: 2021-11-27 | End: 2021-11-27 | Stop reason: HOSPADM

## 2021-11-26 RX ORDER — SODIUM CHLORIDE 9 MG/ML
250 INJECTION, SOLUTION INTRAVENOUS CONTINUOUS
Status: DISCONTINUED | OUTPATIENT
Start: 2021-11-26 | End: 2021-11-26

## 2021-11-26 RX ORDER — INSULIN GLARGINE 100 [IU]/ML
32 INJECTION, SOLUTION SUBCUTANEOUS
COMMUNITY

## 2021-11-26 RX ORDER — OLANZAPINE 2.5 MG/1
5 TABLET ORAL
Status: DISCONTINUED | OUTPATIENT
Start: 2021-11-26 | End: 2021-11-27 | Stop reason: HOSPADM

## 2021-11-26 RX ORDER — HEPARIN SODIUM 5000 [USP'U]/ML
5000 INJECTION, SOLUTION INTRAVENOUS; SUBCUTANEOUS EVERY 8 HOURS SCHEDULED
Status: DISCONTINUED | OUTPATIENT
Start: 2021-11-27 | End: 2021-11-27 | Stop reason: HOSPADM

## 2021-11-26 RX ORDER — HYDRALAZINE HYDROCHLORIDE 25 MG/1
50 TABLET, FILM COATED ORAL 3 TIMES DAILY
Status: DISCONTINUED | OUTPATIENT
Start: 2021-11-26 | End: 2021-11-27 | Stop reason: HOSPADM

## 2021-11-26 RX ORDER — CLOPIDOGREL BISULFATE 75 MG/1
75 TABLET ORAL DAILY
Status: DISCONTINUED | OUTPATIENT
Start: 2021-11-27 | End: 2021-11-27 | Stop reason: HOSPADM

## 2021-11-26 RX ORDER — BISACODYL 10 MG
10 SUPPOSITORY, RECTAL RECTAL DAILY PRN
COMMUNITY

## 2021-11-26 RX ORDER — AZATHIOPRINE 50 MG/1
50 TABLET ORAL 3 TIMES DAILY
Status: DISCONTINUED | OUTPATIENT
Start: 2021-11-26 | End: 2021-11-27 | Stop reason: HOSPADM

## 2021-11-26 RX ORDER — ERGOCALCIFEROL 1.25 MG/1
50000 CAPSULE ORAL WEEKLY
Status: DISCONTINUED | OUTPATIENT
Start: 2021-11-26 | End: 2021-11-27 | Stop reason: HOSPADM

## 2021-11-26 RX ORDER — MELATONIN
1000 DAILY
Status: DISCONTINUED | OUTPATIENT
Start: 2021-11-27 | End: 2021-11-27 | Stop reason: HOSPADM

## 2021-11-26 RX ORDER — ERGOCALCIFEROL (VITAMIN D2) 1250 MCG
50000 CAPSULE ORAL WEEKLY
COMMUNITY

## 2021-11-26 RX ORDER — ACETAMINOPHEN 325 MG/1
650 TABLET ORAL EVERY 6 HOURS PRN
Status: DISCONTINUED | OUTPATIENT
Start: 2021-11-26 | End: 2021-11-27 | Stop reason: HOSPADM

## 2021-11-26 RX ORDER — PANTOPRAZOLE SODIUM 40 MG/1
40 TABLET, DELAYED RELEASE ORAL
Status: DISCONTINUED | OUTPATIENT
Start: 2021-11-27 | End: 2021-11-27 | Stop reason: HOSPADM

## 2021-11-26 RX ORDER — BISACODYL 10 MG
10 SUPPOSITORY, RECTAL RECTAL DAILY PRN
Status: DISCONTINUED | OUTPATIENT
Start: 2021-11-26 | End: 2021-11-27 | Stop reason: HOSPADM

## 2021-11-26 RX ORDER — SUCRALFATE 1 G/1
1 TABLET ORAL
Status: DISCONTINUED | OUTPATIENT
Start: 2021-11-27 | End: 2021-11-27 | Stop reason: HOSPADM

## 2021-11-26 RX ADMIN — SODIUM CHLORIDE 500 ML: 0.9 INJECTION, SOLUTION INTRAVENOUS at 19:09

## 2021-11-26 RX ADMIN — AZATHIOPRINE 50 MG: 50 TABLET ORAL at 22:27

## 2021-11-26 RX ADMIN — SODIUM CHLORIDE 250 ML/HR: 0.9 INJECTION, SOLUTION INTRAVENOUS at 20:17

## 2021-11-26 RX ADMIN — OLANZAPINE 5 MG: 2.5 TABLET, FILM COATED ORAL at 22:29

## 2021-11-26 RX ADMIN — HYDRALAZINE HYDROCHLORIDE 50 MG: 25 TABLET, FILM COATED ORAL at 22:27

## 2021-11-26 RX ADMIN — ERGOCALCIFEROL 50000 UNITS: 1.25 CAPSULE, LIQUID FILLED ORAL at 22:27

## 2021-11-27 VITALS
DIASTOLIC BLOOD PRESSURE: 67 MMHG | WEIGHT: 175.71 LBS | HEART RATE: 64 BPM | OXYGEN SATURATION: 99 % | TEMPERATURE: 97.5 F | RESPIRATION RATE: 19 BRPM | BODY MASS INDEX: 28.36 KG/M2 | SYSTOLIC BLOOD PRESSURE: 163 MMHG

## 2021-11-27 PROBLEM — L89.899: Status: ACTIVE | Noted: 2021-11-27

## 2021-11-27 PROBLEM — N18.4 STAGE 4 CHRONIC KIDNEY DISEASE (HCC): Status: ACTIVE | Noted: 2021-11-27

## 2021-11-27 PROBLEM — Z79.4 TYPE 2 DIABETES MELLITUS WITH HYPERGLYCEMIA, WITH LONG-TERM CURRENT USE OF INSULIN (HCC): Status: ACTIVE | Noted: 2018-01-28

## 2021-11-27 PROBLEM — Z87.11 HISTORY OF GASTRIC ULCER: Status: ACTIVE | Noted: 2021-11-27

## 2021-11-27 LAB
ABO GROUP BLD BPU: NORMAL
ANION GAP SERPL CALCULATED.3IONS-SCNC: 12 MMOL/L (ref 4–13)
BPU ID: NORMAL
BUN SERPL-MCNC: 38 MG/DL (ref 5–25)
CALCIUM SERPL-MCNC: 8.1 MG/DL (ref 8.3–10.1)
CHLORIDE SERPL-SCNC: 114 MMOL/L (ref 100–108)
CO2 SERPL-SCNC: 16 MMOL/L (ref 21–32)
CREAT SERPL-MCNC: 1.73 MG/DL (ref 0.6–1.3)
CROSSMATCH: NORMAL
ERYTHROCYTE [DISTWIDTH] IN BLOOD BY AUTOMATED COUNT: 14.6 % (ref 11.6–15.1)
FERRITIN SERPL-MCNC: 622 NG/ML (ref 8–388)
GFR SERPL CREATININE-BSD FRML MDRD: 28 ML/MIN/1.73SQ M
GLUCOSE P FAST SERPL-MCNC: 130 MG/DL (ref 65–99)
GLUCOSE SERPL-MCNC: 112 MG/DL (ref 65–140)
GLUCOSE SERPL-MCNC: 130 MG/DL (ref 65–140)
GLUCOSE SERPL-MCNC: 143 MG/DL (ref 65–140)
GLUCOSE SERPL-MCNC: 156 MG/DL (ref 65–140)
HCT VFR BLD AUTO: 27.5 % (ref 34.8–46.1)
HGB BLD-MCNC: 8.6 G/DL (ref 11.5–15.4)
IRON SATN MFR SERPL: 26 % (ref 15–50)
IRON SERPL-MCNC: 36 UG/DL (ref 50–170)
MAGNESIUM SERPL-MCNC: 2.4 MG/DL (ref 1.6–2.6)
MCH RBC QN AUTO: 30.6 PG (ref 26.8–34.3)
MCHC RBC AUTO-ENTMCNC: 31.3 G/DL (ref 31.4–37.4)
MCV RBC AUTO: 98 FL (ref 82–98)
PLATELET # BLD AUTO: 230 THOUSANDS/UL (ref 149–390)
PMV BLD AUTO: 10.5 FL (ref 8.9–12.7)
POTASSIUM SERPL-SCNC: 5.3 MMOL/L (ref 3.5–5.3)
RBC # BLD AUTO: 2.81 MILLION/UL (ref 3.81–5.12)
SODIUM SERPL-SCNC: 142 MMOL/L (ref 136–145)
T4 FREE SERPL-MCNC: 0.93 NG/DL (ref 0.76–1.46)
TIBC SERPL-MCNC: 136 UG/DL (ref 250–450)
UNIT DISPENSE STATUS: NORMAL
UNIT PRODUCT CODE: NORMAL
UNIT PRODUCT VOLUME: 350 ML
UNIT RH: NORMAL
VIT B12 SERPL-MCNC: 694 PG/ML (ref 100–900)
WBC # BLD AUTO: 11.27 THOUSAND/UL (ref 4.31–10.16)

## 2021-11-27 PROCEDURE — 85027 COMPLETE CBC AUTOMATED: CPT | Performed by: NURSE PRACTITIONER

## 2021-11-27 PROCEDURE — 82948 REAGENT STRIP/BLOOD GLUCOSE: CPT

## 2021-11-27 PROCEDURE — 83735 ASSAY OF MAGNESIUM: CPT | Performed by: NURSE PRACTITIONER

## 2021-11-27 PROCEDURE — 36415 COLL VENOUS BLD VENIPUNCTURE: CPT | Performed by: NURSE PRACTITIONER

## 2021-11-27 PROCEDURE — 84439 ASSAY OF FREE THYROXINE: CPT | Performed by: NURSE PRACTITIONER

## 2021-11-27 PROCEDURE — 80048 BASIC METABOLIC PNL TOTAL CA: CPT | Performed by: NURSE PRACTITIONER

## 2021-11-27 PROCEDURE — 99217 PR OBSERVATION CARE DISCHARGE MANAGEMENT: CPT | Performed by: FAMILY MEDICINE

## 2021-11-27 RX ADMIN — INSULIN LISPRO 1 UNITS: 100 INJECTION, SOLUTION INTRAVENOUS; SUBCUTANEOUS at 07:26

## 2021-11-27 RX ADMIN — LEVOTHYROXINE SODIUM 200 MCG: 100 TABLET ORAL at 05:12

## 2021-11-27 RX ADMIN — PANTOPRAZOLE SODIUM 40 MG: 40 TABLET, DELAYED RELEASE ORAL at 05:12

## 2021-11-27 RX ADMIN — INSULIN LISPRO 3 UNITS: 100 INJECTION, SOLUTION INTRAVENOUS; SUBCUTANEOUS at 07:27

## 2021-11-27 RX ADMIN — HEPARIN SODIUM 5000 UNITS: 5000 INJECTION INTRAVENOUS; SUBCUTANEOUS at 05:12

## 2024-06-25 NOTE — PROGRESS NOTES
Progress note - Palliative and Supportive Care   Na Sethi 67 y o  female [de-identified]    Patient Active Problem List   Diagnosis    Pancytopenia ([facility] )    Type 2 diabetes mellitus ([facility] )    Hyperglycemia due to type 2 diabetes mellitus (HCC)    Hypoalbuminemia due to protein-calorie malnutrition (HCC)    Gastric ulcer    [email or web address]    Peritonitis (HCC)    Acute blood loss anemia    Hypernatremia    Hypothyroidism    Hyperlipidemia    Multiple sclerosis (HCC)    Metabolic encephalopathy    DIC (disseminated intravascular coagulation) ([facility] )    Anemia   - Possible HIE after hypovolemic shock   - Concern for MDS as a cause of marrow failure    Plan:  1  Symptom management - Pt denies symptoms to me at bedside today   - continue PRN oxyIR only  2  Goals - limited life-prolonging cares   - Pt has a very complex decisional apparatus  We suggest that her children be involved in all decision-making  Please see our consult note dated 2/15/18  Code Status: DNR/DNI - Level 3   Decisional apparatus:  Patient is not competent on my exam today  If competence is lost, patient's substitute decision maker would default to her  Rene Holm by [place] Act 169  However, we have serious concerns about his competence, too  Please see our consult note dated 2/15/18  Advance Directive / Living Will / POLST:  none      Interval history:       Since last visit, she does not appear to have had an eventful weekend  She reports no troubles, but I find her lying crosswise in bed, all rails up, staring directly at the ceiling  She only asks me to turn off the lights in the room when I suggest that her eyes might hurt  She does not present any helpful history today  Through w/e, she did report pain to Dr Margarita Zabala, but I see that she has only been given a few doses -- and much less than allowed -- of oxycodone    She still has COREY drains in place, though in my conversation at bedside, Dr Joann Stpehen does plan to pull these soon  Further drainage of separate collection in abdomen might need to be attempted, though  Bx results are reviewed, and these suggest possible MDS  I DID NOT share this with pt nor family  F/up with Heme/Onc is recommended  MEDICATIONS / ALLERGIES:    current meds:   Current Facility-Administered Medications   Medication Dose Route Frequency    acetaminophen (TYLENOL) tablet 650 mg  650 mg Oral Q4H PRN    atorvastatin (LIPITOR) tablet 40 mg  40 mg Oral Daily With Dinner    calcium carbonate (TUMS) chewable tablet 500 mg  500 mg Oral TID PRN    cefepime (MAXIPIME) 2,000 mg in dextrose 5 % 50 mL IVPB  2,000 mg Intravenous Q12H    hydrALAZINE (APRESOLINE) injection 5 mg  5 mg Intravenous Q6H PRN    insulin glargine (LANTUS) subcutaneous injection 6 Units  6 Units Subcutaneous QAM    insulin lispro (HumaLOG) 100 units/mL subcutaneous injection 1-5 Units  1-5 Units Subcutaneous TID AC    insulin lispro (HumaLOG) 100 units/mL subcutaneous injection 1-5 Units  1-5 Units Subcutaneous HS    levothyroxine tablet 200 mcg  200 mcg Oral Early Morning    lisinopril (ZESTRIL) tablet 40 mg  40 mg Oral Daily    metroNIDAZOLE (FLAGYL) IVPB (premix) 500 mg  500 mg Intravenous Q8H    ondansetron (ZOFRAN) injection 4 mg  4 mg Intravenous Q4H PRN    oxyCODONE (ROXICODONE) IR tablet 2 5 mg  2 5 mg Oral Q4H PRN    pantoprazole (PROTONIX) injection 40 mg  40 mg Intravenous Q12H JT    sucralfate (CARAFATE) oral suspension 1,000 mg  1,000 mg Oral 4x Daily (AC & HS)       No Known Allergies    OBJECTIVE:    Physical Exam  Physical Exam    Lab Results:   I have personally reviewed pertinent labs  , CBC:   Lab Results   Component Value Date    WBC 5 63 02/19/2018    HGB 9 3 (L) 02/19/2018    HCT 28 3 (L) 02/19/2018    MCV 91 02/19/2018    PLT 63 (L) 02/19/2018    MCH 30 0 02/19/2018    MCHC 32 9 02/19/2018    RDW 15 1 02/19/2018    MPV 11 5 02/19/2018   , CMP:   Lab Results Component Value Date     02/19/2018    K 4 0 02/19/2018     02/19/2018    CO2 28 02/19/2018    ANIONGAP 5 02/19/2018    BUN 9 02/19/2018    CREATININE 0 45 (L) 02/19/2018    GLUCOSE 163 (H) 02/19/2018    CALCIUM 8 3 02/19/2018    EGFR 100 02/19/2018   WBCs improved, plts still quite low, hgb also still low    Imaging Studies: reviewed CT scans with Dr Monique Sample  EKG, Pathology, and Other Studies: none new    Counseling / Coordination of Care  Total floor / unit time spent today 25+ minutes  Greater than 50% of total time was spent with the patient and / or family counseling and / or coordination of care  A description of the counseling / coordination of care: coordination with surgical provider as noted; symptom pursuit at the bedside  The patient is a 59y Female complaining of back pain/injury.

## 2024-10-29 NOTE — PROGRESS NOTES
Progress Note - Critical Care   Donte Jaimes 67 y o  female MRN: [id number]  Unit/Bed#: ICU 11 Encounter: [id number]    Assessment/Plan:  1  Chronic blood loss anemia secondary to a gastric ulcer  · Her hemoglobin is up to 11 9  · Will continue to trend her hemoglobin daily  2  Acute hypoxic respiratory failure likely multifactorial related to #1 and her recent surgical procedure  · We will attempt to wean the patient  · We will give her a dose of Lasix this morning prior to her weaning trial  3  Perforated gastric ulcer status post Allayne Freud patch, postop day 1  · Appreciate surgery's assistance with the care of this patient  · Diet advancement per the surgical team's recommendations  4  Diabetes mellitus type 2  · We will continue her on sliding scale insulin with a goal to maintain her blood glucose between 140 and 180  5  Multiple sclerosis  · We will continue her on her outpatient Imuran  6  Hypothyroid-on Synthroid  7  Hypoalbuminemia secondary to protein calorie malnutrition    Critical Care Time:   Documented critical care time excludes any procedures documented elsewhere  It also excludes any family updates    _____________________________________________________________________    HPI/24hr events:   Events of yesterday are noted  The patient had no acute events overnight      Medications:    Current Facility-Administered Medications:  atorvastatin 40 mg Oral Daily With PARRISH Hilton    azaTHIOprine 50 mg Oral Daily PARRISH Wolf    chlorhexidine 15 mL Swish & Spit Q12H [address] PARRISH Rendon    cholecalciferol 1,000 Units Oral Daily Tamela Casey, [address]    [START ON 1/31/2018] ergocalciferol 50,000 Units Oral Weekly PARRISH Wolf    fluconazole 400 mg Intravenous Q24H Mar López MD Last Rate: 400 mg (01/29/18 2108)   glipiZIDE 5 mg Oral BID AC PARRISH Delgado    hydrALAZINE 10 mg Intravenous Q4H PRN PARRISH Rendon    HYDROmorphone 0 5 mg Intravenous Q3H PRN Tato Davenport TREVOR Loyd PA-C    insulin lispro 1-5 Units Subcutaneous Q6H [address] [place]    levothyroxine 200 mcg Oral Early Morning PARRISH Davison    methylprednisolone 4 mg Oral See Admin Instructions PARRISH Davison    morphine injection 1 mg Intravenous Q4H PRN Aries Modest, CRNP    ondansetron 4 mg Intravenous Q4H PRN Aries Modest, CRNP    piperacillin-tazobactam 3 375 g Intravenous Q6H Aries Modest, CRNP Last Rate: 3 375 g (01/30/18 0318)   propofol 5-50 mcg/kg/min Intravenous Titrated Aries Modest, CRNP Last Rate: 30 mcg/kg/min (01/30/18 0631)         propofol 5-50 mcg/kg/min Last Rate: 30 mcg/kg/min (01/30/18 0631)         Physical exam:  Vitals: Body mass index is 25 31 kg/m²  Blood pressure (!) 208/80, pulse 92, temperature 98 6 °F (37 °C), temperature source Temporal, resp  rate 12, height 5' 7" (1 702 m), weight 73 3 kg (161 lb 9 6 oz), SpO2 100 %  ,  Temp  Min: 97 8 °F (36 6 °C)  Max: 99 6 °F (37 6 °C)  IBW: 61 6 kg    SpO2: 100 %            Intake/Output Summary (Last 24 hours) at 01/30/18 0754  Last data filed at 01/30/18 0600   Gross per 24 hour   Intake          4588 18 ml   Output             2575 ml   Net          2013 18 ml       Invasive/non-invasive ventilation settings:   Respiratory    Lab Data (Last 4 hours)    None         O2/Vent Data (Last 4 hours)    None              Invasive Devices     Central Venous Catheter Line            CVC Central Lines 01/29/18 Triple Right Femoral less than 1 day          Peripheral Intravenous Line            Peripheral IV 01/28/18 Left Antecubital 1 day          Arterial Line            Arterial Line 01/29/18 Radial less than 1 day          Drain            Closed/Suction Drain Left Abdomen Bulb 15 Fr  less than 1 day    Closed/Suction Drain Right;Other (Comment) Abdomen Bulb 15 Fr  less than 1 day    Urethral Catheter Latex; Double-lumen 16 Fr  less than 1 day          Airway            ETT  7 mm less than 1 day                  Physical Exam:  Gen:  Sedated but arousable  HEENT:  Pupils are equal round reactive to light  Neck:  Supple negative for lymphadenopathy  Chest:  Coarse bilaterally  Cor:  Regular rate and rhythm  Abd:  Soft with some incisional tenderness  Ext:  There is edema in her bilateral lower extremities  Neuro:  Sedated but arousable  Skin:  Warm and dry      Diagnostic Data:  Lab: I have personally reviewed pertinent lab results  CBC:     Results from last 7 days  Lab Units 01/30/18  0447 01/29/18  2006 01/29/18  1656  01/29/18  1435 01/29/18  0951   WBC Thousand/uL 2 94*  --  2 26*  --   --  2 43*   HEMOGLOBIN g/dL 11 9 12 4 7 8*  --  8 4* 9 1*   I STAT HEMOGLOBIN   --   --   --   < >  --   --    HEMATOCRIT % 34 9  --  22 8*  --  24 7* 26 9*   PLATELETS Thousands/uL 50*  --  102*  --   --  37*   < > = values in this interval not displayed      CMP:     Results from last 7 days  Lab Units 01/30/18 0447 01/29/18  1651 01/29/18  1638 01/29/18  0951 01/29/18  0016 01/28/18  1745   SODIUM mmol/L 144  --   --  138 134* 133*   POTASSIUM mmol/L 4 1  --   --  4 7 4 9 5 6*   CHLORIDE mmol/L 113*  --   --  106 103 99*   CO2 mmol/L 22  --   --  25 23 18*   BUN mg/dL 42*  --   --  46* 44* 43*   CREATININE mg/dL 0 80  --   --  1 13 1 23 1 31*   CALCIUM mg/dL 7 3*  --   --  8 4 8 2* 8 6   TOTAL PROTEIN g/dL  --   --   --   --   --  5 4*   BILIRUBIN TOTAL mg/dL  --   --   --   --   --  1 30*   ALK PHOS U/L  --   --   --   --   --  76   ALT U/L  --   --   --   --   --  23   AST U/L  --   --   --   --   --  30   GLUCOSE RANDOM mg/dL 202*  --   --  403* 512* 586*   GLUCOSE, ISTAT mg/dl  --  328* 335*  --   --   --      PT/INR:   No results found for: PT, INR,   Magnesium:   Results from last 7 days  Lab Units 01/29/18  0016   MAGNESIUM mg/dL 1 7     Phosphorous:       Microbiology:        Imaging:      Cardiac lab/EKG/telemetry/ECHO:       VTE Prophylaxis:  SCDs    Code Status: Level 3 - DNAR and DNI    Monique Number, CRNP    Portions of the record may have been created with voice recognition software  Occasional wrong word or "sound a like" substitutions may have occurred due to the inherent limitations of voice recognition software  Read the chart carefully and recognize, using context, where substitutions have occurred

## 2024-12-11 NOTE — PROGRESS NOTES
[facility] Progress Note  Patient: River Moore 67 y o  female   MRN: [id number]  PCP: Favio Gage DO  Unit/Bed#: ICU 03 Encounter: [id number]  Date Of Visit: 02/12/18      Assessment/plan  Principal problem  1-sepsis:  Present on admission:  secondary to peritonitis due to a perforated gastric ulcer:  The patient underwent surgical repair   She completed 7 days of antibiotics   Her sepsis had resolved  However pt spiked a fever of 102 on 2/8, she was also noted to be leukopenic and borderline neutropenic with an [address] of 700  she was started by ID on cefepime and Flagyl  CT abdomen/pelvis without evidence of focal infection   Had fluid collection the lesser curvature, however has been stable since previous CAT scan of February 2nd   There was discussion about IR to drain this fluid  Will await ID and sugery follow up about fluid collection  2-perforated gastric ulcer:  she was orginally in the ICU and then transferred to surgery service after silvia patch repair  She was then switched to internal medicine service as she is pod 14  She will continue with ppi  She is npo for EGD by GI today     3  Acute/chronic blood loss anemia and pancytopenia- pt had acute blood loss due to ulcer  She has chronic anemia due to pancytopenia  She was transfused approx 7 units of prbc  She is s/p silvia patch by surgery but having EGD by GI today due to ongoing melena  Active problems  1  -diabetes type 2:  continue to hold po antidiabetic medications  Change sliding scale to q6 hours  Will redose lantus of 4 units today while npo       2-hypernatremia:resolved    3-pancytopenia: appreciate hematology recommendations  S/p granix  Pt will need bone marrow biopsy  Scheduled for tomorrow  Platelets are less than 50,000 with ongoing melena  Will transfuse one more unit  4-hypothyroidism:  Continue Synthroid     5-hyperlipidemia:  Statin on hold     6-multiple sclerosis:  Imuran on hold  appreciate Patient has permanent atrial fibrillation. Patient is currently in sinus rhythm. [id number] Score: 5. The patients heart rate in the last 24 hours is as follows:  Pulse  Min: 95  Max: 105     Antiarrhythmics  diltiaZEM 24 hr capsule 120 mg, Daily, Oral       Neurology consultation and recommendations   CT scan revealed multiple old strokes as well as a chronic right occipital infarct with some surrounding hyperdensity consistent with laminar necrosis   Less likely petechial hemorrhage   Continued monitor as patient had thrombocytopenia  MRI pending     7-left hand cat bite:  No evidence of cellulitis   Status post 7 days of antibiotics      8-acute metabolic encephalopathy: Most likely multifactorial, secondary to hypernatremia, sepsis, hypernatremia, hyperglycemia  resolving                 9-intermittent elevated blood presure:  Without prior h/o HTN   Most likely accelerated due to pain   Cont to monitor   continue prn antihypertensive medications     10 left upper extremity DVT:  Secondary to PICC line   Have removed PICC line   Unfortunately patient may not be on anticoagulation due to pancytopenia     11-decubitus ulcers:  Appreciate wound Care team's evaluation recommendations  dispo- awaiting egd by GI  Awaiting bone marrow biopsy  Awaiting physical therapy  Continue to monitor in step down until after procedures  Subjective:   Pt seen and examined  Pt remembered me from last week  She knew she was in the step down  She states she feels a lot better then last week  She states she thinks her melena is getting better  abd pain is improved  No n/v/d no f/c no cp no sob     Objective:     Vitals: Blood pressure 156/64, pulse 90, temperature 97 5 °F (36 4 °C), temperature source Temporal, resp  rate 14, height 5' 7" (1 702 m), weight 76 1 kg (167 lb 12 3 oz), SpO2 98 %  ,Body mass index is 26 28 kg/m²      Lab, Imaging and other studies:    Results from last 7 days  Lab Units 02/12/18  0442   WBC Thousand/uL 9 24   HEMOGLOBIN g/dL 11 1*   HEMATOCRIT % 32 7*   PLATELETS Thousands/uL 43*   INR  1 05       Results from last 7 days  Lab Units 02/12/18  0442  02/10/18  0431   SODIUM mmol/L 137  < > 138   POTASSIUM mmol/L 4 2  < > 4 1   CHLORIDE mmol/L 106  < > 107 CO2 mmol/L 25  < > 25   BUN mg/dL 12  < > 14   CREATININE mg/dL 0 58*  < > 0 64   CALCIUM mg/dL 8 4  < > 7 9*   TOTAL PROTEIN g/dL  --   --  3 7*   BILIRUBIN TOTAL mg/dL  --   --  1 24*   ALK PHOS U/L  --   --  47   ALT U/L  --   --  12   AST U/L  --   --  20   GLUCOSE RANDOM mg/dL 189*  < > 137   < > = values in this interval not displayed  Lab Results   Component Value Date    BLOODCX No Growth at 72 hrs  02/08/2018    BLOODCX No Growth at 72 hrs  02/08/2018    BLOODCX No Growth After 5 Days  01/31/2018    BLOODCX No Growth After 5 Days   01/31/2018           Scheduled Meds:   Current Facility-Administered Medications:  acetaminophen 650 mg Oral Q4H PRN Ellie Edge MD    calcium carbonate 500 mg Oral TID PRN Ellie Edge MD    cefepime 2,000 mg Intravenous Q12H Qiana Brown MD Last Rate: Stopped (02/11/18 2313)   hydrALAZINE 5 mg Intravenous Q6H PRN Ellie Edge MD    insulin lispro 1-5 Units Subcutaneous TID With Meals Magdalene Delcid DO    insulin lispro 5 Units Subcutaneous TID With Meals Ellie Edge MD    iohexol 50 mL Oral Once in imaging Ellie Edge MD    levothyroxine 200 mcg Oral Early Morning Magdalene Delcid DO    methocarbamol 500 mg Oral Q6H PRN Ellie Edge MD    metroNIDAZOLE 500 mg Oral [facility] Qiana Brown MD    morphine injection 2 mg Intravenous Q2H PRN Ollie Pierce MD    ondansetron 4 mg Intravenous Q4H PRN PARRISH Marte    pantoprazole 40 mg Intravenous Q12H [facility] Vesta Kawasaki, MD    sodium chloride 75 mL/hr Intravenous Continuous Ellie Edge MD Last Rate: 75 mL/hr (02/12/18 0549)   sucralfate 1,000 mg Oral BID AC Ollie Pierce MD    tbo-filgrastim 300 mcg Subcutaneous Daily Mari Fitzgerald DO      Continuous Infusions:   sodium chloride 75 mL/hr Last Rate: 75 mL/hr (02/12/18 0549)     PRN Meds:   acetaminophen    calcium carbonate    hydrALAZINE    iohexol    methocarbamol    morphine injection    ondansetron      Physical exam:  Physical Exam  General appearance: alert and oriented, in no acute distress  Head: Normocephalic, without obvious abnormality, atraumatic  Eyes: conjunctivae/corneas clear  PERRL, EOM's intact  Fundi benign    Neck: no adenopathy, no carotid bruit, no JVD, supple, symmetrical, trachea midline and thyroid not enlarged, symmetric, no tenderness/mass/nodules  Lungs: clear to auscultation bilaterally  Heart: regular rate and rhythm, S1, S2 normal, no murmur, click, rub or gallop  Abdomen: soft minimal ttp +bs  Extremities: soft minimal ttp +bs  Pulses: 2+ and symmetric  Skin: Skin color, texture, turgor normal  No rashes or lesions  Neurologic: Mental status: Alert, oriented, thought content appropriate      VTE Pharmacologic Prophylaxis: Reason for no pharmacologic prophylaxis gi bleed  VTE Mechanical Prophylaxis: sequential compression device    Counseling / Coordination of Care  Total floor / unit time spent today 20 minutes    Current Length of Stay: 15 day(s)    Current Patient Status: Inpatient       Code Status: Level 3 - DNAR and DNI

## 2025-03-03 NOTE — PLAN OF CARE
DISCHARGE PLANNING     Discharge to home or other facility with appropriate resources Progressing        DISCHARGE PLANNING - CARE MANAGEMENT     Discharge to post-acute care or home with appropriate resources Progressing        INFECTION - ADULT     Absence or prevention of progression during hospitalization Progressing     Absence of fever/infection during neutropenic period Progressing        Knowledge Deficit     Patient/family/caregiver demonstrates understanding of disease process, treatment plan, medications, and discharge instructions Progressing        Nutrition/Hydration-ADULT     Nutrient/Hydration intake appropriate for improving, restoring or maintaining nutritional needs Progressing        PAIN - ADULT     Verbalizes/displays adequate comfort level or baseline comfort level Progressing        Potential for Falls     Patient will remain free of falls Progressing        Prexisting or High Potential for Compromised Skin Integrity     Skin integrity is maintained or improved Progressing        SAFETY ADULT     Maintain or return to baseline ADL function Progressing     Maintain or return mobility status to optimal level Progressing We just started a low dose, so yes the higher the dose the more impact and longer the effect but we can even make it more frequent if need be     If there is a short improvement please let us know about this instead of saying something didn't work because we can help adjust the meds to make it beneficial plz

## (undated) DEVICE — SUT VICRYL 0 UR-6 27 IN J603H

## (undated) DEVICE — INTENDED FOR TISSUE SEPARATION, AND OTHER PROCEDURES THAT REQUIRE A SHARP SURGICAL BLADE TO PUNCTURE OR CUT.: Brand: BARD-PARKER SAFETY BLADES SIZE 11, STERILE

## (undated) DEVICE — GLOVE INDICATOR PI UNDERGLOVE SZ 6.5 BLUE

## (undated) DEVICE — 3M™ TEGADERM™ TRANSPARENT FILM DRESSING FRAME STYLE, 1626W, 4 IN X 4-3/4 IN (10 CM X 12 CM), 50/CT 4CT/CASE: Brand: 3M™ TEGADERM™

## (undated) DEVICE — ADHESIVE SKN CLSR HISTOACRYL FLEX 0.5ML LF

## (undated) DEVICE — SUT VICRYL 3-0 SH 27 IN J416H

## (undated) DEVICE — SUT SILK 3-0 SH CR/8 18 IN C013D

## (undated) DEVICE — HARMONIC ACE 5MM DIAMETER SHEARS 36CM SHAFT LENGTH + ADAPTIVE TISSUE TECHNOLOGY FOR USE WITH GENERATOR G11: Brand: HARMONIC ACE

## (undated) DEVICE — JP CHAN DRN SIL HUBLESS 15FR W/TRO: Brand: CARDINAL HEALTH

## (undated) DEVICE — PROXIMATE SKIN STAPLERS (35 WIDE) CONTAINS 35 STAINLESS STEEL STAPLES (FIXED HEAD): Brand: PROXIMATE

## (undated) DEVICE — SUT VICRYL 2-0 SH 27 IN UNDYED J417H

## (undated) DEVICE — SUT PDS II 1 CTX 36 IN Z371T

## (undated) DEVICE — GLOVE SRG BIOGEL 6.5

## (undated) DEVICE — SUT ETHILON 2-0 FS 18 IN 664H

## (undated) DEVICE — ALLENTOWN LAP CHOLE APP PACK: Brand: CARDINAL HEALTH

## (undated) DEVICE — BLUE HEAT SCOPE WARMER

## (undated) DEVICE — NEEDLE COUNTER LG W/RULER

## (undated) DEVICE — 3000CC GUARDIAN II: Brand: GUARDIAN

## (undated) DEVICE — REM POLYHESIVE ADULT PATIENT RETURN ELECTRODE: Brand: VALLEYLAB

## (undated) DEVICE — SCD SEQUENTIAL COMPRESSION COMFORT SLEEVE MEDIUM KNEE LENGTH: Brand: KENDALL SCD

## (undated) DEVICE — SPONGE LAP 18 X 18 IN

## (undated) DEVICE — TOWEL SET X-RAY

## (undated) DEVICE — ENDOPATH XCEL UNIVERSAL TROCAR STABLILITY SLEEVES: Brand: ENDOPATH XCEL

## (undated) DEVICE — HEAVY DRAINAGE PACK: Brand: CURITY

## (undated) DEVICE — MAYO STAND COVER: Brand: CONVERTORS

## (undated) DEVICE — GLOVE SRG BIOGEL ECLIPSE 7.5

## (undated) DEVICE — GLOVE INDICATOR PI UNDERGLOVE SZ 8 BLUE

## (undated) DEVICE — IRRIG ENDO FLO TUBING

## (undated) DEVICE — INTENDED FOR TISSUE SEPARATION, AND OTHER PROCEDURES THAT REQUIRE A SHARP SURGICAL BLADE TO PUNCTURE OR CUT.: Brand: BARD-PARKER SAFETY BLADES SIZE 10, STERILE

## (undated) DEVICE — ENDOPATH XCEL BLADELESS TROCARS WITH STABILITY SLEEVES: Brand: ENDOPATH XCEL

## (undated) DEVICE — JACKSON-PRATT 100CC BULB RESERVOIR: Brand: CARDINAL HEALTH

## (undated) DEVICE — SUT MONOCRYL 4-0 PS-2 27 IN Y426H

## (undated) DEVICE — 2000CC GUARDIAN II: Brand: GUARDIAN

## (undated) DEVICE — SUT PDS II 3-0 SH 27 IN Z316H

## (undated) DEVICE — DRAPE FLUID WARMER (BIRD BATH)